# Patient Record
Sex: FEMALE | Race: WHITE | Employment: OTHER | URBAN - NONMETROPOLITAN AREA
[De-identification: names, ages, dates, MRNs, and addresses within clinical notes are randomized per-mention and may not be internally consistent; named-entity substitution may affect disease eponyms.]

---

## 2017-10-06 ENCOUNTER — OFFICE VISIT (OUTPATIENT)
Dept: PRIMARY CARE CLINIC | Age: 47
End: 2017-10-06
Payer: MEDICARE

## 2017-10-06 VITALS
BODY MASS INDEX: 43.4 KG/M2 | HEART RATE: 100 BPM | TEMPERATURE: 97.6 F | HEIGHT: 69 IN | DIASTOLIC BLOOD PRESSURE: 74 MMHG | SYSTOLIC BLOOD PRESSURE: 122 MMHG | OXYGEN SATURATION: 95 % | WEIGHT: 293 LBS

## 2017-10-06 DIAGNOSIS — J06.9 UPPER RESPIRATORY TRACT INFECTION, UNSPECIFIED TYPE: ICD-10-CM

## 2017-10-06 DIAGNOSIS — R05.9 COUGH: ICD-10-CM

## 2017-10-06 DIAGNOSIS — J06.9 UPPER RESPIRATORY TRACT INFECTION, UNSPECIFIED TYPE: Primary | ICD-10-CM

## 2017-10-06 PROCEDURE — 4004F PT TOBACCO SCREEN RCVD TLK: CPT | Performed by: NURSE PRACTITIONER

## 2017-10-06 PROCEDURE — 99213 OFFICE O/P EST LOW 20 MIN: CPT | Performed by: NURSE PRACTITIONER

## 2017-10-06 PROCEDURE — G8427 DOCREV CUR MEDS BY ELIG CLIN: HCPCS | Performed by: NURSE PRACTITIONER

## 2017-10-06 PROCEDURE — G8484 FLU IMMUNIZE NO ADMIN: HCPCS | Performed by: NURSE PRACTITIONER

## 2017-10-06 PROCEDURE — G8419 CALC BMI OUT NRM PARAM NOF/U: HCPCS | Performed by: NURSE PRACTITIONER

## 2017-10-06 RX ORDER — AMOXICILLIN AND CLAVULANATE POTASSIUM 875; 125 MG/1; MG/1
1 TABLET, FILM COATED ORAL 2 TIMES DAILY
Qty: 20 TABLET | Refills: 0 | Status: SHIPPED | OUTPATIENT
Start: 2017-10-06 | End: 2017-10-16

## 2017-10-06 RX ORDER — GUAIFENESIN AND CODEINE PHOSPHATE 100; 10 MG/5ML; MG/5ML
5 SOLUTION ORAL 4 TIMES DAILY PRN
Qty: 180 ML | Refills: 0 | Status: SHIPPED | OUTPATIENT
Start: 2017-10-06 | End: 2017-10-13

## 2017-10-06 RX ORDER — AMOXICILLIN AND CLAVULANATE POTASSIUM 875; 125 MG/1; MG/1
1 TABLET, FILM COATED ORAL 2 TIMES DAILY
Qty: 20 TABLET | Refills: 0 | Status: SHIPPED | OUTPATIENT
Start: 2017-10-06 | End: 2017-10-06 | Stop reason: SDUPTHER

## 2017-10-06 ASSESSMENT — ENCOUNTER SYMPTOMS
COUGH: 1
ABDOMINAL PAIN: 0
DIARRHEA: 0
SORE THROAT: 1
RHINORRHEA: 1
CONSTIPATION: 0
WHEEZING: 1
NAUSEA: 1
EYE DISCHARGE: 0
SINUS PRESSURE: 1
BLOOD IN STOOL: 0
EYE REDNESS: 0
TROUBLE SWALLOWING: 0

## 2017-10-06 NOTE — PROGRESS NOTES
[address]  [place], [address] [place] [address] Rd  Phone [phone]   Fax [phone]      OFFICE VISIT: 10/6/2017    Karin Brunner is a 52 y.o. female who presents today for her medical conditions/complaints as noted below. Karin Brunner is c/o of Cough (green); Congestion; Generalized Body Aches; and Nausea & Vomiting        HPI:     Cough   This is a new problem. The current episode started in the past 7 days. The problem has been gradually worsening. Associated symptoms include nasal congestion, postnasal drip, rhinorrhea, a sore throat and wheezing. Pertinent negatives include no chest pain, eye redness or rash. Nausea & Vomiting   This is a new problem. The current episode started in the past 7 days. Associated symptoms include congestion, coughing, nausea and a sore throat. Pertinent negatives include no abdominal pain, chest pain, rash or weakness. Past Medical History:   Diagnosis Date    Depression     Diabetes ([facility]     Hypertension     Hypothyroidism     RA (rheumatoid arthritis) ([facility]       Past Surgical History:   Procedure Laterality Date    CHOLECYSTECTOMY      TOTAL KNEE ARTHROPLASTY      x2       History reviewed. No pertinent family history. Social History   Substance Use Topics    Smoking status: Current Every Day Smoker     Packs/day: 1.00     Years: 28.00    Smokeless tobacco: Never Used    Alcohol use Yes      Comment: occ      Current Outpatient Prescriptions   Medication Sig Dispense Refill    fluticasone (VERAMYST) 27.5 MCG/SPRAY nasal spray 2 sprays by Nasal route daily      guaiFENesin-codeine (CHERATUSSIN AC) 100-10 MG/5ML syrup Take 5 mLs by mouth 4 times daily as needed for Cough 180 mL 0    Blood Glucose Monitoring Suppl ERASTO Use to check blood sugar.  1 Device 0    amoxicillin-clavulanate (AUGMENTIN) 875-125 MG per tablet Take 1 tablet by mouth 2 times daily for 10 days 20 tablet 0    tiZANidine (ZANAFLEX) 4 MG tablet Take 2 tablets by mouth 3 times daily      visit.       Electronically signed by ANJANA Bertrand on 10/6/2017 at 2:57 PM

## 2017-10-13 DIAGNOSIS — R05.9 COUGH: ICD-10-CM

## 2017-10-13 RX ORDER — GUAIFENESIN AND CODEINE PHOSPHATE 100; 10 MG/5ML; MG/5ML
5 SOLUTION ORAL 4 TIMES DAILY PRN
Qty: 180 ML | Refills: 0 | OUTPATIENT
Start: 2017-10-13 | End: 2017-10-20

## 2017-10-13 RX ORDER — DEXTROMETHORPHAN HYDROBROMIDE AND PROMETHAZINE HYDROCHLORIDE 15; 6.25 MG/5ML; MG/5ML
5 SYRUP ORAL 4 TIMES DAILY PRN
Qty: 180 ML | Refills: 0 | Status: SHIPPED | OUTPATIENT
Start: 2017-10-13 | End: 2017-10-20

## 2017-12-23 ENCOUNTER — ANESTHESIA EVENT (OUTPATIENT)
Dept: OPERATING ROOM | Age: 47
DRG: 270 | End: 2017-12-23
Payer: MEDICARE

## 2017-12-23 ENCOUNTER — APPOINTMENT (OUTPATIENT)
Dept: INTERVENTIONAL RADIOLOGY/VASCULAR | Age: 47
DRG: 270 | End: 2017-12-23
Payer: MEDICARE

## 2017-12-23 ENCOUNTER — HOSPITAL ENCOUNTER (INPATIENT)
Age: 47
LOS: 6 days | Discharge: HOME | DRG: 270 | End: 2017-12-29
Attending: EMERGENCY MEDICINE | Admitting: SURGERY
Payer: MEDICARE

## 2017-12-23 ENCOUNTER — ANESTHESIA (OUTPATIENT)
Dept: OPERATING ROOM | Age: 47
DRG: 270 | End: 2017-12-23
Payer: MEDICARE

## 2017-12-23 ENCOUNTER — HOSPITAL ENCOUNTER (EMERGENCY)
Facility: HOSPITAL | Age: 47
Discharge: ANOTHER HOSPITAL | End: 2017-12-23
Attending: EMERGENCY MEDICINE | Admitting: EMERGENCY MEDICINE

## 2017-12-23 VITALS
HEIGHT: 60 IN | WEIGHT: 136 LBS | OXYGEN SATURATION: 94 % | DIASTOLIC BLOOD PRESSURE: 51 MMHG | TEMPERATURE: 98.5 F | HEART RATE: 76 BPM | RESPIRATION RATE: 20 BRPM | BODY MASS INDEX: 26.7 KG/M2 | SYSTOLIC BLOOD PRESSURE: 106 MMHG

## 2017-12-23 VITALS
OXYGEN SATURATION: 100 % | SYSTOLIC BLOOD PRESSURE: 128 MMHG | DIASTOLIC BLOOD PRESSURE: 97 MMHG | RESPIRATION RATE: 1 BRPM

## 2017-12-23 DIAGNOSIS — I75.021: Primary | ICD-10-CM

## 2017-12-23 DIAGNOSIS — I70.209 ARTERIAL OCCLUSION, LOWER EXTREMITY (HCC): ICD-10-CM

## 2017-12-23 DIAGNOSIS — N17.9 ACUTE RENAL FAILURE, UNSPECIFIED ACUTE RENAL FAILURE TYPE (HCC): ICD-10-CM

## 2017-12-23 DIAGNOSIS — I21.3 ST ELEVATION MYOCARDIAL INFARCTION (STEMI), UNSPECIFIED ARTERY (HCC): Primary | ICD-10-CM

## 2017-12-23 PROBLEM — I99.8 ISCHEMIC LEG: Status: ACTIVE | Noted: 2017-12-23

## 2017-12-23 PROBLEM — I25.10 CAD (CORONARY ARTERY DISEASE): Status: ACTIVE | Noted: 2017-12-23

## 2017-12-23 LAB
ABO/RH: NORMAL
ALBUMIN SERPL-MCNC: 4.4 G/DL (ref 3.5–5.2)
ALP BLD-CCNC: 83 U/L (ref 35–104)
ALT SERPL-CCNC: 40 U/L (ref 5–33)
ANION GAP SERPL CALCULATED.3IONS-SCNC: 14 MMOL/L (ref 4–13)
ANION GAP SERPL CALCULATED.3IONS-SCNC: 18 MMOL/L (ref 7–19)
ANTIBODY SCREEN: NORMAL
APTT PPP: 29.2 SECONDS (ref 24.1–34.8)
APTT: 41.9 SEC (ref 26–36.2)
AST SERPL-CCNC: 240 U/L (ref 5–32)
BASOPHILS ABSOLUTE: 0.1 K/UL (ref 0–0.2)
BASOPHILS RELATIVE PERCENT: 0.3 % (ref 0–1)
BILIRUB SERPL-MCNC: 0.4 MG/DL (ref 0.2–1.2)
BUN BLD-MCNC: 24 MG/DL (ref 5–21)
BUN BLDV-MCNC: 25 MG/DL (ref 6–20)
BUN/CREAT SERPL: 5.3 (ref 7–25)
CALCIUM SERPL-MCNC: 8.9 MG/DL (ref 8.6–10)
CALCIUM SPEC-SCNC: 9.6 MG/DL (ref 8.4–10.4)
CHLORIDE BLD-SCNC: 89 MMOL/L (ref 98–111)
CHLORIDE SERPL-SCNC: 92 MMOL/L (ref 98–110)
CO2 SERPL-SCNC: 28 MMOL/L (ref 24–31)
CO2: 25 MMOL/L (ref 22–29)
CREAT BLD-MCNC: 4.49 MG/DL (ref 0.5–1.4)
CREAT SERPL-MCNC: 4.4 MG/DL (ref 0.5–0.9)
DEPRECATED RDW RBC AUTO: 45.1 FL (ref 40–54)
EOSINOPHILS ABSOLUTE: 0.2 K/UL (ref 0–0.6)
EOSINOPHILS RELATIVE PERCENT: 0.6 % (ref 0–5)
ERYTHROCYTE [DISTWIDTH] IN BLOOD BY AUTOMATED COUNT: 14.5 % (ref 12–15)
GFR NON-AFRICAN AMERICAN: 11
GFR SERPL CREATININE-BSD FRML MDRD: 11 ML/MIN/1.73
GLUCOSE BLD-MCNC: 130 MG/DL (ref 70–100)
GLUCOSE BLD-MCNC: 159 MG/DL (ref 74–109)
HCT VFR BLD AUTO: 43.2 % (ref 37–47)
HCT VFR BLD CALC: 46 % (ref 37–47)
HEMOGLOBIN: 15.3 G/DL (ref 12–16)
HGB BLD-MCNC: 15.1 G/DL (ref 12–16)
INR BLD: 1.01 (ref 0.88–1.18)
INR PPP: 0.92 (ref 0.91–1.09)
LYMPHOCYTES # BLD MANUAL: 0.5 10*3/MM3 (ref 0.72–4.86)
LYMPHOCYTES ABSOLUTE: 4.5 K/UL (ref 1.1–4.5)
LYMPHOCYTES NFR BLD MANUAL: 2 % (ref 15–45)
LYMPHOCYTES NFR BLD MANUAL: 2 % (ref 4–12)
LYMPHOCYTES RELATIVE PERCENT: 17.9 % (ref 20–40)
MCH RBC QN AUTO: 30 PG (ref 27–31)
MCH RBC QN AUTO: 30.3 PG (ref 28–32)
MCHC RBC AUTO-ENTMCNC: 33.3 G/DL (ref 33–37)
MCHC RBC AUTO-ENTMCNC: 35 G/DL (ref 33–36)
MCV RBC AUTO: 86.6 FL (ref 82–98)
MCV RBC AUTO: 90.2 FL (ref 81–99)
METAMYELOCYTES NFR BLD MANUAL: 7 % (ref 0–0)
MONOCYTES # BLD AUTO: 0.5 10*3/MM3 (ref 0.19–1.3)
MONOCYTES ABSOLUTE: 1.7 K/UL (ref 0–0.9)
MONOCYTES RELATIVE PERCENT: 6.8 % (ref 0–10)
NEUTROPHILS # BLD AUTO: 21.34 10*3/MM3 (ref 1.87–8.4)
NEUTROPHILS ABSOLUTE: 18.4 K/UL (ref 1.5–7.5)
NEUTROPHILS NFR BLD MANUAL: 54 % (ref 39–78)
NEUTROPHILS RELATIVE PERCENT: 73.7 % (ref 50–65)
NEUTS BAND NFR BLD MANUAL: 31 % (ref 0–10)
PDW BLD-RTO: 14.3 % (ref 11.5–14.5)
PERFORMED ON: ABNORMAL
PLAT MORPH BLD: NORMAL
PLATELET # BLD AUTO: 353 10*3/MM3 (ref 130–400)
PLATELET # BLD: 329 K/UL (ref 130–400)
PMV BLD AUTO: 10.5 FL (ref 9.4–12.3)
PMV BLD AUTO: 10.8 FL (ref 6–12)
POC TROPONIN I: 32.49 NG/ML (ref 0–0.08)
POTASSIUM BLD-SCNC: 3.5 MMOL/L (ref 3.5–5.3)
POTASSIUM SERPL-SCNC: 3.8 MMOL/L (ref 3.5–5)
PROTHROMBIN TIME: 12.6 SECONDS (ref 11.9–14.6)
PROTHROMBIN TIME: 13.2 SEC (ref 12–14.6)
RBC # BLD AUTO: 4.99 10*6/MM3 (ref 4.2–5.4)
RBC # BLD: 5.1 M/UL (ref 4.2–5.4)
RBC MORPH BLD: NORMAL
SCAN SLIDE: NORMAL
SODIUM BLD-SCNC: 132 MMOL/L (ref 136–145)
SODIUM BLD-SCNC: 134 MMOL/L (ref 135–145)
TOTAL PROTEIN: 8.3 G/DL (ref 6.6–8.7)
VARIANT LYMPHS NFR BLD MANUAL: 4 % (ref 0–5)
WBC # BLD: 24.9 K/UL (ref 4.8–10.8)
WBC MORPH BLD: NORMAL
WBC NRBC COR # BLD: 25.11 10*3/MM3 (ref 4.8–10.8)

## 2017-12-23 PROCEDURE — 93005 ELECTROCARDIOGRAM TRACING: CPT | Performed by: EMERGENCY MEDICINE

## 2017-12-23 PROCEDURE — 6360000002 HC RX W HCPCS: Performed by: SURGERY

## 2017-12-23 PROCEDURE — C1894 INTRO/SHEATH, NON-LASER: HCPCS | Performed by: SURGERY

## 2017-12-23 PROCEDURE — C1769 GUIDE WIRE: HCPCS

## 2017-12-23 PROCEDURE — 85730 THROMBOPLASTIN TIME PARTIAL: CPT | Performed by: EMERGENCY MEDICINE

## 2017-12-23 PROCEDURE — C1769 GUIDE WIRE: HCPCS | Performed by: SURGERY

## 2017-12-23 PROCEDURE — 2709999900 HC NON-CHARGEABLE SUPPLY

## 2017-12-23 PROCEDURE — B2151ZZ FLUOROSCOPY OF LEFT HEART USING LOW OSMOLAR CONTRAST: ICD-10-PCS | Performed by: INTERNAL MEDICINE

## 2017-12-23 PROCEDURE — 99223 1ST HOSP IP/OBS HIGH 75: CPT | Performed by: INTERNAL MEDICINE

## 2017-12-23 PROCEDURE — 93010 ELECTROCARDIOGRAM REPORT: CPT | Performed by: INTERNAL MEDICINE

## 2017-12-23 PROCEDURE — 6360000002 HC RX W HCPCS

## 2017-12-23 PROCEDURE — 02703DZ DILATION OF CORONARY ARTERY, ONE ARTERY WITH INTRALUMINAL DEVICE, PERCUTANEOUS APPROACH: ICD-10-PCS | Performed by: INTERNAL MEDICINE

## 2017-12-23 PROCEDURE — 37211 THROMBOLYTIC ART THERAPY: CPT | Performed by: SURGERY

## 2017-12-23 PROCEDURE — 85007 BL SMEAR W/DIFF WBC COUNT: CPT | Performed by: EMERGENCY MEDICINE

## 2017-12-23 PROCEDURE — 2580000003 HC RX 258: Performed by: SURGERY

## 2017-12-23 PROCEDURE — 6360000002 HC RX W HCPCS: Performed by: EMERGENCY MEDICINE

## 2017-12-23 PROCEDURE — 80053 COMPREHEN METABOLIC PANEL: CPT

## 2017-12-23 PROCEDURE — 4A023N7 MEASUREMENT OF CARDIAC SAMPLING AND PRESSURE, LEFT HEART, PERCUTANEOUS APPROACH: ICD-10-PCS | Performed by: INTERNAL MEDICINE

## 2017-12-23 PROCEDURE — 2720000000 HC MISC SURG SUPPLY STERILE $0-50

## 2017-12-23 PROCEDURE — B41DYZZ FLUOROSCOPY OF AORTA AND BILATERAL LOWER EXTREMITY ARTERIES USING OTHER CONTRAST: ICD-10-PCS | Performed by: SURGERY

## 2017-12-23 PROCEDURE — 85025 COMPLETE CBC W/AUTO DIFF WBC: CPT | Performed by: EMERGENCY MEDICINE

## 2017-12-23 PROCEDURE — B41F1ZZ FLUOROSCOPY OF RIGHT LOWER EXTREMITY ARTERIES USING LOW OSMOLAR CONTRAST: ICD-10-PCS | Performed by: INTERNAL MEDICINE

## 2017-12-23 PROCEDURE — 3E033PZ INTRODUCTION OF PLATELET INHIBITOR INTO PERIPHERAL VEIN, PERCUTANEOUS APPROACH: ICD-10-PCS | Performed by: INTERNAL MEDICINE

## 2017-12-23 PROCEDURE — 3700000000 HC ANESTHESIA ATTENDED CARE: Performed by: SURGERY

## 2017-12-23 PROCEDURE — 85730 THROMBOPLASTIN TIME PARTIAL: CPT

## 2017-12-23 PROCEDURE — C1887 CATHETER, GUIDING: HCPCS

## 2017-12-23 PROCEDURE — 99291 CRITICAL CARE FIRST HOUR: CPT | Performed by: EMERGENCY MEDICINE

## 2017-12-23 PROCEDURE — 6360000002 HC RX W HCPCS: Performed by: ANESTHESIOLOGY

## 2017-12-23 PROCEDURE — 86850 RBC ANTIBODY SCREEN: CPT

## 2017-12-23 PROCEDURE — 99223 1ST HOSP IP/OBS HIGH 75: CPT | Performed by: SURGERY

## 2017-12-23 PROCEDURE — C1876 STENT, NON-COA/NON-COV W/DEL: HCPCS

## 2017-12-23 PROCEDURE — 85025 COMPLETE CBC W/AUTO DIFF WBC: CPT

## 2017-12-23 PROCEDURE — C1757 CATH, THROMBECTOMY/EMBOLECT: HCPCS | Performed by: SURGERY

## 2017-12-23 PROCEDURE — 2000000000 HC ICU R&B

## 2017-12-23 PROCEDURE — 99285 EMERGENCY DEPT VISIT HI MDM: CPT

## 2017-12-23 PROCEDURE — 36415 COLL VENOUS BLD VENIPUNCTURE: CPT

## 2017-12-23 PROCEDURE — 2720000001 HC MISC SURG SUPPLY STERILE $51-500

## 2017-12-23 PROCEDURE — 75710 ARTERY X-RAYS ARM/LEG: CPT

## 2017-12-23 PROCEDURE — 2500000003 HC RX 250 WO HCPCS

## 2017-12-23 PROCEDURE — 85610 PROTHROMBIN TIME: CPT

## 2017-12-23 PROCEDURE — 93458 L HRT ARTERY/VENTRICLE ANGIO: CPT | Performed by: INTERNAL MEDICINE

## 2017-12-23 PROCEDURE — 2720000001 HC MISC SURG SUPPLY STERILE $51-500: Performed by: SURGERY

## 2017-12-23 PROCEDURE — 7100000000 HC PACU RECOVERY - FIRST 15 MIN: Performed by: SURGERY

## 2017-12-23 PROCEDURE — 86901 BLOOD TYPING SEROLOGIC RH(D): CPT

## 2017-12-23 PROCEDURE — C1887 CATHETER, GUIDING: HCPCS | Performed by: SURGERY

## 2017-12-23 PROCEDURE — 2709999900 HC NON-CHARGEABLE SUPPLY: Performed by: SURGERY

## 2017-12-23 PROCEDURE — 80048 BASIC METABOLIC PNL TOTAL CA: CPT | Performed by: EMERGENCY MEDICINE

## 2017-12-23 PROCEDURE — 92928 PRQ TCAT PLMT NTRAC ST 1 LES: CPT | Performed by: INTERNAL MEDICINE

## 2017-12-23 PROCEDURE — 93005 ELECTROCARDIOGRAM TRACING: CPT

## 2017-12-23 PROCEDURE — 3E05317 INTRODUCTION OF OTHER THROMBOLYTIC INTO PERIPHERAL ARTERY, PERCUTANEOUS APPROACH: ICD-10-PCS | Performed by: SURGERY

## 2017-12-23 PROCEDURE — 86900 BLOOD TYPING SEROLOGIC ABO: CPT

## 2017-12-23 PROCEDURE — 3700000001 HC ADD 15 MINUTES (ANESTHESIA): Performed by: SURGERY

## 2017-12-23 PROCEDURE — 3600000004 HC SURGERY LEVEL 4 BASE: Performed by: SURGERY

## 2017-12-23 PROCEDURE — 2500000003 HC RX 250 WO HCPCS: Performed by: ANESTHESIOLOGY

## 2017-12-23 PROCEDURE — 94664 DEMO&/EVAL PT USE INHALER: CPT

## 2017-12-23 PROCEDURE — B2111ZZ FLUOROSCOPY OF MULTIPLE CORONARY ARTERIES USING LOW OSMOLAR CONTRAST: ICD-10-PCS | Performed by: INTERNAL MEDICINE

## 2017-12-23 PROCEDURE — 3600000014 HC SURGERY LEVEL 4 ADDTL 15MIN: Performed by: SURGERY

## 2017-12-23 PROCEDURE — 7100000001 HC PACU RECOVERY - ADDTL 15 MIN: Performed by: SURGERY

## 2017-12-23 PROCEDURE — 84484 ASSAY OF TROPONIN QUANT: CPT

## 2017-12-23 PROCEDURE — 85610 PROTHROMBIN TIME: CPT | Performed by: EMERGENCY MEDICINE

## 2017-12-23 RX ORDER — METOPROLOL TARTRATE 5 MG/5ML
INJECTION INTRAVENOUS
Status: DISPENSED
Start: 2017-12-23 | End: 2017-12-24

## 2017-12-23 RX ORDER — MORPHINE SULFATE 4 MG/ML
4 INJECTION, SOLUTION INTRAMUSCULAR; INTRAVENOUS EVERY 5 MIN PRN
Status: DISCONTINUED | OUTPATIENT
Start: 2017-12-23 | End: 2017-12-23 | Stop reason: HOSPADM

## 2017-12-23 RX ORDER — METOCLOPRAMIDE HYDROCHLORIDE 5 MG/ML
10 INJECTION INTRAMUSCULAR; INTRAVENOUS
Status: DISCONTINUED | OUTPATIENT
Start: 2017-12-23 | End: 2017-12-23 | Stop reason: HOSPADM

## 2017-12-23 RX ORDER — METOPROLOL TARTRATE 5 MG/5ML
INJECTION INTRAVENOUS PRN
Status: DISCONTINUED | OUTPATIENT
Start: 2017-12-23 | End: 2017-12-23 | Stop reason: SDUPTHER

## 2017-12-23 RX ORDER — MORPHINE SULFATE 4 MG/ML
2 INJECTION, SOLUTION INTRAMUSCULAR; INTRAVENOUS
Status: DISCONTINUED | OUTPATIENT
Start: 2017-12-23 | End: 2017-12-24

## 2017-12-23 RX ORDER — ASPIRIN 81 MG/1
324 TABLET, CHEWABLE ORAL ONCE
Status: COMPLETED | OUTPATIENT
Start: 2017-12-23 | End: 2017-12-23

## 2017-12-23 RX ORDER — LABETALOL HYDROCHLORIDE 5 MG/ML
5 INJECTION, SOLUTION INTRAVENOUS EVERY 10 MIN PRN
Status: DISCONTINUED | OUTPATIENT
Start: 2017-12-23 | End: 2017-12-23 | Stop reason: HOSPADM

## 2017-12-23 RX ORDER — HEPARIN SODIUM 10000 [USP'U]/100ML
INJECTION, SOLUTION INTRAVENOUS CONTINUOUS PRN
Status: DISCONTINUED | OUTPATIENT
Start: 2017-12-23 | End: 2017-12-23 | Stop reason: SDUPTHER

## 2017-12-23 RX ORDER — MEPERIDINE HYDROCHLORIDE 50 MG/ML
12.5 INJECTION INTRAMUSCULAR; INTRAVENOUS; SUBCUTANEOUS EVERY 5 MIN PRN
Status: DISCONTINUED | OUTPATIENT
Start: 2017-12-23 | End: 2017-12-23 | Stop reason: HOSPADM

## 2017-12-23 RX ORDER — ONDANSETRON 2 MG/ML
4 INJECTION INTRAMUSCULAR; INTRAVENOUS EVERY 6 HOURS PRN
Status: DISCONTINUED | OUTPATIENT
Start: 2017-12-23 | End: 2017-12-24 | Stop reason: SDUPTHER

## 2017-12-23 RX ORDER — ACETAMINOPHEN 325 MG/1
650 TABLET ORAL EVERY 4 HOURS PRN
Status: DISCONTINUED | OUTPATIENT
Start: 2017-12-23 | End: 2017-12-29 | Stop reason: HOSPADM

## 2017-12-23 RX ORDER — HEPARIN SODIUM 10000 [USP'U]/100ML
18 INJECTION, SOLUTION INTRAVENOUS CONTINUOUS
Status: DISCONTINUED | OUTPATIENT
Start: 2017-12-23 | End: 2017-12-24

## 2017-12-23 RX ORDER — SODIUM CHLORIDE 9 MG/ML
INJECTION, SOLUTION INTRAVENOUS CONTINUOUS
Status: DISCONTINUED | OUTPATIENT
Start: 2017-12-23 | End: 2017-12-29 | Stop reason: HOSPADM

## 2017-12-23 RX ORDER — SODIUM CHLORIDE 0.9 % (FLUSH) 0.9 %
10 SYRINGE (ML) INJECTION PRN
Status: DISCONTINUED | OUTPATIENT
Start: 2017-12-23 | End: 2017-12-29 | Stop reason: HOSPADM

## 2017-12-23 RX ORDER — ONDANSETRON 2 MG/ML
4 INJECTION INTRAMUSCULAR; INTRAVENOUS EVERY 8 HOURS PRN
Status: DISCONTINUED | OUTPATIENT
Start: 2017-12-23 | End: 2017-12-24

## 2017-12-23 RX ORDER — HEPARIN SODIUM 1000 [USP'U]/ML
5000 INJECTION, SOLUTION INTRAVENOUS; SUBCUTANEOUS ONCE
Status: COMPLETED | OUTPATIENT
Start: 2017-12-23 | End: 2017-12-23

## 2017-12-23 RX ORDER — HYDRALAZINE HYDROCHLORIDE 20 MG/ML
5 INJECTION INTRAMUSCULAR; INTRAVENOUS EVERY 10 MIN PRN
Status: DISCONTINUED | OUTPATIENT
Start: 2017-12-23 | End: 2017-12-23 | Stop reason: HOSPADM

## 2017-12-23 RX ORDER — DIPHENHYDRAMINE HYDROCHLORIDE 50 MG/ML
12.5 INJECTION INTRAMUSCULAR; INTRAVENOUS
Status: DISCONTINUED | OUTPATIENT
Start: 2017-12-23 | End: 2017-12-23 | Stop reason: HOSPADM

## 2017-12-23 RX ORDER — PROMETHAZINE HYDROCHLORIDE 25 MG/ML
6.25 INJECTION, SOLUTION INTRAMUSCULAR; INTRAVENOUS
Status: DISCONTINUED | OUTPATIENT
Start: 2017-12-23 | End: 2017-12-23 | Stop reason: HOSPADM

## 2017-12-23 RX ORDER — SODIUM CHLORIDE 0.9 % (FLUSH) 0.9 %
10 SYRINGE (ML) INJECTION AS NEEDED
Status: DISCONTINUED | OUTPATIENT
Start: 2017-12-23 | End: 2017-12-23 | Stop reason: HOSPADM

## 2017-12-23 RX ORDER — SODIUM CHLORIDE 0.9 % (FLUSH) 0.9 %
10 SYRINGE (ML) INJECTION EVERY 12 HOURS SCHEDULED
Status: DISCONTINUED | OUTPATIENT
Start: 2017-12-23 | End: 2017-12-29 | Stop reason: HOSPADM

## 2017-12-23 RX ORDER — CEFAZOLIN SODIUM 1 G/3ML
INJECTION, POWDER, FOR SOLUTION INTRAMUSCULAR; INTRAVENOUS PRN
Status: DISCONTINUED | OUTPATIENT
Start: 2017-12-23 | End: 2017-12-23 | Stop reason: SDUPTHER

## 2017-12-23 RX ORDER — MORPHINE SULFATE 4 MG/ML
2 INJECTION, SOLUTION INTRAMUSCULAR; INTRAVENOUS EVERY 5 MIN PRN
Status: DISCONTINUED | OUTPATIENT
Start: 2017-12-23 | End: 2017-12-23 | Stop reason: HOSPADM

## 2017-12-23 RX ORDER — SODIUM CHLORIDE 9 MG/ML
INJECTION, SOLUTION INTRAVENOUS CONTINUOUS
Status: DISCONTINUED | OUTPATIENT
Start: 2017-12-23 | End: 2017-12-24

## 2017-12-23 RX ADMIN — HEPARIN SODIUM AND DEXTROSE 18 UNITS/KG/HR: 10000; 5 INJECTION INTRAVENOUS at 20:40

## 2017-12-23 RX ADMIN — METOPROLOL TARTRATE 2.5 MG: 5 INJECTION, SOLUTION INTRAVENOUS at 21:03

## 2017-12-23 RX ADMIN — HEPARIN SODIUM AND DEXTROSE 18 UNITS/KG/HR: 10000; 5 INJECTION INTRAVENOUS at 19:16

## 2017-12-23 RX ADMIN — ALTEPLASE 1 MG/HR: 2.2 INJECTION, POWDER, LYOPHILIZED, FOR SOLUTION INTRAVENOUS at 21:45

## 2017-12-23 RX ADMIN — SODIUM CHLORIDE: 9 INJECTION, SOLUTION INTRAVENOUS at 19:13

## 2017-12-23 RX ADMIN — ASPIRIN 81 MG 324 MG: 81 TABLET ORAL at 16:34

## 2017-12-23 RX ADMIN — HEPARIN SODIUM 5000 UNITS: 1000 INJECTION, SOLUTION INTRAVENOUS; SUBCUTANEOUS at 19:13

## 2017-12-23 RX ADMIN — METOPROLOL TARTRATE 2.5 MG: 5 INJECTION, SOLUTION INTRAVENOUS at 21:40

## 2017-12-23 RX ADMIN — MORPHINE SULFATE 4 MG: 4 INJECTION INTRAVENOUS at 22:27

## 2017-12-23 RX ADMIN — MORPHINE SULFATE 4 MG: 4 INJECTION INTRAVENOUS at 22:53

## 2017-12-23 RX ADMIN — CEFAZOLIN 2000 MG: 1 INJECTION, POWDER, FOR SOLUTION INTRAMUSCULAR; INTRAVENOUS; PARENTERAL at 20:50

## 2017-12-23 ASSESSMENT — PAIN SCALES - GENERAL
PAINLEVEL_OUTOF10: 10
PAINLEVEL_OUTOF10: 10
PAINLEVEL_OUTOF10: 0
PAINLEVEL_OUTOF10: 7

## 2017-12-23 ASSESSMENT — LIFESTYLE VARIABLES: SMOKING_STATUS: 0

## 2017-12-23 ASSESSMENT — ENCOUNTER SYMPTOMS: SHORTNESS OF BREATH: 0

## 2017-12-23 NOTE — ED PROVIDER NOTES
no wheezes. She exhibits no tenderness. Abdominal: Soft. She exhibits no distension. There is no tenderness. There is no guarding. Musculoskeletal:        Legs:  Examination the right lower extremity demonstrates pallor and delayed capillary refill to the right lower extremity. This begins proximal to the ankle and extends all the way to the toes. Capillary refill is approximately 4-5 seconds. There is no palpable dorsalis pedis or posterior tibial pulse. These are not able to be located via Doppler either. Patient reports numbness with palpation of the foot. Popliteal pulse on the right lower extremity is present by Doppler. Examination of the left foot demonstrates an extremity that is warm, capillary refill less than 2 seconds. Dorsalis pedis pulses palpable. Neurological: She is alert and oriented to person, place, and time. She has normal strength. No cranial nerve deficit. Skin: No rash noted. Nursing note and vitals reviewed. DIAGNOSTIC RESULTS     EKG: All EKG's are interpreted by the Emergency Department Physician who either signs or Co-signs this chart in the absence of a cardiologist.    Cindy Rias elevation noted in 2,3 and aVF. There are Q waves present in the inferior leads.         LABS:  Labs Reviewed   CBC WITH AUTO DIFFERENTIAL - Abnormal; Notable for the following:        Result Value    WBC 24.9 (*)     Neutrophils % 73.7 (*)     Lymphocytes % 17.9 (*)     Neutrophils # 18.4 (*)     Monocytes # 1.70 (*)     All other components within normal limits   COMPREHENSIVE METABOLIC PANEL - Abnormal; Notable for the following:     Sodium 132 (*)     Chloride 89 (*)     Glucose 159 (*)     BUN 25 (*)     CREATININE 4.4 (*)     GFR Non- 11 (*)     ALT 40 (*)      (*)     All other components within normal limits   APTT - Abnormal; Notable for the following:     aPTT 41.9 (*)     All other components within normal limits   POCT VENOUS - Abnormal; Notable for the following:

## 2017-12-24 ENCOUNTER — APPOINTMENT (OUTPATIENT)
Dept: GENERAL RADIOLOGY | Age: 47
DRG: 270 | End: 2017-12-24
Payer: MEDICARE

## 2017-12-24 ENCOUNTER — APPOINTMENT (OUTPATIENT)
Dept: INTERVENTIONAL RADIOLOGY/VASCULAR | Age: 47
DRG: 270 | End: 2017-12-24
Payer: MEDICARE

## 2017-12-24 PROBLEM — N17.9 ACUTE RENAL FAILURE SUPERIMPOSED ON CHRONIC KIDNEY DISEASE (HCC): Status: ACTIVE | Noted: 2017-12-24

## 2017-12-24 PROBLEM — I70.229 CRITICAL LOWER LIMB ISCHEMIA (HCC): Status: ACTIVE | Noted: 2017-12-23

## 2017-12-24 PROBLEM — D72.829 LEUKOCYTOSIS: Status: ACTIVE | Noted: 2017-12-24

## 2017-12-24 PROBLEM — Z72.0 TOBACCO USE: Status: ACTIVE | Noted: 2017-12-24

## 2017-12-24 PROBLEM — N18.9 ACUTE RENAL FAILURE SUPERIMPOSED ON CHRONIC KIDNEY DISEASE (HCC): Status: ACTIVE | Noted: 2017-12-24

## 2017-12-24 PROBLEM — E87.1 HYPONATREMIA: Status: ACTIVE | Noted: 2017-12-24

## 2017-12-24 LAB
AMORPHOUS: ABNORMAL /HPF
ANION GAP SERPL CALCULATED.3IONS-SCNC: 15 MMOL/L (ref 7–19)
ANION GAP SERPL CALCULATED.3IONS-SCNC: 15 MMOL/L (ref 7–19)
APTT: 30.4 SEC (ref 26–36.2)
APTT: 31.1 SEC (ref 26–36.2)
APTT: 34.7 SEC (ref 26–36.2)
BACTERIA: ABNORMAL /HPF
BILIRUBIN URINE: NEGATIVE
BLOOD, URINE: ABNORMAL
BUN BLDV-MCNC: 30 MG/DL (ref 6–20)
BUN BLDV-MCNC: 32 MG/DL (ref 6–20)
CALCIUM SERPL-MCNC: 8.5 MG/DL (ref 8.6–10)
CALCIUM SERPL-MCNC: 8.6 MG/DL (ref 8.6–10)
CASTS UA: ABNORMAL /LPF
CHLORIDE BLD-SCNC: 90 MMOL/L (ref 98–111)
CHLORIDE BLD-SCNC: 94 MMOL/L (ref 98–111)
CHOLESTEROL, TOTAL: 131 MG/DL (ref 160–199)
CLARITY: ABNORMAL
CO2: 24 MMOL/L (ref 22–29)
CO2: 25 MMOL/L (ref 22–29)
COLOR: YELLOW
CREAT SERPL-MCNC: 3 MG/DL (ref 0.5–0.9)
CREAT SERPL-MCNC: 4.4 MG/DL (ref 0.5–0.9)
CREATININE URINE: 178.5 MG/DL (ref 4.2–622)
EOSINOPHIL,URINE: NORMAL
EPITHELIAL CELLS, UA: ABNORMAL /HPF
FIBRINOGEN: 420 MG/DL (ref 238–505)
FIBRINOGEN: 536 MG/DL (ref 238–505)
GFR NON-AFRICAN AMERICAN: 11
GFR NON-AFRICAN AMERICAN: 17
GLUCOSE BLD-MCNC: 122 MG/DL (ref 70–99)
GLUCOSE BLD-MCNC: 140 MG/DL (ref 70–99)
GLUCOSE BLD-MCNC: 140 MG/DL (ref 74–109)
GLUCOSE BLD-MCNC: 162 MG/DL (ref 74–109)
GLUCOSE BLD-MCNC: 191 MG/DL (ref 70–99)
GLUCOSE URINE: NEGATIVE MG/DL
HBA1C MFR BLD: 6.3 %
HCT VFR BLD CALC: 41.5 % (ref 37–47)
HCT VFR BLD CALC: 43.5 % (ref 37–47)
HCT VFR BLD CALC: 45 % (ref 37–47)
HDLC SERPL-MCNC: 28 MG/DL (ref 65–121)
HEMOGLOBIN: 13.9 G/DL (ref 12–16)
HEMOGLOBIN: 14.2 G/DL (ref 12–16)
HEMOGLOBIN: 14.4 G/DL (ref 12–16)
KETONES, URINE: NEGATIVE MG/DL
LDL CHOLESTEROL CALCULATED: 58 MG/DL
LEUKOCYTE ESTERASE, URINE: NEGATIVE
MAGNESIUM: 1.5 MG/DL (ref 1.6–2.6)
MCH RBC QN AUTO: 30.3 PG (ref 27–31)
MCH RBC QN AUTO: 30.6 PG (ref 27–31)
MCH RBC QN AUTO: 30.8 PG (ref 27–31)
MCHC RBC AUTO-ENTMCNC: 31.6 G/DL (ref 33–37)
MCHC RBC AUTO-ENTMCNC: 33.1 G/DL (ref 33–37)
MCHC RBC AUTO-ENTMCNC: 33.5 G/DL (ref 33–37)
MCV RBC AUTO: 90.6 FL (ref 81–99)
MCV RBC AUTO: 92.6 FL (ref 81–99)
MCV RBC AUTO: 97.6 FL (ref 81–99)
NITRITE, URINE: NEGATIVE
PDW BLD-RTO: 14.3 % (ref 11.5–14.5)
PDW BLD-RTO: 14.4 % (ref 11.5–14.5)
PDW BLD-RTO: 14.6 % (ref 11.5–14.5)
PERFORMED ON: ABNORMAL
PH UA: 5
PHOSPHORUS: 5 MG/DL (ref 2.5–4.5)
PLATELET # BLD: 275 K/UL (ref 130–400)
PLATELET # BLD: 290 K/UL (ref 130–400)
PLATELET # BLD: 302 K/UL (ref 130–400)
PMV BLD AUTO: 10.8 FL (ref 9.4–12.3)
POTASSIUM SERPL-SCNC: 3.6 MMOL/L (ref 3.5–5)
POTASSIUM SERPL-SCNC: 4 MMOL/L (ref 3.5–5)
PROTEIN UA: 100 MG/DL
RBC # BLD: 4.58 M/UL (ref 4.2–5.4)
RBC # BLD: 4.61 M/UL (ref 4.2–5.4)
RBC # BLD: 4.7 M/UL (ref 4.2–5.4)
RBC UA: ABNORMAL /HPF (ref 0–2)
SODIUM BLD-SCNC: 130 MMOL/L (ref 136–145)
SODIUM BLD-SCNC: 133 MMOL/L (ref 136–145)
SODIUM URINE: 60 MMOL/L
SPECIFIC GRAVITY UA: 1.04
TRIGL SERPL-MCNC: 224 MG/DL (ref 0–149)
TSH SERPL DL<=0.05 MIU/L-ACNC: 2.17 UIU/ML (ref 0.27–4.2)
UREA NITROGEN, UR: 278 MG/DL
URIC ACID, SERUM: 9.8 MG/DL (ref 2.4–5.7)
UROBILINOGEN, URINE: 0.2 E.U./DL
WBC # BLD: 21.2 K/UL (ref 4.8–10.8)
WBC # BLD: 23.2 K/UL (ref 4.8–10.8)
WBC # BLD: 23.7 K/UL (ref 4.8–10.8)
WBC UA: ABNORMAL /HPF (ref 0–5)

## 2017-12-24 PROCEDURE — 6360000002 HC RX W HCPCS: Performed by: SURGERY

## 2017-12-24 PROCEDURE — 84100 ASSAY OF PHOSPHORUS: CPT

## 2017-12-24 PROCEDURE — 02HV33Z INSERTION OF INFUSION DEVICE INTO SUPERIOR VENA CAVA, PERCUTANEOUS APPROACH: ICD-10-PCS | Performed by: SURGERY

## 2017-12-24 PROCEDURE — 99223 1ST HOSP IP/OBS HIGH 75: CPT | Performed by: HOSPITALIST

## 2017-12-24 PROCEDURE — 83735 ASSAY OF MAGNESIUM: CPT

## 2017-12-24 PROCEDURE — 82948 REAGENT STRIP/BLOOD GLUCOSE: CPT

## 2017-12-24 PROCEDURE — 6370000000 HC RX 637 (ALT 250 FOR IP): Performed by: SURGERY

## 2017-12-24 PROCEDURE — 37213 THROMBLYTIC ART/VEN THERAPY: CPT

## 2017-12-24 PROCEDURE — 85730 THROMBOPLASTIN TIME PARTIAL: CPT

## 2017-12-24 PROCEDURE — 87070 CULTURE OTHR SPECIMN AEROBIC: CPT

## 2017-12-24 PROCEDURE — 84550 ASSAY OF BLOOD/URIC ACID: CPT

## 2017-12-24 PROCEDURE — 82570 ASSAY OF URINE CREATININE: CPT

## 2017-12-24 PROCEDURE — 71010 XR CHEST PORTABLE: CPT

## 2017-12-24 PROCEDURE — C1769 GUIDE WIRE: HCPCS

## 2017-12-24 PROCEDURE — 2000000000 HC ICU R&B

## 2017-12-24 PROCEDURE — 2580000003 HC RX 258: Performed by: SURGERY

## 2017-12-24 PROCEDURE — 89050 BODY FLUID CELL COUNT: CPT

## 2017-12-24 PROCEDURE — 3E05317 INTRODUCTION OF OTHER THROMBOLYTIC INTO PERIPHERAL ARTERY, PERCUTANEOUS APPROACH: ICD-10-PCS | Performed by: SURGERY

## 2017-12-24 PROCEDURE — 84540 ASSAY OF URINE/UREA-N: CPT

## 2017-12-24 PROCEDURE — 83036 HEMOGLOBIN GLYCOSYLATED A1C: CPT

## 2017-12-24 PROCEDURE — 36415 COLL VENOUS BLD VENIPUNCTURE: CPT

## 2017-12-24 PROCEDURE — 77001 FLUOROGUIDE FOR VEIN DEVICE: CPT

## 2017-12-24 PROCEDURE — 80048 BASIC METABOLIC PNL TOTAL CA: CPT

## 2017-12-24 PROCEDURE — 6370000000 HC RX 637 (ALT 250 FOR IP): Performed by: HOSPITALIST

## 2017-12-24 PROCEDURE — 6360000002 HC RX W HCPCS: Performed by: INTERNAL MEDICINE

## 2017-12-24 PROCEDURE — 81001 URINALYSIS AUTO W/SCOPE: CPT

## 2017-12-24 PROCEDURE — 85027 COMPLETE CBC AUTOMATED: CPT

## 2017-12-24 PROCEDURE — 85384 FIBRINOGEN ACTIVITY: CPT

## 2017-12-24 PROCEDURE — 36556 INSERT NON-TUNNEL CV CATH: CPT

## 2017-12-24 PROCEDURE — 84300 ASSAY OF URINE SODIUM: CPT

## 2017-12-24 PROCEDURE — 80061 LIPID PANEL: CPT

## 2017-12-24 PROCEDURE — 6360000002 HC RX W HCPCS: Performed by: HOSPITALIST

## 2017-12-24 PROCEDURE — 84443 ASSAY THYROID STIM HORMONE: CPT

## 2017-12-24 PROCEDURE — 2700000000 HC OXYGEN THERAPY PER DAY

## 2017-12-24 PROCEDURE — 99232 SBSQ HOSP IP/OBS MODERATE 35: CPT | Performed by: INTERNAL MEDICINE

## 2017-12-24 PROCEDURE — 37213 THROMBLYTIC ART/VEN THERAPY: CPT | Performed by: SURGERY

## 2017-12-24 RX ORDER — ACETAMINOPHEN 325 MG/1
650 TABLET ORAL EVERY 4 HOURS PRN
Status: DISCONTINUED | OUTPATIENT
Start: 2017-12-24 | End: 2017-12-29 | Stop reason: HOSPADM

## 2017-12-24 RX ORDER — MIRTAZAPINE 15 MG/1
15 TABLET, FILM COATED ORAL NIGHTLY
Status: DISCONTINUED | OUTPATIENT
Start: 2017-12-24 | End: 2017-12-29 | Stop reason: HOSPADM

## 2017-12-24 RX ORDER — PANTOPRAZOLE SODIUM 40 MG/1
40 TABLET, DELAYED RELEASE ORAL
Status: DISCONTINUED | OUTPATIENT
Start: 2017-12-25 | End: 2017-12-29 | Stop reason: HOSPADM

## 2017-12-24 RX ORDER — MIDAZOLAM HYDROCHLORIDE 1 MG/ML
1 INJECTION INTRAMUSCULAR; INTRAVENOUS ONCE
Status: COMPLETED | OUTPATIENT
Start: 2017-12-24 | End: 2017-12-24

## 2017-12-24 RX ORDER — DEXTROSE MONOHYDRATE 50 MG/ML
100 INJECTION, SOLUTION INTRAVENOUS PRN
Status: DISCONTINUED | OUTPATIENT
Start: 2017-12-24 | End: 2017-12-29 | Stop reason: HOSPADM

## 2017-12-24 RX ORDER — MIDAZOLAM HYDROCHLORIDE 1 MG/ML
INJECTION INTRAMUSCULAR; INTRAVENOUS
Status: COMPLETED | OUTPATIENT
Start: 2017-12-24 | End: 2017-12-24

## 2017-12-24 RX ORDER — FENTANYL CITRATE 50 UG/ML
100 INJECTION, SOLUTION INTRAMUSCULAR; INTRAVENOUS ONCE
Status: COMPLETED | OUTPATIENT
Start: 2017-12-24 | End: 2017-12-24

## 2017-12-24 RX ORDER — DEXTROSE MONOHYDRATE 25 G/50ML
12.5 INJECTION, SOLUTION INTRAVENOUS PRN
Status: DISCONTINUED | OUTPATIENT
Start: 2017-12-24 | End: 2017-12-29 | Stop reason: HOSPADM

## 2017-12-24 RX ORDER — MORPHINE SULFATE 10 MG/ML
4 INJECTION, SOLUTION INTRAMUSCULAR; INTRAVENOUS
Status: DISCONTINUED | OUTPATIENT
Start: 2017-12-24 | End: 2017-12-29

## 2017-12-24 RX ORDER — LEVOTHYROXINE SODIUM ANHYDROUS 100 UG/5ML
50 INJECTION, POWDER, LYOPHILIZED, FOR SOLUTION INTRAVENOUS
Status: DISCONTINUED | OUTPATIENT
Start: 2017-12-25 | End: 2017-12-24

## 2017-12-24 RX ORDER — FENTANYL CITRATE 50 UG/ML
INJECTION, SOLUTION INTRAMUSCULAR; INTRAVENOUS
Status: COMPLETED | OUTPATIENT
Start: 2017-12-24 | End: 2017-12-24

## 2017-12-24 RX ORDER — TIZANIDINE 4 MG/1
4 TABLET ORAL EVERY 8 HOURS PRN
COMMUNITY

## 2017-12-24 RX ORDER — PREGABALIN 100 MG/1
200 CAPSULE ORAL 3 TIMES DAILY
COMMUNITY

## 2017-12-24 RX ORDER — MAGNESIUM SULFATE IN WATER 40 MG/ML
2 INJECTION, SOLUTION INTRAVENOUS ONCE
Status: COMPLETED | OUTPATIENT
Start: 2017-12-24 | End: 2017-12-24

## 2017-12-24 RX ORDER — ONDANSETRON 2 MG/ML
4 INJECTION INTRAMUSCULAR; INTRAVENOUS EVERY 8 HOURS PRN
Status: DISCONTINUED | OUTPATIENT
Start: 2017-12-24 | End: 2017-12-29 | Stop reason: HOSPADM

## 2017-12-24 RX ORDER — MORPHINE SULFATE 10 MG/ML
2 INJECTION, SOLUTION INTRAMUSCULAR; INTRAVENOUS
Status: DISCONTINUED | OUTPATIENT
Start: 2017-12-24 | End: 2017-12-24

## 2017-12-24 RX ORDER — MIRTAZAPINE 15 MG/1
15 TABLET, FILM COATED ORAL NIGHTLY
COMMUNITY

## 2017-12-24 RX ORDER — HEPARIN SODIUM 10000 [USP'U]/100ML
500 INJECTION, SOLUTION INTRAVENOUS CONTINUOUS
Status: DISCONTINUED | OUTPATIENT
Start: 2017-12-24 | End: 2017-12-25 | Stop reason: ALTCHOICE

## 2017-12-24 RX ORDER — HEPARIN SODIUM 5000 [USP'U]/ML
INJECTION, SOLUTION INTRAVENOUS; SUBCUTANEOUS
Status: COMPLETED | OUTPATIENT
Start: 2017-12-24 | End: 2017-12-24

## 2017-12-24 RX ORDER — ESOMEPRAZOLE MAGNESIUM 10 MG/1
10 GRANULE, FOR SUSPENSION, EXTENDED RELEASE ORAL DAILY
COMMUNITY

## 2017-12-24 RX ORDER — OXYCODONE HYDROCHLORIDE 5 MG/1
10 TABLET ORAL EVERY 4 HOURS PRN
Status: DISCONTINUED | OUTPATIENT
Start: 2017-12-24 | End: 2017-12-29 | Stop reason: HOSPADM

## 2017-12-24 RX ORDER — NICOTINE POLACRILEX 4 MG
15 LOZENGE BUCCAL PRN
Status: DISCONTINUED | OUTPATIENT
Start: 2017-12-24 | End: 2017-12-29 | Stop reason: HOSPADM

## 2017-12-24 RX ORDER — TIZANIDINE 4 MG/1
4 TABLET ORAL EVERY 8 HOURS PRN
Status: DISCONTINUED | OUTPATIENT
Start: 2017-12-24 | End: 2017-12-29 | Stop reason: HOSPADM

## 2017-12-24 RX ORDER — OXYCODONE HYDROCHLORIDE 5 MG/1
5 TABLET ORAL EVERY 4 HOURS PRN
Status: DISCONTINUED | OUTPATIENT
Start: 2017-12-24 | End: 2017-12-24

## 2017-12-24 RX ORDER — MIDAZOLAM HYDROCHLORIDE 1 MG/ML
INJECTION INTRAMUSCULAR; INTRAVENOUS
Status: DISPENSED
Start: 2017-12-24 | End: 2017-12-24

## 2017-12-24 RX ADMIN — MIDAZOLAM HYDROCHLORIDE 1 MG: 1 INJECTION, SOLUTION INTRAMUSCULAR; INTRAVENOUS at 10:26

## 2017-12-24 RX ADMIN — CEFAZOLIN 1 G: 1 INJECTION, POWDER, FOR SOLUTION INTRAMUSCULAR; INTRAVENOUS; PARENTERAL at 10:27

## 2017-12-24 RX ADMIN — FENTANYL CITRATE 50 MCG: 50 INJECTION, SOLUTION INTRAMUSCULAR; INTRAVENOUS at 10:26

## 2017-12-24 RX ADMIN — MORPHINE SULFATE 2 MG: 10 INJECTION INTRAVENOUS at 08:43

## 2017-12-24 RX ADMIN — MORPHINE SULFATE 2 MG: 10 INJECTION INTRAVENOUS at 17:48

## 2017-12-24 RX ADMIN — PREGABALIN 200 MG: 150 CAPSULE ORAL at 20:29

## 2017-12-24 RX ADMIN — Medication 10 ML: at 21:05

## 2017-12-24 RX ADMIN — FENTANYL CITRATE 50 MCG: 50 INJECTION, SOLUTION INTRAMUSCULAR; INTRAVENOUS at 10:37

## 2017-12-24 RX ADMIN — INSULIN LISPRO 1 UNITS: 100 INJECTION, SOLUTION INTRAVENOUS; SUBCUTANEOUS at 17:54

## 2017-12-24 RX ADMIN — MORPHINE SULFATE 4 MG: 10 INJECTION INTRAVENOUS at 21:05

## 2017-12-24 RX ADMIN — ALTEPLASE 1 MG/HR: 2.2 INJECTION, POWDER, LYOPHILIZED, FOR SOLUTION INTRAVENOUS at 20:41

## 2017-12-24 RX ADMIN — HEPARIN SODIUM AND DEXTROSE 500 UNITS/HR: 10000; 5 INJECTION INTRAVENOUS at 01:28

## 2017-12-24 RX ADMIN — SODIUM CHLORIDE: 9 INJECTION, SOLUTION INTRAVENOUS at 01:43

## 2017-12-24 RX ADMIN — ALTEPLASE 1 MG/HR: 2.2 INJECTION, POWDER, LYOPHILIZED, FOR SOLUTION INTRAVENOUS at 12:00

## 2017-12-24 RX ADMIN — MIDAZOLAM HYDROCHLORIDE 1 MG: 1 INJECTION, SOLUTION INTRAMUSCULAR; INTRAVENOUS at 11:00

## 2017-12-24 RX ADMIN — MIRTAZAPINE 15 MG: 15 TABLET, FILM COATED ORAL at 20:30

## 2017-12-24 RX ADMIN — FENTANYL CITRATE 100 MCG: 50 INJECTION, SOLUTION INTRAMUSCULAR; INTRAVENOUS at 19:10

## 2017-12-24 RX ADMIN — HEPARIN SODIUM AND DEXTROSE 500 UNITS/HR: 10000; 5 INJECTION INTRAVENOUS at 13:52

## 2017-12-24 RX ADMIN — OXYCODONE HYDROCHLORIDE 5 MG: 5 TABLET ORAL at 13:51

## 2017-12-24 RX ADMIN — Medication 10 ML: at 20:30

## 2017-12-24 RX ADMIN — MAGNESIUM SULFATE HEPTAHYDRATE 2 G: 40 INJECTION, SOLUTION INTRAVENOUS at 19:23

## 2017-12-24 RX ADMIN — Medication 10 ML: at 05:15

## 2017-12-24 RX ADMIN — PREGABALIN 200 MG: 150 CAPSULE ORAL at 13:51

## 2017-12-24 RX ADMIN — MORPHINE SULFATE 2 MG: 10 INJECTION INTRAVENOUS at 05:14

## 2017-12-24 RX ADMIN — ALTEPLASE 10 MG: 2.2 INJECTION, POWDER, LYOPHILIZED, FOR SOLUTION INTRAVENOUS at 10:44

## 2017-12-24 RX ADMIN — TIZANIDINE 4 MG: 4 TABLET ORAL at 18:56

## 2017-12-24 RX ADMIN — MIDAZOLAM 1 MG: 1 INJECTION INTRAMUSCULAR; INTRAVENOUS at 08:47

## 2017-12-24 RX ADMIN — HEPARIN SODIUM 5000 UNITS: 5000 INJECTION, SOLUTION INTRAVENOUS; SUBCUTANEOUS at 10:40

## 2017-12-24 ASSESSMENT — PAIN SCALES - GENERAL
PAINLEVEL_OUTOF10: 6
PAINLEVEL_OUTOF10: 10
PAINLEVEL_OUTOF10: 0
PAINLEVEL_OUTOF10: 8
PAINLEVEL_OUTOF10: 10
PAINLEVEL_OUTOF10: 4
PAINLEVEL_OUTOF10: 0
PAINLEVEL_OUTOF10: 2
PAINLEVEL_OUTOF10: 8
PAINLEVEL_OUTOF10: 0
PAINLEVEL_OUTOF10: 2

## 2017-12-24 ASSESSMENT — ENCOUNTER SYMPTOMS
VOMITING: 0
SHORTNESS OF BREATH: 1
NAUSEA: 0
ABDOMINAL PAIN: 0
CHEST TIGHTNESS: 1

## 2017-12-24 ASSESSMENT — PAIN DESCRIPTION - PAIN TYPE: TYPE: SURGICAL PAIN

## 2017-12-24 ASSESSMENT — PAIN DESCRIPTION - LOCATION: LOCATION: BACK

## 2017-12-24 ASSESSMENT — PAIN SCALES - WONG BAKER
WONGBAKER_NUMERICALRESPONSE: 0

## 2017-12-24 NOTE — PROCEDURES
fraction of 40%  5.  99% lesion in the mid RCA, subtotally occluded with thrombus  6. Successful percutaneous coronary intervention utilizing a single bare metal stent to the mid RCA reestablishing BRIE-3 flow      RECOMMENDATIONS:    1. Risk Factor Modification  2. On-going Medical Therapy  3. Dual antiplatelet therapy for one month.       Electronically signed by Marybeth Ordonez MD on 12/23/17 at 8:15 PM

## 2017-12-24 NOTE — H&P
[address] [facility] - History & Physical    [id number]  PCP: ANJANA Artis  Date of Admission: 12/23/2017   Date of Service: Pt seen/examined on12/24/2017 and Admitted to Inpatient with expected LOS greater than two midnights due to medical therapy. Chief Complaint:  Severe back and leg pain    History Of Present Illness: The patient is a 52 y.o. female who presented to [facility] ED complaining of a cold and painful right foot and leg. She also was having persistent chest pain with evidence of an acute MI and was transferred for emergent evaluation to our ED for possible STEMI. After careful consideration given her Creatinine of 4.4 she underwent heart catheterization with placement of bare metal stent to the RCA. Afterwards vascular surgery performed angio with lysis catheter placement and tPA + heparin are infusing in left femoral catheter. Now she is going for permacath placement and reimaging to assess lysis. Right PT is + on doppler, cannot detect DP pulse. Foot looks improved apparently compared to lastnight however is still cold with some mottling. Her most pressing complaint this morning is severe chronic back pain and being unable to lie still in the bed. Also complicating her situation is a thrombus in the aorta. She continues to be at high risk for limb loss and further renal failure. Hospitalist services consulted for management of her other chronic medical conditions including DM, hypothyroidism, morbid obesity with BMI 48.1, GERD, RA and chronic back pain. Currently no CP/SOA/N/V.     Past Medical History:        Diagnosis Date    Chronic back pain     [facility] mellitus ([facility]     GERD (gastroesophageal reflux disease)     HTN (hypertension)     Hypothyroidism     Insomnia     Ischemic leg 12/23/2017    Morbid obesity with body mass index (BMI) of 45.0 to 49.9 in adult [facility])     PVD (peripheral vascular disease) ([facility]     Rheumatoid arthritis ([facility]  Tobacco use        Past Surgical History:        Procedure Laterality Date    CARDIAC CATHETERIZATION      CHOLECYSTECTOMY      FOOT SURGERY Right     KNEE SURGERY Bilateral        Home Medications:  Prior to Admission medications    Medication Sig Start Date End Date Taking? Authorizing Provider   pregabalin (LYRICA) 100 MG capsule Take 200 mg by mouth 3 times daily . Yes Historical Provider, MD   Esomeprazole Magnesium 10 MG PACK Take 10 mg by mouth daily   Yes Historical Provider, MD   tiZANidine (ZANAFLEX) 4 MG tablet Take 4 mg by mouth every 8 hours as needed   Yes Historical Provider, MD   metFORMIN (GLUCOPHAGE) 1000 MG tablet Take 1,000 mg by mouth 2 times daily (with meals)   Yes Historical Provider, MD   mirtazapine (REMERON) 15 MG tablet Take 15 mg by mouth nightly   Yes Historical Provider, MD       Allergies:    Review of patient's allergies indicates no known allergies. Social History:    The patient currently lives on her own. She is disabled for psychiatric and physical reasons. Tobacco:   reports that she has been smoking. She has been smoking about 1.00 pack per day. She has never used smokeless tobacco.  Alcohol:   reports that she does not drink alcohol.   Illicit Drugs: denies    Family History:      Problem Relation Age of Onset    Heart Disease Mother     COPD Mother     Cancer Mother     Rheum Arthritis Mother     Cancer Father        Review of Systems:   Constitutional / general:  No fever / chills / sweats  Head:  No headache / neck stiffness / trauma / visual change  Eyes:  No blurry vision / acute visual change or loss / itching / redness  ENT: No sore throat / hoarseness / nasal drainage / ear pain  CV:  No chest pain / palpitations/ orthopnea   Respiratory:  No cough / shortness of breath / sputum / hemoptysis  GI: No nausea / vomiting / abdominal pain / diarrhea / constipation  :  No dysuria / hesitancy / urgency / hematuria   Neuro: No paralysis / syncope / Hyponatremia--follow    Morbid obesity with body mass index (BMI) of 45.0 to 49.9 in adult     Chronic back pain--prn opiates, resume lyrica    GERD--protonix    HTN --currently normotensive    Hypothyroidism--synthroid, confirm dose with her pharmacy    Tobacco use--encouraged cessation    Leukocytosis--stress reactive    Dyslipidemia--check FLP    Thanks for consultation I will be happy to follow with you.     Lucía Champion DO  12/24/2017

## 2017-12-24 NOTE — CONSULTS
Father                    Family history negative for kidney disease. Social History     Social History    Marital status: Legally    Spouse name:                       Years of education:                 Number of children:               Occupational History  Occupation          Employer            Comment               Disabled due to ps*                         Social History Main Topics    Smoking status: Current Every Day Smoker                                                     Packs/day: 1.00      Years: 0.00        Smokeless tobacco: Never Used                        Alcohol use: No              Drug use: No              Sexual activity: No                       Physical Exam    Blood pressure (!) 106/49, pulse 80, temperature 98.4 °F (36.9 °C), resp. rate 17, height 5' 9\" (1.753 m), weight (!) 325 lb (147.4 kg), SpO2 94 %. General:  NAD, A+Ox3, ill-appearing, normal body habitus  HEENT:  PERRL, EOMI  Neck:  Supple, normal range of movement  Chest:  CTAB, good respiratory effort, good air movement  CV:  RRR, no murmurs   Abdomen:   Morbid;y obese, non-tender, +BS, difficult to assess for hepatosplenomegaly  Extremities:  No peripheral edema  Neurological:  Moving all four extremities, decreased sensation in right foot  Lymphatics:  No palpable lymph nodes   Skin:  No rash, no jaundice  Psychiatric:  Normal insight and judgement, good recall    Data                    12/23/17 12/24/17 12/24/17                       1735          0346          1300          WBC          24.9*        21.2*        23.2*         HGB          15.3         14.4         14.2          HCT          46.0         43.5         45.0          MCV          90.2         92.6         97.6          PLT          329          302          290                           12/23/17 12/24/17                       1735          0346          NA           132*         130*          K            3.8          3.6 CL           89*          90*           CO2          25           25            GLUCOSE      159*         140*          PHOS          --          5.0*          MG            --          1.5*          BUN          25*          30*           CREATININE   4.4*         4.4*          LABGLOM      11*          11*             Assessment:  -TIMMY (likely ATN vs renal infarction)  -Type 2 DM  -Benign HTN  -Ischemic right leg   -Inferior STEMI  -Morbid obesity  -hyponatremia    Plan:  Patient was explained risk of contrast induced nephropathy with possible risk of longer TIMMY with possibility of non recovery. At this time, I will ask for a Permcath placement so we can administer dialysis (to control fluid overload, and decrease conc of contrast dye and limit nephrotoxicity). Patient is agreeing to proceed. In the meantime, will order workup and should avoid hypotension and nephrotoxins.      Thank you for asking us to participate in the management of your patient, please do not hesitate to contact me for any concerns regarding my recommendations as outlined above.    -----------------------------  Electronically signed by Rell Tovar MD on 12/24/17 at 3:45 PM

## 2017-12-24 NOTE — PROGRESS NOTES
Lenny Marin is a 52 y.o. female patient. 1. ST elevation myocardial infarction (STEMI), unspecified artery ([facility]    2. Arterial occlusion, lower extremity ([facility]    3. Acute renal failure, unspecified acute renal failure type [facility])      Undergoing thrombolytic therapy for limb threatening ischemia. Also AMI and TIMMY. Plan is return to OR for lysis check, possible further intervention. Discussed with Amy Lainez and Selin. Consult placed to nephrology        Past Medical History:   Diagnosis Date    Chronic back pain     [facility] mellitus [facility]     HTN (hypertension)     Ischemic leg     PVD (peripheral vascular disease) ([facility]     Rheumatoid arthritis ([facility])      No past surgical history pertinent negatives on file. Scheduled Meds:   midazolam        sodium chloride flush  10 mL Intravenous 2 times per day     Continuous Infusions:   heparin (porcine) 500 Units/hr (12/24/17 0128)    sodium chloride 75 mL/hr at 12/24/17 0143    [MAR Hold] sodium chloride      alteplase (ACTIVASE) 10mg in 0.9% sodium chloride infusion (EKOS catheter) 1 mg/hr (12/24/17 0900)     PRN Meds:morphine, [MAR Hold] sodium chloride flush, acetaminophen, magnesium hydroxide, ondansetron, ondansetron, morphine    No Known Allergies  Active Problems:    CAD (coronary artery disease)    Critical lower limb ischemia    Blood pressure 103/61, pulse 75, temperature 98.4 °F (36.9 °C), resp. rate 15, height 5' 9\" (1.753 m), weight (!) 325 lb (147.4 kg), SpO2 95 %. Subjective:   Diet: NPO. Pain control: Partially controlled. Objective:  Vital signs (most recent): Blood pressure 103/61, pulse 75, temperature 98.4 °F (36.9 °C), resp. rate 15, height 5' 9\" (1.753 m), weight (!) 325 lb (147.4 kg), SpO2 95 %. General appearance: Uncomfortable. Lungs:  Normal effort. Abdomen: Abdomen is soft. Ext- RLE- good popliteal dopler signal, good PT signal just above ankle. Foot still cool, with mttling.  LLE ok      Assessment &

## 2017-12-24 NOTE — CONSULTS
 FOOT SURGERY Right     KNEE SURGERY Bilateral          Home Medications:   Prior to Admission medications    Not on File        Facility Administered Medications:    heparin (porcine)  5,000 Units Intravenous Once    sodium chloride flush  10 mL Intravenous 2 times per day       Allergies:  Review of patient's allergies indicates no known allergies. Social History:       Social History     Social History    Marital status: Legally      Spouse name: N/A    Number of children: N/A    Years of education: N/A     Occupational History    Not on file. Social History Main Topics    Smoking status: Current Every Day Smoker     Packs/day: 1.00    Smokeless tobacco: Never Used    Alcohol use No    Drug use: No    Sexual activity: Not on file     Other Topics Concern    Not on file     Social History Narrative    No narrative on file       Family History:   History reviewed. No pertinent family history. REVIEW OF SYSTEMS:     Except as noted in the HPI, all other systems are negative        PHYSICAL EXAMINATION:     BP (!) 141/74   Pulse 84   Temp 98.5 °F (36.9 °C) (Oral)   Resp 12   Ht 5' 9\" (1.753 m)   Wt (!) 320 lb (145.2 kg)   SpO2 90%   BMI 47.26 kg/m²     GENERAL - well developed and well nourished, in moderate amount of generalized distress  HEENT   PERRLA, Hearing appears normal, conjunctiva and lids are normal, ears and nose appear normal  NECK - no thyromegaly, no JVD, trachea is in the midline  CARDIOVASCULAR  PMI is in the left mid line clavicular position, Normal S1 and S2 with a grade 1/6 systolic murmur. No S3 or S4    PULMONARY  No respiratory distress. scattered wheezes and rales.   Breath sounds in both  lung fields are Decreased  ABDOMEN   soft, non tender, no rebound, no hepatomegaly or splenomegaly  MUSCULOSKELETAL   Prone/Supine, digitals and nails are without clubbing or cyanosis  EXTREMITIES - trace edema, with a cold right foot  NEUROLOGIC - cranial nerves, II-XII, are normal  SKIN - turgor is normal, no rash  PSYCHIATRIC - normal mood and affect, alert and orientated x 3, judgement and insight appear appropriate      LABORATORY EVALUATION & TESTING:    I have personally reviewed and interpreted the results of the following diagnostic testing      EKG and or Telemetry:  which was personally reviewed me:  Sinus rhythm,   Pulse Readings from Last 1 Encounters:   12/23/17 84    bpm,  with Acute changes    Troponin:  positive for myocardial necrosis (  32.49); the creatinine is abnormal 4.4    CBC:   Recent Labs      12/23/17   1735   WBC  24.9*   HGB  15.3   HCT  46.0   MCV  90.2   PLT  329     BMP:   Recent Labs      12/23/17   1735   NA  132*   K  3.8   CL  89*   CO2  25   BUN  25*   CREATININE  4.4*     Cardiac Enzymes:   Recent Labs      12/23/17   1737   TROPONINI  32.49*     PT/INR:   Recent Labs      12/23/17   1735   PROTIME  13.2   INR  1.01     APTT:   Recent Labs      12/23/17   1735   APTT  41.9*     Liver Profile:  Lab Results   Component Value Date     12/23/2017    ALT 40 12/23/2017    BILITOT 0.4 12/23/2017    ALKPHOS 83 12/23/2017   No results found for: CHOL, HDL, TRIG  TSH:  No results found for: TSH  UA: No results found for: NITRITE, COLORU, PHUR, LABCAST, WBCUA, RBCUA, MUCUS, TRICHOMONAS, YEAST, BACTERIA, CLARITYU, SPECGRAV, LEUKOCYTESUR, UROBILINOGEN, BILIRUBINUR, BLOODU, GLUCOSEU, AMORPHOUS          ALL THE CARDIOLOGY PROBLEMS ARE LISTED ABOVE; HOWEVER, THE FOLLOWING SPECIFIC CARDIAC PROBLEMS WERE ADDRESSED AND TREATED DURING THE HOSPITAL VISIT TODAY:                                                                                                                                                                                                                                            MEDICAL DECISION MAKING             Cardiac Specific Problem / Diagnosis  Discussion and Data Reviewed Diagnostic Procedures Ordered Management Options of the situation, understood the risks and possible benefits and what was involved. They gave consent to proceed. 5. The ekg shows inferior lateral Q waves and the POC troponin is 32+. However, she has ongoing chest discomfort. Discussed with patient and family and nursing.     I greatly appreciate the opportunity and your confidence in allowing me to participate in the care of Ilya Alaniz    Electronically signed by Dolores Reyna MD on 12/23/17     [facility]

## 2017-12-24 NOTE — ANESTHESIA PRE PROCEDURE
Department of Anesthesiology  Preprocedure Note       Name:  Kuldeep Escobar   Age:  52 y.o.  :  1970                                          MRN:  [id number]         Date:  2017      Surgeon: Leotis Paget):  Joanne Jacobs MD    Procedure: Procedure(s): FEMORAL POPLITEAL BYPASS    Medications prior to admission:   Prior to Admission medications    Not on File       Current medications:    Current Facility-Administered Medications   Medication Dose Route Frequency Provider Last Rate Last Dose    heparin (porcine) injection 5,000 Units  5,000 Units Intravenous Once Fermín Mcneal MD        heparin 25,000 units in dextrose 5% 250 mL infusion  18 Units/kg/hr (Adjusted) Intravenous Continuous Fermín Mcneal MD        sodium chloride flush 0.9 % injection 10 mL  10 mL Intravenous 2 times per day Joanne Jacobs MD        sodium chloride flush 0.9 % injection 10 mL  10 mL Intravenous PRN Joanne Jacobs MD        acetaminophen (TYLENOL) tablet 650 mg  650 mg Oral Q4H PRN Joanne Jacobs MD        magnesium hydroxide (MILK OF MAGNESIA) 400 MG/5ML suspension 30 mL  30 mL Oral Daily PRN Joanne Jacobs MD        ondansetron [facility]) injection 4 mg  4 mg Intravenous Q6H PRN Joanne Jacobs MD        0.9 % sodium chloride infusion   Intravenous Continuous Joanne Jacobs MD         No current outpatient prescriptions on file. Allergies:  No Known Allergies    Problem List:  There is no problem list on file for this patient.       Past Medical History:        Diagnosis Date    [facility] ([facility]     Ischemic leg     Rheumatoid arthritis ([facility]        Past Surgical History:        Procedure Laterality Date    CHOLECYSTECTOMY      FOOT SURGERY Right     KNEE SURGERY Bilateral        Social History:    Social History   Substance Use Topics    Smoking status: Current Every Day Smoker     Packs/day: 1.00    Smokeless tobacco: Never Used    Alcohol use No                                Ready to quit: Not Answered  Counseling given: Not Answered      Vital Signs (Current):   Vitals:    12/23/17 1727 12/23/17 1824   BP: (!) 141/74    Pulse: 84    Resp: 12    Temp: 98.5 °F (36.9 °C)    TempSrc: Oral    SpO2: 92% 90%   Weight: (!) 320 lb (145.2 kg)    Height: 5' 9\" (1.753 m)                                               BP Readings from Last 3 Encounters:   12/23/17 (!) 141/74       NPO Status:                                                                                 BMI:   Wt Readings from Last 3 Encounters:   12/23/17 (!) 320 lb (145.2 kg)     Body mass index is 47.26 kg/m². CBC:   Lab Results   Component Value Date    WBC 24.9 12/23/2017    RBC 5.10 12/23/2017    HGB 15.3 12/23/2017    HCT 46.0 12/23/2017    MCV 90.2 12/23/2017    RDW 14.3 12/23/2017     12/23/2017       CMP:   Lab Results   Component Value Date     12/23/2017    K 3.8 12/23/2017    CL 89 12/23/2017    CO2 25 12/23/2017    BUN 25 12/23/2017    CREATININE 4.4 12/23/2017    LABGLOM 11 12/23/2017    GLUCOSE 159 12/23/2017    PROT 8.3 12/23/2017    CALCIUM 8.9 12/23/2017    BILITOT 0.4 12/23/2017    ALKPHOS 83 12/23/2017     12/23/2017    ALT 40 12/23/2017       POC Tests: No results for input(s): POCGLU, POCNA, POCK, POCCL, POCBUN, POCHEMO, POCHCT in the last 72 hours.     Coags:   Lab Results   Component Value Date    PROTIME 13.2 12/23/2017    INR 1.01 12/23/2017    APTT 41.9 12/23/2017       HCG (If Applicable): No results found for: PREGTESTUR, PREGSERUM, HCG, HCGQUANT     ABGs: No results found for: PHART, PO2ART, SFB5GJT, PPL0ORF, BEART, S6YGAVXW     Type & Screen (If Applicable):  No results found for: [facility]    Anesthesia Evaluation  Patient summary reviewed and Nursing notes reviewed no history of anesthetic complications:   Airway: Mallampati: II  TM distance: >3 FB   Neck ROM: full  Mouth opening: > = 3 FB Dental: normal exam         Pulmonary:Negative Pulmonary ROS and normal exam  breath sounds clear

## 2017-12-24 NOTE — PROGRESS NOTES
Dr Amy Leiva rounded and talked with the patient. Upon assessment he also did not obtain a PT pulse but the DP was +2 dopplered. The patient complains of pain when the medications wear off, and the patient is able to move toes and she stated she can feel the MD touch her foot but she states \"barely\". He informed the patient that the plan is to go back to the OR in the am around 11. I notified family of approximate time for procedure.     CARMEN CALDERON

## 2017-12-25 ENCOUNTER — APPOINTMENT (OUTPATIENT)
Dept: ULTRASOUND IMAGING | Age: 47
DRG: 270 | End: 2017-12-25
Payer: MEDICARE

## 2017-12-25 ENCOUNTER — APPOINTMENT (OUTPATIENT)
Dept: INTERVENTIONAL RADIOLOGY/VASCULAR | Age: 47
DRG: 270 | End: 2017-12-25
Payer: MEDICARE

## 2017-12-25 LAB
ANION GAP SERPL CALCULATED.3IONS-SCNC: 14 MMOL/L (ref 7–19)
ANION GAP SERPL CALCULATED.3IONS-SCNC: 14 MMOL/L (ref 7–19)
APTT: 110.6 SEC (ref 26–36.2)
APTT: 33.3 SEC (ref 26–36.2)
APTT: 35.2 SEC (ref 26–36.2)
APTT: 35.5 SEC (ref 26–36.2)
BUN BLDV-MCNC: 32 MG/DL (ref 6–20)
BUN BLDV-MCNC: 36 MG/DL (ref 6–20)
CALCIUM SERPL-MCNC: 8.1 MG/DL (ref 8.6–10)
CALCIUM SERPL-MCNC: 8.4 MG/DL (ref 8.6–10)
CHLORIDE BLD-SCNC: 92 MMOL/L (ref 98–111)
CHLORIDE BLD-SCNC: 93 MMOL/L (ref 98–111)
CO2: 25 MMOL/L (ref 22–29)
CO2: 25 MMOL/L (ref 22–29)
CREAT SERPL-MCNC: 2.8 MG/DL (ref 0.5–0.9)
CREAT SERPL-MCNC: 3 MG/DL (ref 0.5–0.9)
FIBRINOGEN: 593 MG/DL (ref 238–505)
GFR NON-AFRICAN AMERICAN: 17
GFR NON-AFRICAN AMERICAN: 18
GLUCOSE BLD-MCNC: 114 MG/DL (ref 74–109)
GLUCOSE BLD-MCNC: 123 MG/DL (ref 70–99)
GLUCOSE BLD-MCNC: 126 MG/DL (ref 70–99)
GLUCOSE BLD-MCNC: 128 MG/DL (ref 74–109)
GLUCOSE BLD-MCNC: 129 MG/DL (ref 70–99)
GLUCOSE BLD-MCNC: 130 MG/DL (ref 70–99)
HCT VFR BLD CALC: 36.7 % (ref 37–47)
HCT VFR BLD CALC: 37.3 % (ref 37–47)
HCT VFR BLD CALC: 38.4 % (ref 37–47)
HCT VFR BLD CALC: 38.7 % (ref 37–47)
HEMOGLOBIN: 12 G/DL (ref 12–16)
HEMOGLOBIN: 12.3 G/DL (ref 12–16)
HEMOGLOBIN: 12.5 G/DL (ref 12–16)
HEMOGLOBIN: 12.7 G/DL (ref 12–16)
MCH RBC QN AUTO: 30 PG (ref 27–31)
MCH RBC QN AUTO: 30.1 PG (ref 27–31)
MCH RBC QN AUTO: 30.2 PG (ref 27–31)
MCH RBC QN AUTO: 30.3 PG (ref 27–31)
MCHC RBC AUTO-ENTMCNC: 32.6 G/DL (ref 33–37)
MCHC RBC AUTO-ENTMCNC: 32.7 G/DL (ref 33–37)
MCHC RBC AUTO-ENTMCNC: 32.8 G/DL (ref 33–37)
MCHC RBC AUTO-ENTMCNC: 33 G/DL (ref 33–37)
MCV RBC AUTO: 91.5 FL (ref 81–99)
MCV RBC AUTO: 91.9 FL (ref 81–99)
MCV RBC AUTO: 92.2 FL (ref 81–99)
MCV RBC AUTO: 92.5 FL (ref 81–99)
MRSA CULTURE ONLY: NORMAL
PDW BLD-RTO: 14.3 % (ref 11.5–14.5)
PDW BLD-RTO: 14.5 % (ref 11.5–14.5)
PERFORMED ON: ABNORMAL
PLATELET # BLD: 235 K/UL (ref 130–400)
PLATELET # BLD: 242 K/UL (ref 130–400)
PLATELET # BLD: 258 K/UL (ref 130–400)
PLATELET # BLD: 264 K/UL (ref 130–400)
PMV BLD AUTO: 10.7 FL (ref 9.4–12.3)
PMV BLD AUTO: 11.2 FL (ref 9.4–12.3)
PMV BLD AUTO: 11.4 FL (ref 9.4–12.3)
PMV BLD AUTO: 11.5 FL (ref 9.4–12.3)
POTASSIUM SERPL-SCNC: 3.9 MMOL/L (ref 3.5–5)
POTASSIUM SERPL-SCNC: 4 MMOL/L (ref 3.5–5)
RBC # BLD: 3.98 M/UL (ref 4.2–5.4)
RBC # BLD: 4.06 M/UL (ref 4.2–5.4)
RBC # BLD: 4.15 M/UL (ref 4.2–5.4)
RBC # BLD: 4.23 M/UL (ref 4.2–5.4)
SODIUM BLD-SCNC: 131 MMOL/L (ref 136–145)
SODIUM BLD-SCNC: 132 MMOL/L (ref 136–145)
WBC # BLD: 21.7 K/UL (ref 4.8–10.8)
WBC # BLD: 22.5 K/UL (ref 4.8–10.8)
WBC # BLD: 23.3 K/UL (ref 4.8–10.8)
WBC # BLD: 24 K/UL (ref 4.8–10.8)

## 2017-12-25 PROCEDURE — 85384 FIBRINOGEN ACTIVITY: CPT

## 2017-12-25 PROCEDURE — 85730 THROMBOPLASTIN TIME PARTIAL: CPT

## 2017-12-25 PROCEDURE — 2720000010 IR ANGIO THROUGH CATHETER FOLLOW UP TRANSCATHETER STUDY

## 2017-12-25 PROCEDURE — 36248 INS CATH ABD/L-EXT ART ADDL: CPT

## 2017-12-25 PROCEDURE — 2700000000 HC OXYGEN THERAPY PER DAY

## 2017-12-25 PROCEDURE — 80048 BASIC METABOLIC PNL TOTAL CA: CPT

## 2017-12-25 PROCEDURE — 36247 INS CATH ABD/L-EXT ART 3RD: CPT

## 2017-12-25 PROCEDURE — 37184 PRIM ART M-THRMBC 1ST VSL: CPT

## 2017-12-25 PROCEDURE — 6360000002 HC RX W HCPCS: Performed by: HOSPITALIST

## 2017-12-25 PROCEDURE — 37185 PRIM ART M-THRMBC SBSQ VSL: CPT

## 2017-12-25 PROCEDURE — 2580000003 HC RX 258: Performed by: SURGERY

## 2017-12-25 PROCEDURE — 04CP3ZZ EXTIRPATION OF MATTER FROM RIGHT ANTERIOR TIBIAL ARTERY, PERCUTANEOUS APPROACH: ICD-10-PCS | Performed by: SURGERY

## 2017-12-25 PROCEDURE — 6370000000 HC RX 637 (ALT 250 FOR IP): Performed by: HOSPITALIST

## 2017-12-25 PROCEDURE — 3E05317 INTRODUCTION OF OTHER THROMBOLYTIC INTO PERIPHERAL ARTERY, PERCUTANEOUS APPROACH: ICD-10-PCS | Performed by: SURGERY

## 2017-12-25 PROCEDURE — 6360000002 HC RX W HCPCS: Performed by: SURGERY

## 2017-12-25 PROCEDURE — 99232 SBSQ HOSP IP/OBS MODERATE 35: CPT | Performed by: HOSPITALIST

## 2017-12-25 PROCEDURE — 2000000000 HC ICU R&B

## 2017-12-25 PROCEDURE — 82948 REAGENT STRIP/BLOOD GLUCOSE: CPT

## 2017-12-25 PROCEDURE — 2580000003 HC RX 258: Performed by: INTERNAL MEDICINE

## 2017-12-25 PROCEDURE — 76770 US EXAM ABDO BACK WALL COMP: CPT

## 2017-12-25 PROCEDURE — 04CT3ZZ EXTIRPATION OF MATTER FROM RIGHT PERONEAL ARTERY, PERCUTANEOUS APPROACH: ICD-10-PCS | Performed by: SURGERY

## 2017-12-25 PROCEDURE — 6370000000 HC RX 637 (ALT 250 FOR IP): Performed by: INTERNAL MEDICINE

## 2017-12-25 PROCEDURE — 85027 COMPLETE CBC AUTOMATED: CPT

## 2017-12-25 PROCEDURE — 37184 PRIM ART M-THRMBC 1ST VSL: CPT | Performed by: SURGERY

## 2017-12-25 PROCEDURE — 6360000004 HC RX CONTRAST MEDICATION: Performed by: SURGERY

## 2017-12-25 PROCEDURE — G0269 OCCLUSIVE DEVICE IN VEIN ART: HCPCS

## 2017-12-25 PROCEDURE — 04CR3ZZ EXTIRPATION OF MATTER FROM RIGHT POSTERIOR TIBIAL ARTERY, PERCUTANEOUS APPROACH: ICD-10-PCS | Performed by: SURGERY

## 2017-12-25 PROCEDURE — 37214 CESSJ THERAPY CATH REMOVAL: CPT

## 2017-12-25 PROCEDURE — 99232 SBSQ HOSP IP/OBS MODERATE 35: CPT | Performed by: INTERNAL MEDICINE

## 2017-12-25 RX ORDER — FENTANYL CITRATE 50 UG/ML
INJECTION, SOLUTION INTRAMUSCULAR; INTRAVENOUS
Status: COMPLETED | OUTPATIENT
Start: 2017-12-25 | End: 2017-12-25

## 2017-12-25 RX ORDER — MIDAZOLAM HYDROCHLORIDE 1 MG/ML
INJECTION INTRAMUSCULAR; INTRAVENOUS
Status: COMPLETED | OUTPATIENT
Start: 2017-12-25 | End: 2017-12-25

## 2017-12-25 RX ORDER — IODIXANOL 320 MG/ML
INJECTION, SOLUTION INTRAVASCULAR
Status: COMPLETED | OUTPATIENT
Start: 2017-12-25 | End: 2017-12-25

## 2017-12-25 RX ORDER — HEPARIN SODIUM 10000 [USP'U]/100ML
17.49 INJECTION, SOLUTION INTRAVENOUS CONTINUOUS
Status: DISCONTINUED | OUTPATIENT
Start: 2017-12-25 | End: 2017-12-28

## 2017-12-25 RX ORDER — HEPARIN SODIUM 1000 [USP'U]/ML
10000 INJECTION, SOLUTION INTRAVENOUS; SUBCUTANEOUS PRN
Status: DISCONTINUED | OUTPATIENT
Start: 2017-12-25 | End: 2017-12-28

## 2017-12-25 RX ORDER — HEPARIN SODIUM 1000 [USP'U]/ML
10000 INJECTION, SOLUTION INTRAVENOUS; SUBCUTANEOUS ONCE
Status: DISCONTINUED | OUTPATIENT
Start: 2017-12-25 | End: 2017-12-28

## 2017-12-25 RX ORDER — HEPARIN SODIUM 1000 [USP'U]/ML
5000 INJECTION, SOLUTION INTRAVENOUS; SUBCUTANEOUS PRN
Status: DISCONTINUED | OUTPATIENT
Start: 2017-12-25 | End: 2017-12-28

## 2017-12-25 RX ORDER — HEPARIN SODIUM 5000 [USP'U]/ML
INJECTION, SOLUTION INTRAVENOUS; SUBCUTANEOUS
Status: COMPLETED | OUTPATIENT
Start: 2017-12-25 | End: 2017-12-25

## 2017-12-25 RX ORDER — ALLOPURINOL 100 MG/1
200 TABLET ORAL DAILY
Status: DISCONTINUED | OUTPATIENT
Start: 2017-12-25 | End: 2017-12-29 | Stop reason: HOSPADM

## 2017-12-25 RX ADMIN — HEPARIN SODIUM 3000 UNITS: 5000 INJECTION, SOLUTION INTRAVENOUS; SUBCUTANEOUS at 11:44

## 2017-12-25 RX ADMIN — IODIXANOL 60 ML: 320 INJECTION, SOLUTION INTRAVASCULAR at 12:44

## 2017-12-25 RX ADMIN — OXYCODONE HYDROCHLORIDE 10 MG: 5 TABLET ORAL at 15:55

## 2017-12-25 RX ADMIN — HEPARIN SODIUM AND DEXTROSE 13.45 UNITS/KG/HR: 10000; 5 INJECTION INTRAVENOUS at 20:38

## 2017-12-25 RX ADMIN — OXYCODONE HYDROCHLORIDE 10 MG: 5 TABLET ORAL at 20:15

## 2017-12-25 RX ADMIN — MORPHINE SULFATE 4 MG: 10 INJECTION INTRAVENOUS at 07:47

## 2017-12-25 RX ADMIN — HEPARIN SODIUM 10000 UNITS: 1000 INJECTION, SOLUTION INTRAVENOUS; SUBCUTANEOUS at 15:38

## 2017-12-25 RX ADMIN — HEPARIN SODIUM AND DEXTROSE 13.49 UNITS/KG/HR: 10000; 5 INJECTION INTRAVENOUS at 14:06

## 2017-12-25 RX ADMIN — MIRTAZAPINE 15 MG: 15 TABLET, FILM COATED ORAL at 20:17

## 2017-12-25 RX ADMIN — Medication 10 ML: at 20:25

## 2017-12-25 RX ADMIN — CEFAZOLIN 2 G: 1 INJECTION, POWDER, FOR SOLUTION INTRAMUSCULAR; INTRAVENOUS; PARENTERAL at 11:25

## 2017-12-25 RX ADMIN — PANTOPRAZOLE SODIUM 40 MG: 40 TABLET, DELAYED RELEASE ORAL at 06:19

## 2017-12-25 RX ADMIN — ALTEPLASE 10 MG/HR: 2.2 INJECTION, POWDER, LYOPHILIZED, FOR SOLUTION INTRAVENOUS at 11:46

## 2017-12-25 RX ADMIN — MORPHINE SULFATE 4 MG: 10 INJECTION INTRAVENOUS at 04:46

## 2017-12-25 RX ADMIN — MIDAZOLAM HYDROCHLORIDE 1 MG: 1 INJECTION, SOLUTION INTRAMUSCULAR; INTRAVENOUS at 11:26

## 2017-12-25 RX ADMIN — FENTANYL CITRATE 50 MCG: 50 INJECTION, SOLUTION INTRAMUSCULAR; INTRAVENOUS at 12:32

## 2017-12-25 RX ADMIN — SODIUM CHLORIDE: 9 INJECTION, SOLUTION INTRAVENOUS at 02:19

## 2017-12-25 RX ADMIN — MIDAZOLAM HYDROCHLORIDE 0.5 MG: 1 INJECTION, SOLUTION INTRAMUSCULAR; INTRAVENOUS at 11:42

## 2017-12-25 RX ADMIN — FENTANYL CITRATE 50 MCG: 50 INJECTION, SOLUTION INTRAMUSCULAR; INTRAVENOUS at 11:42

## 2017-12-25 RX ADMIN — ALLOPURINOL 200 MG: 100 TABLET ORAL at 14:01

## 2017-12-25 RX ADMIN — MORPHINE SULFATE 4 MG: 10 INJECTION INTRAVENOUS at 23:11

## 2017-12-25 RX ADMIN — PREGABALIN 200 MG: 150 CAPSULE ORAL at 20:16

## 2017-12-25 RX ADMIN — ALTEPLASE 1 MG/HR: 2.2 INJECTION, POWDER, LYOPHILIZED, FOR SOLUTION INTRAVENOUS at 08:05

## 2017-12-25 RX ADMIN — MORPHINE SULFATE 4 MG: 10 INJECTION INTRAVENOUS at 00:10

## 2017-12-25 RX ADMIN — MORPHINE SULFATE 4 MG: 10 INJECTION INTRAVENOUS at 18:28

## 2017-12-25 RX ADMIN — FENTANYL CITRATE 25 MCG: 50 INJECTION, SOLUTION INTRAMUSCULAR; INTRAVENOUS at 11:26

## 2017-12-25 RX ADMIN — Medication 10 ML: at 00:10

## 2017-12-25 RX ADMIN — FENTANYL CITRATE 25 MCG: 50 INJECTION, SOLUTION INTRAMUSCULAR; INTRAVENOUS at 11:53

## 2017-12-25 RX ADMIN — FENTANYL CITRATE 25 MCG: 50 INJECTION, SOLUTION INTRAMUSCULAR; INTRAVENOUS at 12:09

## 2017-12-25 RX ADMIN — Medication 10 ML: at 08:36

## 2017-12-25 RX ADMIN — SODIUM CHLORIDE: 9 INJECTION, SOLUTION INTRAVENOUS at 20:27

## 2017-12-25 RX ADMIN — PREGABALIN 200 MG: 150 CAPSULE ORAL at 14:30

## 2017-12-25 RX ADMIN — MIDAZOLAM HYDROCHLORIDE 0.5 MG: 1 INJECTION, SOLUTION INTRAMUSCULAR; INTRAVENOUS at 11:53

## 2017-12-25 RX ADMIN — PREGABALIN 200 MG: 150 CAPSULE ORAL at 08:36

## 2017-12-25 RX ADMIN — FENTANYL CITRATE 25 MCG: 50 INJECTION, SOLUTION INTRAMUSCULAR; INTRAVENOUS at 12:20

## 2017-12-25 RX ADMIN — ALTEPLASE 1 MG/HR: 2.2 INJECTION, POWDER, LYOPHILIZED, FOR SOLUTION INTRAVENOUS at 06:19

## 2017-12-25 ASSESSMENT — PAIN SCALES - GENERAL
PAINLEVEL_OUTOF10: 0
PAINLEVEL_OUTOF10: 7
PAINLEVEL_OUTOF10: 7
PAINLEVEL_OUTOF10: 8
PAINLEVEL_OUTOF10: 2
PAINLEVEL_OUTOF10: 0
PAINLEVEL_OUTOF10: 8
PAINLEVEL_OUTOF10: 3
PAINLEVEL_OUTOF10: 6
PAINLEVEL_OUTOF10: 0
PAINLEVEL_OUTOF10: 8
PAINLEVEL_OUTOF10: 0
PAINLEVEL_OUTOF10: 6
PAINLEVEL_OUTOF10: 6
PAINLEVEL_OUTOF10: 2
PAINLEVEL_OUTOF10: 9

## 2017-12-25 ASSESSMENT — PAIN SCALES - WONG BAKER
WONGBAKER_NUMERICALRESPONSE: 0
WONGBAKER_NUMERICALRESPONSE: 0
WONGBAKER_NUMERICALRESPONSE: 2
WONGBAKER_NUMERICALRESPONSE: 0
WONGBAKER_NUMERICALRESPONSE: 2
WONGBAKER_NUMERICALRESPONSE: 0

## 2017-12-25 NOTE — PROGRESS NOTES
iMchelle Gay is a 52 y.o. female patient. Hospital day #3, postoperative day #2/1. Acute ischemia right lower extremity being treated with thrombolysis. Very complicated situation, recent acute MI treated with percutaneous coronary stenting, also recent acute kidney injury presumed to be embolic at this time. We have made progress with thrombolysis, however, circulation is still far from normal. Going back to OR today for recheck possible AngioJet mechanical thrombectomy and stenting of distal aorta for thrombus. Renal function has improved, good urine output, creatinine down.     Current Facility-Administered Medications   Medication Dose Route Frequency Provider Last Rate Last Dose    heparin 25,000 units in dextrose 5% 250 mL infusion  500 Units/hr Intravenous Continuous Juan Carlos Bashir MD 5 mL/hr at 12/24/17 1352 500 Units/hr at 12/24/17 1352    mirtazapine (REMERON) tablet 15 mg  15 mg Oral Nightly Heber Diallo MD   15 mg at 12/24/17 2030    pregabalin (LYRICA) capsule 200 mg  200 mg Oral TID Heber Diallo MD   200 mg at 12/25/17 0836    tiZANidine (ZANAFLEX) tablet 4 mg  4 mg Oral Q8H PRN Heber Diallo MD   4 mg at 12/24/17 1856    pantoprazole (PROTONIX) tablet 40 mg  40 mg Oral QAM AC eHber Diallo MD   40 mg at 12/25/17 0619    glucose (GLUTOSE) 40 % oral gel 15 g  15 g Oral PRN Heber Diallo MD        dextrose 50 % solution 12.5 g  12.5 g Intravenous PRN Heber Diallo MD        glucagon (rDNA) injection 1 mg  1 mg Intramuscular PRN Heber Diallo MD        dextrose 5 % solution  100 mL/hr Intravenous PRN Heber Diallo MD        insulin lispro (HUMALOG) injection vial 0-6 Units  0-6 Units Subcutaneous TID WC Heber Diallo MD   Stopped at 12/25/17 0748    insulin lispro (HUMALOG) injection vial 0-3 Units  0-3 Units Subcutaneous Nightly Heber Diallo MD        acetaminophen (TYLENOL) tablet 650 mg  650 mg Oral Q4H PRN Juan Carlos Bashir MD       [employer] partially controlled. Objective:  General Appearance:  Comfortable. Vital signs: (most recent): Blood pressure (!) 109/52, pulse 83, temperature 97.4 °F (36.3 °C), temperature source Temporal, resp. rate 11, height 5' 9\" (1.753 m), weight (!) 343 lb 2 oz (155.6 kg), SpO2 95 %. Output: Producing urine. Lungs:  Normal effort. Heart: Normal rate. Abdomen: Abdomen is soft. extremity: Right foot is warmer but distal part of foot still cool and cyanotic but much less so than on admission. Doppler signal over posterior tibial artery. Decreased pain in the foot. Capillary refill in the toes is poor. Assessment & Plan    Slight improvement in vascular status with thrombolysis. Return to the OR today for repeat angiography, possible pharmacal mechanical thrombolysis, possible aortoiliac stenting for thrombus. Renal function has improved, good urine output creatinine down to 2.8 this morning. Discussed with Dr. Valeri England, will hold off on permacath for now. .    Cardiac status stable per Dr. Levi Carvalho.     Katelyn Barboza MD  12/25/2017

## 2017-12-25 NOTE — PROGRESS NOTES
Patient cannot comply with regimen of lying flat. She constantly moving about in the bed, sitting up, rolling over, etc. I have emphasized the importance of lying still and flat with a straight leg multiple times. At this time, the sheath in the left groin is intact and in place with no increase in drainage or bleeding. Pulses with doppler on RLE +2 on PT, absent on Pedal. Palpable pulses on LLE.

## 2017-12-25 NOTE — PROGRESS NOTES
mg Oral QAM AC Eloina Go MD   40 mg at 12/25/17 0619    glucose (GLUTOSE) 40 % oral gel 15 g  15 g Oral PRN Eloina Go MD        dextrose 50 % solution 12.5 g  12.5 g Intravenous PRN Eloina Go MD        glucagon (rDNA) injection 1 mg  1 mg Intramuscular PRN Eloina Go MD        dextrose 5 % solution  100 mL/hr Intravenous PRN Eloina Go MD        insulin lispro (HUMALOG) injection vial 0-6 Units  0-6 Units Subcutaneous TID WC Eloina Go MD   Stopped at 12/25/17 0748    insulin lispro (HUMALOG) injection vial 0-3 Units  0-3 Units Subcutaneous Nightly Eloina Go MD        acetaminophen (TYLENOL) tablet 650 mg  650 mg Oral Q4H PRN Vladimir Esquivel MD        [facility]) injection 4 mg  4 mg Intravenous Q8H PRN Vladimir Esquivel MD        morphine (PF) injection 4 mg  4 mg Intravenous Q3H PRN Eloina Go MD   4 mg at 12/25/17 0747    oxyCODONE (ROXICODONE) immediate release tablet 10 mg  10 mg Oral Q4H PRN Eloina oG MD        sodium chloride flush 0.9 % injection 10 mL  10 mL Intravenous 2 times per day Vladimir Esquivel MD   10 mL at 12/24/17 2030    sodium chloride flush 0.9 % injection 10 mL  10 mL Intravenous PRN Vladimir Esquivel MD   10 mL at 12/25/17 0010    acetaminophen (TYLENOL) tablet 650 mg  650 mg Oral Q4H PRN Vladimir Esquivel MD        magnesium hydroxide (MILK OF MAGNESIA) 400 MG/5ML suspension 30 mL  30 mL Oral Daily PRN Vladimir Esquivel MD        0.9 % sodium chloride infusion   Intravenous Continuous Zandra Guillaume  mL/hr at 12/25/17 0219      alteplase (CATHFLO) 10 mg in sodium chloride 0.9 % 250 mL infusion  1 mg/hr Intracatheter Continuous Vladimir Esquivel MD 25 mL/hr at 12/25/17 0805 1 mg/hr at 12/25/17 0805       Past Medical History:  Past Medical History:   Diagnosis Date    Chronic back pain     Diabetes mellitus ([address]     GERD (gastroesophageal reflux disease)     HTN (hypertension)     Hypothyroidism Chest:  clear to auscultation bilaterally without respiratory distress  CVS: regular rate and rhythm  Abdominal: soft, nontender, normal bowel sounds  Extremities: Residual cyanosis in right foot and mild trace leg edema bilaterally  Skin: No rash, decreased warmth in right foot. Labs:  BMP: Recent Labs      12/24/17 0346 12/24/17 1816 12/25/17   0040  12/25/17   0718   NA  130*  133*  132*  131*   K  3.6  4.0  3.9  4.0   CL  90*  94*  93*  92*   CO2  25 24 25 25   PHOS  5.0*   --    --    --    BUN  30*  32*  32*  36*   CREATININE  4.4*  3.0*  2.8*  3.0*   CALCIUM  8.6  8.5*  8.1*  8.4*     CBC: Recent Labs      12/24/17 1816 12/25/17   0040  12/25/17   0718   WBC  23.7*  24.0*  23.3*   HGB  13.9  12.7  12.3   HCT  41.5  38.7  37.3   MCV  90.6  91.5  91.9   PLT  275  264  258     LIVER PROFILE:   Recent Labs      12/23/17   1735   AST  240*   ALT  40*   BILITOT  0.4   ALKPHOS  83     PT/INR:   Recent Labs      12/23/17   1735   PROTIME  13.2   INR  1.01     APTT:   Recent Labs      12/24/17 1816 12/25/17   0040  12/25/17   0718   APTT  31.1  35.2  35.5     BNP:  No results for input(s): BNP in the last 72 hours. Ionized Calcium:No results for input(s): IONCA in the last 72 hours. Magnesium:  Recent Labs      12/24/17   0346   MG  1.5*     Phosphorus:  Recent Labs      12/24/17   0346   PHOS  5.0*     HgbA1C:   Recent Labs      12/24/17   0346   LABA1C  6.3*     Hepatic: Recent Labs      12/23/17   1735   ALKPHOS  83   ALT  40*   AST  240*   PROT  8.3   BILITOT  0.4   LABALBU  4.4     Lactic Acid: No results for input(s): LACTA in the last 72 hours. Troponin: No results for input(s): CKTOTAL, CKMB, TROPONINT in the last 72 hours. ABGs: No results for input(s): PH, PCO2, PO2, HCO3, O2SAT in the last 72 hours. CRP:  No results for input(s): CRP in the last 72 hours. Sed Rate:  No results for input(s): SEDRATE in the last 72 hours.     Cultures:   No results for input(s): CULTURE in the last 72 hours. Radiology reports as per the Radiologist  Radiology: Xr Chest Portable    Result Date: 12/24/2017  History: 71-year-old female with central line placement. Reference: None. Findings: Frontal chest radiograph performed. Suboptimal inspiration. Heart size is accentuated by technique but likely normal. Lungs are grossly clear. No pleural effusion or pneumothorax. There is a right IJ central line with tip in expected SVC. Impression: Right IJ central line, tip in SVC. No pneumothorax. Signed by Dr Fabian Sarmiento on 12/24/2017 12:07 PM       Assessment     -TIMMY ( ATN vs DONOVAN, vs renal infarction)-renal function has improved some  -Type 2 DM  -Benign HTN  -Ischemic right leg   -Inferior STEMI  -Morbid obesity  -hyponatremia  -Hyperuricemia      Plan:  Continue anticoagulation. Will hold on dialysis for now, and continue with IV hydration. Follow up on renal US. Will add allopurinol.

## 2017-12-25 NOTE — PROGRESS NOTES
/ nasal drainage / ear pain  CV:  No chest pain / palpitations/ orthopnea   Respiratory:  No cough / shortness of breath / sputum / hemoptysis  GI: No nausea / vomiting / abdominal pain / diarrhea / constipation  :  No dysuria / hesitancy / urgency / hematuria   Neuro: No paralysis / syncope / seizure / dysphagia / headache / paresthesias  Musculoskeletal:  No muscle weakness /joint stiffness + back pain  Vascular: No edema / claudication / thrombosis / varicosities  Heme / endocrine: No easy bruising / bleeding / excessive sweating / heat or cold intolerance  Psychiatric:  No depression / anxiety / insomnia / mood changes  Skin:  No new rashes / lesions / skin hair or nail changes      OBJECTIVE:     Vent Information  FiO2 : 21 %  I:E Ratio: 1:2.00 (LOC 0)  PEEP/CPAP: 0.1 (LOC 0)  Mean Airway Pressure: 0 cmH20 (LOC 0)  Additional Respiratory  Assessments  Pulse: 89  Resp: 13  SpO2: 96 %  End Tidal CO2: 0 (%)  Oral Care: Teeth brushed, Mouthwash, Lip moisturizer applied    IV Access: central line  Diet:.carb controlled  Prophylaxis:  DVT - heparin drip  GI - protonix    Current Medications:  Current Facility-Administered Medications   Medication Dose Route Frequency Provider Last Rate Last Dose    allopurinol (ZYLOPRIM) tablet 200 mg  200 mg Oral Daily Rudy Gottron, MD        heparin 25,000 units in dextrose 5% 250 mL infusion  500 Units/hr Intravenous Continuous Clary Collazo MD 5 mL/hr at 12/24/17 1352 500 Units/hr at 12/24/17 1352    mirtazapine (REMERON) tablet 15 mg  15 mg Oral Nightly Farheen Kumar MD   15 mg at 12/24/17 2030    pregabalin (LYRICA) capsule 200 mg  200 mg Oral TID Farheen Kumar MD   200 mg at 12/25/17 0836    tiZANidine (ZANAFLEX) tablet 4 mg  4 mg Oral Q8H PRN Farheen Kumar MD   4 mg at 12/24/17 1856    pantoprazole (PROTONIX) tablet 40 mg  40 mg Oral QAM AC Lucía Champion MD   40 mg at 12/25/17 0619    glucose (GLUTOSE) 40 % oral gel 15 g  15 g Oral PRN Lucía LANDERS input(s): BNP in the last 72 hours. INR:   Recent Labs      12/23/17   1735   INR  1.01     ABGs: No results found for: PHART, PO2ART, OPQ6PYK  UA:  Recent Labs      12/24/17   1430   COLORU  YELLOW   PHUR  5.0   WBCUA  2-4   RBCUA  0-1   BACTERIA  trace   CLARITYU  CLOUDY*   SPECGRAV  1.040   LEUKOCYTESUR  Negative   UROBILINOGEN  0.2   BILIRUBINUR  Negative   BLOODU  LARGE*   GLUCOSEU  Negative   AMORPHOUS  1+*       RAD:   US RENAL COMPLETE [[id number]] Resulted: 12/25/17 1142      Order Status: Completed Updated: 12/25/17 1044     Narrative:       RENAL ULTRASOUND COMPLETE 12/25/2017 8:45 AM  REASON FOR EXAM: Acute kidney injury    COMPARISON: None    TECHNIQUE: Multiple longitudinal and transverse realtime sonographic  images of the kidneys and urinary bladder are obtained. FINDINGS:   The right kidney measures 10.9 cm. The cortical thickness within the  right kidney is 15 mm and 17 mm respectively in the upper and lower  pole.  The right kidney is normal in size, shape, contour and  position. The cortical thickness is normal, with preservation of the  corticomedullary differentiation. The central echo complex is compact  with no evidence for hydronephrosis. No nephrolithiasis or abnormal  perinephric fluid collections are seen. No hydroureter. The left kidney measures 10.9 cm. The cortical thickness within the  left kidney is 18 mm  and 18 mm respectively in the upper and lower  pole. The left kidney is normal in size, shape, contour and position. The cortical thickness is normal, with preservation of the  corticomedullary differentiation. The central echo complex is compact  with no evidence for hydronephrosis. No nephrolithiasis or abnormal  perinephric fluid collections are seen. No hydroureter. Scanning through the pelvis reveals the bladder contains a Cotto  catheter. The wall thickness and contour cannot be assessed. There is  no surrounding ascites.      Impression:       1.  Unremarkable renal

## 2017-12-25 NOTE — OP NOTE
[facility] [address] [facility]                                 OPERATIVE REPORT    PATIENT NAME: Cari Sal                      :        1970  MED REC NO:   [id number]                              ROOM:       [facility]  ACCOUNT NO:   [de-identified]                           ADMIT DATE: 2017  PROVIDER:     Jody Chilel MD    DATE OF PROCEDURE:  2017    PREOPERATIVE DIAGNOSIS:  Right lower extremity ischemia. POSTOPERATIVE DIAGNOSIS:  Right lower extremity ischemia. PROCEDURES:  Diagnostic angiography, pharmacomechanical thrombolysis of  popliteal, anterior tibial, tibioperoneal trunk and peroneal artery with  AngioJet rheolytic device, completion angiograms. SURGEON:  Jody Chilel MD    ANESTHESIA:  MAC. BLOOD LOSS:  Minimal.    FLUIDS:  Minimal.    COMPLICATIONS:  None. CONTRAST:  Less than 30 mL. FINDINGS:  Residual thrombus in the anterior tibial and distal  tibioperoneal trunk, posterior tibial mostly resolved with  pharmacomechanical thrombolysis, previously noted aortic thrombus is no  longer visualized. The patient had palpable posterior tibial and anterior  tibial pulses at the completion of the procedure. OPERATIVE PROCEDURE:  The patient was identified in the special suite. She  has been an ongoing thrombolytic case over the last 3 days, who presented  with acute limb ischemia with threatened limb loss. We have been doing  thrombolysis over the past 2 days. She has had recent acute myocardial  infarction and not a candidate for open surgery. She has made some  clinical progress, but her foot remained jeopardy, so my thoughts today  were repeat angio if she still has thrombus to go with pharmacomechanical  thrombolysis and this is what was done. So, we started out by injecting  through the previous sheath.   There was flow into the anterior tibial now,  but it occluded just above the ankle.  There was also some thrombus in the  tibioperoneal trunk and the peroneal and posterior tibial.  So, we  proceeded with wire cannulation of the anterior tibial and over this, the  AngioJet device 4-Vietnamese was inserted, first in the Power Pulse spray, tPA  was injected, a total of 10 mg were injected in the anterior tibial and the  tibioperoneal trunk and posterior tibial.  Thrombectomy _____ were then  performed. Completion films then showed good flow through all tibials. There was still some residual thrombus in the very distal vessels of the  foot, but the patient now had palpable pulses in the foot. At this point,  I started to back up my catheter and did shots of the superficial femoral,  common femoral, external iliac on the right side and then finally the  common iliac and distal aorta and there was no residual thrombus seen in  the proximal arterial circulation, so I was very pleased with the results. We pulled our crossover sheath back, switched to a short 6-Vietnamese sheath  and did a Mynx closure of the superficial femoral artery. The patient  tolerated the procedure well, to recovery in stable condition.         Dayana Norman MD    D: 12/25/2017 14:10:05       T: 12/25/2017 14:54:23     [id number]  Job#: [id number]     Doc#: [id number]    CC:  Sinan Donato MD

## 2017-12-26 LAB
ANION GAP SERPL CALCULATED.3IONS-SCNC: 14 MMOL/L (ref 7–19)
APTT: 36.7 SEC (ref 26–36.2)
APTT: 50.1 SEC (ref 26–36.2)
APTT: 54.5 SEC (ref 26–36.2)
APTT: 90.4 SEC (ref 26–36.2)
BACTERIA: ABNORMAL /HPF
BILIRUBIN URINE: NEGATIVE
BLOOD, URINE: ABNORMAL
BUN BLDV-MCNC: 29 MG/DL (ref 6–20)
CALCIUM SERPL-MCNC: 8.1 MG/DL (ref 8.6–10)
CHLORIDE BLD-SCNC: 98 MMOL/L (ref 98–111)
CLARITY: ABNORMAL
CO2: 24 MMOL/L (ref 22–29)
COLOR: YELLOW
CREAT SERPL-MCNC: 1.5 MG/DL (ref 0.5–0.9)
EPITHELIAL CELLS, UA: ABNORMAL /HPF
GFR NON-AFRICAN AMERICAN: 37
GLUCOSE BLD-MCNC: 132 MG/DL (ref 74–109)
GLUCOSE BLD-MCNC: 138 MG/DL (ref 70–99)
GLUCOSE BLD-MCNC: 150 MG/DL (ref 70–99)
GLUCOSE BLD-MCNC: 158 MG/DL (ref 70–99)
GLUCOSE BLD-MCNC: 163 MG/DL (ref 70–99)
GLUCOSE URINE: NEGATIVE MG/DL
HCT VFR BLD CALC: 33.2 % (ref 37–47)
HCT VFR BLD CALC: 33.7 % (ref 37–47)
HEMOGLOBIN: 10.9 G/DL (ref 12–16)
HEMOGLOBIN: 11 G/DL (ref 12–16)
KETONES, URINE: NEGATIVE MG/DL
LEUKOCYTE ESTERASE, URINE: NEGATIVE
LV EF: 55 %
LVEF MODALITY: NORMAL
MCH RBC QN AUTO: 29.9 PG (ref 27–31)
MCH RBC QN AUTO: 30.2 PG (ref 27–31)
MCHC RBC AUTO-ENTMCNC: 32.6 G/DL (ref 33–37)
MCHC RBC AUTO-ENTMCNC: 32.8 G/DL (ref 33–37)
MCV RBC AUTO: 91.2 FL (ref 81–99)
MCV RBC AUTO: 92.6 FL (ref 81–99)
NITRITE, URINE: NEGATIVE
PARATHYROID HORMONE INTACT: 136.3 PG/ML (ref 15–65)
PDW BLD-RTO: 14.3 % (ref 11.5–14.5)
PDW BLD-RTO: 14.4 % (ref 11.5–14.5)
PERFORMED ON: ABNORMAL
PH UA: 5.5
PLATELET # BLD: 221 K/UL (ref 130–400)
PLATELET # BLD: 228 K/UL (ref 130–400)
PMV BLD AUTO: 11.5 FL (ref 9.4–12.3)
PMV BLD AUTO: 11.7 FL (ref 9.4–12.3)
POTASSIUM SERPL-SCNC: 3.8 MMOL/L (ref 3.5–5)
PROTEIN UA: ABNORMAL MG/DL
RBC # BLD: 3.64 M/UL (ref 4.2–5.4)
RBC # BLD: 3.64 M/UL (ref 4.2–5.4)
RBC UA: ABNORMAL /HPF (ref 0–2)
SODIUM BLD-SCNC: 136 MMOL/L (ref 136–145)
SPECIFIC GRAVITY UA: 1.01
UROBILINOGEN, URINE: 0.2 E.U./DL
WBC # BLD: 17.1 K/UL (ref 4.8–10.8)
WBC # BLD: 20.8 K/UL (ref 4.8–10.8)
WBC UA: ABNORMAL /HPF (ref 0–5)
YEAST: ABNORMAL /HPF

## 2017-12-26 PROCEDURE — 2000000000 HC ICU R&B

## 2017-12-26 PROCEDURE — 6370000000 HC RX 637 (ALT 250 FOR IP): Performed by: INTERNAL MEDICINE

## 2017-12-26 PROCEDURE — 6360000002 HC RX W HCPCS: Performed by: HOSPITALIST

## 2017-12-26 PROCEDURE — 6370000000 HC RX 637 (ALT 250 FOR IP): Performed by: SURGERY

## 2017-12-26 PROCEDURE — 6370000000 HC RX 637 (ALT 250 FOR IP): Performed by: HOSPITALIST

## 2017-12-26 PROCEDURE — 2580000003 HC RX 258: Performed by: INTERNAL MEDICINE

## 2017-12-26 PROCEDURE — 82948 REAGENT STRIP/BLOOD GLUCOSE: CPT

## 2017-12-26 PROCEDURE — 99233 SBSQ HOSP IP/OBS HIGH 50: CPT | Performed by: HOSPITALIST

## 2017-12-26 PROCEDURE — 2580000003 HC RX 258: Performed by: SURGERY

## 2017-12-26 PROCEDURE — 99232 SBSQ HOSP IP/OBS MODERATE 35: CPT | Performed by: INTERNAL MEDICINE

## 2017-12-26 PROCEDURE — 80048 BASIC METABOLIC PNL TOTAL CA: CPT

## 2017-12-26 PROCEDURE — 6360000002 HC RX W HCPCS: Performed by: SURGERY

## 2017-12-26 PROCEDURE — 93306 TTE W/DOPPLER COMPLETE: CPT

## 2017-12-26 PROCEDURE — 83970 ASSAY OF PARATHORMONE: CPT

## 2017-12-26 PROCEDURE — 85027 COMPLETE CBC AUTOMATED: CPT

## 2017-12-26 PROCEDURE — 85730 THROMBOPLASTIN TIME PARTIAL: CPT

## 2017-12-26 RX ORDER — ASPIRIN 81 MG/1
81 TABLET ORAL DAILY
Status: DISCONTINUED | OUTPATIENT
Start: 2017-12-26 | End: 2017-12-29 | Stop reason: ALTCHOICE

## 2017-12-26 RX ORDER — CALCITRIOL 0.25 UG/1
0.25 CAPSULE, LIQUID FILLED ORAL DAILY
Status: DISCONTINUED | OUTPATIENT
Start: 2017-12-26 | End: 2017-12-29 | Stop reason: HOSPADM

## 2017-12-26 RX ORDER — LISINOPRIL 2.5 MG/1
2.5 TABLET ORAL DAILY
Status: DISCONTINUED | OUTPATIENT
Start: 2017-12-26 | End: 2017-12-29 | Stop reason: HOSPADM

## 2017-12-26 RX ORDER — NALOXONE HYDROCHLORIDE 0.4 MG/ML
0.2 INJECTION, SOLUTION INTRAMUSCULAR; INTRAVENOUS; SUBCUTANEOUS ONCE
Status: COMPLETED | OUTPATIENT
Start: 2017-12-26 | End: 2017-12-26

## 2017-12-26 RX ORDER — NALOXONE HYDROCHLORIDE 0.4 MG/ML
INJECTION, SOLUTION INTRAMUSCULAR; INTRAVENOUS; SUBCUTANEOUS
Status: DISPENSED
Start: 2017-12-26 | End: 2017-12-27

## 2017-12-26 RX ADMIN — SODIUM CHLORIDE: 9 INJECTION, SOLUTION INTRAVENOUS at 16:43

## 2017-12-26 RX ADMIN — Medication 10 ML: at 10:27

## 2017-12-26 RX ADMIN — MIRTAZAPINE 15 MG: 15 TABLET, FILM COATED ORAL at 21:39

## 2017-12-26 RX ADMIN — HEPARIN SODIUM AND DEXTROSE 14.49 UNITS/KG/HR: 10000; 5 INJECTION INTRAVENOUS at 21:51

## 2017-12-26 RX ADMIN — METOPROLOL TARTRATE 12.5 MG: 25 TABLET ORAL at 15:12

## 2017-12-26 RX ADMIN — PANTOPRAZOLE SODIUM 40 MG: 40 TABLET, DELAYED RELEASE ORAL at 07:24

## 2017-12-26 RX ADMIN — NALOXONE HYDROCHLORIDE 0.2 MG: 0.4 INJECTION, SOLUTION INTRAMUSCULAR; INTRAVENOUS; SUBCUTANEOUS at 18:57

## 2017-12-26 RX ADMIN — MORPHINE SULFATE 4 MG: 10 INJECTION INTRAVENOUS at 15:19

## 2017-12-26 RX ADMIN — INSULIN LISPRO 1 UNITS: 100 INJECTION, SOLUTION INTRAVENOUS; SUBCUTANEOUS at 12:10

## 2017-12-26 RX ADMIN — PREGABALIN 200 MG: 150 CAPSULE ORAL at 09:06

## 2017-12-26 RX ADMIN — SODIUM CHLORIDE: 9 INJECTION, SOLUTION INTRAVENOUS at 04:00

## 2017-12-26 RX ADMIN — INSULIN LISPRO 3 UNITS: 100 INJECTION, SOLUTION INTRAVENOUS; SUBCUTANEOUS at 16:31

## 2017-12-26 RX ADMIN — OXYCODONE HYDROCHLORIDE 10 MG: 5 TABLET ORAL at 02:04

## 2017-12-26 RX ADMIN — HEPARIN SODIUM 2000 UNITS: 1000 INJECTION, SOLUTION INTRAVENOUS; SUBCUTANEOUS at 03:54

## 2017-12-26 RX ADMIN — HEPARIN SODIUM 10000 UNITS: 1000 INJECTION, SOLUTION INTRAVENOUS; SUBCUTANEOUS at 03:51

## 2017-12-26 RX ADMIN — PREGABALIN 200 MG: 150 CAPSULE ORAL at 21:39

## 2017-12-26 RX ADMIN — Medication 10 ML: at 21:39

## 2017-12-26 RX ADMIN — OXYCODONE HYDROCHLORIDE 10 MG: 5 TABLET ORAL at 08:14

## 2017-12-26 RX ADMIN — OXYCODONE HYDROCHLORIDE 10 MG: 5 TABLET ORAL at 12:22

## 2017-12-26 RX ADMIN — CALCITRIOL 0.25 MCG: 0.25 CAPSULE, LIQUID FILLED ORAL at 11:05

## 2017-12-26 RX ADMIN — HEPARIN SODIUM AND DEXTROSE 15.49 UNITS/KG/HR: 10000; 5 INJECTION INTRAVENOUS at 09:06

## 2017-12-26 RX ADMIN — ASPIRIN 81 MG: 81 TABLET, COATED ORAL at 21:39

## 2017-12-26 RX ADMIN — PREGABALIN 200 MG: 150 CAPSULE ORAL at 12:22

## 2017-12-26 RX ADMIN — OXYCODONE HYDROCHLORIDE 10 MG: 5 TABLET ORAL at 16:30

## 2017-12-26 RX ADMIN — ALLOPURINOL 200 MG: 100 TABLET ORAL at 09:06

## 2017-12-26 ASSESSMENT — PAIN SCALES - GENERAL
PAINLEVEL_OUTOF10: 10
PAINLEVEL_OUTOF10: 10
PAINLEVEL_OUTOF10: 6
PAINLEVEL_OUTOF10: 6
PAINLEVEL_OUTOF10: 8
PAINLEVEL_OUTOF10: 6
PAINLEVEL_OUTOF10: 5

## 2017-12-26 NOTE — PROGRESS NOTES
pTT 110.6. Per Heparin Algorithm, I will stop the heparin gtt for 1 hour and then decrease the infusion by 2un/kg/hr. This was discussed with Indira Coleman RN who is taking over care of this patient for the night shift.

## 2017-12-26 NOTE — PROGRESS NOTES
chest pressure/discomfort. The patient was seen today for these cardiology problems:      Specialty Problems        Cardiology Problems    CAD (coronary artery disease)        ST elevation myocardial infarction (STEMI) ([facility] [address]        * (Principal)Critical lower limb ischemia        Arterial occlusion, lower extremity (HCC)        HTN (hypertension)                 PFSH:  Any new information:  no       Change:  no      Review of Systems:    General:      Complaint / Symptom Yes / No / Description if Yes         Fatigue Yes:  chronic   Weight gain NA   Insomnia NA         Respiratory:         Complaint / Symptom Yes / No / Description if Yes         Cough No   Horseness NA         Cardiovascular:     Complaint / Symptom Yes / No / Description if Yes         Chest Pain No   Shortness of Air / Orthopnea Yes: chronic and stable   Presyncope / Syncope No   Palpitations No             Physical Examination:    /63   Pulse 97   Temp 97.5 °F (36.4 °C) (Temporal)   Resp 24   Ht 5' 9\" (1.753 m)   Wt (!) 339 lb (153.8 kg)   SpO2 (!) 89%   BMI 50.06 kg/m²     GENERAL - well developed and well nourished    HEENT   PERRLA, Hearing appears normal, conjunctiva and lids are normal, ears and nose appear normal  NECK - no thyromegaly, no JVD, trachea is in the midline  CARDIOVASCULAR  PMI is in the left mid line clavicular position, Normal S1 and S2 with a grade 1/6 systolic murmur. No S3 or S4    PULMONARY  No respiratory distress. scattered wheezes and rales.   Breath sounds in both  lung fields are Decreased  ABDOMEN   soft, non tender, no rebound, no hepatomegaly or splenomegaly  MUSCULOSKELETAL   Prone/Supine, digitals and nails are without clubbing or cyanosis  EXTREMITIES - trace edema, the right foot is cold  NEUROLOGIC - cranial nerves, II-XII, are normal  SKIN - turgor is normal, no rash  PSYCHIATRIC - normal mood and affect, alert and orientated x 3, judgement and insight appear appropriate         Current

## 2017-12-26 NOTE — PROGRESS NOTES
function is impaired in the inferior basilar segment extending up the diaphragmatic surface    with an ejection fraction of 40%  5.  99% lesion in the mid RCA, subtotally occluded with thrombus  6. Successful percutaneous coronary intervention utilizing a single bare metal stent to the mid RCA reestablishing BRIE-3 flow  RECOMMENDATIONS:  1. Risk Factor Modification  2. On-going Medical Therapy  3. Dual antiplatelet therapy for one month.    Electronically signed by Nasima Boykin MD on 12/23/17 at 8:15 PM       Objective:   Vitals: BP (!) 103/49   Pulse 89   Temp 98.1 °F (36.7 °C) (Temporal)   Resp 14   Ht 5' 9\" (1.753 m)   Wt (!) 339 lb (153.8 kg)   SpO2 93%   BMI 50.06 kg/m²   24HR INTAKE/OUTPUT:      Intake/Output Summary (Last 24 hours) at 12/26/17 1313  Last data filed at 12/26/17 1200   Gross per 24 hour   Intake          3387.39 ml   Output             6295 ml   Net         -2907.61 ml     General appearance: alert and cooperative with exam, comfortably reclining in the bed  HEENT: atraumatic, eyes with clear conjunctiva and normal lids, pupils and irises normal, external ears and nose are normal, lips normal. Neck without masses, lympadenopathy, bruit, thyroid normal  Lungs: no increased work of breathing, \"clear to auscultation bilaterally\" without rales, rhonchi or wheezes  Heart: regular rate and rhythm, S1, S2 normal, no murmur, click, rub or gallop  Abdomen: soft, non-tender; bowel sounds normal; no masses,  no organomegaly  Extremities: extremities normal, atraumatic, no cyanosis or edema, BLE warm with good color  Neurologic: No focal neurologic deficits, normal sensation, alert and oriented, affect and mood appropriate. Skin: no rashes, nodules.     Assessment and Plan:   Principal Problem:    Critical lower limb ischemia - now SP thrombolysis with success and expected discomfort  Active Problems:    CAD - denies CP, monitor    ST elevation myocardial infarction (STEMI)  - POA now s/p PCI

## 2017-12-26 NOTE — PROGRESS NOTES
Rodger Jay MD        heparin 25,000 units in dextrose 5% 250 mL infusion  13.49 Units/kg/hr Intravenous Continuous Jody Chilel MD 22.5 mL/hr at 12/26/17 0918 14.49 Units/kg/hr at 12/26/17 0918    mirtazapine (REMERON) tablet 15 mg  15 mg Oral Nightly Arnie Rodriguez MD   15 mg at 12/25/17 2017    pregabalin (LYRICA) capsule 200 mg  200 mg Oral TID Arnie Rodriguez MD   200 mg at 12/26/17 0906    tiZANidine (ZANAFLEX) tablet 4 mg  4 mg Oral Q8H PRN Arnie Rodriguez MD   4 mg at 12/24/17 1856    pantoprazole (PROTONIX) tablet 40 mg  40 mg Oral QAM AC Arnie Rodriguez MD   40 mg at 12/26/17 0724    glucose (GLUTOSE) 40 % oral gel 15 g  15 g Oral PRN Arnie Rodriguez MD        dextrose 50 % solution 12.5 g  12.5 g Intravenous PRN Arnie Rodriguez MD        glucagon (rDNA) injection 1 mg  1 mg Intramuscular PRN Arnie Rodriguez MD        dextrose 5 % solution  100 mL/hr Intravenous PRN Arnie Rodriguez MD        insulin lispro (HUMALOG) injection vial 0-6 Units  0-6 Units Subcutaneous TID WC Arnie Rodriguez MD   Stopped at 12/25/17 0748    insulin lispro (HUMALOG) injection vial 0-3 Units  0-3 Units Subcutaneous Nightly Arnie Rodriguez MD        acetaminophen (TYLENOL) tablet 650 mg  650 mg Oral Q4H PRN Jody Chilel MD        ondansetron [facility]) injection 4 mg  4 mg Intravenous Q8H PRN Jody Chilel MD        morphine (PF) injection 4 mg  4 mg Intravenous Q3H PRN Arnie Rodriguez MD   4 mg at 12/25/17 2311    oxyCODONE (ROXICODONE) immediate release tablet 10 mg  10 mg Oral Q4H PRN Arnie Rodriguez MD   10 mg at 12/26/17 2743    sodium chloride flush 0.9 % injection 10 mL  10 mL Intravenous 2 times per day Jody Chilel MD   10 mL at 12/25/17 2025    sodium chloride flush 0.9 % injection 10 mL  10 mL Intravenous PRN Jody Chilel MD   10 mL at 12/25/17 0010    acetaminophen (TYLENOL) tablet 650 mg  650 mg Oral Q4H PRN Jody Chilel MD        magnesium hydroxide (MILK OF melena  Genito-Urinary ROS: No dysuria or hematuria  Musculoskeletal ROS: No joint pain or swelling         Objective:  Blood pressure (!) 94/51, pulse 87, temperature 97.8 °F (36.6 °C), temperature source Temporal, resp. rate 15, height 5' 9\" (1.753 m), weight (!) 339 lb (153.8 kg), SpO2 90 %. Intake/Output Summary (Last 24 hours) at 12/26/17 1025  Last data filed at 12/26/17 1000   Gross per 24 hour   Intake          3142.39 ml   Output             6115 ml   Net         -2972.61 ml     General: awake/alert   Chest:  clear to auscultation bilaterally without respiratory distress  CVS: regular rate and rhythm  Abdominal: soft, nontender, normal bowel sounds  Extremities: no edema, some Right foot cyanosis  Skin: warm and dry without rash    Labs:  BMP: Recent Labs      12/24/17   0346   12/25/17   0040  12/25/17   0718  12/26/17   0245   NA  130*   < >  132*  131*  136   K  3.6   < >  3.9  4.0  3.8   CL  90*   < >  93*  92*  98   CO2  25   < >  25  25  24   PHOS  5.0*   --    --    --    --    BUN  30*   < >  32*  36*  29*   CREATININE  4.4*   < >  2.8*  3.0*  1.5*   CALCIUM  8.6   < >  8.1*  8.4*  8.1*    < > = values in this interval not displayed. CBC: Recent Labs      12/25/17   1405  12/25/17   1840  12/26/17   0245   WBC  21.7*  22.5*  20.8*   HGB  12.5  12.0  11.0*   HCT  38.4  36.7*  33.7*   MCV  92.5  92.2  92.6   PLT  235  242  221     LIVER PROFILE:   Recent Labs      12/23/17   1735   AST  240*   ALT  40*   BILITOT  0.4   ALKPHOS  83     PT/INR:   Recent Labs      12/23/17   1735   PROTIME  13.2   INR  1.01     APTT:   Recent Labs      12/25/17   1840  12/26/17   0245  12/26/17   0819   APTT  110.6*  36.7*  90.4*     BNP:  No results for input(s): BNP in the last 72 hours. Ionized Calcium:No results for input(s): IONCA in the last 72 hours.   Magnesium:  Recent Labs      12/24/17   0346   MG  1.5*     Phosphorus:  Recent Labs      12/24/17   0346   PHOS  5.0*     HgbA1C:   Recent Labs 12/24/17   0346   LABA1C  6.3*     Hepatic: Recent Labs      12/23/17   1735   ALKPHOS  83   ALT  40*   AST  240*   PROT  8.3   BILITOT  0.4   LABALBU  4.4     Lactic Acid: No results for input(s): LACTA in the last 72 hours. Troponin: No results for input(s): CKTOTAL, CKMB, TROPONINT in the last 72 hours. ABGs: No results found for: PHART, PO2ART, HNY7NGR  CRP:  No results for input(s): CRP in the last 72 hours. Sed Rate:  No results for input(s): SEDRATE in the last 72 hours. Culture Results:   Blood Culture Recent: No results for input(s): BC in the last 720 hours. Urine Culture Recent : No results for input(s): LABURIN in the last 720 hours. Radiology reports as per the Radiologist  Radiology: Us Renal Complete    Result Date: 12/25/2017  RENAL ULTRASOUND COMPLETE 12/25/2017 8:45 AM REASON FOR EXAM: Acute kidney injury  COMPARISON: None  TECHNIQUE: Multiple longitudinal and transverse realtime sonographic images of the kidneys and urinary bladder are obtained. FINDINGS: The right kidney measures 10.9 cm. The cortical thickness within the right kidney is 15 mm and 17 mm respectively in the upper and lower pole. The right kidney is normal in size, shape, contour and position. The cortical thickness is normal, with preservation of the corticomedullary differentiation. The central echo complex is compact with no evidence for hydronephrosis. No nephrolithiasis or abnormal perinephric fluid collections are seen. No hydroureter. The left kidney measures 10.9 cm. The cortical thickness within the left kidney is 18 mm  and 18 mm respectively in the upper and lower pole. The left kidney is normal in size, shape, contour and position. The cortical thickness is normal, with preservation of the corticomedullary differentiation. The central echo complex is compact with no evidence for hydronephrosis. No nephrolithiasis or abnormal perinephric fluid collections are seen. No hydroureter.  Scanning through the pelvis reveals the bladder contains a Cotto catheter. The wall thickness and contour cannot be assessed. There is no surrounding ascites. 1. Unremarkable renal ultrasound. Signed by Dr Ranijth White on 12/25/2017 10:42 AM    Xr Chest Portable    Result Date: 12/25/2017  EXAMINATION: Portable one view chest 12/24/2017 HISTORY: Assess central line placement FINDINGS: Today's exam is compared to previous study of earlier the same day. The patient's right IJ deep line remains well-positioned. There is persistent widening of the vascular pedicle with engorgement of the central pulmonary vascularity suggesting an element of volume overload. There is no evidence of lobar pneumonia or effusion. 1.. Right IJ deep line unchanged in position from the previous study. 2. Suspected volume overload with engorgement of the central pulmonary vascularity and widening of the vascular pedicle. Signed by Dr Ranjith White on 12/25/2017 8:56 AM    Xr Chest Portable    Result Date: 12/24/2017  History: 59-year-old female with central line placement. Reference: None. Findings: Frontal chest radiograph performed. Suboptimal inspiration. Heart size is accentuated by technique but likely normal. Lungs are grossly clear. No pleural effusion or pneumothorax. There is a right IJ central line with tip in expected SVC. Impression: Right IJ central line, tip in SVC. No pneumothorax.  Signed by Dr Darya Willson on 12/24/2017 12:07 PM       Assessment   TIMMY- ATN/DONOVAN  DM2   HTN  STEMI  RLE ischemia  Morbid obesity  Hyponatremia  Secondary Hyperparathyroidism  hyperuricemia      Plan:  Creat much better  Add calicitriol for now   Continue to monitor    Jinny Clark MD  12/26/17  10:25 AM

## 2017-12-27 LAB
ANION GAP SERPL CALCULATED.3IONS-SCNC: 10 MMOL/L (ref 7–19)
APTT: 38.1 SEC (ref 26–36.2)
APTT: 45.6 SEC (ref 26–36.2)
APTT: 54 SEC (ref 26–36.2)
APTT: 55.2 SEC (ref 26–36.2)
BUN BLDV-MCNC: 28 MG/DL (ref 6–20)
CALCIUM SERPL-MCNC: 8.1 MG/DL (ref 8.6–10)
CHLORIDE BLD-SCNC: 102 MMOL/L (ref 98–111)
CO2: 25 MMOL/L (ref 22–29)
CREAT SERPL-MCNC: 2 MG/DL (ref 0.5–0.9)
GFR NON-AFRICAN AMERICAN: 27
GLUCOSE BLD-MCNC: 116 MG/DL (ref 74–109)
GLUCOSE BLD-MCNC: 145 MG/DL (ref 70–99)
GLUCOSE BLD-MCNC: 154 MG/DL (ref 70–99)
GLUCOSE BLD-MCNC: 192 MG/DL (ref 70–99)
GLUCOSE BLD-MCNC: 313 MG/DL (ref 70–99)
HCT VFR BLD CALC: 28.1 % (ref 37–47)
HCT VFR BLD CALC: 31.7 % (ref 37–47)
HCT VFR BLD CALC: 32.2 % (ref 37–47)
HEMOGLOBIN: 10.4 G/DL (ref 12–16)
HEMOGLOBIN: 10.5 G/DL (ref 12–16)
HEMOGLOBIN: 9.2 G/DL (ref 12–16)
MCH RBC QN AUTO: 30.1 PG (ref 27–31)
MCH RBC QN AUTO: 30.2 PG (ref 27–31)
MCH RBC QN AUTO: 30.3 PG (ref 27–31)
MCHC RBC AUTO-ENTMCNC: 32.6 G/DL (ref 33–37)
MCHC RBC AUTO-ENTMCNC: 32.7 G/DL (ref 33–37)
MCHC RBC AUTO-ENTMCNC: 32.8 G/DL (ref 33–37)
MCV RBC AUTO: 92.1 FL (ref 81–99)
MCV RBC AUTO: 92.3 FL (ref 81–99)
MCV RBC AUTO: 92.4 FL (ref 81–99)
PDW BLD-RTO: 14.6 % (ref 11.5–14.5)
PDW BLD-RTO: 14.7 % (ref 11.5–14.5)
PDW BLD-RTO: 14.8 % (ref 11.5–14.5)
PERFORMED ON: ABNORMAL
PLATELET # BLD: 218 K/UL (ref 130–400)
PLATELET # BLD: 251 K/UL (ref 130–400)
PLATELET # BLD: 253 K/UL (ref 130–400)
PMV BLD AUTO: 11.4 FL (ref 9.4–12.3)
PMV BLD AUTO: 11.6 FL (ref 9.4–12.3)
PMV BLD AUTO: 11.9 FL (ref 9.4–12.3)
POTASSIUM SERPL-SCNC: 4.1 MMOL/L (ref 3.5–5)
RBC # BLD: 3.05 M/UL (ref 4.2–5.4)
RBC # BLD: 3.43 M/UL (ref 4.2–5.4)
RBC # BLD: 3.49 M/UL (ref 4.2–5.4)
SODIUM BLD-SCNC: 137 MMOL/L (ref 136–145)
WBC # BLD: 17.5 K/UL (ref 4.8–10.8)
WBC # BLD: 17.6 K/UL (ref 4.8–10.8)
WBC # BLD: 17.8 K/UL (ref 4.8–10.8)

## 2017-12-27 PROCEDURE — 80048 BASIC METABOLIC PNL TOTAL CA: CPT

## 2017-12-27 PROCEDURE — 6360000002 HC RX W HCPCS: Performed by: HOSPITALIST

## 2017-12-27 PROCEDURE — 82948 REAGENT STRIP/BLOOD GLUCOSE: CPT

## 2017-12-27 PROCEDURE — 6360000002 HC RX W HCPCS: Performed by: SURGERY

## 2017-12-27 PROCEDURE — 6370000000 HC RX 637 (ALT 250 FOR IP): Performed by: HOSPITALIST

## 2017-12-27 PROCEDURE — 6370000000 HC RX 637 (ALT 250 FOR IP): Performed by: INTERNAL MEDICINE

## 2017-12-27 PROCEDURE — 6360000002 HC RX W HCPCS: Performed by: CLINICAL NURSE SPECIALIST

## 2017-12-27 PROCEDURE — 99231 SBSQ HOSP IP/OBS SF/LOW 25: CPT | Performed by: SURGERY

## 2017-12-27 PROCEDURE — 99233 SBSQ HOSP IP/OBS HIGH 50: CPT | Performed by: INTERNAL MEDICINE

## 2017-12-27 PROCEDURE — 83735 ASSAY OF MAGNESIUM: CPT

## 2017-12-27 PROCEDURE — 36415 COLL VENOUS BLD VENIPUNCTURE: CPT

## 2017-12-27 PROCEDURE — 81001 URINALYSIS AUTO W/SCOPE: CPT

## 2017-12-27 PROCEDURE — 2580000003 HC RX 258: Performed by: SURGERY

## 2017-12-27 PROCEDURE — 85027 COMPLETE CBC AUTOMATED: CPT

## 2017-12-27 PROCEDURE — 85730 THROMBOPLASTIN TIME PARTIAL: CPT

## 2017-12-27 PROCEDURE — 6370000000 HC RX 637 (ALT 250 FOR IP): Performed by: SURGERY

## 2017-12-27 PROCEDURE — 99233 SBSQ HOSP IP/OBS HIGH 50: CPT | Performed by: HOSPITALIST

## 2017-12-27 PROCEDURE — 84100 ASSAY OF PHOSPHORUS: CPT

## 2017-12-27 PROCEDURE — 2580000003 HC RX 258: Performed by: INTERNAL MEDICINE

## 2017-12-27 PROCEDURE — 87086 URINE CULTURE/COLONY COUNT: CPT

## 2017-12-27 PROCEDURE — 1210000000 HC MED SURG R&B

## 2017-12-27 RX ORDER — DOPAMINE HYDROCHLORIDE 160 MG/100ML
2.5 INJECTION, SOLUTION INTRAVENOUS CONTINUOUS
Status: DISCONTINUED | OUTPATIENT
Start: 2017-12-27 | End: 2017-12-27

## 2017-12-27 RX ORDER — CLOPIDOGREL 300 MG/1
600 TABLET, FILM COATED ORAL ONCE
Status: COMPLETED | OUTPATIENT
Start: 2017-12-27 | End: 2017-12-27

## 2017-12-27 RX ORDER — CLOPIDOGREL BISULFATE 75 MG/1
75 TABLET ORAL DAILY
Status: DISCONTINUED | OUTPATIENT
Start: 2017-12-28 | End: 2017-12-29 | Stop reason: HOSPADM

## 2017-12-27 RX ADMIN — SODIUM CHLORIDE: 9 INJECTION, SOLUTION INTRAVENOUS at 21:11

## 2017-12-27 RX ADMIN — OXYCODONE HYDROCHLORIDE 10 MG: 5 TABLET ORAL at 18:27

## 2017-12-27 RX ADMIN — ALLOPURINOL 200 MG: 100 TABLET ORAL at 08:26

## 2017-12-27 RX ADMIN — MIRTAZAPINE 15 MG: 15 TABLET, FILM COATED ORAL at 21:12

## 2017-12-27 RX ADMIN — HEPARIN SODIUM 12000 UNITS: 1000 INJECTION, SOLUTION INTRAVENOUS; SUBCUTANEOUS at 22:27

## 2017-12-27 RX ADMIN — PREGABALIN 200 MG: 150 CAPSULE ORAL at 14:18

## 2017-12-27 RX ADMIN — HEPARIN SODIUM AND DEXTROSE 17.49 UNITS/KG/HR: 10000; 5 INJECTION INTRAVENOUS at 22:30

## 2017-12-27 RX ADMIN — HEPARIN SODIUM AND DEXTROSE 15.49 UNITS/KG/HR: 10000; 5 INJECTION INTRAVENOUS at 10:29

## 2017-12-27 RX ADMIN — TIZANIDINE 4 MG: 4 TABLET ORAL at 18:27

## 2017-12-27 RX ADMIN — PREGABALIN 200 MG: 150 CAPSULE ORAL at 21:12

## 2017-12-27 RX ADMIN — OXYCODONE HYDROCHLORIDE 10 MG: 5 TABLET ORAL at 14:18

## 2017-12-27 RX ADMIN — HEPARIN SODIUM 6176 UNITS: 1000 INJECTION, SOLUTION INTRAVENOUS; SUBCUTANEOUS at 09:21

## 2017-12-27 RX ADMIN — DOPAMINE HYDROCHLORIDE 5 MCG/KG/MIN: 160 INJECTION, SOLUTION INTRAVENOUS at 03:45

## 2017-12-27 RX ADMIN — MORPHINE SULFATE 4 MG: 10 INJECTION INTRAVENOUS at 11:39

## 2017-12-27 RX ADMIN — OXYCODONE HYDROCHLORIDE 10 MG: 5 TABLET ORAL at 22:33

## 2017-12-27 RX ADMIN — PANTOPRAZOLE SODIUM 40 MG: 40 TABLET, DELAYED RELEASE ORAL at 08:26

## 2017-12-27 RX ADMIN — ASPIRIN 81 MG: 81 TABLET, COATED ORAL at 08:27

## 2017-12-27 RX ADMIN — METOPROLOL TARTRATE 12.5 MG: 25 TABLET ORAL at 21:12

## 2017-12-27 RX ADMIN — CLOPIDOGREL BISULFATE 600 MG: 300 TABLET, FILM COATED ORAL at 18:23

## 2017-12-27 RX ADMIN — HEPARIN SODIUM AND DEXTROSE 15.49 UNITS/KG/HR: 10000; 5 INJECTION INTRAVENOUS at 19:51

## 2017-12-27 RX ADMIN — INSULIN LISPRO 11 UNITS: 100 INJECTION, SOLUTION INTRAVENOUS; SUBCUTANEOUS at 21:14

## 2017-12-27 RX ADMIN — PREGABALIN 200 MG: 150 CAPSULE ORAL at 08:26

## 2017-12-27 RX ADMIN — CALCITRIOL 0.25 MCG: 0.25 CAPSULE, LIQUID FILLED ORAL at 08:27

## 2017-12-27 RX ADMIN — MORPHINE SULFATE 4 MG: 10 INJECTION INTRAVENOUS at 21:41

## 2017-12-27 RX ADMIN — Medication 10 ML: at 21:12

## 2017-12-27 RX ADMIN — OXYCODONE HYDROCHLORIDE 10 MG: 5 TABLET ORAL at 09:43

## 2017-12-27 ASSESSMENT — PAIN SCALES - GENERAL
PAINLEVEL_OUTOF10: 9
PAINLEVEL_OUTOF10: 8
PAINLEVEL_OUTOF10: 9
PAINLEVEL_OUTOF10: 7
PAINLEVEL_OUTOF10: 9

## 2017-12-27 ASSESSMENT — PAIN DESCRIPTION - PAIN TYPE: TYPE: ACUTE PAIN

## 2017-12-27 ASSESSMENT — PAIN DESCRIPTION - LOCATION: LOCATION: FOOT

## 2017-12-27 ASSESSMENT — PAIN DESCRIPTION - ORIENTATION: ORIENTATION: RIGHT

## 2017-12-27 NOTE — PROGRESS NOTES
Nephrology ([facility]) Progress Note    Patient:  Tres Rodriguez  YOB: 1970  Date of Service: 12/27/2017  MRN: [id number]   Acct: [de-identified]   Primary Care Physician: ANJANA Masters  Advance Directive: Full Code  Admit Date: 12/23/2017       Hospital Day: 4  Referring Provider: Olivia Varela MD    Patient independently seen and examined, Chart, Consults, Notes, Operative notes, Labs, Cardiology, and Radiology studies reviewed as able. Subjective:  Tres Rodriguez is a 52 y.o. female  whom we were consulted for TIMMY. Patient has benign hypertension, morbid obesity, nicotine abuse. She was experiencing chest pain and then distal right leg pain. She ruled in for an acute inferior MI and was transferred to [facility]. She was found with incidental serum creatinine of 4.4.  She had a heart catheterization with a bare metal stent placed at the RCA.  Vascular surgery has performed angiography with lysis catheter placement and tPA + heparin infusion in left femoral catheter. She is also believed to have a thrombus in the aorta. On 12/24, she went for diagnostic right lower extremity arteriography with replacement of a lysis catheter and insertion of TPA.     Patient is having good urine output. No new complaints today aside from continued RLE pain. No events per RN. No n/v/d.       Allergies:  Review of patient's allergies indicates no known allergies.     Medicines:  Current Facility-Administered Medications   Medication Dose Route Frequency Provider Last Rate Last Dose    DOPamine (INTROPIN) 400 mg in dextrose 5 % 250 mL infusion  2.5 mcg/kg/min Intravenous Continuous ANJANA Moya   Stopped at 12/27/17 0930    calcitRIOL (ROCALTROL) capsule 0.25 mcg  0.25 mcg Oral Daily Rosa Eli MD   0.25 mcg at 12/27/17 0827    insulin lispro (HUMALOG) injection vial 0-35 Units  0-35 Units Subcutaneous 4x Daily AC & HS Debi Ruth MD   3 Units at 12/26/17 1631    metoprolol tartrate (LOPRESSOR) tablet 12.5 mg  12.5 mg Oral BID Paulina Waite MD   12.5 mg at 12/26/17 1512    lisinopril (PRINIVIL;ZESTRIL) tablet 2.5 mg  2.5 mg Oral Daily Paulina Waite MD        aspirin EC tablet 81 mg  81 mg Oral Daily Ap Chavez MD   81 mg at 12/27/17 0827    allopurinol (ZYLOPRIM) tablet 200 mg  200 mg Oral Daily Bony Cano MD   200 mg at 12/27/17 0706    heparin (porcine) injection 10,000 Units  10,000 Units Intravenous Once April Cool MD        heparin (porcine) injection 10,000 Units  10,000 Units Intravenous PRN April Cool MD   2,770 Units at 12/27/17 6052    heparin (porcine) injection 5,000 Units  5,000 Units Intravenous PRN April Cool MD        heparin 25,000 units in dextrose 5% 250 mL infusion  13.49 Units/kg/hr Intravenous Continuous April Cool MD 24.1 mL/hr at 12/27/17 1029 15.49 Units/kg/hr at 12/27/17 1029    mirtazapine (REMERON) tablet 15 mg  15 mg Oral Nightly Edgard Galvin MD   15 mg at 12/26/17 2139    pregabalin (LYRICA) capsule 200 mg  200 mg Oral TID Edgard Galvin MD   200 mg at 12/27/17 0826    tiZANidine (ZANAFLEX) tablet 4 mg  4 mg Oral Q8H PRN Edgard Galvin MD   4 mg at 12/24/17 1856    pantoprazole (PROTONIX) tablet 40 mg  40 mg Oral QAM AC Edgard Galvin MD   40 mg at 12/27/17 0826    glucose (GLUTOSE) 40 % oral gel 15 g  15 g Oral PRN Edgard Galvin MD        dextrose 50 % solution 12.5 g  12.5 g Intravenous PRN Edgard Galvin MD        glucagon (rDNA) injection 1 mg  1 mg Intramuscular PRN Edgard Galvin MD        dextrose 5 % solution  100 mL/hr Intravenous PRN Edgard Galvin MD        acetaminophen (TYLENOL) tablet 650 mg  650 mg Oral Q4H PRN April Cool MD        ondansetron [facility]) injection 4 mg  4 mg Intravenous Q8H PRN April Cool MD        morphine (PF) injection 4 mg  4 mg Intravenous Q3H PRN Edgard Galvin MD   4 mg at 12/27/17 6318    oxyCODONE (ROXICODONE) immediate release tablet 10 mg  10 mg Oral Q4H PRN Pati Bernardo MD   10 mg at 12/27/17 6131    sodium chloride flush 0.9 % injection 10 mL  10 mL Intravenous 2 times per day Amilcar Gandhi MD   10 mL at 12/26/17 2139    sodium chloride flush 0.9 % injection 10 mL  10 mL Intravenous PRN Amilcar Gandhi MD   10 mL at 12/25/17 0010    acetaminophen (TYLENOL) tablet 650 mg  650 mg Oral Q4H PRN Amilcar Gandhi MD        magnesium hydroxide (MILK OF MAGNESIA) 400 MG/5ML suspension 30 mL  30 mL Oral Daily PRN Amilcar Gandhi MD        0.9 % sodium chloride infusion   Intravenous Continuous Naila Portillo  mL/hr at 12/26/17 1643         Past Medical History:  Past Medical History:   Diagnosis Date    Chronic back pain     Diabetes mellitus ([facility]     GERD (gastroesophageal reflux disease)     HTN (hypertension)     Hypothyroidism     Insomnia     Ischemic leg 12/23/2017    Morbid obesity with body mass index (BMI) of 45.0 to 49.9 in adult ([facility]     PVD (peripheral vascular disease) ([facility] [facility]     Rheumatoid arthritis ([facility] [facility]     Tobacco use        Past Surgical History:  Past Surgical History:   Procedure Laterality Date    CARDIAC CATHETERIZATION      CHOLECYSTECTOMY      FOOT SURGERY Right     KNEE SURGERY Bilateral     IL VEIN BYPASS GRAFT,FEM-POP Right 12/23/2017    RIGHT LOWER EXTREMITY RUNOFF WITH LYSIS OF THROMBUS performed by Amilcar Gandhi MD at [facility] History  Family History   Problem Relation Age of Onset    Heart Disease Mother     COPD Mother     Cancer Mother     Rheum Arthritis Mother     Cancer Father        Social History  Social History     Social History    Marital status: Legally      Spouse name: N/A    Number of children: N/A    Years of education: N/A     Occupational History    Disabled due to psychiatric reasons, accident and pain      Social History Main Topics    Smoking status: Current Every Day Smoker     Packs/day: 1.00 12/27/17   0846   APTT  54.5*  55.2*  45.6*     BNP:  No results for input(s): BNP in the last 72 hours. Ionized Calcium:No results for input(s): IONCA in the last 72 hours. Magnesium:  No results for input(s): MG in the last 72 hours. Phosphorus:  No results for input(s): PHOS in the last 72 hours. HgbA1C:   No results for input(s): LABA1C in the last 72 hours. Hepatic: No results for input(s): ALKPHOS, ALT, AST, PROT, BILITOT, BILIDIR, LABALBU in the last 72 hours. Lactic Acid: No results for input(s): LACTA in the last 72 hours. Troponin: No results for input(s): CKTOTAL, CKMB, TROPONINT in the last 72 hours. ABGs: No results found for: PHART, PO2ART, [id number]  CRP:  No results for input(s): CRP in the last 72 hours. Sed Rate:  No results for input(s): SEDRATE in the last 72 hours. Culture Results:   Blood Culture Recent: No results for input(s): BC in the last 720 hours. Urine Culture Recent : No results for input(s): LABURIN in the last 720 hours. Radiology reports as per the Radiologist  Radiology: Us Renal Complete    Result Date: 12/25/2017  RENAL ULTRASOUND COMPLETE 12/25/2017 8:45 AM REASON FOR EXAM: Acute kidney injury  COMPARISON: None  TECHNIQUE: Multiple longitudinal and transverse realtime sonographic images of the kidneys and urinary bladder are obtained. FINDINGS: The right kidney measures 10.9 cm. The cortical thickness within the right kidney is 15 mm and 17 mm respectively in the upper and lower pole. The right kidney is normal in size, shape, contour and position. The cortical thickness is normal, with preservation of the corticomedullary differentiation. The central echo complex is compact with no evidence for hydronephrosis. No nephrolithiasis or abnormal perinephric fluid collections are seen. No hydroureter. The left kidney measures 10.9 cm. The cortical thickness within the left kidney is 18 mm  and 18 mm respectively in the upper and lower pole.  The left kidney is normal

## 2017-12-27 NOTE — PROGRESS NOTES
with foot pain. NO chest discomfort noted. Additionally, positive for fatigue, negative for chest pressure/discomfort.     The patient was seen today for these cardiology problems:      Specialty Problems        Cardiology Problems    CAD (coronary artery disease)        ST elevation myocardial infarction (STEMI) ([facility] [address]        * (Principal)Critical lower limb ischemia        Arterial occlusion, lower extremity (HCC)        HTN (hypertension)                 PFSH:  Any new information:  no       Change:  no      Review of Systems:    General:     Complaint / Symptom Yes / No / Description if Yes         Fatigue Yes:  chronic   Weight gain NA   Insomnia NA         Respiratory:         Complaint / Symptom Yes / No / Description if Yes         Cough No   Horseness NA         Cardiovascular:     Complaint / Symptom Yes / No / Description if Yes         Chest Pain No   Shortness of Air / Orthopnea Yes: chronic and stable   Presyncope / Syncope No   Palpitations No             Physical Examination:    /68   Pulse 91   Temp 97.1 °F (36.2 °C) (Temporal)   Resp 16   Ht 5' 9\" (1.753 m)   Wt (!) 340 lb 6.4 oz (154.4 kg)   SpO2 96%   BMI 50.27 kg/m²     GENERAL - well developed and well nourished    HEENT   PERRLA, Hearing appears normal, conjunctiva and lids are normal, ears and nose appear normal  NECK - no thyromegaly, no JVD, trachea is in the midline  CARDIOVASCULAR  PMI is in the left mid line clavicular position, Normal S1 and S2 with a grade 1/6 systolic murmur.  No S3 or S4    PULMONARY  No respiratory distress.  scattered wheezes and rales.  Breath sounds in both  lung fields are Decreased  ABDOMEN   soft, non tender, no rebound, no hepatomegaly or splenomegaly  MUSCULOSKELETAL   Prone/Supine, digitals and nails are without clubbing or cyanosis  EXTREMITIES - trace edema, the right foot is cold  NEUROLOGIC - cranial nerves, II-XII, are normal  SKIN - turgor is normal, no rash  PSYCHIATRIC - normal mood and affect, alert and orientated x 3, judgement and insight appear appropriate         Current Inpatient Medications:   clopidogrel  600 mg Oral Once    [START ON 12/28/2017] clopidogrel  75 mg Oral Daily    calcitRIOL  0.25 mcg Oral Daily    insulin lispro  0-35 Units Subcutaneous 4x Daily AC & HS    metoprolol tartrate  12.5 mg Oral BID    lisinopril  2.5 mg Oral Daily    aspirin  81 mg Oral Daily    allopurinol  200 mg Oral Daily    heparin (porcine)  10,000 Units Intravenous Once    mirtazapine  15 mg Oral Nightly    pregabalin  200 mg Oral TID    pantoprazole  40 mg Oral QAM AC    sodium chloride flush  10 mL Intravenous 2 times per day       IV Infusions (if any):   heparin (porcine) 15.49 Units/kg/hr (12/27/17 1535)    dextrose      sodium chloride 100 mL/hr at 12/26/17 1643         LABORATORY EVALUATION & TESTING:    I have personally reviewed and interpreted the results of the following diagnostic testing and noted in the nurse's note above      EKG and or Telemetry:  which was personally reviewed me:  Sinus rhythm,   Pulse Readings from Last 1 Encounters:   12/27/17 91           ALL THE CARDIOLOGY PROBLEMS ARE LISTED ABOVE; HOWEVER, THE FOLLOWING SPECIFIC CARDIAC PROBLEMS WERE ADDRESSED AND TREATED DURING [place] [address] VISIT TODAY:                    Cardiac Specific Problem / Diagnosis   Discussion and Data Reviewed Diagnostic Procedures Ordered Management Options Selected                 1.  Presenting problem / symptom     Chest discomfort of ? etiology  are improving The chest discomfort is was exertional and does  appear to represent myocardial ischemia.       Nonetheless, She has the following risk factors for the presence of coronary artery disease:     Risk Factor Yes / No / Unknown         Gender Female   Cigarette Use Yes:    Family History of Cardiovascular Disease N/A   Diabetes Mellitus yes   Hypercholesteremia no   Hypertension no         She also has the following cardiac history:                    Specialty Problems      None                Yes: emergency cardiac catheterization     1.  Successful femoral artery ultrasound  2.  Successful femoral artery arterogram  3.  Single vessel coronary artery disease involving the mid RCA  4.  Left ventricular function is impaired in the inferior basilar segment extending up the diaphragmatic surface with an ejection fraction of 40%  5.  99% lesion in the mid RCA, subtotally occluded with thrombus  6.  Successful percutaneous coronary intervention utilizing a single bare metal stent to the mid RCA reestablishing BRIE-3 flow Continue current medications:      Yes:                  2. Cold right foot Prior evaluation I strongly suspect that the patient had an inferior lateral MI, which is ongoing with continued chest discomfort.  I suspect the discomfort represents a stuttering MI and more likely than not a cardiac embolus went to the right leg with resulting ischemic right foot / leg.       Back to the OR today No     As per vascular Continue current medications:     Yes:                  3. Renal insufficiency Prior evaluation The creatinine is 4.4 and the patient not know of pre-existing renal insufficiency.  Obvious, any use of any contrast will greatly endanger the kidneys and may well precipate acute and chronic renal failure and end up with the patient on either temporary or permanent dialysis.     Creatinine has fallen to 1.5 !   No     As per nephrology Continue current medications:        yes               PLAN:    1. Continue present medications except for changes as noted above  2. Continue to monitor rhythm  3. Further orders per clinical course. 4. Load with Plavix today             Discussed with patient and nursing.       Electronically signed by Cedric Goldmann, MD on 12/27/17 at 5:40 PM    [facility] [place]

## 2017-12-27 NOTE — PROGRESS NOTES
cardiology as to what anticoagulation is needed.   4. Creatine 2.0, recheck in am    Antibiotics continued after surgery due to:N/a    DVT prophylaxis: IV hEPARIN         No mechanical prophylaxis due to:   []  bilateral amputee  []  bilateral lower extremity trauma        []  patient refusal        [x]  continuing IV heparin          Beta Blocker:  continued    Cotto Catheter:  continue  Leave catheter in place due to:  []  low pelvic dissection    [x]  strict I&Os   []  total bedrest  []  functioning as drain

## 2017-12-27 NOTE — PROGRESS NOTES
Hospitalist Progress Note  12/27/2017 7:57 AM  Subjective:   Admit Date: 12/23/2017  PCP: ANJANA Grant    Chief Complaint: R leg painful today, worse with dependency    Subjective: R leg hurts but is warm and she is otherwise doing OK. No HA, fevers, chills sweats, NVD, abdominal pain. Interval History:    51 yo female presented to [facility]OS[facility] [place] ER with cold, painful R foot and leg, CP, question of AMI and transferred to [facility] for ? STEMI, Cr 4.4; cath with BMS to RCA and vascular thromboylsis with TPA + Heparin in L femoral catheter. Permacath placed for HD, LE improved with thrombolysis, also had aortic thrombosis and remains at high risk. Consulted for medical management of DM, Morbid Obesity, RA, GERD, chronic low back pain. ROS: 14 point review of systems is negative except as specifically addressed above.     DIET CARB CONTROL; Carb Control: 4 carbs/meal (approximate 1800 kcals/day)    Intake/Output Summary (Last 24 hours) at 12/27/17 0757  Last data filed at 12/27/17 0600   Gross per 24 hour   Intake          2210.08 ml   Output             3900 ml   Net         -1689.92 ml     Medications:   DOPamine 4 mcg/kg/min (12/27/17 0722)    heparin (porcine) 14.49 Units/kg/hr (12/26/17 2151)    dextrose      sodium chloride 100 mL/hr at 12/26/17 1643     Current Facility-Administered Medications   Medication Dose Route Frequency Provider Last Rate Last Dose    DOPamine (INTROPIN) 400 mg in dextrose 5 % 250 mL infusion  2.5 mcg/kg/min Intravenous Continuous ANJANA Waite 23.1 mL/hr at 12/27/17 0722 4 mcg/kg/min at 12/27/17 4544    calcitRIOL (ROCALTROL) capsule 0.25 mcg  0.25 mcg Oral Daily Henri Kaminski MD   0.25 mcg at 12/26/17 1105    insulin lispro (HUMALOG) injection vial 0-35 Units  0-35 Units Subcutaneous 4x Daily AC & HS Keri Chaney MD   3 Units at 12/26/17 1631    metoprolol tartrate (LOPRESSOR) tablet 12.5 mg  12.5 mg Oral BID Keri Chaney MD   12.5 mg at 12/26/17 4496  lisinopril (PRINIVIL;ZESTRIL) tablet 2.5 mg  2.5 mg Oral Daily Larisa Urbina MD        aspirin EC tablet 81 mg  81 mg Oral Daily Javid Jane MD   81 mg at 12/26/17 2139    allopurinol (ZYLOPRIM) tablet 200 mg  200 mg Oral Daily Aruna Ghotra MD   200 mg at 12/26/17 4700    heparin (porcine) injection 10,000 Units  10,000 Units Intravenous Once Demetri Pereira MD        heparin (porcine) injection 10,000 Units  10,000 Units Intravenous PRN Demetri Pereira MD   2,000 Units at 12/26/17 0354    heparin (porcine) injection 5,000 Units  5,000 Units Intravenous PRN Demetri Pereira MD        heparin 25,000 units in dextrose 5% 250 mL infusion  13.49 Units/kg/hr Intravenous Continuous Demetri Pereira MD 22.5 mL/hr at 12/26/17 2151 14.49 Units/kg/hr at 12/26/17 2151    mirtazapine (REMERON) tablet 15 mg  15 mg Oral Nightly Ladarius Corea MD   15 mg at 12/26/17 2139    pregabalin (LYRICA) capsule 200 mg  200 mg Oral TID Ladarius Corea MD   200 mg at 12/26/17 2139    tiZANidine (ZANAFLEX) tablet 4 mg  4 mg Oral Q8H PRN Ladarius Corea MD   4 mg at 12/24/17 1856    pantoprazole (PROTONIX) tablet 40 mg  40 mg Oral QAM AC Ladarius Corea MD   40 mg at 12/26/17 0724    glucose (GLUTOSE) 40 % oral gel 15 g  15 g Oral PRN Ladarius Corea MD        dextrose 50 % solution 12.5 g  12.5 g Intravenous PRN Ladarius Corea MD        glucagon (rDNA) injection 1 mg  1 mg Intramuscular PRN Ladarius Corea MD        dextrose 5 % solution  100 mL/hr Intravenous PRN Ladarius Corea MD        acetaminophen (TYLENOL) tablet 650 mg  650 mg Oral Q4H PRN Demetri Pereira MD        ondansetron [facility]) injection 4 mg  4 mg Intravenous Q8H PRN Demetri Pereira MD        morphine (PF) injection 4 mg  4 mg Intravenous Q3H PRN Ladarius Corea MD   4 mg at 12/26/17 1519    oxyCODONE (ROXICODONE) immediate release tablet 10 mg  10 mg Oral Q4H PRN Ladarius Corea MD   10 mg at 12/26/17 1630    sodium chloride flush 0.9 % injection 10 mL  10 mL Intravenous 2 times per day Foster Barry MD   10 mL at 12/26/17 2139    sodium chloride flush 0.9 % injection 10 mL  10 mL Intravenous PRN Foster Barry MD   10 mL at 12/25/17 0010    acetaminophen (TYLENOL) tablet 650 mg  650 mg Oral Q4H PRN Foster Barry MD        magnesium hydroxide (MILK OF MAGNESIA) 400 MG/5ML suspension 30 mL  30 mL Oral Daily PRN Foster Barry MD        0.9 % sodium chloride infusion   Intravenous Continuous Cat Ching  mL/hr at 12/26/17 1643          Labs:     Recent Labs      12/26/17   0245  12/26/17   1340  12/27/17   0153   WBC  20.8*  17.1*  17.5*   RBC  3.64*  3.64*  3.05*   HGB  11.0*  10.9*  9.2*   HCT  33.7*  33.2*  28.1*   MCV  92.6  91.2  92.1   MCH  30.2  29.9  30.2   MCHC  32.6*  32.8*  32.7*   PLT  221  228  218     Recent Labs      12/25/17   0718  12/26/17   0245  12/27/17   0154   NA  131*  136  137   K  4.0  3.8  4.1   ANIONGAP  14  14  10   CL  92*  98  102   CO2  25  24  25   BUN  36*  29*  28*   CREATININE  3.0*  1.5*  2.0*   GLUCOSE  114*  132*  116*   CALCIUM  8.4*  8.1*  8.1*     HgBA1c: 6.3    FLP:    Lab Results   Component Value Date    TRIG 224 12/24/2017    HDL 28 12/24/2017    LDLCALC 58 12/24/2017     TSH:    Lab Results   Component Value Date    TSH 2.170 12/24/2017       CXR 12/24/17  Impression  1.. Right IJ deep line unchanged in position from the previous study. 2. Suspected volume overload with engorgement of the central pulmonary vascularity and widening of the vascular pedicle. Signed by Dr Whitney Ornelas on 12/25/2017 8:56 AM     Renal US 12/25/17  Impression  1. Unremarkable renal ultrasound. Signed by Dr Whitney Ornelas on 12/25/2017 10:42 AM      Cardiac Cath 12/23/17  Summary:    1. Successful femoral artery ultrasound  2. Successful femoral artery arterogram  3. Single vessel coronary artery disease involving the mid RCA  4.   Left ventricular function is impaired in the rashes, nodules, she has vascular ischemic changes dorsum R foot, improving. Assessment and Plan:   Principal Problem:    Critical lower limb ischemia - now SP thrombolysis with success and expected discomfort    Active Problems:    CAD - denies CP, monitor    ST elevation myocardial infarction (STEMI)  - POA now s/p PCI with BMS to RCA 12/23/17, EF 40%, add BB ACE-I, Xa inh    Acute Systolic CHF - with MI, add BB and ACE-I. Morbid obesity - medical management    Diabetes mellitus - POC, lispro adusted    Chronic back pain - noted, not bothering her today    GERD - PPI    HTN - medical management    Hypothyroidism - historical, checked TSH and normal (not on replacement)    Tobacco use - cessation    Acute renal failure superimposed on chronic kidney disease - renal function improving and now almost normal    Hyponatremia - POA, resolved    Leukocytosis - likely reactive, trending favorably    Arterial occlusion, lower extremity - s/p intervention, feet and legs warm with normal color    Advance Directive: Full Code    DVT prophylaxis: continuous infusion of heparin per protocol    Discharge planning: TBD    MDM: She had PCI and BMS for STEMI POA 12/23/17 and TPA / heparin to RLE. She is now on aspirin.  Still on heparin infusion  Echo for EF noted, needs Xa as well or plavix/asa for BMS - defer to Cardiology / vascular surgery - discussed with Dr Renato Colorado this am.    Manav Michel MD  [facility] Hospitalist

## 2017-12-28 LAB
ANION GAP SERPL CALCULATED.3IONS-SCNC: 13 MMOL/L (ref 7–19)
APTT: 75.9 SEC (ref 26–36.2)
BUN BLDV-MCNC: 28 MG/DL (ref 6–20)
CALCIUM SERPL-MCNC: 8.3 MG/DL (ref 8.6–10)
CHLORIDE BLD-SCNC: 101 MMOL/L (ref 98–111)
CO2: 25 MMOL/L (ref 22–29)
CREAT SERPL-MCNC: 1.7 MG/DL (ref 0.5–0.9)
GFR NON-AFRICAN AMERICAN: 32
GLUCOSE BLD-MCNC: 111 MG/DL (ref 74–109)
GLUCOSE BLD-MCNC: 115 MG/DL (ref 70–99)
GLUCOSE BLD-MCNC: 123 MG/DL (ref 70–99)
GLUCOSE BLD-MCNC: 135 MG/DL (ref 70–99)
GLUCOSE BLD-MCNC: 139 MG/DL (ref 70–99)
HCT VFR BLD CALC: 28.5 % (ref 37–47)
HCT VFR BLD CALC: 30 % (ref 37–47)
HEMOGLOBIN: 9.1 G/DL (ref 12–16)
HEMOGLOBIN: 9.7 G/DL (ref 12–16)
MCH RBC QN AUTO: 29.6 PG (ref 27–31)
MCH RBC QN AUTO: 29.8 PG (ref 27–31)
MCHC RBC AUTO-ENTMCNC: 31.9 G/DL (ref 33–37)
MCHC RBC AUTO-ENTMCNC: 32.3 G/DL (ref 33–37)
MCV RBC AUTO: 92.3 FL (ref 81–99)
MCV RBC AUTO: 92.8 FL (ref 81–99)
PDW BLD-RTO: 14.5 % (ref 11.5–14.5)
PDW BLD-RTO: 14.7 % (ref 11.5–14.5)
PERFORMED ON: ABNORMAL
PLATELET # BLD: 260 K/UL (ref 130–400)
PLATELET # BLD: 282 K/UL (ref 130–400)
PMV BLD AUTO: 11.1 FL (ref 9.4–12.3)
PMV BLD AUTO: 11.2 FL (ref 9.4–12.3)
POTASSIUM SERPL-SCNC: 4 MMOL/L (ref 3.5–5)
RBC # BLD: 3.07 M/UL (ref 4.2–5.4)
RBC # BLD: 3.25 M/UL (ref 4.2–5.4)
SODIUM BLD-SCNC: 139 MMOL/L (ref 136–145)
WBC # BLD: 13.2 K/UL (ref 4.8–10.8)
WBC # BLD: 15.9 K/UL (ref 4.8–10.8)

## 2017-12-28 PROCEDURE — 86850 RBC ANTIBODY SCREEN: CPT

## 2017-12-28 PROCEDURE — 80048 BASIC METABOLIC PNL TOTAL CA: CPT

## 2017-12-28 PROCEDURE — 2580000003 HC RX 258: Performed by: SURGERY

## 2017-12-28 PROCEDURE — 86901 BLOOD TYPING SEROLOGIC RH(D): CPT

## 2017-12-28 PROCEDURE — 6370000000 HC RX 637 (ALT 250 FOR IP): Performed by: HOSPITALIST

## 2017-12-28 PROCEDURE — 6370000000 HC RX 637 (ALT 250 FOR IP): Performed by: INTERNAL MEDICINE

## 2017-12-28 PROCEDURE — 85730 THROMBOPLASTIN TIME PARTIAL: CPT

## 2017-12-28 PROCEDURE — P9016 RBC LEUKOCYTES REDUCED: HCPCS

## 2017-12-28 PROCEDURE — 6370000000 HC RX 637 (ALT 250 FOR IP): Performed by: SURGERY

## 2017-12-28 PROCEDURE — 36592 COLLECT BLOOD FROM PICC: CPT

## 2017-12-28 PROCEDURE — 82948 REAGENT STRIP/BLOOD GLUCOSE: CPT

## 2017-12-28 PROCEDURE — 85025 COMPLETE CBC W/AUTO DIFF WBC: CPT

## 2017-12-28 PROCEDURE — 99024 POSTOP FOLLOW-UP VISIT: CPT | Performed by: NURSE PRACTITIONER

## 2017-12-28 PROCEDURE — 6360000002 HC RX W HCPCS: Performed by: SURGERY

## 2017-12-28 PROCEDURE — 1210000000 HC MED SURG R&B

## 2017-12-28 PROCEDURE — 99232 SBSQ HOSP IP/OBS MODERATE 35: CPT | Performed by: INTERNAL MEDICINE

## 2017-12-28 PROCEDURE — 6360000002 HC RX W HCPCS: Performed by: HOSPITALIST

## 2017-12-28 PROCEDURE — 6370000000 HC RX 637 (ALT 250 FOR IP): Performed by: NURSE PRACTITIONER

## 2017-12-28 PROCEDURE — 99233 SBSQ HOSP IP/OBS HIGH 50: CPT | Performed by: HOSPITALIST

## 2017-12-28 PROCEDURE — 36415 COLL VENOUS BLD VENIPUNCTURE: CPT

## 2017-12-28 PROCEDURE — 83735 ASSAY OF MAGNESIUM: CPT

## 2017-12-28 PROCEDURE — 85027 COMPLETE CBC AUTOMATED: CPT

## 2017-12-28 PROCEDURE — 86900 BLOOD TYPING SEROLOGIC ABO: CPT

## 2017-12-28 PROCEDURE — 84100 ASSAY OF PHOSPHORUS: CPT

## 2017-12-28 PROCEDURE — 2580000003 HC RX 258: Performed by: INTERNAL MEDICINE

## 2017-12-28 RX ORDER — MORPHINE SULFATE 10 MG/ML
8 INJECTION, SOLUTION INTRAMUSCULAR; INTRAVENOUS
Status: DISCONTINUED | OUTPATIENT
Start: 2017-12-28 | End: 2017-12-29 | Stop reason: SDUPTHER

## 2017-12-28 RX ORDER — MORPHINE SULFATE 10 MG/ML
4 INJECTION, SOLUTION INTRAMUSCULAR; INTRAVENOUS
Status: DISCONTINUED | OUTPATIENT
Start: 2017-12-28 | End: 2017-12-29 | Stop reason: SDUPTHER

## 2017-12-28 RX ADMIN — ASPIRIN 81 MG: 81 TABLET, COATED ORAL at 09:11

## 2017-12-28 RX ADMIN — TIZANIDINE 4 MG: 4 TABLET ORAL at 16:17

## 2017-12-28 RX ADMIN — CLOPIDOGREL 75 MG: 75 TABLET, FILM COATED ORAL at 09:11

## 2017-12-28 RX ADMIN — SODIUM CHLORIDE: 9 INJECTION, SOLUTION INTRAVENOUS at 08:36

## 2017-12-28 RX ADMIN — MIRTAZAPINE 15 MG: 15 TABLET, FILM COATED ORAL at 20:56

## 2017-12-28 RX ADMIN — MORPHINE SULFATE 4 MG: 10 INJECTION INTRAVENOUS at 01:41

## 2017-12-28 RX ADMIN — OXYCODONE HYDROCHLORIDE 10 MG: 5 TABLET ORAL at 17:23

## 2017-12-28 RX ADMIN — PREGABALIN 200 MG: 150 CAPSULE ORAL at 20:56

## 2017-12-28 RX ADMIN — MORPHINE SULFATE 8 MG: 10 INJECTION, SOLUTION INTRAMUSCULAR; INTRAVENOUS at 20:56

## 2017-12-28 RX ADMIN — OXYCODONE HYDROCHLORIDE 10 MG: 5 TABLET ORAL at 08:36

## 2017-12-28 RX ADMIN — RIVAROXABAN 15 MG: 15 TABLET, FILM COATED ORAL at 09:21

## 2017-12-28 RX ADMIN — OXYCODONE HYDROCHLORIDE 10 MG: 5 TABLET ORAL at 12:50

## 2017-12-28 RX ADMIN — PANTOPRAZOLE SODIUM 40 MG: 40 TABLET, DELAYED RELEASE ORAL at 09:21

## 2017-12-28 RX ADMIN — ALLOPURINOL 200 MG: 100 TABLET ORAL at 09:11

## 2017-12-28 RX ADMIN — Medication 10 ML: at 20:56

## 2017-12-28 RX ADMIN — CALCITRIOL 0.25 MCG: 0.25 CAPSULE, LIQUID FILLED ORAL at 09:11

## 2017-12-28 RX ADMIN — MORPHINE SULFATE 4 MG: 10 INJECTION, SOLUTION INTRAMUSCULAR; INTRAVENOUS at 18:18

## 2017-12-28 RX ADMIN — Medication 10 ML: at 09:11

## 2017-12-28 RX ADMIN — PREGABALIN 200 MG: 150 CAPSULE ORAL at 09:11

## 2017-12-28 RX ADMIN — PREGABALIN 200 MG: 150 CAPSULE ORAL at 16:14

## 2017-12-28 ASSESSMENT — PAIN SCALES - GENERAL
PAINLEVEL_OUTOF10: 8
PAINLEVEL_OUTOF10: 7
PAINLEVEL_OUTOF10: 9
PAINLEVEL_OUTOF10: 10
PAINLEVEL_OUTOF10: 8
PAINLEVEL_OUTOF10: 9
PAINLEVEL_OUTOF10: 7
PAINLEVEL_OUTOF10: 10
PAINLEVEL_OUTOF10: 8
PAINLEVEL_OUTOF10: 10

## 2017-12-28 NOTE — PROGRESS NOTES
Pt has c/o of R foot pain. She states that her pain medication is not helping. She asks for it before it due. She is requesting something stronger.

## 2017-12-28 NOTE — PROGRESS NOTES
last 72 hours. Lactic Acid: No results for input(s): LACTA in the last 72 hours. Troponin: No results for input(s): CKTOTAL, CKMB, TROPONINT in the last 72 hours. ABGs: No results found for: PHART, PO2ART, [id number]  CRP:  No results for input(s): CRP in the last 72 hours. Sed Rate:  No results for input(s): SEDRATE in the last 72 hours. Culture Results:   Blood Culture Recent: No results for input(s): BC in the last 720 hours. Urine Culture Recent :   Recent Labs      12/26/17   Pargi 1  No growth       Radiology reports as per the Radiologist  Radiology: Us Renal Complete    Result Date: 12/25/2017  RENAL ULTRASOUND COMPLETE 12/25/2017 8:45 AM REASON FOR EXAM: Acute kidney injury  COMPARISON: None  TECHNIQUE: Multiple longitudinal and transverse realtime sonographic images of the kidneys and urinary bladder are obtained. FINDINGS: The right kidney measures 10.9 cm. The cortical thickness within the right kidney is 15 mm and 17 mm respectively in the upper and lower pole. The right kidney is normal in size, shape, contour and position. The cortical thickness is normal, with preservation of the corticomedullary differentiation. The central echo complex is compact with no evidence for hydronephrosis. No nephrolithiasis or abnormal perinephric fluid collections are seen. No hydroureter. The left kidney measures 10.9 cm. The cortical thickness within the left kidney is 18 mm  and 18 mm respectively in the upper and lower pole. The left kidney is normal in size, shape, contour and position. The cortical thickness is normal, with preservation of the corticomedullary differentiation. The central echo complex is compact with no evidence for hydronephrosis. No nephrolithiasis or abnormal perinephric fluid collections are seen. No hydroureter. Scanning through the pelvis reveals the bladder contains a Cotto catheter. The wall thickness and contour cannot be assessed. There is no surrounding ascites.      1. Unremarkable renal ultrasound. Signed by Dr Benedicto Shelton on 12/25/2017 10:42 AM    Xr Chest Portable    Result Date: 12/25/2017  EXAMINATION: Portable one view chest 12/24/2017 HISTORY: Assess central line placement FINDINGS: Today's exam is compared to previous study of earlier the same day. The patient's right IJ deep line remains well-positioned. There is persistent widening of the vascular pedicle with engorgement of the central pulmonary vascularity suggesting an element of volume overload. There is no evidence of lobar pneumonia or effusion. 1.. Right IJ deep line unchanged in position from the previous study. 2. Suspected volume overload with engorgement of the central pulmonary vascularity and widening of the vascular pedicle. Signed by Dr Benedicto Shelton on 12/25/2017 8:56 AM    Xr Chest Portable    Result Date: 12/24/2017  History: 51-year-old female with central line placement. Reference: None. Findings: Frontal chest radiograph performed. Suboptimal inspiration. Heart size is accentuated by technique but likely normal. Lungs are grossly clear. No pleural effusion or pneumothorax. There is a right IJ central line with tip in expected SVC. Impression: Right IJ central line, tip in SVC. No pneumothorax.  Signed by Dr Felicia Alvares on 12/24/2017 12:07 PM       Assessment   TIMMY- ATN/DONOVAN  DM2   HTN  STEMI  RLE ischemia  Morbid obesity  Hyponatremia  Secondary Hyperparathyroidism  Hyperuricemia  Anemia      Plan:  Creat back down a bit  Add calicitriol for now   Continue to monitor  wean IVF     Chilango Perry MD  12/28/17  12:51 PM

## 2017-12-28 NOTE — PROGRESS NOTES
Vascular Surgery Rounding Note    12/28/2017  8:30 AM  Post op day - 3 Angiojet lysis of right leg thrombus    Subjective- still with foot pain, more sore to touch; reports mom has history of MTHFR, protein S def, and lupus anticoagulation    Objective-   Invalid input(s): 24H    Intake/Output Summary (Last 24 hours) at 12/28/17 0830  Last data filed at 12/27/17 1600   Gross per 24 hour   Intake          1497.59 ml   Output             1100 ml   Net           397.59 ml     No intake/output data recorded. CBC:   Recent Labs      12/27/17   0847  12/27/17   1500  12/28/17   0545   WBC  17.8*  17.6*  15.9*   RBC  3.43*  3.49*  3.07*   HGB  10.4*  10.5*  9.1*   HCT  31.7*  32.2*  28.5*   MCV  92.4  92.3  92.8   MCH  30.3  30.1  29.6   MCHC  32.8*  32.6*  31.9*   RDW  14.7*  14.6*  14.7*   PLT  253  251  260   MPV  11.9  11.4  11.2      Last 3 CMP:   Recent Labs      12/26/17   0245  12/27/17   0154  12/28/17   0545   NA  136  137  139   K  3.8  4.1  4.0   CL  98  102  101   CO2  24  25  25   BUN  29*  28*  28*   CREATININE  1.5*  2.0*  1.7*   GLUCOSE  132*  116*  111*   CALCIUM  8.1*  8.1*  8.3*        Physical Exam:  Awake, alert, appropriate Yes  /71   Pulse 84   Temp 96.4 °F (35.8 °C) (Temporal)   Resp 18   Ht 5' 9\" (1.753 m)   Wt (!) 340 lb 6.4 oz (154.4 kg)   SpO2 96%   BMI 50.27 kg/m²   Heart-Normal S1 and S2.  Regular rhythm. No murmurs, gallops, or rubs. Lung-negative  Neck-suppleno adenopathy, no carotid bruit, no JVD, supple, symmetrical, trachea midline and thyroid not enlarged, symmetric, no tenderness/mass/nodules  Abdomen-Abdomen soft, non-tender. BS normal. No masses,  No organomegaly  Extremities-Right foot with some edema, slightly dusky middle toes and dorsum of foot. Excellent dopler signal in Posterior tibial artery; monophasic dp signal  Neurologic- alert, oriented, normal speech, no focal findings or movement disorder noted    Assessment/Plan  1. Foot viable  2.  Will stop heparin and start xarelto 15 mg po daily (discussed with Daljit Dominguez and Dr. Blake Melgar)  3. plavix per cardiology  4. Creatine slightly improved today  5.   Will start ph th      DVT prophylaxis: switch to xarelto         Beta Blocker:  continued    Cotto Catheter:  continue  Leave catheter in place due to:  []  low pelvic dissection    [x]  strict I&Os   []  total bedrest  []  functioning as drain

## 2017-12-28 NOTE — PROGRESS NOTES
[facility]  Progress Note                            Date:  12/28/2017  Patient: Ilya Alaniz  Admission:  12/23/2017  5:27 PM  Admit DX: Ischemic leg [I99.8]  Age:  52 y.o., 1970     LOS: 5 days     Reason for evaluation:   coronary artery stent      SUBJECTIVE:    The patient was seen and examined. Notes and labs reviewed. There were not complications over night. No new complaints. Still with foot pain . OBJECTIVE:    Telemetry: Sinus  /71   Pulse 84   Temp 96.4 °F (35.8 °C) (Temporal)   Resp 18   Ht 5' 9\" (1.753 m)   Wt (!) 340 lb 6.4 oz (154.4 kg)   SpO2 96%   BMI 50.27 kg/m²     Intake/Output Summary (Last 24 hours) at 12/28/17 1118  Last data filed at 12/28/17 0901   Gross per 24 hour   Intake           1344.6 ml   Output              700 ml   Net            644.6 ml           Labs:   CBC:   Recent Labs      12/27/17   1500  12/28/17   0545   WBC  17.6*  15.9*   HGB  10.5*  9.1*   HCT  32.2*  28.5*   PLT  251  260     BMP: Recent Labs      12/27/17   0154  12/28/17   0545   NA  137  139   K  4.1  4.0   CO2  25  25   BUN  28*  28*   CREATININE  2.0*  1.7*   LABGLOM  27*  32*   GLUCOSE  116*  111*     BNP: No results for input(s): BNP in the last 72 hours. PT/INR: No results for input(s): PROTIME, INR in the last 72 hours. APTT:  Recent Labs      12/27/17   2125  12/28/17   0310   APTT  38.1*  75.9*     CARDIAC ENZYMES:No results for input(s): CKTOTAL, CKMB, CKMBINDEX, TROPONINI in the last 72 hours. FASTING LIPID PANEL:  Lab Results   Component Value Date    HDL 28 12/24/2017    LDLCALC 58 12/24/2017    TRIG 224 12/24/2017     LIVER PROFILE:No results for input(s): AST, ALT, LABALBU in the last 72 hours. NURSE:  Toya Bang RN       Reason for initial evaluation    AMI      History of the Present Illness and Today's Current Status: NO chest discomfort, still with foot pain but foot is warm with pulses noted.        Additionally, positive for appropriate              Current Inpatient Medications:   rivaroxaban  15 mg Oral Daily    clopidogrel  75 mg Oral Daily    calcitRIOL  0.25 mcg Oral Daily    insulin lispro  0-35 Units Subcutaneous 4x Daily AC & HS    metoprolol tartrate  12.5 mg Oral BID    lisinopril  2.5 mg Oral Daily    aspirin  81 mg Oral Daily    allopurinol  200 mg Oral Daily    mirtazapine  15 mg Oral Nightly    pregabalin  200 mg Oral TID    pantoprazole  40 mg Oral QAM AC    sodium chloride flush  10 mL Intravenous 2 times per day       IV Infusions (if any):   dextrose      sodium chloride 50 mL/hr at 12/28/17 1254         LABORATORY EVALUATION & TESTING:    I have personally reviewed and interpreted the results of the following diagnostic testing and noted in the nurse's note above      EKG and or Telemetry:  which was personally reviewed me:  Sinus rhythm,   Pulse Readings from Last 1 Encounters:   12/28/17 88           ALL THE CARDIOLOGY PROBLEMS ARE LISTED ABOVE; HOWEVER, THE FOLLOWING SPECIFIC CARDIAC PROBLEMS WERE ADDRESSED AND TREATED DURING [facility] Jhony Pennington 34 VISIT TODAY:                    Cardiac Specific Problem / Diagnosis   Discussion and Data Reviewed Diagnostic Procedures Ordered Management Options Selected                 1. Presenting problem / symptom     Chest discomfort of ? Etiology/CAD  are improving The chest discomfort is was exertional and does  appear to represent myocardial ischemia.       Nonetheless, She has the following risk factors for the presence of coronary artery disease:     Risk Factor Yes / No / Unknown         Gender Female   Cigarette Use Yes:    Family History of Cardiovascular Disease N/A   Diabetes Mellitus yes   Hypercholesteremia no   Hypertension no        According to the [facility] [address] of Medicine (2016; [id number]), the combination of Xarelto 15 mg orally once a day and a P2Y12 inhibitor for 12 months reduced the rates of clinically significant bleeding.   This is without [facility]

## 2017-12-28 NOTE — PROGRESS NOTES
infusion   Intravenous Continuous Marce Mack MD 50 mL/hr at 12/28/17 1254          Labs:     Recent Labs      12/27/17   0847  12/27/17   1500  12/28/17   0545   WBC  17.8*  17.6*  15.9*   RBC  3.43*  3.49*  3.07*   HGB  10.4*  10.5*  9.1*   HCT  31.7*  32.2*  28.5*   MCV  92.4  92.3  92.8   MCH  30.3  30.1  29.6   MCHC  32.8*  32.6*  31.9*   PLT  253  251  260     Recent Labs      12/26/17   0245  12/27/17   0154  12/28/17   0545   NA  136  137  139   K  3.8  4.1  4.0   ANIONGAP  14  10  13   CL  98  102  101   CO2  24  25  25   BUN  29*  28*  28*   CREATININE  1.5*  2.0*  1.7*   GLUCOSE  132*  116*  111*   CALCIUM  8.1*  8.1*  8.3*     HgBA1c: 6.3    FLP:    Lab Results   Component Value Date    TRIG 224 12/24/2017    HDL 28 12/24/2017    LDLCALC 58 12/24/2017     TSH:    Lab Results   Component Value Date    TSH 2.170 12/24/2017       CXR 12/24/17  Impression  1.. Right IJ deep line unchanged in position from the previous study. 2. Suspected volume overload with engorgement of the central pulmonary vascularity and widening of the vascular pedicle. Signed by Dr West Osorio on 12/25/2017 8:56 AM     Renal US 12/25/17  Impression  1. Unremarkable renal ultrasound. Signed by Dr West Osorio on 12/25/2017 10:42 AM      Cardiac Cath 12/23/17  Summary:    1. Successful femoral artery ultrasound  2. Successful femoral artery arterogram  3. Single vessel coronary artery disease involving the mid RCA  4. Left ventricular function is impaired in the inferior basilar segment extending up the diaphragmatic surface    with an ejection fraction of 40%  5.  99% lesion in the mid RCA, subtotally occluded with thrombus  6. Successful percutaneous coronary intervention utilizing a single bare metal stent to the mid RCA reestablishing BRIE-3 flow  RECOMMENDATIONS:  1. Risk Factor Modification  2. On-going Medical Therapy  3.   Dual antiplatelet therapy for one month.    Electronically signed by Sumaya Rodriguez Aliya Barber MD on 12/23/17 at 8:15 PM     Echo 12/26/17  Summary   Left Atrium was not clearly visualized.  Juan Jose Geraldo dilated left atrium.   The left ventricle was not well visualized.   Normal left ventricular size and function. Electronically signed by Margarito Garcia MD (Interpreting physician) on 12/26/2017 05:35 PM    Objective:   Vitals: /76   Pulse 88   Temp 96.9 °F (36.1 °C) (Temporal)   Resp 18   Ht 5' 9\" (1.753 m)   Wt (!) 340 lb 6.4 oz (154.4 kg)   SpO2 98%   BMI 50.27 kg/m²   24HR INTAKE/OUTPUT:      Intake/Output Summary (Last 24 hours) at 12/28/17 1619  Last data filed at 12/28/17 1402   Gross per 24 hour   Intake              720 ml   Output             1000 ml   Net             -280 ml     General appearance: alert and cooperative with exam, comfortably reclining in the bed  HEENT: atraumatic, eyes with clear conjunctiva and normal lids, pupils and irises normal, external ears and nose are normal, lips normal. Neck without masses, lympadenopathy, bruit, thyroid normal  Lungs: no increased work of breathing, \"clear to auscultation bilaterally\" without rales, rhonchi or wheezes  Heart: regular rate and rhythm, S1, S2 normal, no murmur, click, rub or gallop  Abdomen: soft, non-tender; bowel sounds normal; no masses,  no organomegaly  Extremities: extremities normal, atraumatic, no cyanosis or edema, BLE warm with good color, R 2nd toe slightly dusky and tender  Neurologic: No focal neurologic deficits, normal sensation, alert and oriented, affect and mood appropriate. Skin: no rashes, nodules, she has vascular ischemic changes dorsum R foot, improving, R 2nd toe slightly dusky.     Assessment and Plan:   Principal Problem:    Critical lower limb ischemia - now SP thrombolysis with success and expected discomfort of reperfusion    Active Problems:    CAD - denies CP, monitor    ST elevation myocardial infarction (STEMI)  - POA now s/p PCI with BMS to RCA 12/23/17, EF 40%, add BB ACE-I, Xa inh

## 2017-12-29 ENCOUNTER — ANESTHESIA EVENT (OUTPATIENT)
Dept: OPERATING ROOM | Age: 47
DRG: 907 | End: 2017-12-29
Payer: MEDICARE

## 2017-12-29 ENCOUNTER — HOSPITAL ENCOUNTER (INPATIENT)
Age: 47
LOS: 7 days | Discharge: NURSING, REHAB OR LONG-TERM CARE | DRG: 907 | End: 2018-01-05
Attending: SURGERY | Admitting: SURGERY
Payer: MEDICARE

## 2017-12-29 ENCOUNTER — ANESTHESIA (OUTPATIENT)
Dept: OPERATING ROOM | Age: 47
DRG: 907 | End: 2017-12-29
Payer: MEDICARE

## 2017-12-29 ENCOUNTER — APPOINTMENT (OUTPATIENT)
Dept: GENERAL RADIOLOGY | Age: 47
DRG: 907 | End: 2017-12-29
Payer: MEDICARE

## 2017-12-29 VITALS
DIASTOLIC BLOOD PRESSURE: 63 MMHG | SYSTOLIC BLOOD PRESSURE: 137 MMHG | TEMPERATURE: 97.4 F | RESPIRATION RATE: 12 BRPM | OXYGEN SATURATION: 96 %

## 2017-12-29 VITALS
RESPIRATION RATE: 18 BRPM | WEIGHT: 293 LBS | OXYGEN SATURATION: 96 % | HEART RATE: 92 BPM | DIASTOLIC BLOOD PRESSURE: 73 MMHG | HEIGHT: 69 IN | TEMPERATURE: 97 F | SYSTOLIC BLOOD PRESSURE: 126 MMHG | BODY MASS INDEX: 43.4 KG/M2

## 2017-12-29 DIAGNOSIS — I97.610 POSTOPERATIVE HEMORRHAGE INVOLVING CIRCULATORY SYSTEM FOLLOWING CARDIAC CATHETERIZATION: Primary | ICD-10-CM

## 2017-12-29 PROBLEM — I70.229 CRITICAL LOWER LIMB ISCHEMIA (HCC): Status: RESOLVED | Noted: 2017-12-23 | Resolved: 2017-12-29

## 2017-12-29 PROBLEM — N18.9 ACUTE RENAL FAILURE SUPERIMPOSED ON CHRONIC KIDNEY DISEASE (HCC): Status: RESOLVED | Noted: 2017-12-24 | Resolved: 2017-12-29

## 2017-12-29 PROBLEM — S80.12XA HEMATOMA OF LEFT LOWER EXTREMITY: Status: ACTIVE | Noted: 2017-12-29

## 2017-12-29 PROBLEM — N17.9 ACUTE RENAL FAILURE SUPERIMPOSED ON CHRONIC KIDNEY DISEASE (HCC): Status: RESOLVED | Noted: 2017-12-24 | Resolved: 2017-12-29

## 2017-12-29 PROBLEM — E87.1 HYPONATREMIA: Status: RESOLVED | Noted: 2017-12-24 | Resolved: 2017-12-29

## 2017-12-29 LAB
ABO/RH: NORMAL
ALBUMIN SERPL-MCNC: 2.9 G/DL (ref 3.5–5.2)
ALP BLD-CCNC: 74 U/L (ref 35–104)
ALT SERPL-CCNC: 25 U/L (ref 5–33)
ANION GAP SERPL CALCULATED.3IONS-SCNC: 13 MMOL/L (ref 7–19)
ANION GAP SERPL CALCULATED.3IONS-SCNC: 14 MMOL/L (ref 7–19)
ANTIBODY SCREEN: NORMAL
APTT: 33.2 SEC (ref 26–36.2)
AST SERPL-CCNC: 39 U/L (ref 5–32)
BASOPHILS ABSOLUTE: 0.1 K/UL (ref 0–0.2)
BASOPHILS RELATIVE PERCENT: 0.3 % (ref 0–1)
BILIRUB SERPL-MCNC: 0.5 MG/DL (ref 0.2–1.2)
BLOOD BANK DISPENSE STATUS: NORMAL
BLOOD BANK PRODUCT CODE: NORMAL
BPU ID: NORMAL
BUN BLDV-MCNC: 18 MG/DL (ref 6–20)
BUN BLDV-MCNC: 20 MG/DL (ref 6–20)
CALCIUM SERPL-MCNC: 8.8 MG/DL (ref 8.6–10)
CALCIUM SERPL-MCNC: 8.8 MG/DL (ref 8.6–10)
CHLORIDE BLD-SCNC: 102 MMOL/L (ref 98–111)
CHLORIDE BLD-SCNC: 104 MMOL/L (ref 98–111)
CO2: 25 MMOL/L (ref 22–29)
CO2: 25 MMOL/L (ref 22–29)
CREAT SERPL-MCNC: 1.1 MG/DL (ref 0.5–0.9)
CREAT SERPL-MCNC: 1.2 MG/DL (ref 0.5–0.9)
DESCRIPTION BLOOD BANK: NORMAL
EOSINOPHILS ABSOLUTE: 0.3 K/UL (ref 0–0.6)
EOSINOPHILS RELATIVE PERCENT: 1.4 % (ref 0–5)
FERRITIN: 192.1 NG/ML (ref 13–150)
FOLATE: 3.9 NG/ML (ref 4.8–37.3)
GFR NON-AFRICAN AMERICAN: 48
GFR NON-AFRICAN AMERICAN: 53
GLUCOSE BLD-MCNC: 120 MG/DL (ref 70–99)
GLUCOSE BLD-MCNC: 137 MG/DL (ref 70–99)
GLUCOSE BLD-MCNC: 139 MG/DL (ref 74–109)
GLUCOSE BLD-MCNC: 159 MG/DL (ref 70–99)
GLUCOSE BLD-MCNC: 197 MG/DL (ref 74–109)
HCT VFR BLD CALC: 24.8 % (ref 37–47)
HCT VFR BLD CALC: 29.3 % (ref 37–47)
HCT VFR BLD CALC: 30.6 % (ref 37–47)
HEMOGLOBIN: 8 G/DL (ref 12–16)
HEMOGLOBIN: 9.5 G/DL (ref 12–16)
HEMOGLOBIN: 9.8 G/DL (ref 12–16)
INR BLD: 1.43 (ref 0.88–1.18)
IRON SATURATION: 8 % (ref 14–50)
IRON: 18 UG/DL (ref 37–145)
LYMPHOCYTES ABSOLUTE: 2.6 K/UL (ref 1.1–4.5)
LYMPHOCYTES RELATIVE PERCENT: 12.6 % (ref 20–40)
MCH RBC QN AUTO: 29.7 PG (ref 27–31)
MCH RBC QN AUTO: 30.1 PG (ref 27–31)
MCH RBC QN AUTO: 30.3 PG (ref 27–31)
MCHC RBC AUTO-ENTMCNC: 32 G/DL (ref 33–37)
MCHC RBC AUTO-ENTMCNC: 32.3 G/DL (ref 33–37)
MCHC RBC AUTO-ENTMCNC: 32.4 G/DL (ref 33–37)
MCV RBC AUTO: 92.7 FL (ref 81–99)
MCV RBC AUTO: 93.2 FL (ref 81–99)
MCV RBC AUTO: 93.3 FL (ref 81–99)
MONOCYTES ABSOLUTE: 1.6 K/UL (ref 0–0.9)
MONOCYTES RELATIVE PERCENT: 7.9 % (ref 0–10)
NEUTROPHILS ABSOLUTE: 15.5 K/UL (ref 1.5–7.5)
NEUTROPHILS RELATIVE PERCENT: 76.4 % (ref 50–65)
PDW BLD-RTO: 14.5 % (ref 11.5–14.5)
PDW BLD-RTO: 14.5 % (ref 11.5–14.5)
PDW BLD-RTO: 14.6 % (ref 11.5–14.5)
PERFORMED ON: ABNORMAL
PLATELET # BLD: 277 K/UL (ref 130–400)
PLATELET # BLD: 278 K/UL (ref 130–400)
PLATELET # BLD: 287 K/UL (ref 130–400)
PLATELET # BLD: 372 K/UL (ref 130–400)
PMV BLD AUTO: 11.2 FL (ref 9.4–12.3)
PMV BLD AUTO: 11.3 FL (ref 9.4–12.3)
PMV BLD AUTO: 11.6 FL (ref 9.4–12.3)
POTASSIUM SERPL-SCNC: 3.8 MMOL/L (ref 3.5–5)
POTASSIUM SERPL-SCNC: 4.2 MMOL/L (ref 3.5–5)
PROTHROMBIN TIME: 17.4 SEC (ref 12–14.6)
RBC # BLD: 2.66 M/UL (ref 4.2–5.4)
RBC # BLD: 3.14 M/UL (ref 4.2–5.4)
RBC # BLD: 3.3 M/UL (ref 4.2–5.4)
SODIUM BLD-SCNC: 140 MMOL/L (ref 136–145)
SODIUM BLD-SCNC: 143 MMOL/L (ref 136–145)
TOTAL IRON BINDING CAPACITY: 224 UG/DL (ref 250–400)
TOTAL PROTEIN: 6.1 G/DL (ref 6.6–8.7)
TROPONIN: 1.75 NG/ML (ref 0–0.03)
URINE CULTURE, ROUTINE: NORMAL
VITAMIN B-12: 164 PG/ML (ref 211–946)
WBC # BLD: 11.8 K/UL (ref 4.8–10.8)
WBC # BLD: 12.2 K/UL (ref 4.8–10.8)
WBC # BLD: 20.3 K/UL (ref 4.8–10.8)

## 2017-12-29 PROCEDURE — 6360000002 HC RX W HCPCS: Performed by: NURSE ANESTHETIST, CERTIFIED REGISTERED

## 2017-12-29 PROCEDURE — P9059 PLASMA, FRZ BETWEEN 8-24HOUR: HCPCS

## 2017-12-29 PROCEDURE — 6370000000 HC RX 637 (ALT 250 FOR IP): Performed by: HOSPITALIST

## 2017-12-29 PROCEDURE — 85018 HEMOGLOBIN: CPT

## 2017-12-29 PROCEDURE — 83735 ASSAY OF MAGNESIUM: CPT

## 2017-12-29 PROCEDURE — 6370000000 HC RX 637 (ALT 250 FOR IP): Performed by: NURSE PRACTITIONER

## 2017-12-29 PROCEDURE — 97162 PT EVAL MOD COMPLEX 30 MIN: CPT

## 2017-12-29 PROCEDURE — 3700000001 HC ADD 15 MINUTES (ANESTHESIA): Performed by: SURGERY

## 2017-12-29 PROCEDURE — 85730 THROMBOPLASTIN TIME PARTIAL: CPT

## 2017-12-29 PROCEDURE — 86901 BLOOD TYPING SEROLOGIC RH(D): CPT

## 2017-12-29 PROCEDURE — 6360000002 HC RX W HCPCS: Performed by: HOSPITALIST

## 2017-12-29 PROCEDURE — 85025 COMPLETE CBC W/AUTO DIFF WBC: CPT

## 2017-12-29 PROCEDURE — 6370000000 HC RX 637 (ALT 250 FOR IP): Performed by: INTERNAL MEDICINE

## 2017-12-29 PROCEDURE — G8979 MOBILITY GOAL STATUS: HCPCS

## 2017-12-29 PROCEDURE — 2500000003 HC RX 250 WO HCPCS: Performed by: SURGERY

## 2017-12-29 PROCEDURE — 6360000002 HC RX W HCPCS: Performed by: SURGERY

## 2017-12-29 PROCEDURE — 3700000000 HC ANESTHESIA ATTENDED CARE: Performed by: SURGERY

## 2017-12-29 PROCEDURE — G8978 MOBILITY CURRENT STATUS: HCPCS

## 2017-12-29 PROCEDURE — 86850 RBC ANTIBODY SCREEN: CPT

## 2017-12-29 PROCEDURE — 2000000000 HC ICU R&B

## 2017-12-29 PROCEDURE — 85014 HEMATOCRIT: CPT

## 2017-12-29 PROCEDURE — 99024 POSTOP FOLLOW-UP VISIT: CPT | Performed by: NURSE PRACTITIONER

## 2017-12-29 PROCEDURE — 2580000003 HC RX 258: Performed by: SURGERY

## 2017-12-29 PROCEDURE — 36592 COLLECT BLOOD FROM PICC: CPT

## 2017-12-29 PROCEDURE — 85610 PROTHROMBIN TIME: CPT

## 2017-12-29 PROCEDURE — 82607 VITAMIN B-12: CPT

## 2017-12-29 PROCEDURE — 2500000003 HC RX 250 WO HCPCS: Performed by: NURSE ANESTHETIST, CERTIFIED REGISTERED

## 2017-12-29 PROCEDURE — 83550 IRON BINDING TEST: CPT

## 2017-12-29 PROCEDURE — 36430 TRANSFUSION BLD/BLD COMPNT: CPT

## 2017-12-29 PROCEDURE — P9016 RBC LEUKOCYTES REDUCED: HCPCS

## 2017-12-29 PROCEDURE — G8988 SELF CARE GOAL STATUS: HCPCS

## 2017-12-29 PROCEDURE — 99232 SBSQ HOSP IP/OBS MODERATE 35: CPT | Performed by: INTERNAL MEDICINE

## 2017-12-29 PROCEDURE — 93005 ELECTROCARDIOGRAM TRACING: CPT

## 2017-12-29 PROCEDURE — 86900 BLOOD TYPING SEROLOGIC ABO: CPT

## 2017-12-29 PROCEDURE — 2580000003 HC RX 258: Performed by: INTERNAL MEDICINE

## 2017-12-29 PROCEDURE — 99233 SBSQ HOSP IP/OBS HIGH 50: CPT | Performed by: HOSPITALIST

## 2017-12-29 PROCEDURE — 99285 EMERGENCY DEPT VISIT HI MDM: CPT

## 2017-12-29 PROCEDURE — 80053 COMPREHEN METABOLIC PANEL: CPT

## 2017-12-29 PROCEDURE — 82728 ASSAY OF FERRITIN: CPT

## 2017-12-29 PROCEDURE — 6360000002 HC RX W HCPCS

## 2017-12-29 PROCEDURE — 0Y380ZZ CONTROL BLEEDING IN LEFT FEMORAL REGION, OPEN APPROACH: ICD-10-PCS | Performed by: SURGERY

## 2017-12-29 PROCEDURE — 80048 BASIC METABOLIC PNL TOTAL CA: CPT

## 2017-12-29 PROCEDURE — G8989 SELF CARE D/C STATUS: HCPCS

## 2017-12-29 PROCEDURE — P9035 PLATELET PHERES LEUKOREDUCED: HCPCS

## 2017-12-29 PROCEDURE — A6445 CONFORM BAND S W <3"/YD: HCPCS | Performed by: SURGERY

## 2017-12-29 PROCEDURE — 2580000003 HC RX 258: Performed by: NURSE ANESTHETIST, CERTIFIED REGISTERED

## 2017-12-29 PROCEDURE — 84100 ASSAY OF PHOSPHORUS: CPT

## 2017-12-29 PROCEDURE — 97530 THERAPEUTIC ACTIVITIES: CPT

## 2017-12-29 PROCEDURE — 84484 ASSAY OF TROPONIN QUANT: CPT

## 2017-12-29 PROCEDURE — 2720000001 HC MISC SURG SUPPLY STERILE $51-500: Performed by: SURGERY

## 2017-12-29 PROCEDURE — G8987 SELF CARE CURRENT STATUS: HCPCS

## 2017-12-29 PROCEDURE — 3600000005 HC SURGERY LEVEL 5 BASE: Performed by: SURGERY

## 2017-12-29 PROCEDURE — 35226 REPAIR BLOOD VESSEL DIR LXTR: CPT | Performed by: SURGERY

## 2017-12-29 PROCEDURE — 85049 AUTOMATED PLATELET COUNT: CPT

## 2017-12-29 PROCEDURE — 83540 ASSAY OF IRON: CPT

## 2017-12-29 PROCEDURE — 99285 EMERGENCY DEPT VISIT HI MDM: CPT | Performed by: EMERGENCY MEDICINE

## 2017-12-29 PROCEDURE — 82746 ASSAY OF FOLIC ACID SERUM: CPT

## 2017-12-29 PROCEDURE — 3600000015 HC SURGERY LEVEL 5 ADDTL 15MIN: Performed by: SURGERY

## 2017-12-29 PROCEDURE — 36415 COLL VENOUS BLD VENIPUNCTURE: CPT

## 2017-12-29 PROCEDURE — 97165 OT EVAL LOW COMPLEX 30 MIN: CPT

## 2017-12-29 PROCEDURE — 71010 XR CHEST PORTABLE: CPT

## 2017-12-29 PROCEDURE — 6370000000 HC RX 637 (ALT 250 FOR IP): Performed by: SURGERY

## 2017-12-29 PROCEDURE — 85027 COMPLETE CBC AUTOMATED: CPT

## 2017-12-29 PROCEDURE — 82948 REAGENT STRIP/BLOOD GLUCOSE: CPT

## 2017-12-29 RX ORDER — 0.9 % SODIUM CHLORIDE 0.9 %
250 INTRAVENOUS SOLUTION INTRAVENOUS ONCE
Status: DISCONTINUED | OUTPATIENT
Start: 2017-12-29 | End: 2017-12-29

## 2017-12-29 RX ORDER — ROCURONIUM BROMIDE 10 MG/ML
INJECTION, SOLUTION INTRAVENOUS PRN
Status: DISCONTINUED | OUTPATIENT
Start: 2017-12-29 | End: 2017-12-29 | Stop reason: SDUPTHER

## 2017-12-29 RX ORDER — TIZANIDINE 4 MG/1
8 TABLET ORAL 3 TIMES DAILY
Status: DISCONTINUED | OUTPATIENT
Start: 2017-12-30 | End: 2018-01-06 | Stop reason: HOSPADM

## 2017-12-29 RX ORDER — CLOPIDOGREL BISULFATE 75 MG/1
75 TABLET ORAL DAILY
Qty: 30 TABLET | Refills: 0 | Status: SHIPPED | OUTPATIENT
Start: 2017-12-30 | End: 2018-09-08

## 2017-12-29 RX ORDER — ALLOPURINOL 100 MG/1
200 TABLET ORAL DAILY
Qty: 30 TABLET | Refills: 3 | Status: SHIPPED | OUTPATIENT
Start: 2017-12-30 | End: 2018-09-08

## 2017-12-29 RX ORDER — VANCOMYCIN HYDROCHLORIDE 1 G/200ML
1000 INJECTION, SOLUTION INTRAVENOUS ONCE
Status: COMPLETED | OUTPATIENT
Start: 2017-12-29 | End: 2017-12-29

## 2017-12-29 RX ORDER — CALCITRIOL 0.25 UG/1
0.25 CAPSULE, LIQUID FILLED ORAL DAILY
Status: DISCONTINUED | OUTPATIENT
Start: 2017-12-30 | End: 2018-01-06 | Stop reason: HOSPADM

## 2017-12-29 RX ORDER — ONDANSETRON 2 MG/ML
INJECTION INTRAMUSCULAR; INTRAVENOUS PRN
Status: DISCONTINUED | OUTPATIENT
Start: 2017-12-29 | End: 2017-12-29 | Stop reason: SDUPTHER

## 2017-12-29 RX ORDER — MORPHINE SULFATE 10 MG/ML
8 INJECTION, SOLUTION INTRAMUSCULAR; INTRAVENOUS
Status: DISCONTINUED | OUTPATIENT
Start: 2017-12-29 | End: 2017-12-29 | Stop reason: HOSPADM

## 2017-12-29 RX ORDER — SODIUM CHLORIDE 9 MG/ML
INJECTION, SOLUTION INTRAVENOUS CONTINUOUS
Status: DISCONTINUED | OUTPATIENT
Start: 2017-12-30 | End: 2018-01-02

## 2017-12-29 RX ORDER — ONDANSETRON 2 MG/ML
4 INJECTION INTRAMUSCULAR; INTRAVENOUS EVERY 6 HOURS PRN
Status: DISCONTINUED | OUTPATIENT
Start: 2017-12-29 | End: 2018-01-06 | Stop reason: HOSPADM

## 2017-12-29 RX ORDER — CALCITRIOL 0.25 UG/1
0.25 CAPSULE, LIQUID FILLED ORAL DAILY
COMMUNITY

## 2017-12-29 RX ORDER — MORPHINE SULFATE 4 MG/ML
2 INJECTION, SOLUTION INTRAMUSCULAR; INTRAVENOUS EVERY 5 MIN PRN
Status: DISCONTINUED | OUTPATIENT
Start: 2017-12-29 | End: 2017-12-29 | Stop reason: HOSPADM

## 2017-12-29 RX ORDER — CLOPIDOGREL BISULFATE 75 MG/1
75 TABLET ORAL DAILY
Qty: 30 TABLET | Refills: 0 | Status: CANCELLED | OUTPATIENT
Start: 2017-12-30

## 2017-12-29 RX ORDER — LISINOPRIL 2.5 MG/1
2.5 TABLET ORAL DAILY
Status: DISCONTINUED | OUTPATIENT
Start: 2017-12-30 | End: 2017-12-31

## 2017-12-29 RX ORDER — CALCITRIOL 0.25 UG/1
0.25 CAPSULE, LIQUID FILLED ORAL DAILY
Qty: 30 CAPSULE | Refills: 3 | Status: SHIPPED | OUTPATIENT
Start: 2017-12-30 | End: 2018-09-08

## 2017-12-29 RX ORDER — OXYCODONE AND ACETAMINOPHEN 10; 325 MG/1; MG/1
1 TABLET ORAL EVERY 4 HOURS PRN
COMMUNITY
End: 2018-09-08

## 2017-12-29 RX ORDER — DULOXETIN HYDROCHLORIDE 60 MG/1
60 CAPSULE, DELAYED RELEASE ORAL 2 TIMES DAILY
Status: DISCONTINUED | OUTPATIENT
Start: 2017-12-30 | End: 2018-01-06 | Stop reason: HOSPADM

## 2017-12-29 RX ORDER — ONDANSETRON 2 MG/ML
INJECTION INTRAMUSCULAR; INTRAVENOUS
Status: COMPLETED
Start: 2017-12-29 | End: 2017-12-29

## 2017-12-29 RX ORDER — NITROGLYCERIN 20 MG/100ML
INJECTION INTRAVENOUS CONTINUOUS PRN
Status: DISCONTINUED | OUTPATIENT
Start: 2017-12-29 | End: 2017-12-29 | Stop reason: SDUPTHER

## 2017-12-29 RX ORDER — SUCCINYLCHOLINE CHLORIDE 20 MG/ML
INJECTION INTRAMUSCULAR; INTRAVENOUS PRN
Status: DISCONTINUED | OUTPATIENT
Start: 2017-12-29 | End: 2017-12-29 | Stop reason: SDUPTHER

## 2017-12-29 RX ORDER — SODIUM CHLORIDE 9 MG/ML
INJECTION, SOLUTION INTRAVENOUS CONTINUOUS PRN
Status: DISCONTINUED | OUTPATIENT
Start: 2017-12-29 | End: 2017-12-29 | Stop reason: SDUPTHER

## 2017-12-29 RX ORDER — ALLOPURINOL 100 MG/1
100 TABLET ORAL DAILY
Status: DISCONTINUED | OUTPATIENT
Start: 2017-12-30 | End: 2018-01-06 | Stop reason: HOSPADM

## 2017-12-29 RX ORDER — PROPOFOL 10 MG/ML
INJECTION, EMULSION INTRAVENOUS PRN
Status: DISCONTINUED | OUTPATIENT
Start: 2017-12-29 | End: 2017-12-29 | Stop reason: SDUPTHER

## 2017-12-29 RX ORDER — ONDANSETRON 2 MG/ML
4 INJECTION INTRAMUSCULAR; INTRAVENOUS ONCE
Status: COMPLETED | OUTPATIENT
Start: 2017-12-29 | End: 2017-12-29

## 2017-12-29 RX ORDER — MEPERIDINE HYDROCHLORIDE 50 MG/ML
12.5 INJECTION INTRAMUSCULAR; INTRAVENOUS; SUBCUTANEOUS EVERY 5 MIN PRN
Status: DISCONTINUED | OUTPATIENT
Start: 2017-12-29 | End: 2017-12-29 | Stop reason: HOSPADM

## 2017-12-29 RX ORDER — LIDOCAINE HYDROCHLORIDE 10 MG/ML
INJECTION, SOLUTION INFILTRATION; PERINEURAL PRN
Status: DISCONTINUED | OUTPATIENT
Start: 2017-12-29 | End: 2017-12-29 | Stop reason: SDUPTHER

## 2017-12-29 RX ORDER — FLUTICASONE PROPIONATE 50 MCG
2 SPRAY, SUSPENSION (ML) NASAL DAILY
Status: DISCONTINUED | OUTPATIENT
Start: 2017-12-30 | End: 2018-01-06 | Stop reason: HOSPADM

## 2017-12-29 RX ORDER — MORPHINE SULFATE 4 MG/ML
4 INJECTION, SOLUTION INTRAMUSCULAR; INTRAVENOUS
Status: DISCONTINUED | OUTPATIENT
Start: 2017-12-29 | End: 2018-01-02

## 2017-12-29 RX ORDER — HYDROCODONE BITARTRATE AND ACETAMINOPHEN 5; 325 MG/1; MG/1
2 TABLET ORAL EVERY 4 HOURS PRN
Status: DISCONTINUED | OUTPATIENT
Start: 2017-12-29 | End: 2018-01-06 | Stop reason: HOSPADM

## 2017-12-29 RX ORDER — LISINOPRIL 2.5 MG/1
2.5 TABLET ORAL DAILY
Qty: 30 TABLET | Refills: 3 | Status: SHIPPED | OUTPATIENT
Start: 2017-12-30

## 2017-12-29 RX ORDER — OXYCODONE AND ACETAMINOPHEN 10; 325 MG/1; MG/1
1 TABLET ORAL EVERY 4 HOURS PRN
Status: DISCONTINUED | OUTPATIENT
Start: 2017-12-29 | End: 2018-01-06 | Stop reason: HOSPADM

## 2017-12-29 RX ORDER — LISINOPRIL 2.5 MG/1
2.5 TABLET ORAL DAILY
COMMUNITY

## 2017-12-29 RX ORDER — ACETAMINOPHEN 325 MG/1
650 TABLET ORAL EVERY 4 HOURS PRN
Status: DISCONTINUED | OUTPATIENT
Start: 2017-12-29 | End: 2018-01-06 | Stop reason: HOSPADM

## 2017-12-29 RX ORDER — MORPHINE SULFATE 4 MG/ML
4 INJECTION, SOLUTION INTRAMUSCULAR; INTRAVENOUS EVERY 5 MIN PRN
Status: DISCONTINUED | OUTPATIENT
Start: 2017-12-29 | End: 2017-12-29 | Stop reason: HOSPADM

## 2017-12-29 RX ORDER — FENTANYL CITRATE 50 UG/ML
INJECTION, SOLUTION INTRAMUSCULAR; INTRAVENOUS PRN
Status: DISCONTINUED | OUTPATIENT
Start: 2017-12-29 | End: 2017-12-29 | Stop reason: SDUPTHER

## 2017-12-29 RX ORDER — PROMETHAZINE HYDROCHLORIDE 25 MG/ML
6.25 INJECTION, SOLUTION INTRAMUSCULAR; INTRAVENOUS
Status: DISCONTINUED | OUTPATIENT
Start: 2017-12-29 | End: 2017-12-29 | Stop reason: HOSPADM

## 2017-12-29 RX ORDER — IRON POLYSACCHARIDE COMPLEX 150 MG
150 CAPSULE ORAL DAILY
Status: DISCONTINUED | OUTPATIENT
Start: 2017-12-29 | End: 2017-12-29 | Stop reason: HOSPADM

## 2017-12-29 RX ORDER — ALLOPURINOL 100 MG/1
100 TABLET ORAL DAILY
COMMUNITY
End: 2018-09-08

## 2017-12-29 RX ORDER — METOCLOPRAMIDE HYDROCHLORIDE 5 MG/ML
10 INJECTION INTRAMUSCULAR; INTRAVENOUS
Status: DISCONTINUED | OUTPATIENT
Start: 2017-12-29 | End: 2017-12-29 | Stop reason: HOSPADM

## 2017-12-29 RX ORDER — ESMOLOL HYDROCHLORIDE 10 MG/ML
INJECTION INTRAVENOUS PRN
Status: DISCONTINUED | OUTPATIENT
Start: 2017-12-29 | End: 2017-12-29 | Stop reason: SDUPTHER

## 2017-12-29 RX ORDER — DIPHENHYDRAMINE HYDROCHLORIDE 50 MG/ML
12.5 INJECTION INTRAMUSCULAR; INTRAVENOUS
Status: DISCONTINUED | OUTPATIENT
Start: 2017-12-29 | End: 2017-12-29 | Stop reason: HOSPADM

## 2017-12-29 RX ORDER — MORPHINE SULFATE 10 MG/ML
INJECTION, SOLUTION INTRAMUSCULAR; INTRAVENOUS PRN
Status: DISCONTINUED | OUTPATIENT
Start: 2017-12-29 | End: 2017-12-29 | Stop reason: SDUPTHER

## 2017-12-29 RX ORDER — MORPHINE SULFATE 4 MG/ML
2 INJECTION, SOLUTION INTRAMUSCULAR; INTRAVENOUS
Status: DISCONTINUED | OUTPATIENT
Start: 2017-12-29 | End: 2018-01-02

## 2017-12-29 RX ORDER — ENALAPRILAT 2.5 MG/2ML
1.25 INJECTION INTRAVENOUS
Status: DISCONTINUED | OUTPATIENT
Start: 2017-12-29 | End: 2017-12-29 | Stop reason: HOSPADM

## 2017-12-29 RX ORDER — LEVOTHYROXINE SODIUM 0.1 MG/1
100 TABLET ORAL DAILY
Status: DISCONTINUED | OUTPATIENT
Start: 2017-12-30 | End: 2018-01-06 | Stop reason: HOSPADM

## 2017-12-29 RX ORDER — MIRTAZAPINE 15 MG/1
30 TABLET, FILM COATED ORAL DAILY
Status: DISCONTINUED | OUTPATIENT
Start: 2017-12-30 | End: 2017-12-30

## 2017-12-29 RX ORDER — SODIUM CHLORIDE, SODIUM LACTATE, POTASSIUM CHLORIDE, CALCIUM CHLORIDE 600; 310; 30; 20 MG/100ML; MG/100ML; MG/100ML; MG/100ML
INJECTION, SOLUTION INTRAVENOUS CONTINUOUS PRN
Status: DISCONTINUED | OUTPATIENT
Start: 2017-12-29 | End: 2017-12-29 | Stop reason: SDUPTHER

## 2017-12-29 RX ORDER — MORPHINE SULFATE 10 MG/ML
4 INJECTION, SOLUTION INTRAMUSCULAR; INTRAVENOUS
Status: DISCONTINUED | OUTPATIENT
Start: 2017-12-29 | End: 2017-12-29 | Stop reason: HOSPADM

## 2017-12-29 RX ORDER — CLOPIDOGREL BISULFATE 75 MG/1
75 TABLET ORAL DAILY
COMMUNITY

## 2017-12-29 RX ORDER — HYDROCODONE BITARTRATE AND ACETAMINOPHEN 5; 325 MG/1; MG/1
1 TABLET ORAL EVERY 4 HOURS PRN
Status: DISCONTINUED | OUTPATIENT
Start: 2017-12-29 | End: 2018-01-06 | Stop reason: HOSPADM

## 2017-12-29 RX ORDER — CLOPIDOGREL BISULFATE 75 MG/1
75 TABLET ORAL DAILY
Status: DISCONTINUED | OUTPATIENT
Start: 2017-12-30 | End: 2018-01-06 | Stop reason: HOSPADM

## 2017-12-29 RX ORDER — HYDRALAZINE HYDROCHLORIDE 20 MG/ML
5 INJECTION INTRAMUSCULAR; INTRAVENOUS EVERY 10 MIN PRN
Status: DISCONTINUED | OUTPATIENT
Start: 2017-12-29 | End: 2017-12-29 | Stop reason: HOSPADM

## 2017-12-29 RX ORDER — LABETALOL HYDROCHLORIDE 5 MG/ML
5 INJECTION, SOLUTION INTRAVENOUS EVERY 10 MIN PRN
Status: DISCONTINUED | OUTPATIENT
Start: 2017-12-29 | End: 2017-12-29 | Stop reason: HOSPADM

## 2017-12-29 RX ORDER — LISINOPRIL 2.5 MG/1
2.5 TABLET ORAL DAILY
Qty: 30 TABLET | Refills: 0 | Status: CANCELLED | OUTPATIENT
Start: 2017-12-30

## 2017-12-29 RX ADMIN — PROPOFOL 200 MG: 10 INJECTION, EMULSION INTRAVENOUS at 21:43

## 2017-12-29 RX ADMIN — CLOPIDOGREL 75 MG: 75 TABLET, FILM COATED ORAL at 07:17

## 2017-12-29 RX ADMIN — MORPHINE SULFATE 4 MG: 10 INJECTION INTRAVENOUS at 07:29

## 2017-12-29 RX ADMIN — OXYCODONE HYDROCHLORIDE 10 MG: 5 TABLET ORAL at 09:20

## 2017-12-29 RX ADMIN — Medication 10 ML: at 07:17

## 2017-12-29 RX ADMIN — OXYCODONE HYDROCHLORIDE 10 MG: 5 TABLET ORAL at 15:12

## 2017-12-29 RX ADMIN — ONDANSETRON HYDROCHLORIDE 4 MG: 2 SOLUTION INTRAMUSCULAR; INTRAVENOUS at 23:01

## 2017-12-29 RX ADMIN — METOPROLOL TARTRATE 12.5 MG: 25 TABLET ORAL at 07:16

## 2017-12-29 RX ADMIN — MORPHINE SULFATE 2 MG: 10 INJECTION INTRAMUSCULAR; INTRAVENOUS; SUBCUTANEOUS at 23:11

## 2017-12-29 RX ADMIN — PREGABALIN 200 MG: 150 CAPSULE ORAL at 07:16

## 2017-12-29 RX ADMIN — ROCURONIUM BROMIDE 50 MG: 10 INJECTION INTRAVENOUS at 21:54

## 2017-12-29 RX ADMIN — SODIUM CHLORIDE, SODIUM LACTATE, POTASSIUM CHLORIDE, AND CALCIUM CHLORIDE: 600; 310; 30; 20 INJECTION, SOLUTION INTRAVENOUS at 22:35

## 2017-12-29 RX ADMIN — NITROGLYCERIN 5 MCG/MIN: 20 INJECTION INTRAVENOUS at 22:56

## 2017-12-29 RX ADMIN — MORPHINE SULFATE 2 MG: 10 INJECTION INTRAMUSCULAR; INTRAVENOUS; SUBCUTANEOUS at 23:21

## 2017-12-29 RX ADMIN — PHENYLEPHRINE HYDROCHLORIDE 10 MCG/MIN: 10 INJECTION INTRAVENOUS at 22:16

## 2017-12-29 RX ADMIN — ALLOPURINOL 200 MG: 100 TABLET ORAL at 07:16

## 2017-12-29 RX ADMIN — ONDANSETRON 4 MG: 2 INJECTION INTRAMUSCULAR; INTRAVENOUS at 21:19

## 2017-12-29 RX ADMIN — CALCITRIOL 0.25 MCG: 0.25 CAPSULE, LIQUID FILLED ORAL at 07:16

## 2017-12-29 RX ADMIN — LIDOCAINE HYDROCHLORIDE 50 MG: 10 INJECTION, SOLUTION INFILTRATION; PERINEURAL at 21:43

## 2017-12-29 RX ADMIN — MORPHINE SULFATE 4 MG: 10 INJECTION INTRAVENOUS at 12:02

## 2017-12-29 RX ADMIN — VANCOMYCIN HYDROCHLORIDE 1000 MG: 1 INJECTION, SOLUTION INTRAVENOUS at 21:19

## 2017-12-29 RX ADMIN — OXYCODONE HYDROCHLORIDE 10 MG: 5 TABLET ORAL at 05:08

## 2017-12-29 RX ADMIN — ROCURONIUM BROMIDE 20 MG: 10 INJECTION INTRAVENOUS at 22:38

## 2017-12-29 RX ADMIN — SUCCINYLCHOLINE CHLORIDE 140 MG: 20 INJECTION, SOLUTION INTRAMUSCULAR; INTRAVENOUS; PARENTERAL at 21:43

## 2017-12-29 RX ADMIN — ESMOLOL HYDROCHLORIDE 5 MG: 10 INJECTION, SOLUTION INTRAVENOUS at 22:48

## 2017-12-29 RX ADMIN — LISINOPRIL 2.5 MG: 2.5 TABLET ORAL at 07:16

## 2017-12-29 RX ADMIN — FENTANYL CITRATE 50 MCG: 50 INJECTION, SOLUTION INTRAMUSCULAR; INTRAVENOUS at 21:43

## 2017-12-29 RX ADMIN — SODIUM CHLORIDE: 9 INJECTION, SOLUTION INTRAVENOUS at 07:15

## 2017-12-29 RX ADMIN — FENTANYL CITRATE 50 MCG: 50 INJECTION, SOLUTION INTRAMUSCULAR; INTRAVENOUS at 22:37

## 2017-12-29 RX ADMIN — SUGAMMADEX 300 MG: 100 INJECTION, SOLUTION INTRAVENOUS at 23:15

## 2017-12-29 RX ADMIN — MORPHINE SULFATE 8 MG: 10 INJECTION, SOLUTION INTRAMUSCULAR; INTRAVENOUS at 02:35

## 2017-12-29 RX ADMIN — ASPIRIN 81 MG: 81 TABLET, COATED ORAL at 07:16

## 2017-12-29 RX ADMIN — MORPHINE SULFATE 2 MG: 10 INJECTION INTRAMUSCULAR; INTRAVENOUS; SUBCUTANEOUS at 23:24

## 2017-12-29 RX ADMIN — PREGABALIN 200 MG: 150 CAPSULE ORAL at 14:21

## 2017-12-29 RX ADMIN — PANTOPRAZOLE SODIUM 40 MG: 40 TABLET, DELAYED RELEASE ORAL at 07:16

## 2017-12-29 RX ADMIN — MORPHINE SULFATE 8 MG: 10 INJECTION, SOLUTION INTRAMUSCULAR; INTRAVENOUS at 00:14

## 2017-12-29 RX ADMIN — MORPHINE SULFATE 4 MG: 10 INJECTION INTRAMUSCULAR; INTRAVENOUS; SUBCUTANEOUS at 23:06

## 2017-12-29 RX ADMIN — SODIUM CHLORIDE: 9 INJECTION, SOLUTION INTRAVENOUS at 21:38

## 2017-12-29 RX ADMIN — SODIUM CHLORIDE, SODIUM LACTATE, POTASSIUM CHLORIDE, AND CALCIUM CHLORIDE: 600; 310; 30; 20 INJECTION, SOLUTION INTRAVENOUS at 21:38

## 2017-12-29 RX ADMIN — RIVAROXABAN 15 MG: 15 TABLET, FILM COATED ORAL at 07:16

## 2017-12-29 ASSESSMENT — PAIN SCALES - GENERAL
PAINLEVEL_OUTOF10: 0
PAINLEVEL_OUTOF10: 9
PAINLEVEL_OUTOF10: 9
PAINLEVEL_OUTOF10: 8
PAINLEVEL_OUTOF10: 8
PAINLEVEL_OUTOF10: 9
PAINLEVEL_OUTOF10: 9
PAINLEVEL_OUTOF10: 10
PAINLEVEL_OUTOF10: 1
PAINLEVEL_OUTOF10: 9
PAINLEVEL_OUTOF10: 10

## 2017-12-29 ASSESSMENT — PAIN DESCRIPTION - DESCRIPTORS
DESCRIPTORS: STABBING;CONSTANT
DESCRIPTORS: ACHING

## 2017-12-29 ASSESSMENT — ENCOUNTER SYMPTOMS
ABDOMINAL PAIN: 0
SORE THROAT: 0
DIARRHEA: 0
RHINORRHEA: 0
VOMITING: 0
BACK PAIN: 0
NAUSEA: 0
SHORTNESS OF BREATH: 0

## 2017-12-29 ASSESSMENT — PAIN DESCRIPTION - ORIENTATION
ORIENTATION: RIGHT
ORIENTATION: RIGHT

## 2017-12-29 ASSESSMENT — PAIN DESCRIPTION - PAIN TYPE: TYPE: SURGICAL PAIN

## 2017-12-29 ASSESSMENT — PAIN DESCRIPTION - LOCATION
LOCATION: GROIN
LOCATION: FOOT

## 2017-12-29 ASSESSMENT — PAIN DESCRIPTION - FREQUENCY: FREQUENCY: CONTINUOUS

## 2017-12-29 NOTE — PROGRESS NOTES
Priority: Low    GERD (gastroesophageal reflux disease)      Priority: Low    HTN (hypertension)      Priority: Low    Hypothyroidism      Priority: Low    Arterial occlusion, lower extremity (HCC)      Priority: Low    Critical lower limb ischemia 12/23/2017     Priority: Low     Current Facility-Administered Medications   Medication Dose Route Frequency Provider Last Rate Last Dose    morphine (PF) injection 4 mg  4 mg Intravenous Q1H PRN Pranay Lester MD   4 mg at 12/29/17 1202    morphine (PF) injection 8 mg  8 mg Intravenous Q1H PRN Pranay Lester MD        rivaroxaban (XARELTO) tablet 15 mg  15 mg Oral Daily Benavidez Spruce, APRN   15 mg at 12/29/17 0716    clopidogrel (PLAVIX) tablet 75 mg  75 mg Oral Daily Yvette Lewis MD   75 mg at 12/29/17 2808    calcitRIOL (ROCALTROL) capsule 0.25 mcg  0.25 mcg Oral Daily Candelario Sainz MD   0.25 mcg at 12/29/17 0716    insulin lispro (HUMALOG) injection vial 0-35 Units  0-35 Units Subcutaneous 4x Daily AC & HS Fariba Villavicencio MD   Stopped at 12/28/17 2057    metoprolol tartrate (LOPRESSOR) tablet 12.5 mg  12.5 mg Oral BID Fariba Villavicencio MD   12.5 mg at 12/29/17 0716    lisinopril (PRINIVIL;ZESTRIL) tablet 2.5 mg  2.5 mg Oral Daily Fariba Villavicencio MD   2.5 mg at 12/29/17 0716    allopurinol (ZYLOPRIM) tablet 200 mg  200 mg Oral Daily Aundrea Buenrostro MD   200 mg at 12/29/17 0716    mirtazapine (REMERON) tablet 15 mg  15 mg Oral Nightly Raquel Romero MD   15 mg at 12/28/17 2056    pregabalin (LYRICA) capsule 200 mg  200 mg Oral TID Raquel Romero MD   200 mg at 12/29/17 0716    tiZANidine (ZANAFLEX) tablet 4 mg  4 mg Oral Q8H PRN Raquel Romero MD   4 mg at 12/28/17 1617    pantoprazole (PROTONIX) tablet 40 mg  40 mg Oral QAM AC Lucía Champion MD   40 mg at 12/29/17 0716    glucose (GLUTOSE) 40 % oral gel 15 g  15 g Oral PRN Raquel Romero MD        dextrose 50 % solution 12.5 g  12.5 g Intravenous PRN Lucía LANDERS MAKING                    Cardiac Specific Problem / Diagnosis   Discussion and Data Reviewed Diagnostic Procedures Ordered Management Options Selected                 1. Presenting problem / symptom     Chest discomfort of ? etiology  are improving The chest discomfort is was exertional and does  appear to represent myocardial ischemia.       Nonetheless, She has the following risk factors for the presence of coronary artery disease:     Risk Factor Yes / No / Unknown         Gender Female   Cigarette Use Yes:    Family History of Cardiovascular Disease N/A   Diabetes Mellitus yes   Hypercholesteremia no   Hypertension no         She also has the following cardiac history:                    Specialty Problems      None                Yes: emergency cardiac catheterization     1.  Successful femoral artery ultrasound  2.  Successful femoral artery arterogram  3.  Single vessel coronary artery disease involving the mid RCA  4.  Left ventricular function is impaired in the inferior basilar segment extending up the diaphragmatic surface with an ejection fraction of 40%  5.  99% lesion in the mid RCA, subtotally occluded with thrombus  6.  Successful percutaneous coronary intervention utilizing a single bare metal stent to the mid RCA reestablishing BRIE-3 flow Continue current medications:      Yes:                  2. Cold right foot Prior evaluation I strongly suspect that the patient had an inferior lateral MI, which is ongoing with continued chest discomfort.  I suspect the discomfort represents a stuttering MI and more likely than not a cardiac embolus went to the right leg with resulting ischemic right foot / leg.       Back to the OR today No     As per vascular Continue current medications:     Yes:                  3.  Renal insufficiency Prior evaluation The creatinine is 4.4 and the patient not know of pre-existing renal insufficiency.  Obvious, any use of any contrast will greatly endanger the kidneys and may well precipate acute and chronic renal failure and end up with the patient on either temporary or permanent dialysis.     Creatinine has fallen to 3 !   No     As per nephrology Continue current medications:        yes                      PLAN:    1. Continue present medications except for changes as noted above  2. Continue to monitor rhythm  3. Further orders per clinical course. 4. Ok to DC home  5. Plavix x 1 month           Discussed with patient and nursing.     Electronically signed by Garima Aguilar MD on 12/29/17        [facility]

## 2017-12-29 NOTE — PROGRESS NOTES
Physical Therapy  Name: Amy El  MRN:  [id number]  Date of service:  12/29/2017    Patient declined ambulation this pm.  Instructed on ankle pumps and calf stretches. Patient verbalized and demonstrated understanding. Physical Therapy will continue to follow and progress as able.       Electronically signed by Carmen Lyon PTA on 12/29/2017 at 3:14 PM

## 2017-12-29 NOTE — PROGRESS NOTES
(ROCALTROL) capsule 0.25 mcg  0.25 mcg Oral Daily Carmine Stacy MD   0.25 mcg at 12/29/17 0716    insulin lispro (HUMALOG) injection vial 0-35 Units  0-35 Units Subcutaneous 4x Daily AC & HS Megan Camacho MD   Stopped at 12/28/17 2057    metoprolol tartrate (LOPRESSOR) tablet 12.5 mg  12.5 mg Oral BID Megan Camacho MD   12.5 mg at 12/29/17 0716    lisinopril (PRINIVIL;ZESTRIL) tablet 2.5 mg  2.5 mg Oral Daily Megan Camacho MD   2.5 mg at 12/29/17 0716    allopurinol (ZYLOPRIM) tablet 200 mg  200 mg Oral Daily Veramaya Todd MD   200 mg at 12/29/17 0716    mirtazapine (REMERON) tablet 15 mg  15 mg Oral Nightly Kylah Herrera MD   15 mg at 12/28/17 2056    pregabalin (LYRICA) capsule 200 mg  200 mg Oral TID Kylah Herrera MD   200 mg at 12/29/17 1421    tiZANidine (ZANAFLEX) tablet 4 mg  4 mg Oral Q8H PRN Kylah Herrera MD   4 mg at 12/28/17 1617    pantoprazole (PROTONIX) tablet 40 mg  40 mg Oral QAM AC Kylah Herrera MD   40 mg at 12/29/17 0716    glucose (GLUTOSE) 40 % oral gel 15 g  15 g Oral PRN Kylah Herrera MD        dextrose 50 % solution 12.5 g  12.5 g Intravenous PRN Kylah Herrera MD        glucagon (rDNA) injection 1 mg  1 mg Intramuscular PRN Kylah Herrera MD        dextrose 5 % solution  100 mL/hr Intravenous PRN Kylah Herrera MD        acetaminophen (TYLENOL) tablet 650 mg  650 mg Oral Q4H PRN Hosea Louis MD        ondansetron [facility]) injection 4 mg  4 mg Intravenous Q8H PRN Hosea Louis MD        oxyCODONE (ROXICODONE) immediate release tablet 10 mg  10 mg Oral Q4H PRN Kylah Herrera MD   10 mg at 12/29/17 1512    sodium chloride flush 0.9 % injection 10 mL  10 mL Intravenous 2 times per day Hosea Louis MD   10 mL at 12/29/17 0717    sodium chloride flush 0.9 % injection 10 mL  10 mL Intravenous PRN Hosea Louis MD   10 mL at 12/25/17 0010    acetaminophen (TYLENOL) tablet 650 mg  650 mg Oral Q4H PRN Leopoldo Dapper Carlos Cardenas MD        magnesium hydroxide (MILK OF MAGNESIA) 400 MG/5ML suspension 30 mL  30 mL Oral Daily PRN Douglas Russell MD        0.9 % sodium chloride infusion   Intravenous Continuous Oanh Freitas MD   Stopped at 12/29/17 1621       Past Medical History:  Past Medical History:   Diagnosis Date    Chronic back pain     Diabetes mellitus ([facility]     GERD (gastroesophageal reflux disease)     HTN (hypertension)     Hypothyroidism     Insomnia     Ischemic leg 12/23/2017    Morbid obesity with body mass index (BMI) of 45.0 to 49.9 in adult ([facility]     PVD (peripheral vascular disease) ([facility]     Rheumatoid arthritis ([facility]     Tobacco use        Past Surgical History:  Past Surgical History:   Procedure Laterality Date    CARDIAC CATHETERIZATION      CHOLECYSTECTOMY      FOOT SURGERY Right     KNEE SURGERY Bilateral     SC VEIN BYPASS GRAFT,FEM-POP Right 12/23/2017    RIGHT LOWER EXTREMITY RUNOFF WITH LYSIS OF THROMBUS performed by Douglas Russell MD at [address] History  Family History   Problem Relation Age of Onset    Heart Disease Mother     COPD Mother     Cancer Mother     Rheum Arthritis Mother     Cancer Father        Social History  Social History     Social History    Marital status: Legally      Spouse name: N/A    Number of children: N/A    Years of education: N/A     Occupational History    Disabled due to psychiatric reasons, accident and pain      Social History Main Topics    Smoking status: Current Every Day Smoker     Packs/day: 1.00    Smokeless tobacco: Never Used    Alcohol use No    Drug use: No    Sexual activity: No     Other Topics Concern    Not on file     Social History Narrative    No narrative on file         Review of Systems:  History obtained from chart review and the patient  General ROS: No fever or chills  Respiratory ROS: No cough, shortness of breath, wheezing  Cardiovascular ROS: no chest pain or dyspnea on catheter. The wall thickness and contour cannot be assessed. There is no surrounding ascites. 1. Unremarkable renal ultrasound. Signed by Dr Kiah Carmona on 12/25/2017 10:42 AM    Xr Chest Portable    Result Date: 12/25/2017  EXAMINATION: Portable one view chest 12/24/2017 HISTORY: Assess central line placement FINDINGS: Today's exam is compared to previous study of earlier the same day. The patient's right IJ deep line remains well-positioned. There is persistent widening of the vascular pedicle with engorgement of the central pulmonary vascularity suggesting an element of volume overload. There is no evidence of lobar pneumonia or effusion. 1.. Right IJ deep line unchanged in position from the previous study. 2. Suspected volume overload with engorgement of the central pulmonary vascularity and widening of the vascular pedicle. Signed by Dr Kiah Carmona on 12/25/2017 8:56 AM    Xr Chest Portable    Result Date: 12/24/2017  History: 49-year-old female with central line placement. Reference: None. Findings: Frontal chest radiograph performed. Suboptimal inspiration. Heart size is accentuated by technique but likely normal. Lungs are grossly clear. No pleural effusion or pneumothorax. There is a right IJ central line with tip in expected SVC. Impression: Right IJ central line, tip in SVC. No pneumothorax.  Signed by Dr Polo Lay on 12/24/2017 12:07 PM       Assessment   TIMMY- ATN/DONOVAN  DM2   HTN  STEMI  RLE ischemia  Morbid obesity  Hyponatremia  Secondary Hyperparathyroidism  Hyperuricemia  Anemia - iron deficiency      Plan:  Creat back down a bit  calicitriol for now   Iron   F/u office 1-2w    Patti Burgess MD  12/29/17  4:42 PM

## 2017-12-29 NOTE — CARE COORDINATION
Spoke with patient regarding MD orders for [place] services. Patient agreeable and has chosen [facility]. Referral Faxed. [facility] [phone]. [phone]. Please notify [facility] when patient discharges and fax DC med list and any new [place] orders.   Electronically signed by Alicia Hughes RN on 12/29/17 at 10:43 AM

## 2017-12-29 NOTE — PROGRESS NOTES
Occupational Therapy   Occupational Therapy Initial Assessment  Date: 2017   Patient Name: Trinh Fallon  MRN: [id number]     : 1970    Patient Diagnosis(es): The primary encounter diagnosis was ST elevation myocardial infarction (STEMI), unspecified artery ([facility] Diagnoses of Arterial occlusion, lower extremity ([facility] and Acute renal failure, unspecified acute renal failure type [facility]) were also pertinent to this visit. has a past medical history of Chronic back pain; Diabetes mellitus ([facility] GERD (gastroesophageal reflux disease); HTN (hypertension); Hypothyroidism; Insomnia; Ischemic leg; Morbid obesity with body mass index (BMI) of 45.0 to 49.9 in adult [facility]); PVD (peripheral vascular disease) ([facility] Rheumatoid arthritis ([facility] and Tobacco use.   has a past surgical history that includes knee surgery (Bilateral); Cholecystectomy; Foot surgery (Right); Cardiac catheterization; and vein bypass graft,fem-pop (Right, 2017). Treatment Diagnosis: RLE thrombosis, s/p angiojet lysis      Restrictions  Restrictions/Precautions  Restrictions/Precautions: Weight Bearing  Lower Extremity Weight Bearing Restrictions  Right Lower Extremity Weight Bearing: Weight Bearing As Tolerated    Subjective   General  Chart Reviewed: Yes  Patient assessed for rehabilitation services?: Yes  Additional Pertinent Hx: Pt. lives with her adult children, but plans to go temporarily to her cousin's house which is more accesible. Family / Caregiver Present: Yes  Diagnosis: RLE thrombosis, s/p angiojet lysis  General Comment  Comments: Pt. sitting EOB when OT entered.   Pain Assessment  Patient Currently in Pain: Yes  Pain Assessment: 0-10  Torres-Baker Pain Rating: Hurts a little bit  Pain Level: 8  Pain Type: Surgical pain  Pain Location: Foot  Pain Orientation: Right  Pain Descriptors: Aching  Pain Frequency: Continuous (Increases with attempted weight bearing.)  Pain Intervention(s): Medication (see eMar)  Oxygen on RLE due to pain. Recommended w/c rental but does not need further OT at this point. PT to see for gait. Treatment Diagnosis: RLE thrombosis, s/p angiojet lysis  Prognosis: Good  Decision Making: Low Complexity  No Skilled OT: Independent with ADL's;No OT goals identified  REQUIRES OT FOLLOW UP: No  Activity Tolerance  Activity Tolerance: Patient limited by pain        Discharge Recommendations:        Plan   Plan  Times per week: OT eval only    G-Code  OT G-codes  Functional Assessment Tool Used: ADLs, transfers  Score: CI  Functional Limitation: Self care  Self Care Current Status (): At least 1 percent but less than 20 percent impaired, limited or restricted  Self Care Goal Status (): At least 1 percent but less than 20 percent impaired, limited or restricted  Self Care Discharge Status ():  At least 1 percent but less than 20 percent impaired, limited or restricted  OutComes Score                                           AM-PAC Score             Goals  Short term goals  Time Frame for Short term goals: OT eval only  Long term goals  Time Frame for Long term goals : OT eval only       Therapy Time   Individual Concurrent Group Co-treatment   Time In           Time Out           Minutes                   Yoko Krueger, OTR/L

## 2017-12-29 NOTE — PROGRESS NOTES
Physical Therapy    Facility/Department: [facility] CLEO/VAS/MED  Initial Assessment    NAME: Charlestine Brunner  : 1970  MRN: [id number]    Date of Service: 2017    Patient Diagnosis(es): The primary encounter diagnosis was ST elevation myocardial infarction (STEMI), unspecified artery ([facility] Diagnoses of Arterial occlusion, lower extremity ([facility] and Acute renal failure, unspecified acute renal failure type [facility]) were also pertinent to this visit. has a past medical history of Chronic back pain; Diabetes mellitus ([facility] GERD (gastroesophageal reflux disease); HTN (hypertension); Hypothyroidism; Insomnia; Ischemic leg; Morbid obesity with body mass index (BMI) of 45.0 to 49.9 in adult [facility]); PVD (peripheral vascular disease) ([facility] Rheumatoid arthritis ([facility] and Tobacco use.   has a past surgical history that includes knee surgery (Bilateral); Cholecystectomy; Foot surgery (Right); Cardiac catheterization; and vein bypass graft,fem-pop (Right, 2017). Restrictions     Vision/Hearing  Vision: Within Functional Limits  Hearing: Within functional limits     Subjective  General  Additional Pertinent Hx: RA, PVD, MORBID OBESITY, CHRONIC PAIN  Family / Caregiver Present: Yes (son present)  Diagnosis: MI AND THROMBUS IN R FOOT  Follows Commands: Within Functional Limits  General Comment  Comments: Pt LYING IN BED WITH R LE ELEVATED. HOLD R LE IN ER/FLEX IN ATTEMPTS TO DECREASE PAIN  Subjective  Subjective: pt STATES SHE WOULD LIKE TO D/C TO [facility]. PLANS TO USE A ROLLATOR WALKER AND HAVE BSC IN LIVING ROOM WITH HER DURING DAY SO SHE WOULD NOT HAVE TO AMB TO THE BR WHILE HER R FOOT IS STILL SO PAINFUL.   Pain Screening  Patient Currently in Pain:  (STATES HER R FOOT REMAINS VERY PAINFUL)  Vital Signs  Patient Currently in Pain:  (STATES HER R FOOT REMAINS VERY PAINFUL)       Orientation  Orientation  Overall Orientation Status: Within Normal Limits    Social/Functional History  Social/Functional

## 2017-12-30 PROBLEM — D62 ACUTE BLOOD LOSS ANEMIA: Status: ACTIVE | Noted: 2017-12-30

## 2017-12-30 PROBLEM — D72.819 LEUKOCYTOPENIA: Status: ACTIVE | Noted: 2017-12-30

## 2017-12-30 PROBLEM — I25.10 CORONARY ARTERY DISEASE: Status: ACTIVE | Noted: 2017-12-30

## 2017-12-30 PROBLEM — E11.9 DIABETES (HCC): Status: ACTIVE | Noted: 2017-12-30

## 2017-12-30 PROBLEM — E03.9 HYPOTHYROIDISM: Status: ACTIVE | Noted: 2017-12-30

## 2017-12-30 PROBLEM — K21.9 GERD (GASTROESOPHAGEAL REFLUX DISEASE): Status: ACTIVE | Noted: 2017-12-30

## 2017-12-30 PROBLEM — I10 HYPERTENSION: Status: ACTIVE | Noted: 2017-12-30

## 2017-12-30 LAB
ANION GAP SERPL CALCULATED.3IONS-SCNC: 11 MMOL/L (ref 7–19)
BASOPHILS ABSOLUTE: 0 K/UL (ref 0–0.2)
BASOPHILS RELATIVE PERCENT: 0.2 % (ref 0–1)
BLOOD BANK DISPENSE STATUS: NORMAL
BLOOD BANK PRODUCT CODE: NORMAL
BPU ID: NORMAL
BUN BLDV-MCNC: 20 MG/DL (ref 6–20)
CALCIUM SERPL-MCNC: 8.2 MG/DL (ref 8.6–10)
CHLORIDE BLD-SCNC: 102 MMOL/L (ref 98–111)
CO2: 26 MMOL/L (ref 22–29)
CREAT SERPL-MCNC: 1 MG/DL (ref 0.5–0.9)
DESCRIPTION BLOOD BANK: NORMAL
EOSINOPHILS ABSOLUTE: 0.1 K/UL (ref 0–0.6)
EOSINOPHILS RELATIVE PERCENT: 0.2 % (ref 0–5)
GFR NON-AFRICAN AMERICAN: 59
GLUCOSE BLD-MCNC: 156 MG/DL (ref 70–99)
GLUCOSE BLD-MCNC: 161 MG/DL (ref 74–109)
GLUCOSE BLD-MCNC: 177 MG/DL (ref 70–99)
GLUCOSE BLD-MCNC: 196 MG/DL (ref 70–99)
HCT VFR BLD CALC: 21.4 % (ref 37–47)
HCT VFR BLD CALC: 23.8 % (ref 37–47)
HCT VFR BLD CALC: 25.3 % (ref 37–47)
HCT VFR BLD CALC: 27 % (ref 37–47)
HEMOGLOBIN: 7 G/DL (ref 12–16)
HEMOGLOBIN: 7.9 G/DL (ref 12–16)
HEMOGLOBIN: 8.4 G/DL (ref 12–16)
HEMOGLOBIN: 8.9 G/DL (ref 12–16)
LYMPHOCYTES ABSOLUTE: 2.8 K/UL (ref 1.1–4.5)
LYMPHOCYTES RELATIVE PERCENT: 12.6 % (ref 20–40)
MCH RBC QN AUTO: 29.3 PG (ref 27–31)
MCH RBC QN AUTO: 29.4 PG (ref 27–31)
MCH RBC QN AUTO: 29.5 PG (ref 27–31)
MCH RBC QN AUTO: 30 PG (ref 27–31)
MCHC RBC AUTO-ENTMCNC: 32.7 G/DL (ref 33–37)
MCHC RBC AUTO-ENTMCNC: 33 G/DL (ref 33–37)
MCHC RBC AUTO-ENTMCNC: 33.2 G/DL (ref 33–37)
MCHC RBC AUTO-ENTMCNC: 33.2 G/DL (ref 33–37)
MCV RBC AUTO: 88.5 FL (ref 81–99)
MCV RBC AUTO: 88.8 FL (ref 81–99)
MCV RBC AUTO: 89.5 FL (ref 81–99)
MCV RBC AUTO: 90.9 FL (ref 81–99)
MONOCYTES ABSOLUTE: 1.9 K/UL (ref 0–0.9)
MONOCYTES RELATIVE PERCENT: 8.8 % (ref 0–10)
NEUTROPHILS ABSOLUTE: 16.8 K/UL (ref 1.5–7.5)
NEUTROPHILS RELATIVE PERCENT: 76.6 % (ref 50–65)
PDW BLD-RTO: 14.4 % (ref 11.5–14.5)
PDW BLD-RTO: 14.6 % (ref 11.5–14.5)
PDW BLD-RTO: 14.9 % (ref 11.5–14.5)
PDW BLD-RTO: 15 % (ref 11.5–14.5)
PERFORMED ON: ABNORMAL
PLATELET # BLD: 271 K/UL (ref 130–400)
PLATELET # BLD: 292 K/UL (ref 130–400)
PLATELET # BLD: 293 K/UL (ref 130–400)
PLATELET # BLD: 385 K/UL (ref 130–400)
PMV BLD AUTO: 10.6 FL (ref 9.4–12.3)
PMV BLD AUTO: 10.9 FL (ref 9.4–12.3)
PMV BLD AUTO: 11.1 FL (ref 9.4–12.3)
PMV BLD AUTO: 11.1 FL (ref 9.4–12.3)
POTASSIUM SERPL-SCNC: 4.7 MMOL/L (ref 3.5–5)
RBC # BLD: 2.39 M/UL (ref 4.2–5.4)
RBC # BLD: 2.68 M/UL (ref 4.2–5.4)
RBC # BLD: 2.86 M/UL (ref 4.2–5.4)
RBC # BLD: 2.97 M/UL (ref 4.2–5.4)
SODIUM BLD-SCNC: 139 MMOL/L (ref 136–145)
TROPONIN: 1.09 NG/ML (ref 0–0.03)
TROPONIN: 1.13 NG/ML (ref 0–0.03)
WBC # BLD: 18.7 K/UL (ref 4.8–10.8)
WBC # BLD: 21.9 K/UL (ref 4.8–10.8)
WBC # BLD: 23.4 K/UL (ref 4.8–10.8)
WBC # BLD: 29.4 K/UL (ref 4.8–10.8)

## 2017-12-30 PROCEDURE — 2000000000 HC ICU R&B

## 2017-12-30 PROCEDURE — 2580000003 HC RX 258: Performed by: SURGERY

## 2017-12-30 PROCEDURE — 99291 CRITICAL CARE FIRST HOUR: CPT | Performed by: HOSPITALIST

## 2017-12-30 PROCEDURE — 6370000000 HC RX 637 (ALT 250 FOR IP): Performed by: SURGERY

## 2017-12-30 PROCEDURE — 36430 TRANSFUSION BLD/BLD COMPNT: CPT

## 2017-12-30 PROCEDURE — 80048 BASIC METABOLIC PNL TOTAL CA: CPT

## 2017-12-30 PROCEDURE — 6360000002 HC RX W HCPCS: Performed by: SURGERY

## 2017-12-30 PROCEDURE — 36415 COLL VENOUS BLD VENIPUNCTURE: CPT

## 2017-12-30 PROCEDURE — 99222 1ST HOSP IP/OBS MODERATE 55: CPT | Performed by: INTERNAL MEDICINE

## 2017-12-30 PROCEDURE — 6370000000 HC RX 637 (ALT 250 FOR IP): Performed by: HOSPITALIST

## 2017-12-30 PROCEDURE — 85025 COMPLETE CBC W/AUTO DIFF WBC: CPT

## 2017-12-30 PROCEDURE — 84484 ASSAY OF TROPONIN QUANT: CPT

## 2017-12-30 PROCEDURE — 85027 COMPLETE CBC AUTOMATED: CPT

## 2017-12-30 PROCEDURE — 2500000003 HC RX 250 WO HCPCS: Performed by: INTERNAL MEDICINE

## 2017-12-30 PROCEDURE — 2700000000 HC OXYGEN THERAPY PER DAY

## 2017-12-30 PROCEDURE — P9016 RBC LEUKOCYTES REDUCED: HCPCS

## 2017-12-30 PROCEDURE — 82948 REAGENT STRIP/BLOOD GLUCOSE: CPT

## 2017-12-30 RX ORDER — 0.9 % SODIUM CHLORIDE 0.9 %
250 INTRAVENOUS SOLUTION INTRAVENOUS ONCE
Status: COMPLETED | OUTPATIENT
Start: 2017-12-30 | End: 2017-12-31

## 2017-12-30 RX ORDER — NICOTINE POLACRILEX 4 MG
15 LOZENGE BUCCAL PRN
Status: DISCONTINUED | OUTPATIENT
Start: 2017-12-30 | End: 2018-01-06 | Stop reason: HOSPADM

## 2017-12-30 RX ORDER — DEXTROSE MONOHYDRATE 50 MG/ML
100 INJECTION, SOLUTION INTRAVENOUS PRN
Status: DISCONTINUED | OUTPATIENT
Start: 2017-12-30 | End: 2018-01-06 | Stop reason: HOSPADM

## 2017-12-30 RX ORDER — DEXTROSE MONOHYDRATE 25 G/50ML
12.5 INJECTION, SOLUTION INTRAVENOUS PRN
Status: DISCONTINUED | OUTPATIENT
Start: 2017-12-30 | End: 2018-01-06 | Stop reason: HOSPADM

## 2017-12-30 RX ORDER — NITROGLYCERIN 20 MG/100ML
5 INJECTION INTRAVENOUS CONTINUOUS
Status: DISCONTINUED | OUTPATIENT
Start: 2017-12-30 | End: 2018-01-01

## 2017-12-30 RX ORDER — FAMOTIDINE 20 MG/1
20 TABLET, FILM COATED ORAL 2 TIMES DAILY
Status: DISCONTINUED | OUTPATIENT
Start: 2017-12-30 | End: 2018-01-06 | Stop reason: HOSPADM

## 2017-12-30 RX ORDER — NITROGLYCERIN 20 MG/100ML
INJECTION INTRAVENOUS
Status: DISPENSED
Start: 2017-12-30 | End: 2017-12-31

## 2017-12-30 RX ORDER — MIRTAZAPINE 15 MG/1
30 TABLET, FILM COATED ORAL NIGHTLY
Status: DISCONTINUED | OUTPATIENT
Start: 2017-12-30 | End: 2018-01-06 | Stop reason: HOSPADM

## 2017-12-30 RX ADMIN — LEVOTHYROXINE SODIUM 100 MCG: 100 TABLET ORAL at 06:37

## 2017-12-30 RX ADMIN — ALLOPURINOL 100 MG: 100 TABLET ORAL at 07:54

## 2017-12-30 RX ADMIN — SODIUM CHLORIDE: 9 INJECTION, SOLUTION INTRAVENOUS at 14:55

## 2017-12-30 RX ADMIN — TIZANIDINE 8 MG: 4 TABLET ORAL at 14:08

## 2017-12-30 RX ADMIN — SODIUM CHLORIDE: 9 INJECTION, SOLUTION INTRAVENOUS at 00:05

## 2017-12-30 RX ADMIN — CLOPIDOGREL 75 MG: 75 TABLET, FILM COATED ORAL at 07:55

## 2017-12-30 RX ADMIN — PREGABALIN 200 MG: 150 CAPSULE ORAL at 07:54

## 2017-12-30 RX ADMIN — OXYCODONE HYDROCHLORIDE AND ACETAMINOPHEN 1 TABLET: 10; 325 TABLET ORAL at 19:50

## 2017-12-30 RX ADMIN — PREGABALIN 200 MG: 150 CAPSULE ORAL at 19:42

## 2017-12-30 RX ADMIN — METOPROLOL TARTRATE 12.5 MG: 25 TABLET, FILM COATED ORAL at 07:53

## 2017-12-30 RX ADMIN — FAMOTIDINE 20 MG: 20 TABLET ORAL at 19:42

## 2017-12-30 RX ADMIN — METOPROLOL TARTRATE 12.5 MG: 25 TABLET, FILM COATED ORAL at 19:41

## 2017-12-30 RX ADMIN — NITROGLYCERIN 5 MCG/MIN: 20 INJECTION INTRAVENOUS at 14:55

## 2017-12-30 RX ADMIN — FAMOTIDINE 20 MG: 20 TABLET ORAL at 15:50

## 2017-12-30 RX ADMIN — DULOXETINE HYDROCHLORIDE 60 MG: 60 CAPSULE, DELAYED RELEASE ORAL at 19:42

## 2017-12-30 RX ADMIN — LISINOPRIL 2.5 MG: 2.5 TABLET ORAL at 07:55

## 2017-12-30 RX ADMIN — DULOXETINE HYDROCHLORIDE 60 MG: 60 CAPSULE, DELAYED RELEASE ORAL at 07:54

## 2017-12-30 RX ADMIN — CALCITRIOL 0.25 MCG: 0.25 CAPSULE, LIQUID FILLED ORAL at 07:53

## 2017-12-30 RX ADMIN — FLUTICASONE PROPIONATE 2 SPRAY: 50 SPRAY, METERED NASAL at 07:55

## 2017-12-30 RX ADMIN — Medication 2000 MG: at 07:53

## 2017-12-30 RX ADMIN — INSULIN LISPRO 3 UNITS: 100 INJECTION, SOLUTION INTRAVENOUS; SUBCUTANEOUS at 19:53

## 2017-12-30 RX ADMIN — MIRTAZAPINE 30 MG: 15 TABLET, FILM COATED ORAL at 19:40

## 2017-12-30 RX ADMIN — SODIUM CHLORIDE 250 ML: 9 INJECTION, SOLUTION INTRAVENOUS at 18:37

## 2017-12-30 RX ADMIN — HYDROCODONE BITARTRATE AND ACETAMINOPHEN 1 TABLET: 5; 325 TABLET ORAL at 07:53

## 2017-12-30 RX ADMIN — TIZANIDINE 8 MG: 4 TABLET ORAL at 19:41

## 2017-12-30 RX ADMIN — Medication 4 MG: at 14:09

## 2017-12-30 RX ADMIN — TIZANIDINE 8 MG: 4 TABLET ORAL at 07:54

## 2017-12-30 RX ADMIN — PREGABALIN 200 MG: 150 CAPSULE ORAL at 14:08

## 2017-12-30 ASSESSMENT — PAIN SCALES - GENERAL
PAINLEVEL_OUTOF10: 5
PAINLEVEL_OUTOF10: 0
PAINLEVEL_OUTOF10: 5
PAINLEVEL_OUTOF10: 0
PAINLEVEL_OUTOF10: 8
PAINLEVEL_OUTOF10: 10
PAINLEVEL_OUTOF10: 0

## 2017-12-30 NOTE — PLAN OF CARE
Problem: Falls - Risk of  Goal: Absence of falls  Outcome: Ongoing      Problem: Risk for Impaired Skin Integrity  Goal: Tissue integrity - skin and mucous membranes  Structural intactness and normal physiological function of skin and  mucous membranes.    Outcome: Ongoing      Problem: Bleeding:  Goal: Will show no signs and symptoms of excessive bleeding  Will show no signs and symptoms of excessive bleeding   Outcome: Ongoing

## 2017-12-30 NOTE — ED PROVIDER NOTES
performed and is negative except as noted above in the HPI. PAST MEDICAL HISTORY     Past Medical History:   Diagnosis Date    Depression     Diabetes ([facility]     Heart attack     Hypertension     Hypothyroidism     RA (rheumatoid arthritis) ([facility]          SURGICAL HISTORY       Past Surgical History:   Procedure Laterality Date    CHOLECYSTECTOMY      CORONARY ARTERY BYPASS GRAFT      with one stent    TOTAL KNEE ARTHROPLASTY      x2         CURRENT MEDICATIONS       Current Discharge Medication List      CONTINUE these medications which have NOT CHANGED    Details   metoprolol tartrate (LOPRESSOR) 12.5 mg TABS Take 12.5 mg by mouth 2 times daily      rivaroxaban (XARELTO) 15 MG TABS tablet Take 15 mg by mouth      clopidogrel (PLAVIX) 75 MG tablet Take 75 mg by mouth daily      lisinopril (PRINIVIL;ZESTRIL) 2.5 MG tablet Take 2.5 mg by mouth daily      allopurinol (ZYLOPRIM) 100 MG tablet Take 100 mg by mouth daily      calcitRIOL (ROCALTROL) 0.25 MCG capsule Take 0.25 mcg by mouth daily      oxyCODONE-acetaminophen (PERCOCET)  MG per tablet Take 1 tablet by mouth every 4 hours as needed for Pain. fluticasone (VERAMYST) 27.5 MCG/SPRAY nasal spray 2 sprays by Nasal route daily      Blood Glucose Monitoring Suppl ERASTO Use to check blood sugar. Qty: 1 Device, Refills: 0      tiZANidine (ZANAFLEX) 4 MG tablet Take 2 tablets by mouth 3 times daily      LYRICA 200 MG capsule Take 1 capsule by mouth 3 times daily      DULoxetine (CYMBALTA) 60 MG capsule Take 60 mg by mouth 2 times daily      mirtazapine (REMERON) 30 MG tablet Take 30 mg by mouth daily      metFORMIN (GLUCOPHAGE) 500 MG tablet Take 1 tablet by mouth 2 times daily      levothyroxine (SYNTHROID) 100 MCG tablet Take 100 mcg by mouth Daily             ALLERGIES     Review of patient's allergies indicates no known allergies. FAMILY HISTORY     History reviewed. No pertinent family history.        SOCIAL HISTORY       Social History She is not diaphoretic. Psychiatric: She has a normal mood and affect. Her behavior is normal.   Nursing note and vitals reviewed. DIAGNOSTIC RESULTS     EKG: All EKG's are interpreted by the Emergency Department Physician who either signs or Co-signs this chart in the absence of a cardiologist.  Sinus tach rate 110 ST elevation in leads 2,3 aVF with ST depression V1 and V2 V3.  This EKG was compared to an EKG on December 23, 2017 and was slightly improved    RADIOLOGY:   Non-plain film images such as CT, Ultrasound and MRI are read by the radiologist. Plain radiographic images are visualized and preliminarily interpreted by the emergency physician with the below findings:      Interpretation per the Radiologist below, if available at the time of this note:    No orders to display         ED BEDSIDE ULTRASOUND:   Performed by ED Physician - none    LABS:  Labs Reviewed   CBC WITH AUTO DIFFERENTIAL - Abnormal; Notable for the following:        Result Value    WBC 20.3 (*)     RBC 2.66 (*)     Hemoglobin 8.0 (*)     Hematocrit 24.8 (*)     MCHC 32.3 (*)     Neutrophils % 76.4 (*)     Lymphocytes % 12.6 (*)     Neutrophils # 15.5 (*)     Monocytes # 1.60 (*)     All other components within normal limits   COMPREHENSIVE METABOLIC PANEL - Abnormal; Notable for the following:     Glucose 197 (*)     CREATININE 1.1 (*)     GFR Non- 53 (*)     Total Protein 6.1 (*)     Alb 2.9 (*)     AST 39 (*)     All other components within normal limits   PROTIME-INR - Abnormal; Notable for the following:     Protime 17.4 (*)     INR 1.43 (*)     All other components within normal limits   TROPONIN - Abnormal; Notable for the following:     Troponin 1.75 (*)     All other components within normal limits    Narrative:     CALL  [facility]  KLED tel. ,  Chemistry results called to and read back by Reg Robledo RN in [address],  12/29/2017 21:54, by Earlene Clark   APTT   TYPE AND SCREEN   PREPARE RBC (CROSSMATCH)   PREPARE PLATELETS

## 2017-12-30 NOTE — ED NOTES
Bed: 17  Expected date: 12/29/17  Expected time:   Means of arrival: [facility]:  [facility] ?  Groin swelling s/p tiffanie Ly RN  12/29/17 [id number]

## 2017-12-30 NOTE — ANESTHESIA PRE PROCEDURE
[facility])        Past Surgical History:        Procedure Laterality Date    CHOLECYSTECTOMY      CORONARY ARTERY BYPASS GRAFT      with one stent    TOTAL KNEE ARTHROPLASTY      x2       Social History:    Social History   Substance Use Topics    Smoking status: Current Some Day Smoker     Packs/day: 1.00     Years: 28.00    Smokeless tobacco: Never Used    Alcohol use No      Comment: occ                                Ready to quit: Not Answered  Counseling given: Not Answered      Vital Signs (Current):   Vitals:    12/29/17 1958 12/29/17 2001 12/29/17 2114   BP:  (!) 93/59 (!) 99/54   Pulse:  117 98   Resp:  17    Temp:  98.6 °F (37 °C) 98.6 °F (37 °C)   TempSrc:  Oral Oral   SpO2:  97% 95%   Weight: (!) 330 lb (149.7 kg)     Height: 5' 9\" (1.753 m)                                                BP Readings from Last 3 Encounters:   12/29/17 (!) 99/54   10/06/17 122/74   03/10/16 (!) 140/98       NPO Status:                                                                                 BMI:   Wt Readings from Last 3 Encounters:   12/29/17 (!) 330 lb (149.7 kg)   10/06/17 (!) 338 lb (153.3 kg)   03/10/16 (!) 353 lb 12 oz (160.5 kg)     Body mass index is 48.73 kg/m². CBC:   Lab Results   Component Value Date    WBC 20.3 12/29/2017    RBC 2.66 12/29/2017    HGB 8.0 12/29/2017    HCT 24.8 12/29/2017    MCV 93.2 12/29/2017    RDW 14.5 12/29/2017     12/29/2017       CMP:   Lab Results   Component Value Date     12/29/2017    K 4.2 12/29/2017     12/29/2017    CO2 25 12/29/2017    BUN 18 12/29/2017    CREATININE 1.1 12/29/2017    LABGLOM 53 12/29/2017    GLUCOSE 197 12/29/2017    PROT 6.1 12/29/2017    CALCIUM 8.8 12/29/2017    BILITOT 0.5 12/29/2017    ALKPHOS 74 12/29/2017    AST 39 12/29/2017    ALT 25 12/29/2017       POC Tests: No results for input(s): POCGLU, POCNA, POCK, POCCL, POCBUN, POCHEMO, POCHCT in the last 72 hours.     Coags:   Lab Results   Component Value Date    PROTIME 17.4 12/29/2017    INR 1.43 12/29/2017    APTT 33.2 12/29/2017       HCG (If Applicable): No results found for: PREGTESTUR, PREGSERUM, HCG, HCGQUANT     ABGs: No results found for: PHART, PO2ART, ENP8MQW, ULJ4MZE, BEART, U9BPWPHG     Type & Screen (If Applicable):  No results found for: LABABO, [address]    Anesthesia Evaluation  Patient summary reviewed and Nursing notes reviewed  Airway: Mallampati: II  TM distance: >3 FB   Neck ROM: full  Mouth opening: > = 3 FB Dental:    (+) edentulous      Pulmonary:normal exam                               Cardiovascular:    (+) hypertension:, CAD:, CABG/stent:,       ECG reviewed  Rhythm: irregular  Rate: abnormal           Beta Blocker:  Dose within 24 Hrs         Neuro/Psych:   (+) depression/anxiety             GI/Hepatic/Renal:             Endo/Other:    (+) Type II DM, , hypothyroidism, blood dyscrasia: anticoagulation therapy, arthritis: rheumatoid. , .                 Abdominal:   (+) obese,         Vascular:                                      Anesthesia Plan      general     ASA 4 - emergent       Induction: intravenous. central line  MIPS: Postoperative opioids intended and Prophylactic antiemetics administered. Anesthetic plan and risks discussed with patient.                     Wicho Escobar MD   12/29/2017

## 2017-12-30 NOTE — H&P
HPI: 52year old female over last week had MI, thrombo-embolism to right leg treated with lysis. D/c'd home on Xaralto and Plavix. A few hours ago experienced rapid left groin swelling and hematome, came into ER. Hgb 9.7 this am now 8. BP 79\"A systolic,  Large left groin hematoma      HEENT negative  Chest- Heart regular, no S3S4 or m  Lung -clear  Right leg, good dopler signals in PT and DP, Tender swollen right foot. Left leg, palpable DP+2, thigh large eccymotic very tender. PMHx-MI,  (Impresssion-groin bleeding from Stick site-  Plan IV fluids   2. TO OR for urgent exploration and repair  [facility] on [facility]  3. Will need blood  Risks discussed. Risks of surgery have been reviewed with the patient including but not limited to MI, death, CVA, bleeding, nerve injury, infection, need for further surgery, pain, and extended recovery time.

## 2017-12-30 NOTE — ANESTHESIA POSTPROCEDURE EVALUATION
Department of Anesthesiology  Postprocedure Note    Patient: Ever Silver  MRN: [id number]  [place]: 1970  Date of evaluation: 12/29/2017  Time:  11:43 PM     Procedure Summary     Date:  12/29/17 Room / Location:  [facility] OR    Anesthesia Start:  2138 Anesthesia Stop:  2342    Procedure:  LEFT FEMORAL ARTERY REPAIR (N/A ) Diagnosis:  (bleeding at heart catherization site)    Surgeon:  Faiza Elena MD Responsible Provider:  Tiffany Yanes CRNA    Anesthesia Type:  general ASA Status:  4 - Emergent          Anesthesia Type: general    Jennifer Phase I:      Jennifer Phase II:      Last vitals: Reviewed and per EMR flowsheets. Anesthesia Post Evaluation    Patient location during evaluation: PACU  Patient participation: complete - patient participated  Level of consciousness: awake and alert  Pain score: 0  Airway patency: patent  Nausea & Vomiting: no nausea and no vomiting  Complications: no  Cardiovascular status: hemodynamically stable and blood pressure returned to baseline  Respiratory status: acceptable and nasal cannula  Hydration status: stable  Comments: Vital Signs Stable. Exchanging well. PACU RN received care.

## 2017-12-30 NOTE — PROGRESS NOTES
Vascular Surgery Rounding Note    12/30/2017  12:23 PM  Post op day - 1repair of left CFA    Subjective- No complaints    Objective-   Invalid input(s): 24H    Intake/Output Summary (Last 24 hours) at 12/30/17 1223  Last data filed at 12/30/17 1000   Gross per 24 hour   Intake          5333.78 ml   Output             2595 ml   Net          2738.78 ml     In: 1004.6 [I.V.:504.6]  Out: 300 [Urine:300]    CBC: Recent Labs      12/29/17 2007 12/30/17   0007  12/30/17   0427   WBC  20.3*  29.4*  23.4*   RBC  2.66*  2.97*  2.86*   HGB  8.0*  8.9*  8.4*   HCT  24.8*  27.0*  25.3*   MCV  93.2  90.9  88.5   MCH  30.1  30.0  29.4   MCHC  32.3*  33.0  33.2   RDW  14.5  14.4  14.6*   PLT  372  385  293   MPV  11.3  10.9  10.6      Last 3 CMP:   Recent Labs      12/29/17 2007 12/30/17   0427   NA  140  139   K  4.2  4.7   CL  102  102   CO2  25  26   BUN  18  20   CREATININE  1.1*  1.0*   GLUCOSE  197*  161*   CALCIUM  8.8  8.2*   PROT  6.1*   --    LABALBU  2.9*   --    BILITOT  0.5   --    ALKPHOS  74   --    AST  39*   --    ALT  25   --       Troponin:   Recent Labs      12/29/17   2125   TROPONINI  1.75*     Calcium:   Lab Results   Component Value Date    CALCIUM 8.2 12/30/2017    CALCIUM 8.8 12/29/2017      Ionized Calcium: No results found for: IONCA   Lipids: No results for input(s): CHOL, HDL in the last 72 hours. Invalid input(s): LDLCALCU  INR:   Recent Labs      12/29/17 2007   INR  1.43*     Lactic Acid: No results found for: LACTA           Physical Exam:  Awake, alert, appropriate Yes  BP (!) 97/46   Pulse 56   Temp 97.3 °F (36.3 °C)   Resp (!) 6   Ht 5' 9\" (1.753 m)   Wt (!) 357 lb (161.9 kg)   SpO2 97%   BMI 52.72 kg/m²   Heart-Normal S1 and S2.  Regular rhythm. No murmurs, gallops, or rubs. Lung-negative  Neck-suppleno adenopathy, no carotid bruit, no JVD, supple, symmetrical, trachea midline and thyroid not enlarged, symmetric, no tenderness/mass/nodules  Abdomen-Abdomen soft, non-tender.

## 2017-12-31 LAB
ANION GAP SERPL CALCULATED.3IONS-SCNC: 8 MMOL/L (ref 7–19)
BLOOD BANK DISPENSE STATUS: NORMAL
BLOOD BANK PRODUCT CODE: NORMAL
BPU ID: NORMAL
BUN BLDV-MCNC: 13 MG/DL (ref 6–20)
CALCIUM SERPL-MCNC: 8.3 MG/DL (ref 8.6–10)
CHLORIDE BLD-SCNC: 104 MMOL/L (ref 98–111)
CO2: 29 MMOL/L (ref 22–29)
CREAT SERPL-MCNC: 0.7 MG/DL (ref 0.5–0.9)
DESCRIPTION BLOOD BANK: NORMAL
GFR NON-AFRICAN AMERICAN: >60
GLUCOSE BLD-MCNC: 122 MG/DL (ref 70–99)
GLUCOSE BLD-MCNC: 132 MG/DL (ref 70–99)
GLUCOSE BLD-MCNC: 133 MG/DL (ref 74–109)
GLUCOSE BLD-MCNC: 141 MG/DL (ref 70–99)
GLUCOSE BLD-MCNC: 141 MG/DL (ref 70–99)
GLUCOSE BLD-MCNC: 148 MG/DL (ref 70–99)
HCT VFR BLD CALC: 23.6 % (ref 37–47)
HCT VFR BLD CALC: 26.4 % (ref 37–47)
HCT VFR BLD CALC: 26.8 % (ref 37–47)
HEMOGLOBIN: 7.8 G/DL (ref 12–16)
HEMOGLOBIN: 8.6 G/DL (ref 12–16)
HEMOGLOBIN: 8.8 G/DL (ref 12–16)
MCH RBC QN AUTO: 28.6 PG (ref 27–31)
MCH RBC QN AUTO: 28.9 PG (ref 27–31)
MCHC RBC AUTO-ENTMCNC: 32.6 G/DL (ref 33–37)
MCHC RBC AUTO-ENTMCNC: 32.8 G/DL (ref 33–37)
MCV RBC AUTO: 87.7 FL (ref 81–99)
MCV RBC AUTO: 88.2 FL (ref 81–99)
PDW BLD-RTO: 15.1 % (ref 11.5–14.5)
PDW BLD-RTO: 16.2 % (ref 11.5–14.5)
PERFORMED ON: ABNORMAL
PLATELET # BLD: 267 K/UL (ref 130–400)
PLATELET # BLD: 281 K/UL (ref 130–400)
PMV BLD AUTO: 10.9 FL (ref 9.4–12.3)
PMV BLD AUTO: 11 FL (ref 9.4–12.3)
POTASSIUM SERPL-SCNC: 4.1 MMOL/L (ref 3.5–5)
RBC # BLD: 3.01 M/UL (ref 4.2–5.4)
RBC # BLD: 3.04 M/UL (ref 4.2–5.4)
SODIUM BLD-SCNC: 141 MMOL/L (ref 136–145)
TROPONIN: 1.15 NG/ML (ref 0–0.03)
WBC # BLD: 18.9 K/UL (ref 4.8–10.8)
WBC # BLD: 18.9 K/UL (ref 4.8–10.8)

## 2017-12-31 PROCEDURE — 2580000003 HC RX 258: Performed by: SURGERY

## 2017-12-31 PROCEDURE — 6370000000 HC RX 637 (ALT 250 FOR IP): Performed by: HOSPITALIST

## 2017-12-31 PROCEDURE — P9016 RBC LEUKOCYTES REDUCED: HCPCS

## 2017-12-31 PROCEDURE — 99232 SBSQ HOSP IP/OBS MODERATE 35: CPT | Performed by: INTERNAL MEDICINE

## 2017-12-31 PROCEDURE — 99024 POSTOP FOLLOW-UP VISIT: CPT | Performed by: SURGERY

## 2017-12-31 PROCEDURE — 6370000000 HC RX 637 (ALT 250 FOR IP): Performed by: SURGERY

## 2017-12-31 PROCEDURE — 2580000003 HC RX 258: Performed by: HOSPITALIST

## 2017-12-31 PROCEDURE — 85018 HEMOGLOBIN: CPT

## 2017-12-31 PROCEDURE — 85014 HEMATOCRIT: CPT

## 2017-12-31 PROCEDURE — 36415 COLL VENOUS BLD VENIPUNCTURE: CPT

## 2017-12-31 PROCEDURE — 99233 SBSQ HOSP IP/OBS HIGH 50: CPT | Performed by: HOSPITALIST

## 2017-12-31 PROCEDURE — 2000000000 HC ICU R&B

## 2017-12-31 PROCEDURE — 80048 BASIC METABOLIC PNL TOTAL CA: CPT

## 2017-12-31 PROCEDURE — 36430 TRANSFUSION BLD/BLD COMPNT: CPT

## 2017-12-31 PROCEDURE — 84484 ASSAY OF TROPONIN QUANT: CPT

## 2017-12-31 PROCEDURE — 85027 COMPLETE CBC AUTOMATED: CPT

## 2017-12-31 PROCEDURE — 6360000002 HC RX W HCPCS: Performed by: SURGERY

## 2017-12-31 PROCEDURE — 82948 REAGENT STRIP/BLOOD GLUCOSE: CPT

## 2017-12-31 PROCEDURE — 2580000003 HC RX 258: Performed by: INTERNAL MEDICINE

## 2017-12-31 RX ORDER — 0.9 % SODIUM CHLORIDE 0.9 %
250 INTRAVENOUS SOLUTION INTRAVENOUS ONCE
Status: COMPLETED | OUTPATIENT
Start: 2017-12-31 | End: 2018-01-01

## 2017-12-31 RX ORDER — SODIUM HYPOCHLORITE 1.25 MG/ML
SOLUTION TOPICAL 2 TIMES DAILY
Status: DISCONTINUED | OUTPATIENT
Start: 2017-12-31 | End: 2018-01-02

## 2017-12-31 RX ORDER — 0.9 % SODIUM CHLORIDE 0.9 %
500 INTRAVENOUS SOLUTION INTRAVENOUS ONCE
Status: COMPLETED | OUTPATIENT
Start: 2017-12-31 | End: 2017-12-31

## 2017-12-31 RX ORDER — ZOLPIDEM TARTRATE 5 MG/1
5 TABLET ORAL NIGHTLY PRN
Status: DISCONTINUED | OUTPATIENT
Start: 2017-12-31 | End: 2018-01-06 | Stop reason: HOSPADM

## 2017-12-31 RX ORDER — LISINOPRIL 2.5 MG/1
2.5 TABLET ORAL 2 TIMES DAILY
Status: DISCONTINUED | OUTPATIENT
Start: 2017-12-31 | End: 2017-12-31

## 2017-12-31 RX ADMIN — DULOXETINE HYDROCHLORIDE 60 MG: 60 CAPSULE, DELAYED RELEASE ORAL at 21:14

## 2017-12-31 RX ADMIN — METOPROLOL TARTRATE 12.5 MG: 25 TABLET, FILM COATED ORAL at 09:56

## 2017-12-31 RX ADMIN — DULOXETINE HYDROCHLORIDE 60 MG: 60 CAPSULE, DELAYED RELEASE ORAL at 10:52

## 2017-12-31 RX ADMIN — LEVOTHYROXINE SODIUM 100 MCG: 100 TABLET ORAL at 04:57

## 2017-12-31 RX ADMIN — LISINOPRIL 2.5 MG: 2.5 TABLET ORAL at 09:56

## 2017-12-31 RX ADMIN — CLOPIDOGREL 75 MG: 75 TABLET, FILM COATED ORAL at 09:56

## 2017-12-31 RX ADMIN — DAKIN'S SOLUTION 0.125% (QUARTER STRENGTH): 0.12 SOLUTION at 21:20

## 2017-12-31 RX ADMIN — FAMOTIDINE 20 MG: 20 TABLET ORAL at 21:14

## 2017-12-31 RX ADMIN — MIRTAZAPINE 30 MG: 15 TABLET, FILM COATED ORAL at 21:13

## 2017-12-31 RX ADMIN — PREGABALIN 200 MG: 150 CAPSULE ORAL at 09:56

## 2017-12-31 RX ADMIN — SODIUM CHLORIDE 250 ML: 9 INJECTION, SOLUTION INTRAVENOUS at 01:17

## 2017-12-31 RX ADMIN — ONDANSETRON 4 MG: 2 INJECTION INTRAMUSCULAR; INTRAVENOUS at 15:16

## 2017-12-31 RX ADMIN — SODIUM CHLORIDE 500 ML: 9 INJECTION, SOLUTION INTRAVENOUS at 12:21

## 2017-12-31 RX ADMIN — ZOLPIDEM TARTRATE 5 MG: 5 TABLET ORAL at 21:14

## 2017-12-31 RX ADMIN — HYDROCODONE BITARTRATE AND ACETAMINOPHEN 2 TABLET: 5; 325 TABLET ORAL at 21:14

## 2017-12-31 RX ADMIN — METOPROLOL TARTRATE 12.5 MG: 25 TABLET, FILM COATED ORAL at 21:14

## 2017-12-31 RX ADMIN — SODIUM CHLORIDE: 9 INJECTION, SOLUTION INTRAVENOUS at 22:17

## 2017-12-31 RX ADMIN — PREGABALIN 200 MG: 150 CAPSULE ORAL at 15:03

## 2017-12-31 RX ADMIN — HYDROCODONE BITARTRATE AND ACETAMINOPHEN 2 TABLET: 5; 325 TABLET ORAL at 15:52

## 2017-12-31 RX ADMIN — OXYCODONE HYDROCHLORIDE AND ACETAMINOPHEN 1 TABLET: 10; 325 TABLET ORAL at 04:58

## 2017-12-31 RX ADMIN — ALLOPURINOL 100 MG: 100 TABLET ORAL at 09:56

## 2017-12-31 RX ADMIN — SODIUM CHLORIDE 250 ML: 9 INJECTION, SOLUTION INTRAVENOUS at 15:05

## 2017-12-31 RX ADMIN — PREGABALIN 200 MG: 150 CAPSULE ORAL at 21:14

## 2017-12-31 RX ADMIN — FAMOTIDINE 20 MG: 20 TABLET ORAL at 09:56

## 2017-12-31 RX ADMIN — CALCITRIOL 0.25 MCG: 0.25 CAPSULE, LIQUID FILLED ORAL at 09:56

## 2017-12-31 RX ADMIN — TIZANIDINE 8 MG: 4 TABLET ORAL at 09:56

## 2017-12-31 ASSESSMENT — PAIN SCALES - GENERAL
PAINLEVEL_OUTOF10: 0
PAINLEVEL_OUTOF10: 6
PAINLEVEL_OUTOF10: 3
PAINLEVEL_OUTOF10: 8
PAINLEVEL_OUTOF10: 5
PAINLEVEL_OUTOF10: 7
PAINLEVEL_OUTOF10: 10

## 2017-12-31 ASSESSMENT — PAIN DESCRIPTION - PAIN TYPE
TYPE: CHRONIC PAIN
TYPE: CHRONIC PAIN

## 2017-12-31 ASSESSMENT — PAIN DESCRIPTION - LOCATION
LOCATION: BACK
LOCATION: BACK

## 2017-12-31 NOTE — PROGRESS NOTES
Hospitalist Progress Note  12/31/2017 10:49 AM  Subjective:   Admit Date: 12/29/2017  PCP: ANJANA Syed    Chief Complaint: L groin, R foot uncomfortable    Subjective: Denies CP to me, L groin still sore (dressing changed) and R foot sore but less so. No CP, back uncomfortable. Course reviewed, all questions answered. Cumulative Hospital Course   52 y.o. female READMITTED following discharge home same day 12/29/17 with onset of rapid L groin swelling drop of Hg apx 2 gms and hypotension with SPB in 80s, taken emergently to OR with repair of L femoral artery site where she had bled at catheerization site. She had 2 areas of puncture bleeding at L CFA per Operative note, has stabilized and doing well since. Still with hematoma, some oozing but dressings in place. Troponin was elevated but no chest pain, SOA or other complaint (will trend). She was admitted here 12/23/17 in transfer from [facility] ER with cold, painful R foot and leg, CP,  AMI in progress, Cr 4.4; cathed with BMS to RCA and vascular thromboylsis with TPA + Heparin in L femoral catheter.  Permacath placed for HD, LE improved with thrombolysis, also had aortic thrombosis and remains at high risk. Consulted for medical management of DM, Morbid Obesity, RA, GERD, chronic low back pain. She did well and was discharged home 12/29/17 - was concerned and had \"butterflies\" about going home but wanted to, was reassured that we would take care of her if she had a problem.     ROS: 14 point review of systems is negative except as specifically addressed above.          Intake/Output Summary (Last 24 hours) at 12/31/17 1049  Last data filed at 12/31/17 1000   Gross per 24 hour   Intake          4522.63 ml   Output             4115 ml   Net           407.63 ml     Medications:   dextrose      nitroGLYCERIN 3.333 mcg/min (12/31/17 0607)    sodium chloride 125 mL/hr at 12/30/17 1455     Current Facility-Administered Medications Medication Dose Route Frequency Provider Last Rate Last Dose    lisinopril (PRINIVIL;ZESTRIL) tablet 2.5 mg  2.5 mg Oral BID Manav Michel MD        mirtazapine (REMERON) tablet 30 mg  30 mg Oral Nightly Doris Castrejon MD   30 mg at 12/30/17 1940    insulin lispro (HUMALOG) injection vial 0-35 Units  0-35 Units Subcutaneous 4x Daily AC & HS Manav Michel MD   3 Units at 12/30/17 1953    famotidine (PEPCID) tablet 20 mg  20 mg Oral BID Manav Michel MD   20 mg at 12/31/17 0956    glucose (GLUTOSE) 40 % oral gel 15 g  15 g Oral PRN Manav Michel MD        dextrose 50 % solution 12.5 g  12.5 g Intravenous PRN Manav Michel MD        glucagon (rDNA) injection 1 mg  1 mg Intramuscular PRN Manav Michel MD        dextrose 5 % solution  100 mL/hr Intravenous PRN Manav Michel MD        nitroGLYCERIN 50 mg in dextrose 5% 250 mL infusion  5 mcg/min Intravenous Continuous Derrel MD Liam 1 mL/hr at 12/31/17 0607 3.333 mcg/min at 12/31/17 6667    0.9 % sodium chloride bolus  250 mL Intravenous Once Christine Villanueva MD 20 mL/hr at 12/31/17 0117 250 mL at 12/31/17 0117    allopurinol (ZYLOPRIM) tablet 100 mg  100 mg Oral Daily Doris Castrejon MD   100 mg at 12/31/17 4679    calcitRIOL (ROCALTROL) capsule 0.25 mcg  0.25 mcg Oral Daily Doris Castrejon MD   0.25 mcg at 12/31/17 0956    DULoxetine (CYMBALTA) extended release capsule 60 mg  60 mg Oral BID Doris Castrejon MD   60 mg at 12/30/17 1942    fluticasone (FLONASE) 50 MCG/ACT nasal spray 2 spray  2 spray Each Nare Daily Doris Castrejon MD   2 spray at 12/30/17 0755    levothyroxine (SYNTHROID) tablet 100 mcg  100 mcg Oral Daily Doris Castrejon MD   100 mcg at 12/31/17 0457    clopidogrel (PLAVIX) tablet 75 mg  75 mg Oral Daily Doris Castrejon MD   75 mg at 12/31/17 0956    pregabalin (LYRICA) capsule 200 mg  200 mg Oral TID Doris Castrejon MD   200 mg at 12/31/17 0956    metoprolol tartrate (LOPRESSOR) tablet 12.5 mg  12.5 mg Oral BID Nicky Key MD   12.5 mg at 12/31/17 0956    oxyCODONE-acetaminophen (PERCOCET)  MG per tablet 1 tablet  1 tablet Oral Q4H PRN Nicky Key MD   1 tablet at 12/31/17 0458    tiZANidine (ZANAFLEX) tablet 8 mg  8 mg Oral TID Nicky Key MD   8 mg at 12/31/17 0515    acetaminophen (TYLENOL) tablet 650 mg  650 mg Oral Q4H PRN Nicky Key MD        HYDROcodone-acetaminophen (NORCO) 5-325 MG per tablet 1 tablet  1 tablet Oral Q4H PRN Nicky Key MD   1 tablet at 12/30/17 0753    Or    HYDROcodone-acetaminophen (NORCO) 5-325 MG per tablet 2 tablet  2 tablet Oral Q4H PRN Nicky Key MD        ondansetron (ZOFRAN) injection 4 mg  4 mg Intravenous Q6H PRN Nicky Key MD        0.9 % sodium chloride infusion   Intravenous Continuous Nicky Key  mL/hr at 12/30/17 1455      morphine injection 2 mg  2 mg Intravenous Q1H PRN Nicky Key MD        Or    morphine injection 4 mg  4 mg Intravenous Q2H PRN Nicky Key MD   4 mg at 12/30/17 1409        Labs:     Recent Labs      12/30/17   1310  12/30/17   2131  12/31/17   0530   WBC  21.9*  18.7*  18.9*   RBC  2.39*  2.68*  3.04*   HGB  7.0*  7.9*  8.8*   HCT  21.4*  23.8*  26.8*   MCV  89.5  88.8  88.2   MCH  29.3  29.5  28.9   MCHC  32.7*  33.2  32.8*   PLT  292  271  281     Recent Labs      12/29/17 2007 12/30/17   0427   NA  140  139   K  4.2  4.7   ANIONGAP  13  11   CL  102  102   CO2  25  26   BUN  18  20   CREATININE  1.1*  1.0*   GLUCOSE  197*  161*   CALCIUM  8.8  8.2*     No results for input(s): MG, PHOS in the last 72 hours. Recent Labs      12/29/17 2007   AST  39*   ALT  25   BILITOT  0.5   ALKPHOS  74     ABGs:No results for input(s): PHART, JNL5CMO, PO2ART, IXT1RYA, BEART, HGBAE, A7DFWZHQ, CARBOXHGBART, 02THERAPY in the last 72 hours. HgBA1c: Invalid input(s):  HGBA1C    Troponin T:   Recent Labs      12/30/17   1310  12/30/17   5510

## 2017-12-31 NOTE — PROGRESS NOTES
[address]    Daily HOSPITAL Progress Note                            Date:  12/31/17  Patient: Grace Wyman  Admission:  12/29/2017  7:57 PM  Admit DX: Hematoma of left lower extremity, initial encounter [S80.12XA]  Age:  52 y.o., 1970     LOS: 2 days       Reason for initial evaluation or the patient's initial complaint    Left groin hematoma      SUBJECTIVE:      12/29/2017    Chief Complaint / Reason for the Visit   Follow up of:  Left groin hematoma / cold right foot and CAD and Hypertenson    Family present:  no      Specialty Problems        Cardiology Problems    Postoperative hemorrhage involving circulatory system following cardiac catheterization        Coronary artery disease        Hypertension              Current Status Today According to the patient:  \"back is hurting\"    Subjective:  Ms. Grace Wyman is generally feeling unchanged. Stable, the patient has no complaints or symptoms. There are no new problems noted overnight. Ms. Grace Wyman has the following cardiac complaints / symptoms today:    1. Left groin hematoma / cold right foot, Is stable on current medications     2. CAD, had some chest discomfort last evening with equivocal to positive inferior ST segment.   She is chest pain free today, the troponin is on the way down    3. hypertension, Is stable on current medications         Grace Wyman is a 52 y.o. female with the following history as recorded in [facility]:    Patient Active Problem List    Diagnosis Date Noted    Hypothyroidism 12/30/2017    Hypertension 12/30/2017    GERD (gastroesophageal reflux disease) 12/30/2017    Diabetes ([address] 12/30/2017    Coronary artery disease 12/30/2017    Leukocytopenia 12/30/2017    Acute blood loss anemia 12/30/2017    Leukocytosis     Hematoma of left lower extremity 12/29/2017    Postoperative hemorrhage involving circulatory system following cardiac catheterization      Current

## 2018-01-01 LAB
ANION GAP SERPL CALCULATED.3IONS-SCNC: 9 MMOL/L (ref 7–19)
BASOPHILS ABSOLUTE: 0 K/UL (ref 0–0.2)
BASOPHILS RELATIVE PERCENT: 0.3 % (ref 0–1)
BUN BLDV-MCNC: 9 MG/DL (ref 6–20)
CALCIUM SERPL-MCNC: 8.3 MG/DL (ref 8.6–10)
CHLORIDE BLD-SCNC: 104 MMOL/L (ref 98–111)
CO2: 28 MMOL/L (ref 22–29)
CREAT SERPL-MCNC: 0.6 MG/DL (ref 0.5–0.9)
EOSINOPHILS ABSOLUTE: 0.3 K/UL (ref 0–0.6)
EOSINOPHILS RELATIVE PERCENT: 1.8 % (ref 0–5)
GFR NON-AFRICAN AMERICAN: >60
GLUCOSE BLD-MCNC: 114 MG/DL (ref 70–99)
GLUCOSE BLD-MCNC: 114 MG/DL (ref 74–109)
GLUCOSE BLD-MCNC: 124 MG/DL (ref 70–99)
GLUCOSE BLD-MCNC: 126 MG/DL (ref 70–99)
GLUCOSE BLD-MCNC: 140 MG/DL (ref 70–99)
HCT VFR BLD CALC: 26.6 % (ref 37–47)
HEMOGLOBIN: 8.4 G/DL (ref 12–16)
LYMPHOCYTES ABSOLUTE: 2.5 K/UL (ref 1.1–4.5)
LYMPHOCYTES RELATIVE PERCENT: 15.4 % (ref 20–40)
MCH RBC QN AUTO: 28 PG (ref 27–31)
MCHC RBC AUTO-ENTMCNC: 31.6 G/DL (ref 33–37)
MCV RBC AUTO: 88.7 FL (ref 81–99)
MONOCYTES ABSOLUTE: 1.4 K/UL (ref 0–0.9)
MONOCYTES RELATIVE PERCENT: 8.7 % (ref 0–10)
NEUTROPHILS ABSOLUTE: 11.6 K/UL (ref 1.5–7.5)
NEUTROPHILS RELATIVE PERCENT: 72.6 % (ref 50–65)
PDW BLD-RTO: 16.4 % (ref 11.5–14.5)
PERFORMED ON: ABNORMAL
PLATELET # BLD: 287 K/UL (ref 130–400)
PMV BLD AUTO: 10.5 FL (ref 9.4–12.3)
POTASSIUM SERPL-SCNC: 3.7 MMOL/L (ref 3.5–5)
RBC # BLD: 3 M/UL (ref 4.2–5.4)
SODIUM BLD-SCNC: 141 MMOL/L (ref 136–145)
WBC # BLD: 16 K/UL (ref 4.8–10.8)

## 2018-01-01 PROCEDURE — 2000000000 HC ICU R&B

## 2018-01-01 PROCEDURE — 6370000000 HC RX 637 (ALT 250 FOR IP): Performed by: HOSPITALIST

## 2018-01-01 PROCEDURE — 99024 POSTOP FOLLOW-UP VISIT: CPT | Performed by: SURGERY

## 2018-01-01 PROCEDURE — 99233 SBSQ HOSP IP/OBS HIGH 50: CPT | Performed by: INTERNAL MEDICINE

## 2018-01-01 PROCEDURE — 85025 COMPLETE CBC W/AUTO DIFF WBC: CPT

## 2018-01-01 PROCEDURE — 36592 COLLECT BLOOD FROM PICC: CPT

## 2018-01-01 PROCEDURE — 99233 SBSQ HOSP IP/OBS HIGH 50: CPT | Performed by: HOSPITALIST

## 2018-01-01 PROCEDURE — 6370000000 HC RX 637 (ALT 250 FOR IP): Performed by: SURGERY

## 2018-01-01 PROCEDURE — 80048 BASIC METABOLIC PNL TOTAL CA: CPT

## 2018-01-01 PROCEDURE — 82948 REAGENT STRIP/BLOOD GLUCOSE: CPT

## 2018-01-01 PROCEDURE — 2580000003 HC RX 258: Performed by: SURGERY

## 2018-01-01 RX ORDER — ISOSORBIDE MONONITRATE 60 MG/1
60 TABLET, EXTENDED RELEASE ORAL DAILY
Status: DISCONTINUED | OUTPATIENT
Start: 2018-01-01 | End: 2018-01-03

## 2018-01-01 RX ADMIN — MIRTAZAPINE 30 MG: 15 TABLET, FILM COATED ORAL at 20:16

## 2018-01-01 RX ADMIN — DULOXETINE HYDROCHLORIDE 60 MG: 60 CAPSULE, DELAYED RELEASE ORAL at 20:16

## 2018-01-01 RX ADMIN — DAKIN'S SOLUTION 0.125% (QUARTER STRENGTH) 473 ML: 0.12 SOLUTION at 09:32

## 2018-01-01 RX ADMIN — METOPROLOL TARTRATE 12.5 MG: 25 TABLET, FILM COATED ORAL at 21:21

## 2018-01-01 RX ADMIN — FAMOTIDINE 20 MG: 20 TABLET ORAL at 09:26

## 2018-01-01 RX ADMIN — SODIUM CHLORIDE: 9 INJECTION, SOLUTION INTRAVENOUS at 20:16

## 2018-01-01 RX ADMIN — CALCITRIOL 0.25 MCG: 0.25 CAPSULE, LIQUID FILLED ORAL at 09:25

## 2018-01-01 RX ADMIN — METOPROLOL TARTRATE 12.5 MG: 25 TABLET, FILM COATED ORAL at 09:26

## 2018-01-01 RX ADMIN — HYDROCODONE BITARTRATE AND ACETAMINOPHEN 2 TABLET: 5; 325 TABLET ORAL at 05:25

## 2018-01-01 RX ADMIN — HYDROCODONE BITARTRATE AND ACETAMINOPHEN 1 TABLET: 5; 325 TABLET ORAL at 18:01

## 2018-01-01 RX ADMIN — HYDROCODONE BITARTRATE AND ACETAMINOPHEN 1 TABLET: 5; 325 TABLET ORAL at 13:32

## 2018-01-01 RX ADMIN — HYDROCODONE BITARTRATE AND ACETAMINOPHEN 2 TABLET: 5; 325 TABLET ORAL at 09:32

## 2018-01-01 RX ADMIN — FLUTICASONE PROPIONATE 2 SPRAY: 50 SPRAY, METERED NASAL at 09:32

## 2018-01-01 RX ADMIN — FAMOTIDINE 20 MG: 20 TABLET ORAL at 20:16

## 2018-01-01 RX ADMIN — PREGABALIN 200 MG: 150 CAPSULE ORAL at 13:30

## 2018-01-01 RX ADMIN — PREGABALIN 200 MG: 150 CAPSULE ORAL at 09:25

## 2018-01-01 RX ADMIN — TIZANIDINE 8 MG: 4 TABLET ORAL at 20:16

## 2018-01-01 RX ADMIN — TIZANIDINE 8 MG: 4 TABLET ORAL at 09:25

## 2018-01-01 RX ADMIN — SODIUM CHLORIDE: 9 INJECTION, SOLUTION INTRAVENOUS at 06:14

## 2018-01-01 RX ADMIN — DULOXETINE HYDROCHLORIDE 60 MG: 60 CAPSULE, DELAYED RELEASE ORAL at 09:26

## 2018-01-01 RX ADMIN — ALLOPURINOL 100 MG: 100 TABLET ORAL at 09:25

## 2018-01-01 RX ADMIN — HYDROCODONE BITARTRATE AND ACETAMINOPHEN 1 TABLET: 5; 325 TABLET ORAL at 20:16

## 2018-01-01 RX ADMIN — CLOPIDOGREL 75 MG: 75 TABLET, FILM COATED ORAL at 09:25

## 2018-01-01 RX ADMIN — PREGABALIN 200 MG: 150 CAPSULE ORAL at 20:16

## 2018-01-01 RX ADMIN — LEVOTHYROXINE SODIUM 100 MCG: 100 TABLET ORAL at 05:26

## 2018-01-01 RX ADMIN — DAKIN'S SOLUTION 0.125% (QUARTER STRENGTH) 473 ML: 0.12 SOLUTION at 20:10

## 2018-01-01 ASSESSMENT — PAIN SCALES - GENERAL
PAINLEVEL_OUTOF10: 5
PAINLEVEL_OUTOF10: 0
PAINLEVEL_OUTOF10: 6
PAINLEVEL_OUTOF10: 5
PAINLEVEL_OUTOF10: 8
PAINLEVEL_OUTOF10: 7
PAINLEVEL_OUTOF10: 1
PAINLEVEL_OUTOF10: 0
PAINLEVEL_OUTOF10: 6
PAINLEVEL_OUTOF10: 8

## 2018-01-01 NOTE — PROGRESS NOTES
12/31/17 2114    metoprolol tartrate (LOPRESSOR) tablet 12.5 mg  12.5 mg Oral BID Sara Whitfield MD   12.5 mg at 12/31/17 2114    oxyCODONE-acetaminophen (PERCOCET)  MG per tablet 1 tablet  1 tablet Oral Q4H PRN Sara Whitfield MD   1 tablet at 12/31/17 0458    tiZANidine (ZANAFLEX) tablet 8 mg  8 mg Oral TID Sara Whitfield MD   Stopped at 12/31/17 1432    acetaminophen (TYLENOL) tablet 650 mg  650 mg Oral Q4H PRN Sara Whitfield MD        HYDROcodone-acetaminophen (NORCO) 5-325 MG per tablet 1 tablet  1 tablet Oral Q4H PRN Sara Whitfield MD   1 tablet at 12/30/17 0753    Or    HYDROcodone-acetaminophen (NORCO) 5-325 MG per tablet 2 tablet  2 tablet Oral Q4H PRN Sara Whitfield MD   2 tablet at 01/01/18 0525    ondansetron (ZOFRAN) injection 4 mg  4 mg Intravenous Q6H PRN Sara Whitfield MD   4 mg at 12/31/17 1516    0.9 % sodium chloride infusion   Intravenous Continuous Sara Whitfield  mL/hr at 01/01/18 0614      morphine injection 2 mg  2 mg Intravenous Q1H PRN Sara Whitfield MD        Or    morphine injection 4 mg  4 mg Intravenous Q2H PRN Sara Whitfield MD   4 mg at 12/30/17 1409        Labs:     Recent Labs      12/31/17   0530  12/31/17   1317  12/31/17   1812  01/01/18   0350   WBC  18.9*   --   18.9*  16.0*   RBC  3.04*   --   3.01*  3.00*   HGB  8.8*  7.8*  8.6*  8.4*   HCT  26.8*  23.6*  26.4*  26.6*   MCV  88.2   --   87.7  88.7   MCH  28.9   --   28.6  28.0   MCHC  32.8*   --   32.6*  31.6*   PLT  281   --   267  287     Recent Labs      12/30/17   0427  12/31/17   0530  01/01/18   0350   NA  139  141  141   K  4.7  4.1  3.7   ANIONGAP  11  8  9   CL  102  104  104   CO2  26  29  28   BUN  20  13  9   CREATININE  1.0*  0.7  0.6   GLUCOSE  161*  133*  114*   CALCIUM  8.2*  8.3*  8.3*     No results for input(s): MG, PHOS in the last 72 hours.   Recent Labs      12/29/17 2007   AST  39*   ALT  25   BILITOT  0.5   ALKPHOS  74     ABGs:No results for input(s): PHART, QEV0MVV, PO2ART, YMQ0NNP, BEART, HGBAE, C2WTLEWN, CARBOXHGBART, 02THERAPY in the last 72 hours. HgBA1c: Invalid input(s): HGBA1C    Troponin T:   Recent Labs      12/30/17   1310  12/30/17   1834  12/31/17   0156   TROPONINI  1.09*  1.13*  1.15*     INR:   Recent Labs      12/29/17 2007   INR  1.43*     A1c: 6.3     PCXR: 12/30/17  Impression:  1. Right internal jugular central line tip projects over the SVC with no appreciable pneumothorax. 2. Diminished level of inspiration. Vascular crowding. Right medial basilar densities may be patchy atelectasis. Pneumonia considered. Signed by Dr Luma Sandra on 12/30/2017 9:53 AM     CXR 12/24/17  Impression  1.. Right IJ deep line unchanged in position from the previous study. 2. Suspected volume overload with engorgement of the central pulmonary vascularity and widening of the vascular pedicle.              Signed by Dr Pradeep Andrew on 12/25/2017 8:56 AM     Renal US 12/25/17  Impression  1.  Unremarkable renal ultrasound.              Signed by Dr Pradeep Andrew on 12/25/2017 10:42 AM     Cardiac Cath 12/23/17  Summary:    1.  Successful femoral artery ultrasound  2.  Successful femoral artery arterogram  3.  Single vessel coronary artery disease involving the mid RCA  4.  Left ventricular function is impaired in the inferior basilar segment extending up the diaphragmatic surface               with an ejection fraction of 40%  5.  99% lesion in the mid RCA, subtotally occluded with thrombus  6.  Successful percutaneous coronary intervention utilizing a single bare metal stent to the mid RCA reestablishing BRIE-3 flow  RECOMMENDATIONS:  1.  Risk Factor Modification  2.  On-going Medical Therapy  3.  Dual antiplatelet therapy for one month.              Electronically signed by Nasima Boykin MD on 12/23/17 at 8:15 PM     Echo 12/26/17  Summary   Left Atrium was not clearly visualized.  Saurabh Huang dilated left atrium.   The left

## 2018-01-01 NOTE — PROGRESS NOTES
Social History   Substance Use Topics    Smoking status: Current Some Day Smoker     Packs/day: 1.00     Years: 28.00    Smokeless tobacco: Never Used    Alcohol use No      Comment: occ          Review of Systems:    General:      Complaint / Symptom Yes / No / Description if Yes       Fatigue Yes:  chronic   Weight gain NA   Insomnia NA       Respiratory:        Complaint / Symptom Yes / No / Description if Yes       Cough No   Horseness NA       Cardiovascular:    Complaint / Symptom Yes / No / Description if Yes       Chest Pain No   Shortness of Air / Orthopnea Yes: chronic and stable   Presyncope / Syncope No   Palpitations No         Objective:    /77   Pulse 75   Temp 97 °F (36.1 °C) (Temporal)   Resp 16   Ht 5' 9\" (1.753 m)   Wt (!) 358 lb 1.6 oz (162.4 kg)   SpO2 95%   BMI 52.88 kg/m²     GENERAL - well developed and well nourished    HEENT   PERRLA, Hearing appears normal, conjunctiva and lids are normal, ears and nose appear normal  NECK - no thyromegaly, no JVD, trachea is in the midline  CARDIOVASCULAR  PMI is in the left mid line clavicular position, Normal S1 and S2 with a grade 1/6 systolic murmur. No S3 or S4    PULMONARY  No respiratory distress. scattered wheezes and rales.   Breath sounds in both  lung fields are Decreased  ABDOMEN   soft, non tender, no rebound, no hepatomegaly or splenomegaly  MUSCULOSKELETAL   Prone/Supine, digitals and nails are without clubbing or cyanosis  EXTREMITIES - trace edema  NEUROLOGIC - cranial nerves, II-XII, are normal  SKIN - turgor is normal, no rash  PSYCHIATRIC - normal mood and affect, alert and orientated x 3, judgement and insight appear appropriate      ASSESSMENT:    ALL THE CARDIOLOGY PROBLEMS ARE LISTED ABOVE; HOWEVER, THE FOLLOWING SPECIFIC CARDIAC PROBLEMS / CONDITIONS WERE ADDRESSED AND TREATED DURING THE OFFICE VISIT TODAY:                                                                                            MEDICAL

## 2018-01-01 NOTE — PROGRESS NOTES
Vascular Surgery Rounding Note    1/1/2018  10:14 AM  Post op day - 3repair of left CFA    Subjective- No complaints, looks better, feeling \"fine\"    Objective-   Invalid input(s): 24H    Intake/Output Summary (Last 24 hours) at 01/01/18 1014  Last data filed at 01/01/18 0800   Gross per 24 hour   Intake             3650 ml   Output             3565 ml   Net               85 ml     In: 250 [I.V.:250]  Out: 160 [Urine:160]    CBC:   Recent Labs      12/31/17   0530 12/31/17   1317  12/31/17   1812  01/01/18   0350   WBC  18.9*   --   18.9*  16.0*   RBC  3.04*   --   3.01*  3.00*   HGB  8.8*  7.8*  8.6*  8.4*   HCT  26.8*  23.6*  26.4*  26.6*   MCV  88.2   --   87.7  88.7   MCH  28.9   --   28.6  28.0   MCHC  32.8*   --   32.6*  31.6*   RDW  15.1*   --   16.2*  16.4*   PLT  281   --   267  287   MPV  11.0   --   10.9  10.5      Last 3 CMP:   Recent Labs      12/29/17 2007 12/30/17   0427  12/31/17   0530  01/01/18   0350   NA  140  139  141  141   K  4.2  4.7  4.1  3.7   CL  102  102  104  104   CO2  25  26  29  28   BUN  18  20  13  9   CREATININE  1.1*  1.0*  0.7  0.6   GLUCOSE  197*  161*  133*  114*   CALCIUM  8.8  8.2*  8.3*  8.3*   PROT  6.1*   --    --    --    LABALBU  2.9*   --    --    --    BILITOT  0.5   --    --    --    ALKPHOS  74   --    --    --    AST  39*   --    --    --    ALT  25   --    --    --       Troponin:   Recent Labs      12/30/17   1310  12/30/17   1834  12/31/17   0156   TROPONINI  1.09*  1.13*  1.15*     Calcium:   Lab Results   Component Value Date    CALCIUM 8.3 01/01/2018    CALCIUM 8.3 12/31/2017    CALCIUM 8.2 12/30/2017      Ionized Calcium: No results found for: IONCA   Lipids: No results for input(s): CHOL, HDL in the last 72 hours.     Invalid input(s): LDLCALCU  INR:   Recent Labs      12/29/17 2007   INR  1.43*     Lactic Acid: No results found for: LACTA           Physical Exam:  Awake, alert, appropriate Yes  /77   Pulse 75   Temp 97 °F (36.1 °C) (Temporal)

## 2018-01-02 LAB
BASOPHILS ABSOLUTE: 0.1 K/UL (ref 0–0.2)
BASOPHILS RELATIVE PERCENT: 0.3 % (ref 0–1)
EKG P AXIS: 49 DEGREES
EKG P-R INTERVAL: 160 MS
EKG Q-T INTERVAL: 406 MS
EKG QRS DURATION: 122 MS
EKG QTC CALCULATION (BAZETT): 449 MS
EKG T AXIS: 32 DEGREES
EOSINOPHILS ABSOLUTE: 0.3 K/UL (ref 0–0.6)
EOSINOPHILS RELATIVE PERCENT: 1.5 % (ref 0–5)
GLUCOSE BLD-MCNC: 114 MG/DL (ref 70–99)
GLUCOSE BLD-MCNC: 136 MG/DL (ref 70–99)
GLUCOSE BLD-MCNC: 143 MG/DL (ref 70–99)
HCT VFR BLD CALC: 25.7 % (ref 37–47)
HEMOGLOBIN: 8.1 G/DL (ref 12–16)
LYMPHOCYTES ABSOLUTE: 2.6 K/UL (ref 1.1–4.5)
LYMPHOCYTES RELATIVE PERCENT: 13.8 % (ref 20–40)
MCH RBC QN AUTO: 28.4 PG (ref 27–31)
MCHC RBC AUTO-ENTMCNC: 31.5 G/DL (ref 33–37)
MCV RBC AUTO: 90.2 FL (ref 81–99)
MONOCYTES ABSOLUTE: 1.6 K/UL (ref 0–0.9)
MONOCYTES RELATIVE PERCENT: 8.6 % (ref 0–10)
NEUTROPHILS ABSOLUTE: 13.7 K/UL (ref 1.5–7.5)
NEUTROPHILS RELATIVE PERCENT: 74.4 % (ref 50–65)
PDW BLD-RTO: 15.9 % (ref 11.5–14.5)
PERFORMED ON: ABNORMAL
PLATELET # BLD: 322 K/UL (ref 130–400)
PMV BLD AUTO: 11 FL (ref 9.4–12.3)
RBC # BLD: 2.85 M/UL (ref 4.2–5.4)
WBC # BLD: 18.4 K/UL (ref 4.8–10.8)

## 2018-01-02 PROCEDURE — 82948 REAGENT STRIP/BLOOD GLUCOSE: CPT

## 2018-01-02 PROCEDURE — 6370000000 HC RX 637 (ALT 250 FOR IP): Performed by: SURGERY

## 2018-01-02 PROCEDURE — 6370000000 HC RX 637 (ALT 250 FOR IP): Performed by: HOSPITALIST

## 2018-01-02 PROCEDURE — 99233 SBSQ HOSP IP/OBS HIGH 50: CPT | Performed by: HOSPITALIST

## 2018-01-02 PROCEDURE — 94640 AIRWAY INHALATION TREATMENT: CPT

## 2018-01-02 PROCEDURE — 85025 COMPLETE CBC W/AUTO DIFF WBC: CPT

## 2018-01-02 PROCEDURE — 1210000000 HC MED SURG R&B

## 2018-01-02 PROCEDURE — 99232 SBSQ HOSP IP/OBS MODERATE 35: CPT | Performed by: INTERNAL MEDICINE

## 2018-01-02 PROCEDURE — 6370000000 HC RX 637 (ALT 250 FOR IP): Performed by: INTERNAL MEDICINE

## 2018-01-02 PROCEDURE — 94664 DEMO&/EVAL PT USE INHALER: CPT

## 2018-01-02 PROCEDURE — 6360000002 HC RX W HCPCS: Performed by: SURGERY

## 2018-01-02 RX ORDER — MORPHINE SULFATE 4 MG/ML
4 INJECTION, SOLUTION INTRAMUSCULAR; INTRAVENOUS
Status: DISCONTINUED | OUTPATIENT
Start: 2018-01-02 | End: 2018-01-02 | Stop reason: SDUPTHER

## 2018-01-02 RX ORDER — SODIUM CHLORIDE 9 MG/ML
INJECTION, SOLUTION INTRAVENOUS CONTINUOUS
Status: DISCONTINUED | OUTPATIENT
Start: 2018-01-02 | End: 2018-01-02

## 2018-01-02 RX ORDER — MORPHINE SULFATE 4 MG/ML
2 INJECTION, SOLUTION INTRAMUSCULAR; INTRAVENOUS
Status: DISCONTINUED | OUTPATIENT
Start: 2018-01-02 | End: 2018-01-05 | Stop reason: SDUPTHER

## 2018-01-02 RX ORDER — MORPHINE SULFATE 4 MG/ML
4 INJECTION, SOLUTION INTRAMUSCULAR; INTRAVENOUS
Status: DISCONTINUED | OUTPATIENT
Start: 2018-01-02 | End: 2018-01-05 | Stop reason: SDUPTHER

## 2018-01-02 RX ORDER — MORPHINE SULFATE 4 MG/ML
2 INJECTION, SOLUTION INTRAMUSCULAR; INTRAVENOUS
Status: DISCONTINUED | OUTPATIENT
Start: 2018-01-02 | End: 2018-01-02 | Stop reason: SDUPTHER

## 2018-01-02 RX ADMIN — TIZANIDINE 8 MG: 4 TABLET ORAL at 14:19

## 2018-01-02 RX ADMIN — TIZANIDINE 8 MG: 4 TABLET ORAL at 09:08

## 2018-01-02 RX ADMIN — FAMOTIDINE 20 MG: 20 TABLET ORAL at 09:08

## 2018-01-02 RX ADMIN — CALCITRIOL 0.25 MCG: 0.25 CAPSULE, LIQUID FILLED ORAL at 09:08

## 2018-01-02 RX ADMIN — ZOLPIDEM TARTRATE 5 MG: 5 TABLET ORAL at 21:17

## 2018-01-02 RX ADMIN — Medication 4 MG: at 21:30

## 2018-01-02 RX ADMIN — PREGABALIN 200 MG: 150 CAPSULE ORAL at 09:08

## 2018-01-02 RX ADMIN — DULOXETINE HYDROCHLORIDE 60 MG: 60 CAPSULE, DELAYED RELEASE ORAL at 09:08

## 2018-01-02 RX ADMIN — PREGABALIN 200 MG: 150 CAPSULE ORAL at 14:19

## 2018-01-02 RX ADMIN — DULOXETINE HYDROCHLORIDE 60 MG: 60 CAPSULE, DELAYED RELEASE ORAL at 21:17

## 2018-01-02 RX ADMIN — METOPROLOL TARTRATE 12.5 MG: 25 TABLET, FILM COATED ORAL at 21:17

## 2018-01-02 RX ADMIN — HYDROCODONE BITARTRATE AND ACETAMINOPHEN 1 TABLET: 5; 325 TABLET ORAL at 05:22

## 2018-01-02 RX ADMIN — ISOSORBIDE MONONITRATE 60 MG: 60 TABLET, EXTENDED RELEASE ORAL at 10:09

## 2018-01-02 RX ADMIN — TIZANIDINE 8 MG: 4 TABLET ORAL at 21:17

## 2018-01-02 RX ADMIN — DAKIN'S SOLUTION 0.125% (QUARTER STRENGTH): 0.12 SOLUTION at 09:10

## 2018-01-02 RX ADMIN — HYDROCODONE BITARTRATE AND ACETAMINOPHEN 1 TABLET: 5; 325 TABLET ORAL at 10:58

## 2018-01-02 RX ADMIN — LEVOTHYROXINE SODIUM 100 MCG: 100 TABLET ORAL at 05:22

## 2018-01-02 RX ADMIN — FLUTICASONE PROPIONATE 2 SPRAY: 50 SPRAY, METERED NASAL at 09:10

## 2018-01-02 RX ADMIN — MORPHINE SULFATE 2 MG: 4 INJECTION INTRAVENOUS at 16:37

## 2018-01-02 RX ADMIN — MIRTAZAPINE 30 MG: 15 TABLET, FILM COATED ORAL at 21:17

## 2018-01-02 RX ADMIN — HYDROCODONE BITARTRATE AND ACETAMINOPHEN 1 TABLET: 5; 325 TABLET ORAL at 01:56

## 2018-01-02 RX ADMIN — ALLOPURINOL 100 MG: 100 TABLET ORAL at 09:08

## 2018-01-02 RX ADMIN — PREGABALIN 200 MG: 150 CAPSULE ORAL at 21:17

## 2018-01-02 RX ADMIN — OXYCODONE HYDROCHLORIDE AND ACETAMINOPHEN 1 TABLET: 10; 325 TABLET ORAL at 14:28

## 2018-01-02 RX ADMIN — CLOPIDOGREL 75 MG: 75 TABLET, FILM COATED ORAL at 09:08

## 2018-01-02 RX ADMIN — FAMOTIDINE 20 MG: 20 TABLET ORAL at 22:29

## 2018-01-02 ASSESSMENT — PAIN SCALES - GENERAL
PAINLEVEL_OUTOF10: 3
PAINLEVEL_OUTOF10: 8
PAINLEVEL_OUTOF10: 0
PAINLEVEL_OUTOF10: 0
PAINLEVEL_OUTOF10: 3
PAINLEVEL_OUTOF10: 0
PAINLEVEL_OUTOF10: 5
PAINLEVEL_OUTOF10: 9
PAINLEVEL_OUTOF10: 3
PAINLEVEL_OUTOF10: 6
PAINLEVEL_OUTOF10: 6

## 2018-01-02 NOTE — PROGRESS NOTES
indigestion this AM, now resolved      Additionally, positive for fatigue, negative for chest pressure/discomfort and dyspnea. The patient was seen today for these cardiology problems:      Specialty Problems        Cardiology Problems    Postoperative hemorrhage involving circulatory system following cardiac catheterization        Coronary artery disease        Hypertension        Coronary artery disease involving native coronary artery of native heart without angina pectoris        Essential hypertension        Hypotension                 PFSH:  Any new information:  no       Change:  no      Review of Systems:    General:      Complaint / Symptom Yes / No / Description if Yes         Fatigue Yes:  chronic   Weight gain NA   Insomnia NA         Respiratory:         Complaint / Symptom Yes / No / Description if Yes         Cough No   Horseness NA         Cardiovascular:     Complaint / Symptom Yes / No / Description if Yes         Chest Pain No   Shortness of Air / Orthopnea Yes: chronic and stable   Presyncope / Syncope No   Palpitations No             Physical Examination:    BP (!) 104/50   Pulse 84   Temp 97.2 °F (36.2 °C) (Temporal)   Resp 16   Ht 5' 9\" (1.753 m)   Wt (!) 363 lb 1.6 oz (164.7 kg)   SpO2 97%   BMI 53.62 kg/m²     GENERAL - well developed and well nourished    HEENT   PERRLA, Hearing appears normal, conjunctiva and lids are normal, ears and nose appear normal  NECK - no thyromegaly, no JVD, trachea is in the midline  CARDIOVASCULAR  PMI is in the left mid line clavicular position, Normal S1 and S2 with a grade 1/6 systolic murmur. No S3 or S4    PULMONARY  No respiratory distress. scattered wheezes and rales.   Breath sounds in both  lung fields are Decreased  ABDOMEN   soft, non tender, no rebound, no hepatomegaly or splenomegaly  MUSCULOSKELETAL   Prone/Supine, digitals and nails are without clubbing or cyanosis  EXTREMITIES - trace edema  NEUROLOGIC - cranial nerves, II-XII, are normal  SKIN - turgor is normal, no rash  PSYCHIATRIC - normal mood and affect, alert and orientated x 3, judgement and insight appear appropriate         Current Inpatient Medications:   isosorbide mononitrate  60 mg Oral Daily    mirtazapine  30 mg Oral Nightly    insulin lispro  0-35 Units Subcutaneous 4x Daily AC & HS    famotidine  20 mg Oral BID    allopurinol  100 mg Oral Daily    calcitRIOL  0.25 mcg Oral Daily    DULoxetine  60 mg Oral BID    fluticasone  2 spray Each Nare Daily    levothyroxine  100 mcg Oral Daily    clopidogrel  75 mg Oral Daily    pregabalin  200 mg Oral TID    metoprolol tartrate  12.5 mg Oral BID    tiZANidine  8 mg Oral TID       IV Infusions (if any):   dextrose           LABORATORY EVALUATION & TESTING:    I have personally reviewed and interpreted the results of the following diagnostic testing and noted in the nurse's note above      EKG and or Telemetry:  which was personally reviewed me:  Sinus rhythm,   Pulse Readings from Last 1 Encounters:   01/02/18 84           ALL THE CARDIOLOGY PROBLEMS ARE LISTED ABOVE; HOWEVER, THE FOLLOWING SPECIFIC CARDIAC PROBLEMS WERE ADDRESSED AND TREATED DURING THE HOSPITAL VISIT TODAY:                    Cardiac Specific Problem / Diagnosis   Discussion and Data Reviewed Diagnostic Procedures Ordered Management Options Selected                 1. Presenting problem / symptom     Left groin hematoma  are improving S/p surgery 12/29/2017     HB stable at 8.8 No Continue current medications:      Yes:                  2. CAD Initial presentation during this evaluation Review and summation of old records:     S/p inferior lateral MI  S/p stent to mid RCA  Inferior basilar akinesis     troponins are stable, no further chest discomfort, I personally reviewed the previous EKG with equivocal to positive ST segment elevation in the inferior leads Yes:  Continue current medications:     Yes:                  3.  Hypertension Initial presentation during this evaluation The blood pressure for the lastr 36 hours has been:  Systolic (07OGY), UVT:657 , Min:71 , JNS:547    Diastolic (03LIW), GFJ:13, Min:33, Max:87    No Continue current medications:        yes                PLAN:    1. Continue present medications except for changes as noted above  2. Continue to monitor rhythm  3. Further orders per clinical course. 4. Monitor             Discussed with patient and nursing.       Electronically signed by Eduard Amaya MD on 1/2/18 at 3:08 PM    [facility] [place] [facility]

## 2018-01-02 NOTE — PROGRESS NOTES
hoarseness / nasal drainage / ear pain  CV:  No chest pain / palpitations/ orthopnea   Respiratory:  No cough / shortness of breath / sputum / hemoptysis  GI: No nausea / vomiting / abdominal pain / diarrhea / constipation  :  No dysuria / hesitancy / urgency / hematuria   Neuro: No paralysis / syncope / seizure / dysphagia / headache / paresthesias  Musculoskeletal:  No muscle weakness /joint stiffness Chronic back pain  Vascular: No edema / claudication / thrombosis / varicosities  Heme / endocrine: No easy bruising / bleeding / excessive sweating / heat or cold intolerance  Psychiatric:  No depression / anxiety / insomnia / mood changes  Skin:  No new rashes / lesions / skin hair or nail changes    OBJECTIVE:        Additional Respiratory  Assessments  Pulse: 85  Resp: 16  SpO2: 94 %  Oral Care: Mouth swabbed    IV Access: peripheral and right IJ  Diet:. Cardiac low sodium  Prophylaxis:  DVT - Plan is for xaralto per vascular at their discretion  GI - pepcid    Current Medications:  Current Facility-Administered Medications   Medication Dose Route Frequency Provider Last Rate Last Dose    isosorbide mononitrate (IMDUR) extended release tablet 60 mg  60 mg Oral Daily Nasima Boykin MD   60 mg at 01/02/18 1009    sodium hypochlorite (DAKINS) 0.125 % external solution   Irrigation BID Patricio Parisi MD        zolpidem (AMBIEN) tablet 5 mg  5 mg Oral Nightly PRN Edison Sykes MD   5 mg at 12/31/17 2114    mirtazapine (REMERON) tablet 30 mg  30 mg Oral Nightly Patricio Parisi MD   30 mg at 01/01/18 2016    insulin lispro (HUMALOG) injection vial 0-35 Units  0-35 Units Subcutaneous 4x Daily AC & HS Edison Sykes MD   Stopped at 12/31/17 1052    famotidine (PEPCID) tablet 20 mg  20 mg Oral BID Edison Sykes MD   20 mg at 01/02/18 0908    glucose (GLUTOSE) 40 % oral gel 15 g  15 g Oral PRN Edison Sykes MD        dextrose 50 % solution 12.5 g  12.5 g Intravenous PRN Edison Sykes Intravenous Q1H PRN Eloy Elizabeth MD        Or    morphine injection 4 mg  4 mg Intravenous Q2H PRN Eloy Elizabeth MD   4 mg at 12/30/17 1409       Physical Exam:  BP (!) 91/43   Pulse 85   Temp 97.4 °F (36.3 °C) (Temporal)   Resp 16   Ht 5' 9\" (1.753 m)   Wt (!) 363 lb 1.6 oz (164.7 kg)   SpO2 94%   BMI 53.62 kg/m²   24hr I & O:    Intake/Output Summary (Last 24 hours) at 01/02/18 1133  Last data filed at 01/02/18 0800   Gross per 24 hour   Intake             2500 ml   Output             1815 ml   Net              685 ml     General appearance: alert, appears stated age, cooperative and no distress  Head: Normocephalic, without obvious abnormality, atraumatic  Eyes: conjunctivae/corneas clear. PERRL, EOM's intact. Ears: normal external ears  Neck: no adenopathy, no carotid bruit, no JVD, supple, symmetrical, trachea midline and thyroid not enlarged, symmetric, no tenderness/mass/nodules  Lungs: clear to auscultation bilaterally,no rales or wheezes   Heart: regular rate and rhythm, S1, S2 normal, no murmur,  No thrill palpable   Abdomen:soft, non-tender; non-distended normal bowel sounds no masses, no organomegaly  Extremities:Pedal edema Trace  Homans sign is negative, no sign of DVT  Left groin with VAC in place  Skin: Skin color, texture, turgor normal. No rashes or lesions  Lymphatic: No palpable lymph node enlargment  Neurologic: Alert and oriented X 3, normal strength and tone. Normal symmetric reflexes.  Mental status: Alert, oriented, thought content appropriate  Cranial nerves:II-XII Grossly intact Sensory: normal Motor:grossly normal  Psychiatric:  normal insight and behavior, judgement and thought content normal, affect appropriate      Labs:   Recent Labs      12/31/17   1812  01/01/18   0350  01/02/18   0221   WBC  18.9*  16.0*  18.4*   RBC  3.01*  3.00*  2.85*   HGB  8.6*  8.4*  8.1*   HCT  26.4*  26.6*  25.7*   PLT  267  287  322     Recent Labs      12/31/17   0530  01/01/18   0350 NA  141  141   K  4.1  3.7   ANIONGAP  8  9   CL  104  104   CO2  29  28   BUN  13  9   CREATININE  0.7  0.6   GLUCOSE  133*  114*   CALCIUM  8.3*  8.3*   No results for input(s): MG, PHOS in the last 72 hours. No results for input(s): AST, ALT, ALB, BILITOT, ALKPHOS in the last 72 hours. HgBA1c:  No components found for: HGBA1C  Troponin T:   Recent Labs      12/30/17   1310  12/30/17   1834  12/31/17   0156   TROPONINI  1.09*  1.13*  1.15*     Pro-BNP: No results for input(s): BNP in the last 72 hours. INR: No results for input(s): INR in the last 72 hours. ABGs: No results found for: PHART, PO2ART, BSA9WFZ  UA:No results for input(s): NITRITE, COLORU, PHUR, LABCAST, WBCUA, RBCUA, MUCUS, TRICHOMONAS, YEAST, BACTERIA, CLARITYU, SPECGRAV, LEUKOCYTESUR, UROBILINOGEN, BILIRUBINUR, BLOODU, GLUCOSEU, AMORPHOUS in the last 72 hours. Invalid input(s): Melven Proper: 12/30/17  Impression:  1. Right internal jugular central line tip projects over the SVC with no appreciable pneumothorax. 2. Diminished level of inspiration. Vascular crowding. Right medial basilar densities may be patchy atelectasis.     Pneumonia considered.              BDLMER by Dr Patience Alvarado on 12/30/2017 9:53 AM     CXR 12/24/17  Impression  1.. Right IJ deep line unchanged in position from the previous study. 2. Suspected volume overload with engorgement of the central pulmonary vascularity and widening of the vascular pedicle.              Signed by Dr Harman Mcfarland on 12/25/2017 8:56 AM     Renal US 12/25/17  Impression  1.  Unremarkable renal ultrasound.              Signed by Dr Harman Mcfarland on 12/25/2017 10:42 AM     Cardiac Cath 12/23/17  Summary:    1.  Successful femoral artery ultrasound  2.  Successful femoral artery arterogram  3.  Single vessel coronary artery disease involving the mid RCA  4.  Left ventricular function is impaired in the inferior basilar segment extending up the diaphragmatic surface               with

## 2018-01-02 NOTE — PROGRESS NOTES
Pt transported up to room 328 via wheelchair on telemetry. Both nurses at the bedside to assess patient, found to be stable. Care handed off to Jayla Singleton, [address] [facility].

## 2018-01-03 LAB
ABO/RH: NORMAL
ANION GAP SERPL CALCULATED.3IONS-SCNC: 12 MMOL/L (ref 7–19)
ANTIBODY SCREEN: NORMAL
BASOPHILS ABSOLUTE: 0 K/UL (ref 0–0.2)
BASOPHILS RELATIVE PERCENT: 0.3 % (ref 0–1)
BLOOD BANK DISPENSE STATUS: NORMAL
BLOOD BANK PRODUCT CODE: NORMAL
BPU ID: NORMAL
BUN BLDV-MCNC: 10 MG/DL (ref 6–20)
CALCIUM SERPL-MCNC: 8.4 MG/DL (ref 8.6–10)
CHLORIDE BLD-SCNC: 101 MMOL/L (ref 98–111)
CO2: 26 MMOL/L (ref 22–29)
CREAT SERPL-MCNC: 0.9 MG/DL (ref 0.5–0.9)
DESCRIPTION BLOOD BANK: NORMAL
EOSINOPHILS ABSOLUTE: 0.2 K/UL (ref 0–0.6)
EOSINOPHILS RELATIVE PERCENT: 1.4 % (ref 0–5)
GFR NON-AFRICAN AMERICAN: >60
GLUCOSE BLD-MCNC: 110 MG/DL (ref 70–99)
GLUCOSE BLD-MCNC: 120 MG/DL (ref 70–99)
GLUCOSE BLD-MCNC: 141 MG/DL (ref 70–99)
GLUCOSE BLD-MCNC: 144 MG/DL (ref 74–109)
GLUCOSE BLD-MCNC: 221 MG/DL (ref 70–99)
HCT VFR BLD CALC: 23.1 % (ref 37–47)
HCT VFR BLD CALC: 24.4 % (ref 37–47)
HEMOGLOBIN: 7.2 G/DL (ref 12–16)
HEMOGLOBIN: 7.7 G/DL (ref 12–16)
LYMPHOCYTES ABSOLUTE: 2.4 K/UL (ref 1.1–4.5)
LYMPHOCYTES RELATIVE PERCENT: 15.5 % (ref 20–40)
MAGNESIUM: 1.5 MG/DL (ref 1.6–2.6)
MCH RBC QN AUTO: 28.8 PG (ref 27–31)
MCH RBC QN AUTO: 28.8 PG (ref 27–31)
MCHC RBC AUTO-ENTMCNC: 31.2 G/DL (ref 33–37)
MCHC RBC AUTO-ENTMCNC: 31.6 G/DL (ref 33–37)
MCV RBC AUTO: 91.4 FL (ref 81–99)
MCV RBC AUTO: 92.4 FL (ref 81–99)
MONOCYTES ABSOLUTE: 1.1 K/UL (ref 0–0.9)
MONOCYTES RELATIVE PERCENT: 7.4 % (ref 0–10)
NEUTROPHILS ABSOLUTE: 11.3 K/UL (ref 1.5–7.5)
NEUTROPHILS RELATIVE PERCENT: 74 % (ref 50–65)
PDW BLD-RTO: 16.2 % (ref 11.5–14.5)
PDW BLD-RTO: 16.3 % (ref 11.5–14.5)
PERFORMED ON: ABNORMAL
PHOSPHORUS: 3.5 MG/DL (ref 2.5–4.5)
PLATELET # BLD: 222 K/UL (ref 130–400)
PLATELET # BLD: 348 K/UL (ref 130–400)
PMV BLD AUTO: 10.5 FL (ref 9.4–12.3)
PMV BLD AUTO: 11.6 FL (ref 9.4–12.3)
POTASSIUM SERPL-SCNC: 3 MMOL/L (ref 3.5–5)
RBC # BLD: 2.5 M/UL (ref 4.2–5.4)
RBC # BLD: 2.67 M/UL (ref 4.2–5.4)
SODIUM BLD-SCNC: 139 MMOL/L (ref 136–145)
WBC # BLD: 14.7 K/UL (ref 4.8–10.8)
WBC # BLD: 15.3 K/UL (ref 4.8–10.8)

## 2018-01-03 PROCEDURE — 83735 ASSAY OF MAGNESIUM: CPT

## 2018-01-03 PROCEDURE — 99233 SBSQ HOSP IP/OBS HIGH 50: CPT | Performed by: INTERNAL MEDICINE

## 2018-01-03 PROCEDURE — 86901 BLOOD TYPING SEROLOGIC RH(D): CPT

## 2018-01-03 PROCEDURE — 85025 COMPLETE CBC W/AUTO DIFF WBC: CPT

## 2018-01-03 PROCEDURE — 99024 POSTOP FOLLOW-UP VISIT: CPT | Performed by: SURGERY

## 2018-01-03 PROCEDURE — 6370000000 HC RX 637 (ALT 250 FOR IP): Performed by: INTERNAL MEDICINE

## 2018-01-03 PROCEDURE — 6360000002 HC RX W HCPCS: Performed by: SURGERY

## 2018-01-03 PROCEDURE — 6370000000 HC RX 637 (ALT 250 FOR IP): Performed by: HOSPITALIST

## 2018-01-03 PROCEDURE — 6370000000 HC RX 637 (ALT 250 FOR IP): Performed by: SURGERY

## 2018-01-03 PROCEDURE — 1210000000 HC MED SURG R&B

## 2018-01-03 PROCEDURE — 36415 COLL VENOUS BLD VENIPUNCTURE: CPT

## 2018-01-03 PROCEDURE — 82948 REAGENT STRIP/BLOOD GLUCOSE: CPT

## 2018-01-03 PROCEDURE — 86900 BLOOD TYPING SEROLOGIC ABO: CPT

## 2018-01-03 PROCEDURE — 2580000003 HC RX 258: Performed by: INTERNAL MEDICINE

## 2018-01-03 PROCEDURE — 80048 BASIC METABOLIC PNL TOTAL CA: CPT

## 2018-01-03 PROCEDURE — 85027 COMPLETE CBC AUTOMATED: CPT

## 2018-01-03 PROCEDURE — 86850 RBC ANTIBODY SCREEN: CPT

## 2018-01-03 PROCEDURE — 99024 POSTOP FOLLOW-UP VISIT: CPT | Performed by: NURSE PRACTITIONER

## 2018-01-03 PROCEDURE — 84100 ASSAY OF PHOSPHORUS: CPT

## 2018-01-03 PROCEDURE — 99232 SBSQ HOSP IP/OBS MODERATE 35: CPT | Performed by: HOSPITALIST

## 2018-01-03 RX ORDER — SODIUM CHLORIDE 9 MG/ML
INJECTION, SOLUTION INTRAVENOUS ONCE
Status: COMPLETED | OUTPATIENT
Start: 2018-01-03 | End: 2018-01-03

## 2018-01-03 RX ORDER — POTASSIUM CHLORIDE 20MEQ/15ML
40 LIQUID (ML) ORAL PRN
Status: DISCONTINUED | OUTPATIENT
Start: 2018-01-03 | End: 2018-01-06 | Stop reason: HOSPADM

## 2018-01-03 RX ORDER — POTASSIUM CHLORIDE 20 MEQ/1
40 TABLET, EXTENDED RELEASE ORAL PRN
Status: DISCONTINUED | OUTPATIENT
Start: 2018-01-03 | End: 2018-01-06 | Stop reason: HOSPADM

## 2018-01-03 RX ORDER — SODIUM HYPOCHLORITE 1.25 MG/ML
SOLUTION TOPICAL ONCE
Status: COMPLETED | OUTPATIENT
Start: 2018-01-03 | End: 2018-01-03

## 2018-01-03 RX ORDER — POTASSIUM CHLORIDE 20 MEQ/1
40 TABLET, EXTENDED RELEASE ORAL ONCE
Status: COMPLETED | OUTPATIENT
Start: 2018-01-03 | End: 2018-01-03

## 2018-01-03 RX ORDER — ISOSORBIDE MONONITRATE 60 MG/1
30 TABLET, EXTENDED RELEASE ORAL DAILY
Status: DISCONTINUED | OUTPATIENT
Start: 2018-01-04 | End: 2018-01-06 | Stop reason: HOSPADM

## 2018-01-03 RX ORDER — POTASSIUM CHLORIDE 7.45 MG/ML
10 INJECTION INTRAVENOUS PRN
Status: DISCONTINUED | OUTPATIENT
Start: 2018-01-03 | End: 2018-01-06 | Stop reason: HOSPADM

## 2018-01-03 RX ADMIN — MIRTAZAPINE 30 MG: 15 TABLET, FILM COATED ORAL at 20:36

## 2018-01-03 RX ADMIN — CLOPIDOGREL 75 MG: 75 TABLET, FILM COATED ORAL at 09:23

## 2018-01-03 RX ADMIN — PREGABALIN 200 MG: 150 CAPSULE ORAL at 13:28

## 2018-01-03 RX ADMIN — Medication 4 MG: at 08:05

## 2018-01-03 RX ADMIN — ISOSORBIDE MONONITRATE 60 MG: 60 TABLET, EXTENDED RELEASE ORAL at 09:23

## 2018-01-03 RX ADMIN — PREGABALIN 200 MG: 150 CAPSULE ORAL at 09:22

## 2018-01-03 RX ADMIN — LEVOTHYROXINE SODIUM 100 MCG: 100 TABLET ORAL at 06:41

## 2018-01-03 RX ADMIN — DULOXETINE HYDROCHLORIDE 60 MG: 60 CAPSULE, DELAYED RELEASE ORAL at 09:23

## 2018-01-03 RX ADMIN — OXYCODONE HYDROCHLORIDE AND ACETAMINOPHEN 1 TABLET: 10; 325 TABLET ORAL at 13:30

## 2018-01-03 RX ADMIN — HYDROCODONE BITARTRATE AND ACETAMINOPHEN 2 TABLET: 5; 325 TABLET ORAL at 06:59

## 2018-01-03 RX ADMIN — TIZANIDINE 8 MG: 4 TABLET ORAL at 13:29

## 2018-01-03 RX ADMIN — POTASSIUM CHLORIDE 40 MEQ: 20 TABLET, EXTENDED RELEASE ORAL at 11:38

## 2018-01-03 RX ADMIN — METOPROLOL TARTRATE 12.5 MG: 25 TABLET, FILM COATED ORAL at 09:23

## 2018-01-03 RX ADMIN — HYDROCODONE BITARTRATE AND ACETAMINOPHEN 2 TABLET: 5; 325 TABLET ORAL at 01:58

## 2018-01-03 RX ADMIN — OXYCODONE HYDROCHLORIDE AND ACETAMINOPHEN 1 TABLET: 10; 325 TABLET ORAL at 17:30

## 2018-01-03 RX ADMIN — Medication 4 MG: at 20:10

## 2018-01-03 RX ADMIN — DULOXETINE HYDROCHLORIDE 60 MG: 60 CAPSULE, DELAYED RELEASE ORAL at 20:36

## 2018-01-03 RX ADMIN — PREGABALIN 200 MG: 150 CAPSULE ORAL at 20:36

## 2018-01-03 RX ADMIN — TIZANIDINE 8 MG: 4 TABLET ORAL at 09:22

## 2018-01-03 RX ADMIN — ALLOPURINOL 100 MG: 100 TABLET ORAL at 09:23

## 2018-01-03 RX ADMIN — FAMOTIDINE 20 MG: 20 TABLET ORAL at 09:22

## 2018-01-03 RX ADMIN — CALCITRIOL 0.25 MCG: 0.25 CAPSULE, LIQUID FILLED ORAL at 09:23

## 2018-01-03 RX ADMIN — SODIUM CHLORIDE: 9 INJECTION, SOLUTION INTRAVENOUS at 11:25

## 2018-01-03 RX ADMIN — TIZANIDINE 8 MG: 4 TABLET ORAL at 20:36

## 2018-01-03 RX ADMIN — FLUTICASONE PROPIONATE 2 SPRAY: 50 SPRAY, METERED NASAL at 08:48

## 2018-01-03 RX ADMIN — FAMOTIDINE 20 MG: 20 TABLET ORAL at 20:36

## 2018-01-03 RX ADMIN — HYDROCODONE BITARTRATE AND ACETAMINOPHEN 1 TABLET: 5; 325 TABLET ORAL at 22:42

## 2018-01-03 RX ADMIN — DAKIN'S SOLUTION 0.125% (QUARTER STRENGTH): 0.12 SOLUTION at 21:59

## 2018-01-03 RX ADMIN — INSULIN LISPRO 6 UNITS: 100 INJECTION, SOLUTION INTRAVENOUS; SUBCUTANEOUS at 17:27

## 2018-01-03 ASSESSMENT — PAIN SCALES - GENERAL
PAINLEVEL_OUTOF10: 3
PAINLEVEL_OUTOF10: 6
PAINLEVEL_OUTOF10: 7
PAINLEVEL_OUTOF10: 6
PAINLEVEL_OUTOF10: 6
PAINLEVEL_OUTOF10: 8
PAINLEVEL_OUTOF10: 6
PAINLEVEL_OUTOF10: 7
PAINLEVEL_OUTOF10: 2

## 2018-01-03 NOTE — PROGRESS NOTES
Left groin VAC dressing leaking and Foam spongy- patient medicated with pain meds and dressing removed. Left groin beverly wound gently washed with soap and water, patted dry. Washcloths in folds to absorb moisture. Arglaes Powder lightly sprinkled around wound edges and smoothed, Sureprep Rapid Dry Spray (Medline) applied around wound as prep. Kat #[id number] Skin Barrier 4X4 cut into strips and used around all wound edges. Black VAC Foam into open wound, Trac Pad placed distal to wound to keep out of skin folds. Drape applied and VAC at -125mmHg cont, good seal noted. Patient tolerated dressing change well with pain meds.

## 2018-01-03 NOTE — PROGRESS NOTES
cardiology following  3. Hgb 7.7  4. Restart xarelto after sure no bleeding.   5. Order Home VAC - change vac today  Antibiotics continued after surgery due to:NA                              DVT prophylaxis: bleeding risk, on xeralto plavix         Beta Blocker:  continued      Leave catheter in place due to:  []  low pelvic dissection    [x]  strict I&Os   [x]  total bedrest  []  functioning as drain

## 2018-01-03 NOTE — PROGRESS NOTES
chest pressure/discomfort, dyspnea and irregular heart beat.     The patient was seen today for these cardiology problems:      Specialty Problems        Cardiology Problems    Postoperative hemorrhage involving circulatory system following cardiac catheterization        Coronary artery disease        Hypertension        Coronary artery disease involving native coronary artery of native heart without angina pectoris        Essential hypertension        Hypotension                 PFSH:  Any new information:  no       Change:  no      Review of Systems:    General:      Complaint / Symptom Yes / No / Description if Yes         Fatigue Yes:  chronic   Weight gain NA   Insomnia NA         Respiratory:         Complaint / Symptom Yes / No / Description if Yes         Cough No   Horseness NA         Cardiovascular:     Complaint / Symptom Yes / No / Description if Yes         Chest Pain No   Shortness of Air / Orthopnea Yes: chronic and stable   Presyncope / Syncope No   Palpitations No             Physical Examination:    BP (!) 76/47   Pulse 68   Temp 97.6 °F (36.4 °C)   Resp 14   Ht 5' 9\" (1.753 m)   Wt (!) 363 lb 1.6 oz (164.7 kg)   SpO2 94%   BMI 53.62 kg/m²     GENERAL - well developed and well nourished    HEENT   PERRLA, Hearing appears normal, conjunctiva and lids are normal, ears and nose appear normal  NECK - no thyromegaly, no JVD, trachea is in the midline  CARDIOVASCULAR  PMI is in the left mid line clavicular position, Normal S1 and S2 with a grade 1/6 systolic murmur.  No S3 or S4    PULMONARY  No respiratory distress.  scattered wheezes and rales.  Breath sounds in both  lung fields are Decreased  ABDOMEN   soft, non tender, no rebound, no hepatomegaly or splenomegaly  MUSCULOSKELETAL   Prone/Supine, digitals and nails are without clubbing or cyanosis  EXTREMITIES - trace edema  NEUROLOGIC - cranial nerves, II-XII, are normal  SKIN - turgor is normal, no rash  PSYCHIATRIC - normal mood and affect, alert and orientated x 3, judgement and insight appear appropriate         Current Inpatient Medications:   [START ON 1/4/2018] isosorbide mononitrate  30 mg Oral Daily    mirtazapine  30 mg Oral Nightly    insulin lispro  0-35 Units Subcutaneous 4x Daily AC & HS    famotidine  20 mg Oral BID    allopurinol  100 mg Oral Daily    calcitRIOL  0.25 mcg Oral Daily    DULoxetine  60 mg Oral BID    fluticasone  2 spray Each Nare Daily    levothyroxine  100 mcg Oral Daily    clopidogrel  75 mg Oral Daily    pregabalin  200 mg Oral TID    metoprolol tartrate  12.5 mg Oral BID    tiZANidine  8 mg Oral TID       IV Infusions (if any):   dextrose           LABORATORY EVALUATION & TESTING:    I have personally reviewed and interpreted the results of the following diagnostic testing and noted in the nurse's note above      EKG and or Telemetry:  which was personally reviewed me:  Sinus rhythm,   Pulse Readings from Last 1 Encounters:   01/03/18 68           ALL THE CARDIOLOGY PROBLEMS ARE LISTED ABOVE; HOWEVER, THE FOLLOWING SPECIFIC CARDIAC PROBLEMS WERE ADDRESSED AND TREATED DURING THE HOSPITAL VISIT TODAY:                    Cardiac Specific Problem / Diagnosis   Discussion and Data Reviewed Diagnostic Procedures Ordered Management Options Selected                 1. Presenting problem / symptom     Left groin hematoma  are improving S/p surgery 12/29/2017     HB stable at 8.8 No Continue current medications:      Yes:                  2. CAD Initial presentation during this evaluation Review and summation of old records:     S/p inferior lateral MI  S/p stent to mid RCA  Inferior basilar akinesis     troponins are stable, no further chest discomfort, I personally reviewed the previous EKG with equivocal to positive ST segment elevation in the inferior leads Yes:  Continue current medications:     Yes:                  3.  Hypertension Initial presentation during this evaluation The blood pressure for the

## 2018-01-03 NOTE — PROGRESS NOTES
Attempted to draw ordered lab work from pt central line. After changing cap on line blood return was noted but clotted immediately in Vacutainer even after several attempts.  Changed pt to a lab draw and notified the phlebotomist. Electronically signed by Delonte Jay RN on 1/3/18 at 3:00 PM

## 2018-01-03 NOTE — PLAN OF CARE
[facility] VAC order submitted to [facility], documentation faxed to [facility]. Patient current with [facility].

## 2018-01-03 NOTE — PROGRESS NOTES
ICU Hospitalist Progress Note                    Patient Name: Bryan Mclaughlin  [id number]  Admit Date: 12/29/2017  LOS 5     PCP: ANJANA Dixon     Brief Overview: 52 y.o. female     Chief Complaint: Still with groin pain    Subjective / History of present illness:  Her groin pain is better. Eating well. No N/V/CP/SOA. VAC placed and home unit being ordered. Cumulative hospital history:    52 y.o. female READMITTED following discharge home same day 12/29/17 with onset of rapid L groin swelling drop of Hg apx 2 gms and hypotension with SPB in 80s, taken emergently to OR with repair of L femoral artery site where she had bled at catheerization site.  She had 2 areas of puncture bleeding at L CFA per Operative note, has stabilized and doing well since.  Still with hematoma, some oozing but dressings in place.  Troponin was elevated but no chest pain, SOA or other complaint (will trend).  She was admitted here 12/23/17 in transfer from [facility] ER with cold, painful R foot and leg, CP,  AMI in progress, Cr 4.4; cathed with BMS to RCA and vascular thromboylsis with TPA + Heparin in L femoral catheter.  Permacath placed for HD, LE improved with thrombolysis, also had aortic thrombosis and remains at high risk. Consulted for medical management of DM, Morbid Obesity, RA, GERD, chronic low back pain. She did well and was discharged home 12/29/17 - was concerned and had \"butterflies\" about going home but wanted to, was reassured that we would take care of her if she had a problem.     REVIEW OF SYSTEMS:  Constitutional / general:  No fever / chills / sweats \"my groin is sore but okay\"  Head:  No headache / neck stiffness / trauma / visual change  Eyes:  No blurry vision / acute visual change or loss / itching / redness  ENT: No sore throat / hoarseness / nasal drainage / ear pain  CV:  No chest pain / palpitations/ orthopnea   Respiratory:  No cough / shortness of breath / sputum / hemoptysis  GI: No nausea / vomiting / abdominal pain / diarrhea / constipation  :  No dysuria / hesitancy / urgency / hematuria   Neuro: No paralysis / syncope / seizure / dysphagia / headache / paresthesias  Musculoskeletal:  No muscle weakness /joint stiffness Chronic back pain  Vascular: No edema / claudication / thrombosis / varicosities  Heme / endocrine: No easy bruising / bleeding / excessive sweating / heat or cold intolerance  Psychiatric:  No depression / anxiety / insomnia / mood changes  Skin:  No new rashes / lesions / skin hair or nail changes    OBJECTIVE:        Additional Respiratory  Assessments  Pulse: 70  Resp: 18  SpO2: 97 %  Oral Care: Mouth swabbed    IV Access: peripheral and right IJ  Diet:. Cardiac low sodium  Prophylaxis:  DVT - Plan is for xaralto per vascular at their discretion  GI - pepcid    Current Medications:  Current Facility-Administered Medications   Medication Dose Route Frequency Provider Last Rate Last Dose    [START ON 1/4/2018] isosorbide mononitrate (IMDUR) extended release tablet 30 mg  30 mg Oral Daily Bill Wheeler MD        potassium chloride (KLOR-CON M) extended release tablet 40 mEq  40 mEq Oral PRN Doris Castrejon MD        Or    potassium chloride 20 MEQ/15ML (10%) oral solution 40 mEq  40 mEq Oral PRN Doris Castrejon MD        Or    potassium chloride 10 mEq/100 mL IVPB (Peripheral Line)  10 mEq Intravenous PRN Doris Castrejon MD        morphine injection 2 mg  2 mg Intravenous Q1H PRN Doris Castrejon MD        Or    morphine injection 4 mg  4 mg Intravenous Q1H PRN Doris Castrejon MD   4 mg at 01/03/18 0805    zolpidem (AMBIEN) tablet 5 mg  5 mg Oral Nightly PRN Manav Michel MD   5 mg at 01/02/18 2117    mirtazapine (REMERON) tablet 30 mg  30 mg Oral Nightly Doris Castrejon MD   30 mg at 01/02/18 2117    insulin lispro (HUMALOG) injection vial 0-35 Units  0-35 Units Subcutaneous 4x Daily AC & HS Manav Michel MD   Stopped at 12/31/17 1052    famotidine

## 2018-01-04 LAB
ANION GAP SERPL CALCULATED.3IONS-SCNC: 13 MMOL/L (ref 7–19)
BASOPHILS ABSOLUTE: 0 K/UL (ref 0–0.2)
BASOPHILS RELATIVE PERCENT: 0.2 % (ref 0–1)
BUN BLDV-MCNC: 14 MG/DL (ref 6–20)
CALCIUM SERPL-MCNC: 8.5 MG/DL (ref 8.6–10)
CHLORIDE BLD-SCNC: 100 MMOL/L (ref 98–111)
CO2: 26 MMOL/L (ref 22–29)
CREAT SERPL-MCNC: 1.1 MG/DL (ref 0.5–0.9)
EOSINOPHILS ABSOLUTE: 0.4 K/UL (ref 0–0.6)
EOSINOPHILS RELATIVE PERCENT: 2.5 % (ref 0–5)
GFR NON-AFRICAN AMERICAN: 53
GLUCOSE BLD-MCNC: 130 MG/DL (ref 70–99)
GLUCOSE BLD-MCNC: 131 MG/DL (ref 74–109)
GLUCOSE BLD-MCNC: 168 MG/DL (ref 70–99)
GLUCOSE BLD-MCNC: 175 MG/DL (ref 70–99)
GLUCOSE BLD-MCNC: 208 MG/DL (ref 70–99)
HCT VFR BLD CALC: 25.2 % (ref 37–47)
HEMOGLOBIN: 8.1 G/DL (ref 12–16)
LYMPHOCYTES ABSOLUTE: 2.2 K/UL (ref 1.1–4.5)
LYMPHOCYTES RELATIVE PERCENT: 15.7 % (ref 20–40)
MAGNESIUM: 1.6 MG/DL (ref 1.6–2.6)
MCH RBC QN AUTO: 29.3 PG (ref 27–31)
MCHC RBC AUTO-ENTMCNC: 32.1 G/DL (ref 33–37)
MCV RBC AUTO: 91.3 FL (ref 81–99)
MONOCYTES ABSOLUTE: 1 K/UL (ref 0–0.9)
MONOCYTES RELATIVE PERCENT: 7.2 % (ref 0–10)
NEUTROPHILS ABSOLUTE: 10.4 K/UL (ref 1.5–7.5)
NEUTROPHILS RELATIVE PERCENT: 72.9 % (ref 50–65)
PDW BLD-RTO: 16 % (ref 11.5–14.5)
PERFORMED ON: ABNORMAL
PHOSPHORUS: 4 MG/DL (ref 2.5–4.5)
PLATELET # BLD: 347 K/UL (ref 130–400)
PMV BLD AUTO: 10.9 FL (ref 9.4–12.3)
POTASSIUM SERPL-SCNC: 3.7 MMOL/L (ref 3.5–5)
RBC # BLD: 2.76 M/UL (ref 4.2–5.4)
SODIUM BLD-SCNC: 139 MMOL/L (ref 136–145)
WBC # BLD: 14.3 K/UL (ref 4.8–10.8)

## 2018-01-04 PROCEDURE — 6370000000 HC RX 637 (ALT 250 FOR IP): Performed by: SURGERY

## 2018-01-04 PROCEDURE — 99232 SBSQ HOSP IP/OBS MODERATE 35: CPT | Performed by: INTERNAL MEDICINE

## 2018-01-04 PROCEDURE — 82948 REAGENT STRIP/BLOOD GLUCOSE: CPT

## 2018-01-04 PROCEDURE — 80048 BASIC METABOLIC PNL TOTAL CA: CPT

## 2018-01-04 PROCEDURE — 6370000000 HC RX 637 (ALT 250 FOR IP): Performed by: HOSPITALIST

## 2018-01-04 PROCEDURE — 6370000000 HC RX 637 (ALT 250 FOR IP): Performed by: INTERNAL MEDICINE

## 2018-01-04 PROCEDURE — 83735 ASSAY OF MAGNESIUM: CPT

## 2018-01-04 PROCEDURE — 1210000000 HC MED SURG R&B

## 2018-01-04 PROCEDURE — 84100 ASSAY OF PHOSPHORUS: CPT

## 2018-01-04 PROCEDURE — 36430 TRANSFUSION BLD/BLD COMPNT: CPT

## 2018-01-04 PROCEDURE — 36415 COLL VENOUS BLD VENIPUNCTURE: CPT

## 2018-01-04 PROCEDURE — 85025 COMPLETE CBC W/AUTO DIFF WBC: CPT

## 2018-01-04 PROCEDURE — P9016 RBC LEUKOCYTES REDUCED: HCPCS

## 2018-01-04 RX ORDER — VARENICLINE TARTRATE 0.5 MG/1
0.5 TABLET, FILM COATED ORAL 2 TIMES DAILY
Status: DISCONTINUED | OUTPATIENT
Start: 2018-01-07 | End: 2018-01-06 | Stop reason: HOSPADM

## 2018-01-04 RX ORDER — VARENICLINE TARTRATE 0.5 MG/1
0.5 TABLET, FILM COATED ORAL DAILY
Status: DISCONTINUED | OUTPATIENT
Start: 2018-01-04 | End: 2018-01-06 | Stop reason: HOSPADM

## 2018-01-04 RX ORDER — VARENICLINE TARTRATE 0.5 MG/1
1 TABLET, FILM COATED ORAL 2 TIMES DAILY
Status: DISCONTINUED | OUTPATIENT
Start: 2018-01-10 | End: 2018-01-06 | Stop reason: HOSPADM

## 2018-01-04 RX ADMIN — INSULIN LISPRO 5 UNITS: 100 INJECTION, SOLUTION INTRAVENOUS; SUBCUTANEOUS at 17:52

## 2018-01-04 RX ADMIN — OXYCODONE HYDROCHLORIDE AND ACETAMINOPHEN 1 TABLET: 10; 325 TABLET ORAL at 12:28

## 2018-01-04 RX ADMIN — LEVOTHYROXINE SODIUM 100 MCG: 100 TABLET ORAL at 08:00

## 2018-01-04 RX ADMIN — OXYCODONE HYDROCHLORIDE AND ACETAMINOPHEN 1 TABLET: 10; 325 TABLET ORAL at 17:29

## 2018-01-04 RX ADMIN — ALLOPURINOL 100 MG: 100 TABLET ORAL at 08:00

## 2018-01-04 RX ADMIN — VARENICLINE TARTRATE 0.5 MG: 0.5 TABLET, FILM COATED ORAL at 19:56

## 2018-01-04 RX ADMIN — TIZANIDINE 8 MG: 4 TABLET ORAL at 14:04

## 2018-01-04 RX ADMIN — HYDROCODONE BITARTRATE AND ACETAMINOPHEN 1 TABLET: 5; 325 TABLET ORAL at 21:40

## 2018-01-04 RX ADMIN — MIRTAZAPINE 30 MG: 15 TABLET, FILM COATED ORAL at 19:56

## 2018-01-04 RX ADMIN — METOPROLOL TARTRATE 12.5 MG: 25 TABLET, FILM COATED ORAL at 08:00

## 2018-01-04 RX ADMIN — FAMOTIDINE 20 MG: 20 TABLET ORAL at 08:00

## 2018-01-04 RX ADMIN — CALCITRIOL 0.25 MCG: 0.25 CAPSULE, LIQUID FILLED ORAL at 08:00

## 2018-01-04 RX ADMIN — INSULIN LISPRO 4 UNITS: 100 INJECTION, SOLUTION INTRAVENOUS; SUBCUTANEOUS at 08:01

## 2018-01-04 RX ADMIN — DULOXETINE HYDROCHLORIDE 60 MG: 60 CAPSULE, DELAYED RELEASE ORAL at 19:56

## 2018-01-04 RX ADMIN — ISOSORBIDE MONONITRATE 30 MG: 60 TABLET, EXTENDED RELEASE ORAL at 08:01

## 2018-01-04 RX ADMIN — FAMOTIDINE 20 MG: 20 TABLET ORAL at 19:57

## 2018-01-04 RX ADMIN — TIZANIDINE 8 MG: 4 TABLET ORAL at 08:00

## 2018-01-04 RX ADMIN — FLUTICASONE PROPIONATE 2 SPRAY: 50 SPRAY, METERED NASAL at 08:01

## 2018-01-04 RX ADMIN — OXYCODONE HYDROCHLORIDE AND ACETAMINOPHEN 1 TABLET: 10; 325 TABLET ORAL at 08:00

## 2018-01-04 RX ADMIN — PREGABALIN 200 MG: 150 CAPSULE ORAL at 14:04

## 2018-01-04 RX ADMIN — PREGABALIN 200 MG: 150 CAPSULE ORAL at 08:00

## 2018-01-04 RX ADMIN — DULOXETINE HYDROCHLORIDE 60 MG: 60 CAPSULE, DELAYED RELEASE ORAL at 08:00

## 2018-01-04 RX ADMIN — INSULIN LISPRO 3 UNITS: 100 INJECTION, SOLUTION INTRAVENOUS; SUBCUTANEOUS at 12:21

## 2018-01-04 RX ADMIN — PREGABALIN 200 MG: 150 CAPSULE ORAL at 19:56

## 2018-01-04 RX ADMIN — TIZANIDINE 8 MG: 4 TABLET ORAL at 19:56

## 2018-01-04 RX ADMIN — CLOPIDOGREL 75 MG: 75 TABLET, FILM COATED ORAL at 08:00

## 2018-01-04 RX ADMIN — METOPROLOL TARTRATE 12.5 MG: 25 TABLET, FILM COATED ORAL at 19:56

## 2018-01-04 ASSESSMENT — PAIN SCALES - GENERAL
PAINLEVEL_OUTOF10: 8
PAINLEVEL_OUTOF10: 8
PAINLEVEL_OUTOF10: 10
PAINLEVEL_OUTOF10: 7

## 2018-01-04 NOTE — PROGRESS NOTES
ICU Hospitalist Progress Note                    Patient Name: Kindra Yanez  [id number]  Admit Date: 12/29/2017  LOS 6     PCP: ANJANA Castellanos     Brief Overview: 52 y.o. female     Chief Complaint: Still with groin pain    Subjective / History of present illness:  Her groin pain is better. Eating well. No N/V/CP/SOA. VAC placed and home unit being ordered. Cumulative hospital history:    52 y.o. female READMITTED following discharge home same day 12/29/17 with onset of rapid L groin swelling drop of Hg apx 2 gms and hypotension with SPB in 80s, taken emergently to OR with repair of L femoral artery site where she had bled at catheerization site.  She had 2 areas of puncture bleeding at L CFA per Operative note, has stabilized and doing well since.  Still with hematoma, some oozing but dressings in place.  Troponin was elevated but no chest pain, SOA or other complaint (will trend).  She was admitted here 12/23/17 in transfer from [facility] ER with cold, painful R foot and leg, CP,  AMI in progress, Cr 4.4; cathed with BMS to RCA and vascular thromboylsis with TPA + Heparin in L femoral catheter.  Permacath placed for HD, LE improved with thrombolysis, also had aortic thrombosis and remains at high risk. Consulted for medical management of DM, Morbid Obesity, RA, GERD, chronic low back pain. She did well and was discharged home 12/29/17 - was concerned and had \"butterflies\" about going home but wanted to, was reassured that we would take care of her if she had a problem.     REVIEW OF SYSTEMS:  Constitutional / general:  No fever / chills / sweats \"my groin is sore but okay\"  Head:  No headache / neck stiffness / trauma / visual change  Eyes:  No blurry vision / acute visual change or loss / itching / redness  ENT: No sore throat / hoarseness / nasal drainage / ear pain  CV:  No chest pain / palpitations/ orthopnea   Respiratory:  No cough / shortness of breath / sputum / hemoptysis  GI: No nausea / [facility]) injection 4 mg  4 mg Intravenous Q6H PRN Chantal Abdi MD   4 mg at 12/31/17 1516       Physical Exam:  BP (!) 91/51 Comment: RN Irish notified  Pulse 73   Temp 96.5 °F (35.8 °C) (Temporal)   Resp 18   Ht 5' 9\" (1.753 m)   Wt (!) 363 lb 1.6 oz (164.7 kg)   SpO2 98%   BMI 53.62 kg/m²   24hr I & O:      Intake/Output Summary (Last 24 hours) at 01/04/18 1303  Last data filed at 01/04/18 1135   Gross per 24 hour   Intake              820 ml   Output             1000 ml   Net             -180 ml     General appearance: alert, appears stated age, cooperative and no distress  Head: Normocephalic, without obvious abnormality, atraumatic  Eyes: conjunctivae/corneas clear. PERRL, EOM's intact. Ears: normal external ears  Neck: no adenopathy, no carotid bruit, no JVD, supple, symmetrical, trachea midline and thyroid not enlarged, symmetric, no tenderness/mass/nodules  Lungs: clear to auscultation bilaterally,no rales or wheezes   Heart: regular rate and rhythm, S1, S2 normal, no murmur,  No thrill palpable   Abdomen:soft, non-tender; non-distended normal bowel sounds no masses, no organomegaly  Extremities:Pedal edema Trace  Homans sign is negative, no sign of DVT  Left groin with VAC in place no leaking  Skin: Skin color, texture, turgor normal. No rashes or lesions  Lymphatic: No palpable lymph node enlargment  Neurologic: Alert and oriented X 3, normal strength and tone. Normal symmetric reflexes.  Mental status: Alert, oriented, thought content appropriate  Cranial nerves:II-XII Grossly intact Sensory: normal Motor:grossly normal  Psychiatric:  normal insight and behavior, judgement and thought content normal, affect appropriate      Labs:   Recent Labs      01/03/18   0527  01/03/18   1249  01/04/18   0336   WBC  15.3*  14.7*  14.3*   RBC  2.67*  2.50*  2.76*   HGB  7.7*  7.2*  8.1*   HCT  24.4*  23.1*  25.2*   PLT  222  348  347     Recent Labs      01/03/18   0527  01/04/18   0336   NA  139  543 40%  5.  99% lesion in the mid RCA, subtotally occluded with thrombus  6.  Successful percutaneous coronary intervention utilizing a single bare metal stent to the mid RCA reestablishing BRIE-3 flow  RECOMMENDATIONS:  1.  Risk Factor Modification  2.  On-going Medical Therapy  3.  Dual antiplatelet therapy for one month.              Electronically signed by Sampson Lawson MD on 12/23/17 at 8:15 PM     Echo 12/26/17  Summary   Left Atrium was not clearly visualized.  Blima Ingram dilated left atrium.   The left ventricle was not well visualized.   Normal left ventricular size and function.              Electronically signed by Crystal Morocho MD (Interpreting physician) on 12/26/2017 05:35 PM    IMPRESSION / PLAN:  Active Problems:    Postoperative hemorrhage involving circulatory system following cardiac catheterization    Hematoma of left lower extremity    Hypothyroidism    Hypertension    GERD (gastroesophageal reflux disease)    Diabetes ([address]    Coronary artery disease    Leukocytopenia    Acute blood loss anemia    Leukocytosis    Coronary artery disease involving native coronary artery of native heart without angina pectoris    Type 2 diabetes mellitus with complication (HCC)    Gastroesophageal reflux disease    Essential hypertension    Hypotension    Hypokalemia    Hypomagnesemia  Hypokalemia  Hypomagnesemia    Replacing mag and phos. Xarelto when appropriate. Leukocytosis likely reactive. SS for discharge planning. Glucose controlled very well. She is trying to go off the floor and smoke. Pt discussed with ICU team during rounds. CC time excluding procedures: 0 minutes  Code Status: Full Code       ESTRELLITA Worley, D.O.   Internal Medicine Hospitalist

## 2018-01-04 NOTE — PROGRESS NOTES
Blood just finished running on patient. Called lab to confirm that they had patient down as a lab draw and asked if they could draw 6am labs at 330 to get the CBC completed. They stated that they would do this.

## 2018-01-04 NOTE — PROGRESS NOTES
[address] [facility]  Progress Note                            Date:  1/4/2018  Patient: Cherelle Conklin  Admission:  12/29/2017  7:57 PM  Admit DX: Hematoma of left lower extremity, initial encounter [S80.12XA]  Age:  52 y.o., 1970     LOS: 6 days     Reason for evaluation:   coronary artery disease      SUBJECTIVE:    The patient was seen and examined. Notes and labs reviewed. There were not complications over night. No new complaints. Received blood during the night. OBJECTIVE:    Telemetry: Sinus  BP (!) 91/51 Comment: VKNG Coburn notified  Pulse 73   Temp 96.5 °F (35.8 °C) (Temporal)   Resp 18   Ht 5' 9\" (1.753 m)   Wt (!) 363 lb 1.6 oz (164.7 kg)   SpO2 98%   BMI 53.62 kg/m²     Intake/Output Summary (Last 24 hours) at 01/04/18 1659  Last data filed at 01/04/18 1135   Gross per 24 hour   Intake              820 ml   Output             1000 ml   Net             -180 ml           Labs:   CBC:   Recent Labs      01/03/18   1249  01/04/18   0336   WBC  14.7*  14.3*   HGB  7.2*  8.1*   HCT  23.1*  25.2*   PLT  348  347     BMP: Recent Labs      01/03/18   0527  01/04/18   0336   NA  139  139   K  3.0*  3.7   CO2  26  26   BUN  10  14   CREATININE  0.9  1.1*   LABGLOM  >60  53*   GLUCOSE  144*  131*     BNP: No results for input(s): BNP in the last 72 hours. PT/INR: No results for input(s): PROTIME, INR in the last 72 hours. APTT:No results for input(s): APTT in the last 72 hours. CARDIAC ENZYMES:No results for input(s): CKTOTAL, CKMB, CKMBINDEX, TROPONINI in the last 72 hours. FASTING LIPID PANEL:No results found for: HDL, LDLDIRECT, LDLCALC, TRIG  LIVER PROFILE:No results for input(s): AST, ALT, LABALBU in the last 72 hours. NURSE:  Judge Jose RN      Reason for initial evaluation    CAD      History of the Present Illness and Today's Current Status: No new complaints. Occasionally arrhythmia's noted from telemetry.        Additionally, positive for fatigue, negative for chest pressure/discomfort.     The patient was seen today for these cardiology problems:      Specialty Problems        Cardiology Problems    Postoperative hemorrhage involving circulatory system following cardiac catheterization        Coronary artery disease        Hypertension        Coronary artery disease involving native coronary artery of native heart without angina pectoris        Essential hypertension        Hypotension                 PFSH:  Any new information:  no       Change:  no      Review of Systems:    General:  Complaint / Symptom Yes / No / Description if Yes         Fatigue Yes:  chronic   Weight gain NA   Insomnia NA         Respiratory:         Complaint / Symptom Yes / No / Description if Yes         Cough No   Horseness NA         Cardiovascular:     Complaint / Symptom Yes / No / Description if Yes         Chest Pain No   Shortness of Air / Orthopnea Yes: chronic and stable   Presyncope / Syncope No   Palpitations No             Physical Examination:    BP (!) 91/51 Comment: RN Krish Mark notified  Pulse 73   Temp 96.5 °F (35.8 °C) (Temporal)   Resp 18   Ht 5' 9\" (1.753 m)   Wt (!) 363 lb 1.6 oz (164.7 kg)   SpO2 98%   BMI 53.62 kg/m²     GENERAL - well developed and well nourished    HEENT   PERRLA, Hearing appears normal, conjunctiva and lids are normal, ears and nose appear normal  NECK - no thyromegaly, no JVD, trachea is in the midline  CARDIOVASCULAR  PMI is in the left mid line clavicular position, Normal S1 and S2 with a grade 1/6 systolic murmur.  No S3 or S4    PULMONARY  No respiratory distress.  scattered wheezes and rales.  Breath sounds in both  lung fields are Decreased  ABDOMEN   soft, non tender, no rebound, no hepatomegaly or splenomegaly  MUSCULOSKELETAL   Prone/Supine, digitals and nails are without clubbing or cyanosis  EXTREMITIES - trace edema  NEUROLOGIC - cranial nerves, II-XII, are normal  SKIN - turgor is normal, no rash  PSYCHIATRIC -

## 2018-01-04 NOTE — PROGRESS NOTES
of serosang drainage. Dakins moist to dry dressing applied. Wound bed pink color, clean-no granulation noted     Assessment/Plan  1. POD-#6 Repair of left CFA  2.3. Hgb 8.1-recheck in am  4. Restart xarelto after sure no bleeding. 5. Order Home VAC - change vac today  6. Discussed LTACH with patient, patient agreeable and Aravind Tovar has made offer-will plan for am  7.   Dakins moist to dry dressings-will reapply VAC soon                              DVT prophylaxis: bleeding risk, on xaralto plavix         Beta Blocker:  continued

## 2018-01-05 ENCOUNTER — HOSPITAL ENCOUNTER (OUTPATIENT)
Facility: HOSPITAL | Age: 48
Discharge: HOME | End: 2018-01-22
Attending: INTERNAL MEDICINE | Admitting: INTERNAL MEDICINE

## 2018-01-05 VITALS
RESPIRATION RATE: 20 BRPM | TEMPERATURE: 97.6 F | HEART RATE: 84 BPM | WEIGHT: 293 LBS | HEIGHT: 69 IN | BODY MASS INDEX: 43.4 KG/M2 | DIASTOLIC BLOOD PRESSURE: 52 MMHG | SYSTOLIC BLOOD PRESSURE: 99 MMHG | OXYGEN SATURATION: 99 %

## 2018-01-05 DIAGNOSIS — S31.109D WOUND OF LEFT GROIN, SUBSEQUENT ENCOUNTER: Primary | ICD-10-CM

## 2018-01-05 LAB
ANION GAP SERPL CALCULATED.3IONS-SCNC: 11 MMOL/L (ref 7–19)
BASOPHILS ABSOLUTE: 0 K/UL (ref 0–0.2)
BASOPHILS RELATIVE PERCENT: 0.2 % (ref 0–1)
BUN BLDV-MCNC: 11 MG/DL (ref 6–20)
CALCIUM SERPL-MCNC: 8.7 MG/DL (ref 8.6–10)
CHLORIDE BLD-SCNC: 103 MMOL/L (ref 98–111)
CO2: 29 MMOL/L (ref 22–29)
CREAT SERPL-MCNC: 0.9 MG/DL (ref 0.5–0.9)
EOSINOPHILS ABSOLUTE: 0.4 K/UL (ref 0–0.6)
EOSINOPHILS RELATIVE PERCENT: 2.8 % (ref 0–5)
GFR NON-AFRICAN AMERICAN: >60
GLUCOSE BLD-MCNC: 116 MG/DL (ref 74–109)
GLUCOSE BLD-MCNC: 138 MG/DL (ref 70–99)
GLUCOSE BLD-MCNC: 169 MG/DL (ref 70–99)
GLUCOSE BLD-MCNC: 176 MG/DL (ref 70–99)
GLUCOSE BLD-MCNC: 184 MG/DL (ref 70–99)
HCT VFR BLD CALC: 28 % (ref 37–47)
HEMOGLOBIN: 8.8 G/DL (ref 12–16)
LYMPHOCYTES ABSOLUTE: 1.8 K/UL (ref 1.1–4.5)
LYMPHOCYTES RELATIVE PERCENT: 14.8 % (ref 20–40)
MAGNESIUM: 1.7 MG/DL (ref 1.6–2.6)
MCH RBC QN AUTO: 28.8 PG (ref 27–31)
MCHC RBC AUTO-ENTMCNC: 31.4 G/DL (ref 33–37)
MCV RBC AUTO: 91.5 FL (ref 81–99)
MONOCYTES ABSOLUTE: 0.7 K/UL (ref 0–0.9)
MONOCYTES RELATIVE PERCENT: 5.8 % (ref 0–10)
NEUTROPHILS ABSOLUTE: 9.2 K/UL (ref 1.5–7.5)
NEUTROPHILS RELATIVE PERCENT: 74.8 % (ref 50–65)
PDW BLD-RTO: 16 % (ref 11.5–14.5)
PERFORMED ON: ABNORMAL
PHOSPHORUS: 3.9 MG/DL (ref 2.5–4.5)
PLATELET # BLD: 384 K/UL (ref 130–400)
PMV BLD AUTO: 10.8 FL (ref 9.4–12.3)
POTASSIUM SERPL-SCNC: 3.6 MMOL/L (ref 3.5–5)
RBC # BLD: 3.06 M/UL (ref 4.2–5.4)
SODIUM BLD-SCNC: 143 MMOL/L (ref 136–145)
WBC # BLD: 12.3 K/UL (ref 4.8–10.8)

## 2018-01-05 PROCEDURE — 6370000000 HC RX 637 (ALT 250 FOR IP): Performed by: INTERNAL MEDICINE

## 2018-01-05 PROCEDURE — 6360000002 HC RX W HCPCS: Performed by: SURGERY

## 2018-01-05 PROCEDURE — 6370000000 HC RX 637 (ALT 250 FOR IP): Performed by: SURGERY

## 2018-01-05 PROCEDURE — 1210000000 HC MED SURG R&B

## 2018-01-05 PROCEDURE — 6370000000 HC RX 637 (ALT 250 FOR IP): Performed by: HOSPITALIST

## 2018-01-05 PROCEDURE — 83735 ASSAY OF MAGNESIUM: CPT

## 2018-01-05 PROCEDURE — 85025 COMPLETE CBC W/AUTO DIFF WBC: CPT

## 2018-01-05 PROCEDURE — 84100 ASSAY OF PHOSPHORUS: CPT

## 2018-01-05 PROCEDURE — 99232 SBSQ HOSP IP/OBS MODERATE 35: CPT | Performed by: INTERNAL MEDICINE

## 2018-01-05 PROCEDURE — 99024 POSTOP FOLLOW-UP VISIT: CPT | Performed by: SURGERY

## 2018-01-05 PROCEDURE — 80048 BASIC METABOLIC PNL TOTAL CA: CPT

## 2018-01-05 PROCEDURE — 82948 REAGENT STRIP/BLOOD GLUCOSE: CPT

## 2018-01-05 PROCEDURE — 36415 COLL VENOUS BLD VENIPUNCTURE: CPT

## 2018-01-05 RX ORDER — NICOTINE 21 MG/24HR
1 PATCH, TRANSDERMAL 24 HOURS TRANSDERMAL DAILY
Qty: 30 PATCH | Refills: 3 | Status: SHIPPED | OUTPATIENT
Start: 2018-01-06 | End: 2018-09-08

## 2018-01-05 RX ORDER — VARENICLINE TARTRATE 0.5 MG/1
0.5 TABLET, FILM COATED ORAL DAILY
Qty: 60 TABLET | Refills: 3 | Status: SHIPPED | OUTPATIENT
Start: 2018-01-06 | End: 2018-09-08

## 2018-01-05 RX ORDER — ISOSORBIDE MONONITRATE 30 MG/1
30 TABLET, EXTENDED RELEASE ORAL DAILY
Qty: 30 TABLET | Refills: 3 | Status: SHIPPED | OUTPATIENT
Start: 2018-01-06 | End: 2018-09-08

## 2018-01-05 RX ORDER — MORPHINE SULFATE 4 MG/ML
2 INJECTION, SOLUTION INTRAMUSCULAR; INTRAVENOUS
Status: DISCONTINUED | OUTPATIENT
Start: 2018-01-05 | End: 2018-01-06 | Stop reason: HOSPADM

## 2018-01-05 RX ORDER — NICOTINE 21 MG/24HR
1 PATCH, TRANSDERMAL 24 HOURS TRANSDERMAL DAILY
Status: DISCONTINUED | OUTPATIENT
Start: 2018-01-05 | End: 2018-01-06 | Stop reason: HOSPADM

## 2018-01-05 RX ORDER — MORPHINE SULFATE 4 MG/ML
4 INJECTION, SOLUTION INTRAMUSCULAR; INTRAVENOUS
Status: DISCONTINUED | OUTPATIENT
Start: 2018-01-05 | End: 2018-01-06 | Stop reason: HOSPADM

## 2018-01-05 RX ADMIN — FAMOTIDINE 20 MG: 20 TABLET ORAL at 09:07

## 2018-01-05 RX ADMIN — ALLOPURINOL 100 MG: 100 TABLET ORAL at 09:06

## 2018-01-05 RX ADMIN — PREGABALIN 200 MG: 150 CAPSULE ORAL at 14:51

## 2018-01-05 RX ADMIN — VARENICLINE TARTRATE 0.5 MG: 0.5 TABLET, FILM COATED ORAL at 09:06

## 2018-01-05 RX ADMIN — DULOXETINE HYDROCHLORIDE 60 MG: 60 CAPSULE, DELAYED RELEASE ORAL at 21:12

## 2018-01-05 RX ADMIN — TIZANIDINE 8 MG: 4 TABLET ORAL at 09:06

## 2018-01-05 RX ADMIN — DULOXETINE HYDROCHLORIDE 60 MG: 60 CAPSULE, DELAYED RELEASE ORAL at 09:07

## 2018-01-05 RX ADMIN — INSULIN LISPRO 4 UNITS: 100 INJECTION, SOLUTION INTRAVENOUS; SUBCUTANEOUS at 17:52

## 2018-01-05 RX ADMIN — CALCITRIOL 0.25 MCG: 0.25 CAPSULE, LIQUID FILLED ORAL at 09:06

## 2018-01-05 RX ADMIN — FAMOTIDINE 20 MG: 20 TABLET ORAL at 21:12

## 2018-01-05 RX ADMIN — OXYCODONE HYDROCHLORIDE AND ACETAMINOPHEN 1 TABLET: 10; 325 TABLET ORAL at 10:56

## 2018-01-05 RX ADMIN — PREGABALIN 200 MG: 150 CAPSULE ORAL at 21:12

## 2018-01-05 RX ADMIN — OXYCODONE HYDROCHLORIDE AND ACETAMINOPHEN 1 TABLET: 10; 325 TABLET ORAL at 19:59

## 2018-01-05 RX ADMIN — MIRTAZAPINE 30 MG: 15 TABLET, FILM COATED ORAL at 21:12

## 2018-01-05 RX ADMIN — METOPROLOL TARTRATE 12.5 MG: 25 TABLET, FILM COATED ORAL at 21:12

## 2018-01-05 RX ADMIN — Medication 4 MG: at 09:55

## 2018-01-05 RX ADMIN — TIZANIDINE 8 MG: 4 TABLET ORAL at 21:12

## 2018-01-05 RX ADMIN — HYDROCODONE BITARTRATE AND ACETAMINOPHEN 1 TABLET: 5; 325 TABLET ORAL at 06:50

## 2018-01-05 RX ADMIN — METOPROLOL TARTRATE 12.5 MG: 25 TABLET, FILM COATED ORAL at 09:06

## 2018-01-05 RX ADMIN — TIZANIDINE 8 MG: 4 TABLET ORAL at 14:51

## 2018-01-05 RX ADMIN — PREGABALIN 200 MG: 150 CAPSULE ORAL at 09:07

## 2018-01-05 RX ADMIN — INSULIN LISPRO 3 UNITS: 100 INJECTION, SOLUTION INTRAVENOUS; SUBCUTANEOUS at 13:33

## 2018-01-05 RX ADMIN — LEVOTHYROXINE SODIUM 100 MCG: 100 TABLET ORAL at 09:12

## 2018-01-05 RX ADMIN — ISOSORBIDE MONONITRATE 30 MG: 60 TABLET, EXTENDED RELEASE ORAL at 09:07

## 2018-01-05 RX ADMIN — INSULIN LISPRO 3 UNITS: 100 INJECTION, SOLUTION INTRAVENOUS; SUBCUTANEOUS at 21:32

## 2018-01-05 RX ADMIN — FLUTICASONE PROPIONATE 2 SPRAY: 50 SPRAY, METERED NASAL at 09:14

## 2018-01-05 RX ADMIN — CLOPIDOGREL 75 MG: 75 TABLET, FILM COATED ORAL at 09:06

## 2018-01-05 RX ADMIN — OXYCODONE HYDROCHLORIDE AND ACETAMINOPHEN 1 TABLET: 10; 325 TABLET ORAL at 14:58

## 2018-01-05 ASSESSMENT — PAIN SCALES - GENERAL
PAINLEVEL_OUTOF10: 7
PAINLEVEL_OUTOF10: 7
PAINLEVEL_OUTOF10: 6
PAINLEVEL_OUTOF10: 7
PAINLEVEL_OUTOF10: 10
PAINLEVEL_OUTOF10: 8

## 2018-01-05 NOTE — DISCHARGE SUMMARY
Dictated Stat [id number]
Electrolytes were in order. Glucose was slightly elevated at 116. Phosphorus was 3.9 and normal.   Magnesium 1.7 and normal.  She was discharged to the [facility] L-TAC unit. Her activity was to progress as tolerated. Her  medications are listed in the medication reconciliation sheet. Of note,  she was started on Chantix on 01/04/2018 and she was started on a nicotine  patch on 01/05/2018. She was restarted back on her Xarelto on 01/05/2018. It was felt that most likely a negative pressure VAC type dressing for her  left groin would be beneficial to her. Her hemoglobin will need to be  monitored as well as her glucoses. I did speak with Dr. Arnulfo Workman who is the intake physician for the [facility]  today and went over her multiple problems.       Gurmeet Juarez MD    D: 01/05/2018 11:35:25       T: 01/05/2018 11:38:50     [email or web address]  Job#: [id number]     Doc#: [id number]  CC:

## 2018-01-05 NOTE — PROGRESS NOTES
drainage. Dakins moist to dry dressing applied. Wound bed pink color, clean-no granulation noted     Assessment/Plan  1. POD-#7 Repair of left CFA  2.. Hgb 8.8   3. Nada Rhonda today  4. .  Discussed LTACH with patient, patient agreeable and [place] Javon has made offer-will plan for today  5. Dakins moist to dry dressings-want VAC reapplied when she gets to [facility] later today                              DVT prophylaxis: bleeding risk, on xaralto plavix         Beta Blocker:  continued    Patient seen on rounds this am with -assessment and plan are mine, at [facility] can progess as tolerated in terms of activity.

## 2018-01-05 NOTE — PROGRESS NOTES
Right IJ Cortis removed as per order. Area cleaned with Betadine swab and allowed to dry. Sutures clipped, patient lying flat in bed and Cortis removed. 2X2 and Tegaderm over site. Pressure held at site X10 minutes, patient lying flat X20 minutes. No bleeding at site, no complaints voiced.

## 2018-01-05 NOTE — PROGRESS NOTES
drainage. Dakins moist to dry dressing applied. Wound bed pink color, clean-no granulation noted     Assessment/Plan  1. POD-#7 Repair of left CFA  2.. Hgb 8.8   3. Machado Schultz today  4. .  Discussed LTACH with patient, patient agreeable and Kike Noe has made offer-will plan for today  5.   Dakins moist to dry dressings-want VAC reapplied when she gets to [facility] later today                              DVT prophylaxis: bleeding risk, on xaralto plavix         Beta Blocker:  continued    Patient seen on rounds this am with

## 2018-01-06 LAB
ALBUMIN SERPL-MCNC: 2.9 G/DL (ref 3.5–5)
ALBUMIN/GLOB SERPL: 0.9 G/DL (ref 1.1–2.5)
ALP SERPL-CCNC: 83 U/L (ref 24–120)
ALT SERPL W P-5'-P-CCNC: 44 U/L (ref 0–54)
ANION GAP SERPL CALCULATED.3IONS-SCNC: 7 MMOL/L (ref 4–13)
AST SERPL-CCNC: 44 U/L (ref 7–45)
BASOPHILS # BLD AUTO: 0.02 10*3/MM3 (ref 0–0.2)
BASOPHILS NFR BLD AUTO: 0.2 % (ref 0–2)
BILIRUB SERPL-MCNC: 0.4 MG/DL (ref 0.1–1)
BUN BLD-MCNC: 12 MG/DL (ref 5–21)
BUN/CREAT SERPL: 12.8 (ref 7–25)
CALCIUM SPEC-SCNC: 9 MG/DL (ref 8.4–10.4)
CHLORIDE SERPL-SCNC: 104 MMOL/L (ref 98–110)
CO2 SERPL-SCNC: 31 MMOL/L (ref 24–31)
CREAT BLD-MCNC: 0.94 MG/DL (ref 0.5–1.4)
DEPRECATED RDW RBC AUTO: 49.1 FL (ref 40–54)
EOSINOPHIL # BLD AUTO: 0.42 10*3/MM3 (ref 0–0.7)
EOSINOPHIL NFR BLD AUTO: 3.6 % (ref 0–4)
ERYTHROCYTE [DISTWIDTH] IN BLOOD BY AUTOMATED COUNT: 15.9 % (ref 12–15)
GFR SERPL CREATININE-BSD FRML MDRD: 64 ML/MIN/1.73
GLOBULIN UR ELPH-MCNC: 3.4 GM/DL
GLUCOSE BLD-MCNC: 141 MG/DL (ref 70–100)
GLUCOSE BLDC GLUCOMTR-MCNC: 109 MG/DL (ref 70–130)
GLUCOSE BLDC GLUCOMTR-MCNC: 110 MG/DL (ref 70–130)
GLUCOSE BLDC GLUCOMTR-MCNC: 122 MG/DL (ref 70–130)
GLUCOSE BLDC GLUCOMTR-MCNC: 143 MG/DL (ref 70–130)
HCT VFR BLD AUTO: 26 % (ref 37–47)
HGB BLD-MCNC: 8.1 G/DL (ref 12–16)
IMM GRANULOCYTES # BLD: 0.13 10*3/MM3 (ref 0–0.03)
IMM GRANULOCYTES NFR BLD: 1.1 % (ref 0–5)
LYMPHOCYTES # BLD AUTO: 2.54 10*3/MM3 (ref 0.72–4.86)
LYMPHOCYTES NFR BLD AUTO: 21.5 % (ref 15–45)
MCH RBC QN AUTO: 27.6 PG (ref 28–32)
MCHC RBC AUTO-ENTMCNC: 31.2 G/DL (ref 33–36)
MCV RBC AUTO: 88.7 FL (ref 82–98)
MONOCYTES # BLD AUTO: 0.76 10*3/MM3 (ref 0.19–1.3)
MONOCYTES NFR BLD AUTO: 6.4 % (ref 4–12)
NEUTROPHILS # BLD AUTO: 7.92 10*3/MM3 (ref 1.87–8.4)
NEUTROPHILS NFR BLD AUTO: 67.2 % (ref 39–78)
NRBC BLD MANUAL-RTO: 0 /100 WBC (ref 0–0)
PLATELET # BLD AUTO: 432 10*3/MM3 (ref 130–400)
PMV BLD AUTO: 10.8 FL (ref 6–12)
POTASSIUM BLD-SCNC: 3.2 MMOL/L (ref 3.5–5.3)
PREALB SERPL-MCNC: 8.9 MG/DL (ref 18–36)
PROT SERPL-MCNC: 6.3 G/DL (ref 6.3–8.7)
RBC # BLD AUTO: 2.93 10*6/MM3 (ref 4.2–5.4)
SODIUM BLD-SCNC: 142 MMOL/L (ref 135–145)
WBC NRBC COR # BLD: 11.79 10*3/MM3 (ref 4.8–10.8)

## 2018-01-06 PROCEDURE — 90661 CCIIV3 VAC ABX FR 0.5 ML IM: CPT | Performed by: INTERNAL MEDICINE

## 2018-01-06 PROCEDURE — 97606 NEG PRS WND THER DME>50 SQCM: CPT

## 2018-01-06 PROCEDURE — 90732 PPSV23 VACC 2 YRS+ SUBQ/IM: CPT | Performed by: INTERNAL MEDICINE

## 2018-01-06 PROCEDURE — 82962 GLUCOSE BLOOD TEST: CPT

## 2018-01-06 PROCEDURE — G0009 ADMIN PNEUMOCOCCAL VACCINE: HCPCS | Performed by: INTERNAL MEDICINE

## 2018-01-06 PROCEDURE — 80053 COMPREHEN METABOLIC PANEL: CPT | Performed by: INTERNAL MEDICINE

## 2018-01-06 PROCEDURE — 84134 ASSAY OF PREALBUMIN: CPT | Performed by: INTERNAL MEDICINE

## 2018-01-06 PROCEDURE — 25010000002 PNEUMOCOCCAL VAC POLYVALENT PER 0.5 ML: Performed by: INTERNAL MEDICINE

## 2018-01-06 PROCEDURE — G0008 ADMIN INFLUENZA VIRUS VAC: HCPCS | Performed by: INTERNAL MEDICINE

## 2018-01-06 PROCEDURE — 85025 COMPLETE CBC W/AUTO DIFF WBC: CPT | Performed by: INTERNAL MEDICINE

## 2018-01-06 PROCEDURE — 97162 PT EVAL MOD COMPLEX 30 MIN: CPT

## 2018-01-06 PROCEDURE — 25010000002 INFLUENZA VAC SUBUNIT QUAD 0.5 ML SUSPENSION PREFILLED SYRINGE: Performed by: INTERNAL MEDICINE

## 2018-01-06 PROCEDURE — 97116 GAIT TRAINING THERAPY: CPT

## 2018-01-06 PROCEDURE — 97164 PT RE-EVAL EST PLAN CARE: CPT

## 2018-01-06 PROCEDURE — 97165 OT EVAL LOW COMPLEX 30 MIN: CPT

## 2018-01-06 RX ORDER — FLUTICASONE PROPIONATE 50 MCG
2 SPRAY, SUSPENSION (ML) NASAL DAILY
Status: DISCONTINUED | OUTPATIENT
Start: 2018-01-06 | End: 2018-01-22 | Stop reason: HOSPADM

## 2018-01-06 RX ORDER — OXYCODONE AND ACETAMINOPHEN 10; 325 MG/1; MG/1
1 TABLET ORAL EVERY 4 HOURS PRN
Status: DISPENSED | OUTPATIENT
Start: 2018-01-06 | End: 2018-01-16

## 2018-01-06 RX ORDER — PREGABALIN 100 MG/1
200 CAPSULE ORAL EVERY 8 HOURS SCHEDULED
Status: DISCONTINUED | OUTPATIENT
Start: 2018-01-06 | End: 2018-01-22 | Stop reason: HOSPADM

## 2018-01-06 RX ORDER — VARENICLINE TARTRATE 0.5 MG/1
0.5 TABLET, FILM COATED ORAL DAILY
Status: DISCONTINUED | OUTPATIENT
Start: 2018-01-06 | End: 2018-01-22 | Stop reason: HOSPADM

## 2018-01-06 RX ORDER — ASPIRIN 81 MG/1
81 TABLET ORAL DAILY
Status: DISCONTINUED | OUTPATIENT
Start: 2018-01-30 | End: 2018-01-22 | Stop reason: HOSPADM

## 2018-01-06 RX ORDER — MIRTAZAPINE 30 MG/1
30 TABLET, FILM COATED ORAL NIGHTLY
Status: DISCONTINUED | OUTPATIENT
Start: 2018-01-06 | End: 2018-01-22 | Stop reason: HOSPADM

## 2018-01-06 RX ORDER — POTASSIUM CHLORIDE 750 MG/1
40 CAPSULE, EXTENDED RELEASE ORAL ONCE
Status: DISCONTINUED | OUTPATIENT
Start: 2018-01-06 | End: 2018-01-17

## 2018-01-06 RX ORDER — DULOXETIN HYDROCHLORIDE 30 MG/1
60 CAPSULE, DELAYED RELEASE ORAL EVERY 12 HOURS SCHEDULED
Status: DISCONTINUED | OUTPATIENT
Start: 2018-01-06 | End: 2018-01-22 | Stop reason: HOSPADM

## 2018-01-06 RX ORDER — ALLOPURINOL 100 MG/1
100 TABLET ORAL DAILY
Status: DISCONTINUED | OUTPATIENT
Start: 2018-01-06 | End: 2018-01-22 | Stop reason: HOSPADM

## 2018-01-06 RX ORDER — ISOSORBIDE MONONITRATE 30 MG/1
30 TABLET, EXTENDED RELEASE ORAL
Status: DISCONTINUED | OUTPATIENT
Start: 2018-01-06 | End: 2018-01-22 | Stop reason: HOSPADM

## 2018-01-06 RX ORDER — CALCITRIOL 0.25 UG/1
0.25 CAPSULE, LIQUID FILLED ORAL DAILY
Status: DISCONTINUED | OUTPATIENT
Start: 2018-01-06 | End: 2018-01-22 | Stop reason: HOSPADM

## 2018-01-06 RX ORDER — CLOPIDOGREL BISULFATE 75 MG/1
75 TABLET ORAL DAILY
Status: DISCONTINUED | OUTPATIENT
Start: 2018-01-06 | End: 2018-01-22 | Stop reason: HOSPADM

## 2018-01-06 RX ORDER — LEVOTHYROXINE SODIUM 0.1 MG/1
100 TABLET ORAL
Status: DISCONTINUED | OUTPATIENT
Start: 2018-01-06 | End: 2018-01-22 | Stop reason: HOSPADM

## 2018-01-06 RX ORDER — POTASSIUM CHLORIDE 750 MG/1
20 CAPSULE, EXTENDED RELEASE ORAL DAILY
Status: DISCONTINUED | OUTPATIENT
Start: 2018-01-07 | End: 2018-01-22 | Stop reason: HOSPADM

## 2018-01-06 RX ORDER — TIZANIDINE 4 MG/1
8 TABLET ORAL EVERY 8 HOURS SCHEDULED
Status: DISCONTINUED | OUTPATIENT
Start: 2018-01-06 | End: 2018-01-22 | Stop reason: HOSPADM

## 2018-01-06 RX ORDER — NICOTINE 21 MG/24HR
1 PATCH, TRANSDERMAL 24 HOURS TRANSDERMAL
Status: DISCONTINUED | OUTPATIENT
Start: 2018-01-06 | End: 2018-01-13 | Stop reason: DRUGHIGH

## 2018-01-06 RX ORDER — LISINOPRIL 2.5 MG/1
2.5 TABLET ORAL
Status: DISCONTINUED | OUTPATIENT
Start: 2018-01-06 | End: 2018-01-22 | Stop reason: HOSPADM

## 2018-01-06 RX ORDER — POTASSIUM CHLORIDE 750 MG/1
20 CAPSULE, EXTENDED RELEASE ORAL ONCE
Status: DISCONTINUED | OUTPATIENT
Start: 2018-01-06 | End: 2018-01-22 | Stop reason: HOSPADM

## 2018-01-06 RX ORDER — CALCIUM CARBONATE 200(500)MG
2 TABLET,CHEWABLE ORAL EVERY 4 HOURS PRN
Status: DISCONTINUED | OUTPATIENT
Start: 2018-01-06 | End: 2018-01-22 | Stop reason: HOSPADM

## 2018-01-06 NOTE — PROGRESS NOTES
"Adult Nutrition  Assessment/PES    Patient Name:  Radha Boswell  YOB: 1970  MRN: [id number]  Admit Date:  1/5/2018    Assessment Date:  1/6/2018    Comments:            Reason for Assessment       01/06/18 1139    Reason for Assessment    Reason For Assessment/Visit admission assessment   Routine LTACH nutrition assessment              Nutrition/Diet History       01/06/18 1235    Nutrition/Diet History    Factors Affecting Nutritional Intake Factors   Nurse reports pt is out of room, but is eating and drinking pretty good.              Anthropometrics       01/06/18 1140    Anthropometrics    Height 175.3 cm (69\")    Weight (!)  167 kg (368 lb)    Ideal Body Weight (IBW)    Ideal Body Weight (IBW), Female 66.83    % Ideal Body Weight 250.28    Body Mass Index (BMI)    BMI (kg/m2) 54.46    BMI Grade greater than 40 - obesity grade III            Labs/Tests/Procedures/Meds       01/06/18 1140    Labs/Tests/Procedures/Meds    Diagnostic Test/Procedure Review reviewed    Labs/Tests Review Reviewed;K+;Glucose;Alb;Pre Albumin    Medication Review Reviewed, pertinent    Significant Vitals reviewed              Estimated/Assessed Needs       01/06/18 1141    Calculation Measurements    Weight Used For Calculations 167 kg (368 lb)    Height Used for Calculations 1.753 m (5' 9\")    Estimated/Assessed Energy Needs    Energy Need Method Kcal/kg    kcal/kg 14    14 Kcal/Kg (kcal) 2336.94    Estimated Kcal Range  5372-2933    Estimated/Assessed Protein Needs    Weight Used for Protein Calculation 167 kg (368 lb)    Protein (gm/kg) 0.6    0.6 Gm Protein (gm) 100.15    Estimated Protein Range     Estimated/Assessed Fluid Needs    Fluid Need Method --   2200            Nutrition Prescription Ordered       01/06/18 1142    Nutrition Prescription PO    Current PO Diet Regular    Common Modifiers Cardiac            Evaluation of Received Nutrient/Fluid Intake       01/06/18 1237    Evaluation of Received " Nutrient/Fluid Intake    Nutrition Delivered Fluid Evaluation    Fluid Intake Evaluation    Other Fluid (mL) 360   pt admitted to LTACH < 24 hrs ago    Total Fluid Intake (mL) 360    PO Evaluation    Number of Meals 2    % PO Intake 58            Problem/Interventions:                      Electronically signed by:  Ilana Guadalupe  01/06/18 12:39 PM

## 2018-01-06 NOTE — H&P
[facility]  ALONZO Akers M.D.  BRAYDEN Sheridan        Internal Medicine History and Physical      Name: Radha Boswell  MRN: [id number]     Acct: [id number]  Room: [facility]    Admit Date: 1/5/2018  PCP: BRAYDEN Edmond    Chief Complaint:     Left Femoral artery bleed with hematoma  Coronary artery disease  Recent MI  Diabetes type II with complication  Hypotension  Anemia secondary to acute blood loss      History Obtained From:     chart review and the patient    History of Present Illness:      Radha Boswell is a  47 y.o.  female who has been transferred to [facility] from [facility] for wound care to left groin and strengthening. Radha had a cardiac catheterization on 12/23/2017 via left femoral artery and was noted to have single-vessel coronary artery disease involving the mid RCA.  She was discharged home on 12/29/2017 and readmitted that same day for acute bleed to the left femoral groin requiring emergency repair and evacuation of hematoma to the left common femoral artery by Dr. Romero.  Radha will receive a wound VAC, physical therapy and occupational therapy during her admission at [facility].      Past Medical History:     Past Medical History:   Diagnosis Date   • Arthritis    • Diabetes mellitus    • Hyperlipidemia    • Hypertension    • Migraine    Non-STEMI      Past Surgical History:     Past Surgical History:   Procedure Laterality Date   • CHOLECYSTECTOMY     • TOTAL KNEE ARTHROPLASTY Bilateral    Cardiac catheterization      Medications Prior to Admission:       Prior to Admission medications       Zanaflex 8 mg by mouth 3 times a day  Xarelto 15 mg by mouth daily initiated on 01/05/2018 and 01/29/2018  Imdur 30 mg daily  NicoDerm CQ 21 mg/24 hours daily  Chantix 0.5 mg daily  Lisinopril 2.5 mg daily  Allopurinol 100 mg daily  Plavix 75 mg daily with last dose to be 01/29/2018  Calcitrol 0.25 µg daily  Cymbalta 60 mg twice a  day  Flonase 2 sprays daily  Synthroid 100 µg daily  Lyrica 200 mg 3 times a day  Metformin 500 mg twice a day  Metoprolol tartrate 12.5 mg twice a day  Remeron 30 mg daily  Oxycodone/acetaminophen 10/325 mg by mouth every 4 hours when necessary for pain    Aspirin 81 mg daily to begin on 01/30/2018  Xarelto 20 mg daily to begin on 01/30/2018  Allergies:       Review of patient's allergies indicates no known allergies.    Social History:     Tobacco:    reports that she has been smoking Cigarettes.  She has been smoking about 0.50 packs per day. She does not have any smokeless tobacco history on file.  Alcohol:      reports that she does not drink alcohol.  Drug Use:  reports that she does not use illicit drugs.    Family History:     No family history on file.    Review of Systems:     Review of Systems   Constitution: Positive for weakness and malaise/fatigue. Negative for chills, decreased appetite and fever.   HENT: Negative for congestion, hoarse voice, nosebleeds and sore throat.    Eyes: Negative for blurred vision, discharge, pain and visual disturbance.   Cardiovascular: Negative for chest pain, dyspnea on exertion, irregular heartbeat, leg swelling, orthopnea and palpitations.   Respiratory: Negative for cough, shortness of breath, sputum production and wheezing.    Hematologic/Lymphatic: Negative for bleeding problem. Does not bruise/bleed easily.   Skin: Negative for dry skin, flushing, itching, poor wound healing, rash and suspicious lesions.        Wound VAC to left groin    Musculoskeletal: Negative for arthritis, back pain, falls, joint pain, joint swelling, muscle weakness and myalgias.        Pain to left groin   Gastrointestinal: Negative for bloating, abdominal pain, change in bowel habit, diarrhea, flatus, heartburn, melena and nausea.   Genitourinary: Negative for frequency, hesitancy, incomplete emptying, pelvic pain and urgency.   Neurological: Negative for difficulty with concentration,  "disturbances in coordination, dizziness, headaches, numbness, paresthesias and tremors.   Psychiatric/Behavioral: Negative for altered mental status, hallucinations and memory loss. The patient is not nervous/anxious.        Code Status:  No Order    Physical Exam:     Vitals:  Ht 175.3 cm (69\")  Wt (!) 167 kg (368 lb)  BMI 54.34 kg/m2  T 98.5, P 74, R 18, /56, pulse ox 98% on room air    Physical Exam   Constitutional: Vital signs are normal. She appears well-developed and well-nourished. She is cooperative.   HENT:   Head: Normocephalic and atraumatic.   Nose: Nose normal.   Mouth/Throat: Oropharynx is clear and moist.   Eyes: Conjunctivae, EOM and lids are normal. Pupils are equal, round, and reactive to light.   Neck: Trachea normal and normal range of motion. Neck supple.   Cardiovascular: Normal rate, regular rhythm and normal heart sounds.    Abdominal: Soft. Normal appearance and bowel sounds are normal.   Obese   Musculoskeletal: Normal range of motion.   Lymphadenopathy:     She has no cervical adenopathy.     She has no axillary adenopathy.        Left: No supraclavicular adenopathy present.   Neurological: She is alert. She has normal strength. No cranial nerve deficit or sensory deficit.   Skin: Skin is warm, dry and intact. No petechiae and no rash noted. No cyanosis or erythema. Nails show no clubbing.   Wound VAC to left groin   Psychiatric: She has a normal mood and affect. Her speech is normal and behavior is normal. Judgment and thought content normal. Cognition and memory are normal.             Data:     Lab Results (last 7 days)     Procedure Component Value Units Date/Time    CBC & Differential [[id number]] Collected:  01/06/18 0338    Specimen:  Blood Updated:  01/06/18 9883    Narrative:       The following orders were created for panel order CBC & Differential.  Procedure                               Abnormality         Status                     ---------                            "    -----------         ------                     CBC Auto Differential[[id number]]        Abnormal            Final result                 Please view results for these tests on the individual orders.    CBC Auto Differential [[id number]]  (Abnormal) Collected:  01/06/18 0338    Specimen:  Blood Updated:  01/06/18 0432     WBC 11.79 (H) 10*3/mm3      RBC 2.93 (L) 10*6/mm3      Hemoglobin 8.1 (L) g/dL      Hematocrit 26.0 (L) %      MCV 88.7 fL      MCH 27.6 (L) pg      MCHC 31.2 (L) g/dL      RDW 15.9 (H) %      RDW-SD 49.1 fl      MPV 10.8 fL      Platelets 432 (H) 10*3/mm3      Neutrophil % 67.2 %      Lymphocyte % 21.5 %      Monocyte % 6.4 %      Eosinophil % 3.6 %      Basophil % 0.2 %      Immature Grans % 1.1 %      Neutrophils, Absolute 7.92 10*3/mm3      Lymphocytes, Absolute 2.54 10*3/mm3      Monocytes, Absolute 0.76 10*3/mm3      Eosinophils, Absolute 0.42 10*3/mm3      Basophils, Absolute 0.02 10*3/mm3      Immature Grans, Absolute 0.13 (H) 10*3/mm3      nRBC 0.0 /100 WBC     Prealbumin [[id number]]  (Abnormal) Collected:  01/06/18 0338    Specimen:  Blood Updated:  01/06/18 0439     Prealbumin 8.9 (L) mg/dL     Comprehensive Metabolic Panel [[id number]]  (Abnormal) Collected:  01/06/18 0338    Specimen:  Blood Updated:  01/06/18 0559     Glucose 141 (H) mg/dL      BUN 12 mg/dL      Creatinine 0.94 mg/dL      Sodium 142 mmol/L      Potassium 3.2 (L) mmol/L      Chloride 104 mmol/L      CO2 31.0 mmol/L      Calcium 9.0 mg/dL      Total Protein 6.3 g/dL      Albumin 2.90 (L) g/dL      ALT (SGPT) 44 U/L      AST (SGOT) 44 U/L      Alkaline Phosphatase 83 U/L      Total Bilirubin 0.4 mg/dL      eGFR Non African Amer 64 mL/min/1.73      Globulin 3.4 gm/dL      A/G Ratio 0.9 (L) g/dL      BUN/Creatinine Ratio 12.8     Anion Gap 7.0 mmol/L     POC Glucose Once [[id number]]  (Abnormal) Collected:  01/06/18 0613    Specimen:  Blood Updated:  01/06/18 0624     Glucose 143 (H) mg/dL       : MUSA Rg  AnnetteaMeter ID: [id number]       POC Glucose Once [[id number]]  (Normal) Collected:  01/06/18 1149    Specimen:  Blood Updated:  01/06/18 1201     Glucose 110 mg/dL       : [id number] Lamb KristaMeter ID: [id number]           No results found.      Assesment:       Past Medical History:   Diagnosis Date   • Arthritis    • Diabetes mellitus    • Hyperlipidemia    • Hypertension    • Migraine        Plan:     1. Left femoral artery bleed with hematoma  2. Coronary artery disease  3. Recent MI  4. Diabetes type II with complication  5.  History of hypertension assisting with Hypotension  6. Anemia secondary to acute blood loss  7.  Hypokalemia  8.  Leukocytosis - improving    Continue with wound care and medications as prior to transfer.  Physical therapy and occupational therapy to eval and treat. Replace potassium. Evaluate iron substrates and repeat CBCAnd BMP in a.m..  Labs on 01/08/2017.  Further treatment recommendations per Dr. Traore    Electronically signed by BRAYDEN Cleveland on 1/6/2018 at 1:22 PM     Copy sent to BRAYDEN Rivera

## 2018-01-06 NOTE — PROGRESS NOTES
"LTACH Fall Assessment Note    Radha Boswell is a 47 y.o.female  [Ht: 175.3 cm (69\"); Wt: (!) 167 kg (368 lb)] admitted 1/5/2018 11:08 PM.    Current medications associated with an increased risk for fall include:  •  DULoxetine (CYMBALTA) DR capsule 60 mg, 60 mg, Oral, Q12H, Rashi Traore MD  •  isosorbide mononitrate (IMDUR) 24 hr tablet 30 mg, 30 mg, Oral, Q24H, Rashi Traore MD  •  lisinopril (PRINIVIL,ZESTRIL) tablet 2.5 mg, 2.5 mg, Oral, Q24H, Rashi Traore MD  •  metoprolol tartrate (LOPRESSOR) tablet 12.5 mg, 12.5 mg, Oral, Q12H, Rashi Traore MD  •  mirtazapine (REMERON) tablet 30 mg, 30 mg, Oral, Nightly, Rashi Traore MD  •  oxyCODONE-acetaminophen (PERCOCET)  MG per tablet 1 tablet, 1 tablet, Oral, Q4H PRN, Rashi Traore MD  •  pregabalin (LYRICA) capsule 200 mg, 200 mg, Oral, Q8H, Rashi Traore MD  •  rivaroxaban (XARELTO) tablet 15 mg, 15 mg, Oral, Daily With Dinner **FOLLOWED BY** [START ON 1/30/2018] rivaroxaban (XARELTO) tablet 20 mg, 20 mg, Oral, Daily With Dinner, Rashi Traore MD  •  tiZANidine (ZANAFLEX) tablet 8 mg, 8 mg, Oral, Q8H, MD Ranjan Santiago, [facility]  01/06/181:22 AM       "

## 2018-01-07 LAB
ANION GAP SERPL CALCULATED.3IONS-SCNC: 7 MMOL/L (ref 4–13)
BASOPHILS # BLD AUTO: 0.02 10*3/MM3 (ref 0–0.2)
BASOPHILS NFR BLD AUTO: 0.2 % (ref 0–2)
BUN BLD-MCNC: 11 MG/DL (ref 5–21)
BUN/CREAT SERPL: 11.3 (ref 7–25)
CALCIUM SPEC-SCNC: 9.5 MG/DL (ref 8.4–10.4)
CHLORIDE SERPL-SCNC: 103 MMOL/L (ref 98–110)
CO2 SERPL-SCNC: 33 MMOL/L (ref 24–31)
CREAT BLD-MCNC: 0.97 MG/DL (ref 0.5–1.4)
DEPRECATED RDW RBC AUTO: 50.7 FL (ref 40–54)
EOSINOPHIL # BLD AUTO: 0.36 10*3/MM3 (ref 0–0.7)
EOSINOPHIL NFR BLD AUTO: 3.1 % (ref 0–4)
ERYTHROCYTE [DISTWIDTH] IN BLOOD BY AUTOMATED COUNT: 15.9 % (ref 12–15)
FERRITIN SERPL-MCNC: 106 NG/ML (ref 6.24–137)
FOLATE SERPL-MCNC: 3.4 NG/ML
GFR SERPL CREATININE-BSD FRML MDRD: 62 ML/MIN/1.73
GLUCOSE BLD-MCNC: 110 MG/DL (ref 70–100)
GLUCOSE BLDC GLUCOMTR-MCNC: 121 MG/DL (ref 70–130)
GLUCOSE BLDC GLUCOMTR-MCNC: 134 MG/DL (ref 70–130)
GLUCOSE BLDC GLUCOMTR-MCNC: 140 MG/DL (ref 70–130)
GLUCOSE BLDC GLUCOMTR-MCNC: 155 MG/DL (ref 70–130)
HCT VFR BLD AUTO: 26.5 % (ref 37–47)
HGB BLD-MCNC: 8.1 G/DL (ref 12–16)
IMM GRANULOCYTES # BLD: 0.12 10*3/MM3 (ref 0–0.03)
IMM GRANULOCYTES NFR BLD: 1 % (ref 0–5)
IRON 24H UR-MRATE: 26 MCG/DL (ref 42–180)
IRON SATN MFR SERPL: 9 % (ref 20–45)
LYMPHOCYTES # BLD AUTO: 2.22 10*3/MM3 (ref 0.72–4.86)
LYMPHOCYTES NFR BLD AUTO: 19 % (ref 15–45)
MAGNESIUM SERPL-MCNC: 1.7 MG/DL (ref 1.4–2.2)
MCH RBC QN AUTO: 27.2 PG (ref 28–32)
MCHC RBC AUTO-ENTMCNC: 30.6 G/DL (ref 33–36)
MCV RBC AUTO: 88.9 FL (ref 82–98)
MONOCYTES # BLD AUTO: 0.82 10*3/MM3 (ref 0.19–1.3)
MONOCYTES NFR BLD AUTO: 7 % (ref 4–12)
NEUTROPHILS # BLD AUTO: 8.17 10*3/MM3 (ref 1.87–8.4)
NEUTROPHILS NFR BLD AUTO: 69.7 % (ref 39–78)
NRBC BLD MANUAL-RTO: 0 /100 WBC (ref 0–0)
PLATELET # BLD AUTO: 461 10*3/MM3 (ref 130–400)
PMV BLD AUTO: 10.6 FL (ref 6–12)
POTASSIUM BLD-SCNC: 3.8 MMOL/L (ref 3.5–5.3)
RBC # BLD AUTO: 2.98 10*6/MM3 (ref 4.2–5.4)
SODIUM BLD-SCNC: 143 MMOL/L (ref 135–145)
TIBC SERPL-MCNC: 291 MCG/DL (ref 225–420)
VIT B12 BLD-MCNC: 212 PG/ML (ref 239–931)
WBC NRBC COR # BLD: 11.71 10*3/MM3 (ref 4.8–10.8)

## 2018-01-07 PROCEDURE — 97535 SELF CARE MNGMENT TRAINING: CPT

## 2018-01-07 PROCEDURE — 82607 VITAMIN B-12: CPT | Performed by: INTERNAL MEDICINE

## 2018-01-07 PROCEDURE — 82728 ASSAY OF FERRITIN: CPT | Performed by: INTERNAL MEDICINE

## 2018-01-07 PROCEDURE — 83735 ASSAY OF MAGNESIUM: CPT | Performed by: INTERNAL MEDICINE

## 2018-01-07 PROCEDURE — 83540 ASSAY OF IRON: CPT | Performed by: INTERNAL MEDICINE

## 2018-01-07 PROCEDURE — 85025 COMPLETE CBC W/AUTO DIFF WBC: CPT | Performed by: INTERNAL MEDICINE

## 2018-01-07 PROCEDURE — 83550 IRON BINDING TEST: CPT | Performed by: INTERNAL MEDICINE

## 2018-01-07 PROCEDURE — 97606 NEG PRS WND THER DME>50 SQCM: CPT

## 2018-01-07 PROCEDURE — 80048 BASIC METABOLIC PNL TOTAL CA: CPT | Performed by: INTERNAL MEDICINE

## 2018-01-07 PROCEDURE — 82962 GLUCOSE BLOOD TEST: CPT

## 2018-01-07 PROCEDURE — 82746 ASSAY OF FOLIC ACID SERUM: CPT | Performed by: INTERNAL MEDICINE

## 2018-01-07 RX ORDER — FERROUS SULFATE 325(65) MG
325 TABLET ORAL
Status: DISCONTINUED | OUTPATIENT
Start: 2018-01-07 | End: 2018-01-22 | Stop reason: HOSPADM

## 2018-01-07 RX ORDER — PANTOPRAZOLE SODIUM 40 MG/1
40 TABLET, DELAYED RELEASE ORAL DAILY
Status: DISCONTINUED | OUTPATIENT
Start: 2018-01-07 | End: 2018-01-22 | Stop reason: HOSPADM

## 2018-01-07 RX ORDER — DOCUSATE SODIUM 100 MG/1
100 CAPSULE, LIQUID FILLED ORAL 2 TIMES DAILY PRN
Status: DISCONTINUED | OUTPATIENT
Start: 2018-01-07 | End: 2018-01-22 | Stop reason: HOSPADM

## 2018-01-08 LAB
ANION GAP SERPL CALCULATED.3IONS-SCNC: 11 MMOL/L (ref 4–13)
BASOPHILS # BLD AUTO: 0.04 10*3/MM3 (ref 0–0.2)
BASOPHILS NFR BLD AUTO: 0.4 % (ref 0–2)
BUN BLD-MCNC: 11 MG/DL (ref 5–21)
BUN/CREAT SERPL: 11 (ref 7–25)
CALCIUM SPEC-SCNC: 9.3 MG/DL (ref 8.4–10.4)
CHLORIDE SERPL-SCNC: 103 MMOL/L (ref 98–110)
CO2 SERPL-SCNC: 31 MMOL/L (ref 24–31)
CREAT BLD-MCNC: 1 MG/DL (ref 0.5–1.4)
DEPRECATED RDW RBC AUTO: 50.2 FL (ref 40–54)
EKG P AXIS: 57 DEGREES
EKG P-R INTERVAL: 106 MS
EKG Q-T INTERVAL: 330 MS
EKG QRS DURATION: 92 MS
EKG QTC CALCULATION (BAZETT): 417 MS
EKG T AXIS: 40 DEGREES
EOSINOPHIL # BLD AUTO: 0.39 10*3/MM3 (ref 0–0.7)
EOSINOPHIL NFR BLD AUTO: 3.4 % (ref 0–4)
ERYTHROCYTE [DISTWIDTH] IN BLOOD BY AUTOMATED COUNT: 15.8 % (ref 12–15)
GFR SERPL CREATININE-BSD FRML MDRD: 59 ML/MIN/1.73
GLUCOSE BLD-MCNC: 123 MG/DL (ref 70–100)
GLUCOSE BLDC GLUCOMTR-MCNC: 113 MG/DL (ref 70–130)
GLUCOSE BLDC GLUCOMTR-MCNC: 132 MG/DL (ref 70–130)
GLUCOSE BLDC GLUCOMTR-MCNC: 137 MG/DL (ref 70–130)
GLUCOSE BLDC GLUCOMTR-MCNC: 199 MG/DL (ref 70–130)
HCT VFR BLD AUTO: 27.8 % (ref 37–47)
HGB BLD-MCNC: 8.6 G/DL (ref 12–16)
IMM GRANULOCYTES # BLD: 0.13 10*3/MM3 (ref 0–0.03)
IMM GRANULOCYTES NFR BLD: 1.1 % (ref 0–5)
LYMPHOCYTES # BLD AUTO: 2.36 10*3/MM3 (ref 0.72–4.86)
LYMPHOCYTES NFR BLD AUTO: 20.7 % (ref 15–45)
MCH RBC QN AUTO: 27.6 PG (ref 28–32)
MCHC RBC AUTO-ENTMCNC: 30.9 G/DL (ref 33–36)
MCV RBC AUTO: 89.1 FL (ref 82–98)
MONOCYTES # BLD AUTO: 0.71 10*3/MM3 (ref 0.19–1.3)
MONOCYTES NFR BLD AUTO: 6.2 % (ref 4–12)
NEUTROPHILS # BLD AUTO: 7.79 10*3/MM3 (ref 1.87–8.4)
NEUTROPHILS NFR BLD AUTO: 68.2 % (ref 39–78)
NRBC BLD MANUAL-RTO: 0 /100 WBC (ref 0–0)
PLATELET # BLD AUTO: 505 10*3/MM3 (ref 130–400)
PMV BLD AUTO: 10.8 FL (ref 6–12)
POTASSIUM BLD-SCNC: 3.6 MMOL/L (ref 3.5–5.3)
RBC # BLD AUTO: 3.12 10*6/MM3 (ref 4.2–5.4)
SODIUM BLD-SCNC: 145 MMOL/L (ref 135–145)
WBC NRBC COR # BLD: 11.42 10*3/MM3 (ref 4.8–10.8)

## 2018-01-08 PROCEDURE — 97535 SELF CARE MNGMENT TRAINING: CPT

## 2018-01-08 PROCEDURE — 97116 GAIT TRAINING THERAPY: CPT

## 2018-01-08 PROCEDURE — 97605 NEG PRS WND THER DME<=50SQCM: CPT

## 2018-01-08 PROCEDURE — 82962 GLUCOSE BLOOD TEST: CPT

## 2018-01-08 PROCEDURE — 80048 BASIC METABOLIC PNL TOTAL CA: CPT | Performed by: INTERNAL MEDICINE

## 2018-01-08 PROCEDURE — 85025 COMPLETE CBC W/AUTO DIFF WBC: CPT | Performed by: INTERNAL MEDICINE

## 2018-01-08 RX ORDER — ALPRAZOLAM 0.5 MG/1
0.5 TABLET ORAL 3 TIMES DAILY PRN
Status: DISPENSED | OUTPATIENT
Start: 2018-01-08 | End: 2018-01-18

## 2018-01-08 NOTE — PROGRESS NOTES
"[facility]  ALONZO Akers M.D.  BRAYDEN Sheridan      Internal Medicine Progress Note    1/8/2018   10:17 AM    Name:  Radha Boswell  MRN:    [id number]     Acct:     [id number]   Room:  [address] [place] [address] Day: 0     Admit Date: 1/5/2018 11:08 PM  PCP: BRAYDEN Edmond    Subjective:     C/C: \"Gas\" and left groin pain    Interval History: Status: Stable/improved.  Resting in bed with wound vac intact.    Review of Systems   Constitution: Positive for weakness and malaise/fatigue. Negative for chills, decreased appetite and fever.   HENT: Negative for congestion, hoarse voice, nosebleeds and sore throat.    Eyes: Negative for blurred vision, discharge, pain and visual disturbance.   Cardiovascular: Negative for chest pain, dyspnea on exertion, irregular heartbeat, leg swelling, orthopnea and palpitations.   Respiratory: Negative for cough, shortness of breath, sputum production and wheezing.    Hematologic/Lymphatic: Negative for bleeding problem. Does not bruise/bleed easily.   Skin: Negative for dry skin, flushing, itching, poor wound healing, rash and suspicious lesions.   Musculoskeletal: Positive for muscle weakness. Negative for arthritis, back pain, falls, joint pain, joint swelling and myalgias.   Gastrointestinal: Positive for heartburn. Negative for bloating, abdominal pain, change in bowel habit, diarrhea, flatus, melena and nausea.   Genitourinary: Negative for frequency, hesitancy, incomplete emptying, pelvic pain and urgency.   Neurological: Negative for difficulty with concentration, disturbances in coordination, dizziness, headaches, numbness, paresthesias and tremors.   Psychiatric/Behavioral: Negative for altered mental status, hallucinations and memory loss. The patient is not nervous/anxious.          Medications:     Allergies: No Known Allergies    Current Meds:   Current Facility-Administered Medications:   •  allopurinol (ZYLOPRIM) tablet 100 mg, 100 mg, " Oral, Daily, Rashi Traore MD  •  [START ON 1/30/2018] aspirin EC tablet 81 mg, 81 mg, Oral, Daily, Rashi Traore MD  •  calcitriol (ROCALTROL) capsule 0.25 mcg, 0.25 mcg, Oral, Daily, Rashi Traore MD  •  calcium carbonate (TUMS) chewable tablet 500 mg (200 mg elemental), 2 tablet, Oral, Q4H PRN, Rashi Traore MD  •  clopidogrel (PLAVIX) tablet 75 mg, 75 mg, Oral, Daily, Rashi Traore MD  •  docusate sodium (COLACE) capsule 100 mg, 100 mg, Oral, BID PRN, Rashi Traore MD  •  DULoxetine (CYMBALTA) DR capsule 60 mg, 60 mg, Oral, Q12H, Rashi Traore MD  •  ferrous sulfate tablet 325 mg, 325 mg, Oral, TID With Meals, Rashi Traore MD  •  fluticasone (FLONASE) 50 MCG/ACT nasal spray 2 spray, 2 spray, Each Nare, Daily, Rashi Traore MD  •  Influenza Vac Subunit Quad (FLUCELVAX) injection 0.5 mL, 0.5 mL, Intramuscular, Once, Rashi Traore MD  •  insulin lispro (humaLOG) injection 2-7 Units, 2-7 Units, Subcutaneous, 4x Daily AC & at Bedtime, Rashi Traore MD  •  isosorbide mononitrate (IMDUR) 24 hr tablet 30 mg, 30 mg, Oral, Q24H, Rashi Traore MD  •  levothyroxine (SYNTHROID, LEVOTHROID) tablet 100 mcg, 100 mcg, Oral, Q AM, Rashi Traore MD  •  lisinopril (PRINIVIL,ZESTRIL) tablet 2.5 mg, 2.5 mg, Oral, Q24H, Rashi Traore MD  •  metFORMIN (GLUCOPHAGE) tablet 500 mg, 500 mg, Oral, BID With Meals, Rashi Traore MD  •  metoprolol tartrate (LOPRESSOR) tablet 12.5 mg, 12.5 mg, Oral, Q12H, Rashi Traore MD  •  mirtazapine (REMERON) tablet 30 mg, 30 mg, Oral, Nightly, Rashi Traore MD  •  nicotine (NICODERM CQ) 21 MG/24HR patch 1 patch, 1 patch, Transdermal, Q24H, Rashi Traore MD  •  oxyCODONE-acetaminophen (PERCOCET)  MG per tablet 1 tablet, 1 tablet, Oral, Q4H PRN, Rashi Traore MD  •  pantoprazole (PROTONIX) EC tablet 40 mg, 40 mg, Oral, Daily, Rashi Traore,  "MD  •  pneumococcal polysaccharide 23-valent (PNEUMOVAX-23) vaccine 0.5 mL, 0.5 mL, Intramuscular, Once, Rashi Traore MD  •  potassium chloride (MICRO-K) CR capsule 20 mEq, 20 mEq, Oral, Once, Rashi Traore MD  •  potassium chloride (MICRO-K) CR capsule 20 mEq, 20 mEq, Oral, Daily, Rashi Traore MD  •  potassium chloride (MICRO-K) CR capsule 40 mEq, 40 mEq, Oral, Once, Rashi Traore MD  •  pregabalin (LYRICA) capsule 200 mg, 200 mg, Oral, Q8H, Rashi Traore MD  •  rivaroxaban (XARELTO) tablet 15 mg, 15 mg, Oral, Daily With Dinner **FOLLOWED BY** [START ON 1/30/2018] rivaroxaban (XARELTO) tablet 20 mg, 20 mg, Oral, Daily With Dinner, Rashi Traore MD  •  tiZANidine (ZANAFLEX) tablet 8 mg, 8 mg, Oral, Q8H, Rashi Traore MD  •  varenicline (CHANTIX) tablet 0.5 mg, 0.5 mg, Oral, Daily, Rashi Traore MD    Data:     Code Status:  No Order    No family history on file.    Social History     Social History   • Marital status: Legally      Spouse name: N/A   • Number of children: N/A   • Years of education: N/A     Occupational History   • Not on file.     Social History Main Topics   • Smoking status: Current Every Day Smoker     Packs/day: 0.50     Types: Cigarettes   • Smokeless tobacco: Not on file   • Alcohol use No   • Drug use: No   • Sexual activity: Not on file     Other Topics Concern   • Not on file     Social History Narrative   • No narrative on file       Vitals:  Ht 175.3 cm (69\")  Wt (!) 167 kg (368 lb)  BMI 54.34 kg/m2    T 96.7, P 78, RR 18, /63, pulse ox 97% on room air        I/O (24Hr):  No intake or output data in the 24 hours ending 01/08/18 1017    Labs and imaging:      Lab Results (last 24 hours)     Procedure Component Value Units Date/Time    POC Glucose Once [[id number]]  (Abnormal) Collected:  01/07/18 1155    Specimen:  Blood Updated:  01/07/18 1212     Glucose 134 (H) mg/dL       : [id number] Julianna " CarolynMeter ID: [id number]       POC Glucose Once [[id number]]  (Abnormal) Collected:  01/07/18 1709    Specimen:  Blood Updated:  01/07/18 1725     Glucose 155 (H) mg/dL       : ANDRES Levine CarolynMeter ID: [id number]       POC Glucose Once [[id number]]  (Abnormal) Collected:  01/07/18 2028    Specimen:  Blood Updated:  01/07/18 2117     Glucose 140 (H) mg/dL       : AJ Lane KathyMeter ID: [id number]       Basic Metabolic Panel [[id number]]  (Abnormal) Collected:  01/08/18 0426    Specimen:  Blood Updated:  01/08/18 0531     Glucose 123 (H) mg/dL      BUN 11 mg/dL      Creatinine 1.00 mg/dL      Sodium 145 mmol/L      Potassium 3.6 mmol/L      Chloride 103 mmol/L      CO2 31.0 mmol/L      Calcium 9.3 mg/dL      eGFR Non African Amer 59 (L) mL/min/1.73      BUN/Creatinine Ratio 11.0     Anion Gap 11.0 mmol/L     Narrative:       GFR Normal >60  Chronic Kidney Disease <60  Kidney Failure <15    CBC & Differential [[id number]] Collected:  01/08/18 0426    Specimen:  Blood Updated:  01/08/18 0537    Narrative:       The following orders were created for panel order CBC & Differential.  Procedure                               Abnormality         Status                     ---------                               -----------         ------                     CBC Auto Differential[[id number]]        Abnormal            Final result                 Please view results for these tests on the individual orders.    CBC Auto Differential [[id number]]  (Abnormal) Collected:  01/08/18 0426    Specimen:  Blood Updated:  01/08/18 0537     WBC 11.42 (H) 10*3/mm3      RBC 3.12 (L) 10*6/mm3      Hemoglobin 8.6 (L) g/dL      Hematocrit 27.8 (L) %      MCV 89.1 fL      MCH 27.6 (L) pg      MCHC 30.9 (L) g/dL      RDW 15.8 (H) %      RDW-SD 50.2 fl      MPV 10.8 fL      Platelets 505 (H) 10*3/mm3      Neutrophil % 68.2 %      Lymphocyte % 20.7 %      Monocyte % 6.2 %      Eosinophil % 3.4 %      Basophil % 0.4 %       Immature Grans % 1.1 %      Neutrophils, Absolute 7.79 10*3/mm3      Lymphocytes, Absolute 2.36 10*3/mm3      Monocytes, Absolute 0.71 10*3/mm3      Eosinophils, Absolute 0.39 10*3/mm3      Basophils, Absolute 0.04 10*3/mm3      Immature Grans, Absolute 0.13 (H) 10*3/mm3      nRBC 0.0 /100 WBC     POC Glucose Once [[id number]]  (Abnormal) Collected:  01/08/18 0546    Specimen:  Blood Updated:  01/08/18 0618     Glucose 137 (H) mg/dL       : AJ Lane KathyMeter ID: [id number]             Physical Examination:        Physical Exam   Constitutional: Vital signs are normal. She appears well-developed and well-nourished. She is cooperative.   HENT:   Head: Normocephalic and atraumatic.   Nose: Nose normal.   Mouth/Throat: Oropharynx is clear and moist.   Eyes: Conjunctivae, EOM and lids are normal. Pupils are equal, round, and reactive to light.   Neck: Trachea normal and normal range of motion. Neck supple.   Cardiovascular: Normal rate, regular rhythm and normal heart sounds.    Abdominal: Soft. Normal appearance and bowel sounds are normal.   Obese   Musculoskeletal: Normal range of motion.   Wound VAC to left groin intact   Lymphadenopathy:     She has no cervical adenopathy.     She has no axillary adenopathy.        Left: No supraclavicular adenopathy present.   Neurological: She is alert. She has normal strength. No cranial nerve deficit or sensory deficit.   Skin: Skin is warm, dry and intact. No petechiae and no rash noted. No cyanosis or erythema. Nails show no clubbing.   Psychiatric: She has a normal mood and affect. Her speech is normal and behavior is normal. Judgment and thought content normal. Cognition and memory are normal.         Assessment:          Past Medical History:   Diagnosis Date   • Arthritis    • Diabetes mellitus    • Hyperlipidemia    • Hypertension    • Migraine         Plan:           1. Left femoral artery bleed with hematoma  2.   Coronary artery disease  3.   Recent  MI  4.   Diabetes type II with complication  5.   History of hypertension assisting with Hypotension  6.   Multifactorial Anemia to include acute blood loss and iron deficiency  7.   Hypokalemia - resolved  8.   Leukocytosis - improving     Continue with current plan of care.  Continue wound care. Labs Thursday. Compliance stressed.      Electronically signed by Rashi Traore MD on 1/8/2018 at 10:17 AM

## 2018-01-09 LAB
GLUCOSE BLDC GLUCOMTR-MCNC: 105 MG/DL (ref 70–130)
GLUCOSE BLDC GLUCOMTR-MCNC: 113 MG/DL (ref 70–130)
GLUCOSE BLDC GLUCOMTR-MCNC: 144 MG/DL (ref 70–130)
GLUCOSE BLDC GLUCOMTR-MCNC: 158 MG/DL (ref 70–130)

## 2018-01-09 PROCEDURE — 97535 SELF CARE MNGMENT TRAINING: CPT

## 2018-01-09 PROCEDURE — 82962 GLUCOSE BLOOD TEST: CPT

## 2018-01-09 PROCEDURE — 97605 NEG PRS WND THER DME<=50SQCM: CPT

## 2018-01-09 PROCEDURE — 97116 GAIT TRAINING THERAPY: CPT

## 2018-01-09 PROCEDURE — 97110 THERAPEUTIC EXERCISES: CPT

## 2018-01-09 RX ORDER — FUROSEMIDE 40 MG/1
40 TABLET ORAL DAILY
Status: DISCONTINUED | OUTPATIENT
Start: 2018-01-09 | End: 2018-01-13 | Stop reason: DRUGHIGH

## 2018-01-09 NOTE — PROGRESS NOTES
"[facility]  ALONZO Akers M.D.  BRAYDEN Sheridan      Internal Medicine Progress Note    1/9/2018   10:57 AM    Name:  Radha Boswell  MRN:    [id number]     Acct:     [id number]   Room:  [address] [place] [address] Day: 0     Admit Date: 1/5/2018 11:08 PM  PCP: BRAYDEN Edmond    Subjective:     C/C: \"Gas\" and left groin pain    Interval History: Status: Improving slowly. Uncomfortable bilateral lower extremities with swelling.    Review of Systems   Constitution: Positive for weakness and malaise/fatigue. Negative for chills, decreased appetite and fever.   HENT: Negative for congestion, hoarse voice, nosebleeds and sore throat.    Eyes: Negative for blurred vision, discharge, pain and visual disturbance.   Cardiovascular: Positive for leg swelling. Negative for chest pain, dyspnea on exertion, irregular heartbeat, orthopnea and palpitations.   Respiratory: Negative for cough, shortness of breath, sputum production and wheezing.    Hematologic/Lymphatic: Negative for bleeding problem. Does not bruise/bleed easily.   Skin: Negative for dry skin, flushing, itching, poor wound healing, rash and suspicious lesions.   Musculoskeletal: Positive for muscle weakness. Negative for arthritis, back pain, falls, joint pain, joint swelling and myalgias.   Gastrointestinal: Positive for heartburn. Negative for bloating, abdominal pain, change in bowel habit, diarrhea, flatus, melena and nausea.   Genitourinary: Negative for frequency, hesitancy, incomplete emptying, pelvic pain and urgency.   Neurological: Negative for difficulty with concentration, disturbances in coordination, dizziness, headaches, numbness, paresthesias and tremors.   Psychiatric/Behavioral: Negative for altered mental status, hallucinations and memory loss. The patient is not nervous/anxious.          Medications:     Allergies: No Known Allergies    Current Meds:   Current Facility-Administered Medications:   •  allopurinol " (ZYLOPRIM) tablet 100 mg, 100 mg, Oral, Daily, Rashi Traore MD  •  ALPRAZolam (XANAX) tablet 0.5 mg, 0.5 mg, Oral, TID PRN, Rashi Traore MD  •  [START ON 1/30/2018] aspirin EC tablet 81 mg, 81 mg, Oral, Daily, Rashi Traore MD  •  calcitriol (ROCALTROL) capsule 0.25 mcg, 0.25 mcg, Oral, Daily, Rashi Traore MD  •  calcium carbonate (TUMS) chewable tablet 500 mg (200 mg elemental), 2 tablet, Oral, Q4H PRN, Rashi Traore MD  •  clopidogrel (PLAVIX) tablet 75 mg, 75 mg, Oral, Daily, Rashi Traore MD  •  docusate sodium (COLACE) capsule 100 mg, 100 mg, Oral, BID PRN, Rashi Traore MD  •  DULoxetine (CYMBALTA) DR capsule 60 mg, 60 mg, Oral, Q12H, Rashi Traore MD  •  ferrous sulfate tablet 325 mg, 325 mg, Oral, TID With Meals, Rashi Traore MD  •  fluticasone (FLONASE) 50 MCG/ACT nasal spray 2 spray, 2 spray, Each Nare, Daily, Rashi Traore MD  •  Influenza Vac Subunit Quad (FLUCELVAX) injection 0.5 mL, 0.5 mL, Intramuscular, Once, Rashi Traore MD  •  insulin lispro (humaLOG) injection 2-7 Units, 2-7 Units, Subcutaneous, 4x Daily AC & at Bedtime, Rashi Traore MD  •  isosorbide mononitrate (IMDUR) 24 hr tablet 30 mg, 30 mg, Oral, Q24H, Rashi Traore MD  •  levothyroxine (SYNTHROID, LEVOTHROID) tablet 100 mcg, 100 mcg, Oral, Q AM, Rashi Traore MD  •  lisinopril (PRINIVIL,ZESTRIL) tablet 2.5 mg, 2.5 mg, Oral, Q24H, Rashi Traore MD  •  metFORMIN (GLUCOPHAGE) tablet 500 mg, 500 mg, Oral, BID With Meals, Rashi Traore MD  •  metoprolol tartrate (LOPRESSOR) tablet 12.5 mg, 12.5 mg, Oral, Q12H, Rashi Traore MD  •  mirtazapine (REMERON) tablet 30 mg, 30 mg, Oral, Nightly, Rashi Traore MD  •  nicotine (NICODERM CQ) 21 MG/24HR patch 1 patch, 1 patch, Transdermal, Q24H, Rashi Traore MD  •  oxyCODONE-acetaminophen (PERCOCET)  MG per tablet 1 tablet, 1 tablet, Oral, Q4H  "PRN, Rashi Traore MD  •  pantoprazole (PROTONIX) EC tablet 40 mg, 40 mg, Oral, Daily, Rahsi Traore MD  •  pneumococcal polysaccharide 23-valent (PNEUMOVAX-23) vaccine 0.5 mL, 0.5 mL, Intramuscular, Once, Rashi Traore MD  •  potassium chloride (MICRO-K) CR capsule 20 mEq, 20 mEq, Oral, Once, Rashi Traore MD  •  potassium chloride (MICRO-K) CR capsule 20 mEq, 20 mEq, Oral, Daily, Rashi Traore MD  •  potassium chloride (MICRO-K) CR capsule 40 mEq, 40 mEq, Oral, Once, Rashi Traore MD  •  pregabalin (LYRICA) capsule 200 mg, 200 mg, Oral, Q8H, Rashi Traore MD  •  rivaroxaban (XARELTO) tablet 15 mg, 15 mg, Oral, Daily With Dinner **FOLLOWED BY** [START ON 1/30/2018] rivaroxaban (XARELTO) tablet 20 mg, 20 mg, Oral, Daily With Dinner, Rashi Traore MD  •  tiZANidine (ZANAFLEX) tablet 8 mg, 8 mg, Oral, Q8H, Rashi Traore MD  •  varenicline (CHANTIX) tablet 0.5 mg, 0.5 mg, Oral, Daily, Rashi Traore MD    Data:     Code Status:  No Order    No family history on file.    Social History     Social History   • Marital status: Legally      Spouse name: N/A   • Number of children: N/A   • Years of education: N/A     Occupational History   • Not on file.     Social History Main Topics   • Smoking status: Current Every Day Smoker     Packs/day: 0.50     Types: Cigarettes   • Smokeless tobacco: Not on file   • Alcohol use No   • Drug use: No   • Sexual activity: Not on file     Other Topics Concern   • Not on file     Social History Narrative   • No narrative on file       Vitals:  Ht 175.3 cm (69\")  Wt (!) 167 kg (368 lb)  BMI 54.34 kg/m2    T 97.3, P 70, RR 20, /57, pulse ox 99% on room air        I/O (24Hr):  No intake or output data in the 24 hours ending 01/09/18 1057    Labs and imaging:      Lab Results (last 24 hours)     Procedure Component Value Units Date/Time    POC Glucose Once [[id number]]  (Abnormal) Collected:  " 01/08/18 1240    Specimen:  Blood Updated:  01/08/18 1251     Glucose 199 (H) mg/dL       : MUMTAZ Lamb KristaMeter ID: [id number]       POC Glucose Once [[id number]]  (Abnormal) Collected:  01/08/18 1710    Specimen:  Blood Updated:  01/08/18 1721     Glucose 132 (H) mg/dL       : MUMTAZ Lamb KristaMeter ID: [id number]       POC Glucose Once [[id number]]  (Normal) Collected:  01/08/18 1956    Specimen:  Blood Updated:  01/08/18 2011     Glucose 113 mg/dL       : AJ Lane KathyMeter ID: [id number]       POC Glucose Once [[id number]]  (Abnormal) Collected:  01/09/18 0552    Specimen:  Blood Updated:  01/09/18 0619     Glucose 144 (H) mg/dL       : AJ Lane KathyMeter ID: [id number]             Physical Examination:        Physical Exam   Constitutional: Vital signs are normal. She appears well-developed and well-nourished. She is cooperative.   HENT:   Head: Normocephalic and atraumatic.   Nose: Nose normal.   Mouth/Throat: Oropharynx is clear and moist.   Eyes: Conjunctivae, EOM and lids are normal. Pupils are equal, round, and reactive to light.   Neck: Trachea normal and normal range of motion. Neck supple.   Cardiovascular: Normal rate, regular rhythm and normal heart sounds.    Abdominal: Soft. Normal appearance and bowel sounds are normal.   Obese   Musculoskeletal: Normal range of motion. She exhibits edema (1+ ble).   Wound VAC to left groin intact   Lymphadenopathy:     She has no cervical adenopathy.     She has no axillary adenopathy.        Left: No supraclavicular adenopathy present.   Neurological: She is alert. She has normal strength. No cranial nerve deficit or sensory deficit.   Skin: Skin is warm, dry and intact. No petechiae and no rash noted. No cyanosis or erythema. Nails show no clubbing.   Psychiatric: She has a normal mood and affect. Her speech is normal and behavior is normal. Judgment and thought content normal. Cognition and memory are  normal.         Assessment:          Past Medical History:   Diagnosis Date   • Arthritis    • Diabetes mellitus    • Hyperlipidemia    • Hypertension    • Migraine         Plan:           1. Left femoral artery bleed with hematoma  2.   Coronary artery disease  3.   Recent MI  4.   Diabetes type II with complication  5.   History of hypertension assisting with Hypotension  6.   Multifactorial Anemia to include acute blood loss and iron deficiency  7.   Hypokalemia - resolved  8.   Leukocytosis - improving     Continue with current plan of care.  Continue wound care. Labs Thursday. Start lasix 40 mg in am and monitor electrolytes.      Electronically signed by Rashi Traore MD on 1/9/2018 at 10:57 AM

## 2018-01-09 NOTE — PROGRESS NOTES
"[facility] Fall Assessment Note    Radha Boswell is a 47 y.o.female  [Ht: 175.3 cm (69\"); Wt: (!) 167 kg (368 lb)] admitted 1/5/2018 11:08 PM.    Current medications associated with an increased risk for fall include:  Alprazolam  Aspirin  Duloxetine  Humalog  Isosorbide mononitrate  Lisinopril  Metformin  Metoprolol tartrate  Mirtazapine  Nicoderm  Percocet  Protonix  Pregabalin  Rivaroxaban   Zanaflex    Enio Larios, [facility]  01/09/183:20 PM       "

## 2018-01-10 LAB
GLUCOSE BLDC GLUCOMTR-MCNC: 122 MG/DL (ref 70–130)
GLUCOSE BLDC GLUCOMTR-MCNC: 127 MG/DL (ref 70–130)
GLUCOSE BLDC GLUCOMTR-MCNC: 161 MG/DL (ref 70–130)
GLUCOSE BLDC GLUCOMTR-MCNC: 193 MG/DL (ref 70–130)

## 2018-01-10 PROCEDURE — 82962 GLUCOSE BLOOD TEST: CPT

## 2018-01-10 PROCEDURE — 63710000001 INSULIN LISPRO (HUMAN) PER 5 UNITS: Performed by: INTERNAL MEDICINE

## 2018-01-10 PROCEDURE — 97605 NEG PRS WND THER DME<=50SQCM: CPT

## 2018-01-10 PROCEDURE — 97535 SELF CARE MNGMENT TRAINING: CPT

## 2018-01-10 NOTE — PROGRESS NOTES
"[facility]  ALONZO Akers M.D.  BRAYDEN Sheridan      Internal Medicine Progress Note    1/10/2018   2:02 PM    Name:  Radha Boswell  MRN:    [id number]     Acct:     [id number]   Room:  [address] [place] [address] Day: 0     Admit Date: 1/5/2018 11:08 PM  PCP: BRAYDEN Edmond    Subjective:     C/C: \"Gas\" and left groin pain    Interval History: Status: Improving slowly.better with diuresis.    Review of Systems   Constitution: Positive for weakness and malaise/fatigue. Negative for chills, decreased appetite and fever.   HENT: Negative for congestion, hoarse voice, nosebleeds and sore throat.    Eyes: Negative for blurred vision, discharge, pain and visual disturbance.   Cardiovascular: Positive for leg swelling. Negative for chest pain, dyspnea on exertion, irregular heartbeat, orthopnea and palpitations.   Respiratory: Negative for cough, shortness of breath, sputum production and wheezing.    Hematologic/Lymphatic: Negative for bleeding problem. Does not bruise/bleed easily.   Skin: Negative for dry skin, flushing, itching, poor wound healing, rash and suspicious lesions.   Musculoskeletal: Positive for muscle weakness. Negative for arthritis, back pain, falls, joint pain, joint swelling and myalgias.   Gastrointestinal: Positive for heartburn. Negative for bloating, abdominal pain, change in bowel habit, diarrhea, flatus, melena and nausea.   Genitourinary: Negative for frequency, hesitancy, incomplete emptying, pelvic pain and urgency.   Neurological: Negative for difficulty with concentration, disturbances in coordination, dizziness, headaches, numbness, paresthesias and tremors.   Psychiatric/Behavioral: Negative for altered mental status, hallucinations and memory loss. The patient is not nervous/anxious.          Medications:     Allergies: No Known Allergies    Current Meds:   Current Facility-Administered Medications:   •  allopurinol (ZYLOPRIM) tablet 100 mg, 100 mg, Oral, " Daily, Rashi Traore MD  •  ALPRAZolam (XANAX) tablet 0.5 mg, 0.5 mg, Oral, TID PRN, Rashi Traore MD  •  [START ON 1/30/2018] aspirin EC tablet 81 mg, 81 mg, Oral, Daily, Rashi Traore MD  •  calcitriol (ROCALTROL) capsule 0.25 mcg, 0.25 mcg, Oral, Daily, Rashi Traore MD  •  calcium carbonate (TUMS) chewable tablet 500 mg (200 mg elemental), 2 tablet, Oral, Q4H PRN, Rashi Traore MD  •  clopidogrel (PLAVIX) tablet 75 mg, 75 mg, Oral, Daily, Rashi Traore MD  •  docusate sodium (COLACE) capsule 100 mg, 100 mg, Oral, BID PRN, Rashi Tarore MD  •  DULoxetine (CYMBALTA) DR capsule 60 mg, 60 mg, Oral, Q12H, Rashi Traore MD  •  ferrous sulfate tablet 325 mg, 325 mg, Oral, TID With Meals, Rashi Traore MD  •  fluticasone (FLONASE) 50 MCG/ACT nasal spray 2 spray, 2 spray, Each Nare, Daily, Rashi Traore MD  •  furosemide (LASIX) tablet 40 mg, 40 mg, Oral, Daily, Rashi Traore MD  •  Influenza Vac Subunit Quad (FLUCELVAX) injection 0.5 mL, 0.5 mL, Intramuscular, Once, Rashi Traore MD  •  insulin lispro (humaLOG) injection 2-7 Units, 2-7 Units, Subcutaneous, 4x Daily AC & at Bedtime, Rashi Traore MD  •  isosorbide mononitrate (IMDUR) 24 hr tablet 30 mg, 30 mg, Oral, Q24H, Rashi Traore MD  •  levothyroxine (SYNTHROID, LEVOTHROID) tablet 100 mcg, 100 mcg, Oral, Q AM, Rashi Traore MD  •  lisinopril (PRINIVIL,ZESTRIL) tablet 2.5 mg, 2.5 mg, Oral, Q24H, Rashi Traore MD  •  metFORMIN (GLUCOPHAGE) tablet 500 mg, 500 mg, Oral, BID With Meals, Rashi Traore MD  •  metoprolol tartrate (LOPRESSOR) tablet 12.5 mg, 12.5 mg, Oral, Q12H, Rashi Traore MD  •  mirtazapine (REMERON) tablet 30 mg, 30 mg, Oral, Nightly, Rashi Traore MD  •  nicotine (NICODERM CQ) 21 MG/24HR patch 1 patch, 1 patch, Transdermal, Q24H, Rashi Traore MD  •  oxyCODONE-acetaminophen (PERCOCET)  MG  "per tablet 1 tablet, 1 tablet, Oral, Q4H PRN, Rashi Traore MD  •  pantoprazole (PROTONIX) EC tablet 40 mg, 40 mg, Oral, Daily, Rashi Traore MD  •  pneumococcal polysaccharide 23-valent (PNEUMOVAX-23) vaccine 0.5 mL, 0.5 mL, Intramuscular, Once, Rashi Traore MD  •  potassium chloride (MICRO-K) CR capsule 20 mEq, 20 mEq, Oral, Once, Rashi Traore MD  •  potassium chloride (MICRO-K) CR capsule 20 mEq, 20 mEq, Oral, Daily, Rashi Traore MD  •  potassium chloride (MICRO-K) CR capsule 40 mEq, 40 mEq, Oral, Once, Rashi Traore MD  •  pregabalin (LYRICA) capsule 200 mg, 200 mg, Oral, Q8H, Rashi Traore MD  •  rivaroxaban (XARELTO) tablet 15 mg, 15 mg, Oral, Daily With Dinner **FOLLOWED BY** [START ON 1/30/2018] rivaroxaban (XARELTO) tablet 20 mg, 20 mg, Oral, Daily With Dinner, Rashi Traore MD  •  tiZANidine (ZANAFLEX) tablet 8 mg, 8 mg, Oral, Q8H, Rashi Traore MD  •  varenicline (CHANTIX) tablet 0.5 mg, 0.5 mg, Oral, Daily, Rashi Traore MD    Data:     Code Status:  No Order    No family history on file.    Social History     Social History   • Marital status: Legally      Spouse name: N/A   • Number of children: N/A   • Years of education: N/A     Occupational History   • Not on file.     Social History Main Topics   • Smoking status: Current Every Day Smoker     Packs/day: 0.50     Types: Cigarettes   • Smokeless tobacco: Not on file   • Alcohol use No   • Drug use: No   • Sexual activity: Not on file     Other Topics Concern   • Not on file     Social History Narrative   • No narrative on file       Vitals:  Ht 175.3 cm (69\")  Wt (!) 167 kg (368 lb)  BMI 54.34 kg/m2    T 98.0, P 75, RR 16, BP 86/50, pulse ox 96% on room air        I/O (24Hr):  No intake or output data in the 24 hours ending 01/10/18 1402    Labs and imaging:      Lab Results (last 24 hours)     Procedure Component Value Units Date/Time    POC Glucose Once " [[id number]]  (Abnormal) Collected:  01/09/18 1709    Specimen:  Blood Updated:  01/09/18 1720     Glucose 158 (H) mg/dL       : MUMTAZ Lamb KristaMeter ID: [id number]       POC Glucose Once [[id number]]  (Normal) Collected:  01/09/18 2019    Specimen:  Blood Updated:  01/09/18 2034     Glucose 113 mg/dL       : AJ Lane KathyMeter ID: [id number]       POC Glucose Once [[id number]]  (Normal) Collected:  01/10/18 0528    Specimen:  Blood Updated:  01/10/18 0600     Glucose 122 mg/dL       : AJ Lane KathyMeter ID: [id number]       POC Glucose Once [[id number]]  (Normal) Collected:  01/10/18 1105    Specimen:  Blood Updated:  01/10/18 1117     Glucose 127 mg/dL       : SCOTTIE CastilloeMeter ID: [id number]             Physical Examination:        Physical Exam   Constitutional: Vital signs are normal. She appears well-developed and well-nourished. She is cooperative.   HENT:   Head: Normocephalic and atraumatic.   Nose: Nose normal.   Mouth/Throat: Oropharynx is clear and moist.   Eyes: Conjunctivae, EOM and lids are normal. Pupils are equal, round, and reactive to light.   Neck: Trachea normal and normal range of motion. Neck supple.   Cardiovascular: Normal rate, regular rhythm and normal heart sounds.    Abdominal: Soft. Normal appearance and bowel sounds are normal.   Obese   Musculoskeletal: Normal range of motion. She exhibits edema (1+ ble).   Wound VAC to left groin intact   Lymphadenopathy:     She has no cervical adenopathy.     She has no axillary adenopathy.        Left: No supraclavicular adenopathy present.   Neurological: She is alert. She has normal strength. No cranial nerve deficit or sensory deficit.   Skin: Skin is warm, dry and intact. No petechiae and no rash noted. No cyanosis or erythema. Nails show no clubbing.   Psychiatric: She has a normal mood and affect. Her speech is normal and behavior is normal. Judgment and thought content normal.  Cognition and memory are normal.         Assessment:          Past Medical History:   Diagnosis Date   • Arthritis    • Diabetes mellitus    • Hyperlipidemia    • Hypertension    • Migraine         Plan:           1. Left femoral artery bleed with hematoma  2.   Coronary artery disease  3.   Recent MI  4.   Diabetes type II with complication  5.   History of hypertension assisting with Hypotension  6.   Multifactorial Anemia to include acute blood loss and iron deficiency  7.   Hypokalemia - resolved  8.   Leukocytosis - improving     Continue with current plan of care.  Continue wound care. Labs Thursday. Continue diuresis.      Electronically signed by Rashi Traore MD on 1/10/2018 at 2:02 PM

## 2018-01-11 LAB
GLUCOSE BLDC GLUCOMTR-MCNC: 123 MG/DL (ref 70–130)
GLUCOSE BLDC GLUCOMTR-MCNC: 139 MG/DL (ref 70–130)
GLUCOSE BLDC GLUCOMTR-MCNC: 164 MG/DL (ref 70–130)
GLUCOSE BLDC GLUCOMTR-MCNC: 192 MG/DL (ref 70–130)

## 2018-01-11 PROCEDURE — 63710000001 INSULIN LISPRO (HUMAN) PER 5 UNITS: Performed by: INTERNAL MEDICINE

## 2018-01-11 PROCEDURE — 82962 GLUCOSE BLOOD TEST: CPT

## 2018-01-11 PROCEDURE — 97535 SELF CARE MNGMENT TRAINING: CPT

## 2018-01-11 PROCEDURE — 97116 GAIT TRAINING THERAPY: CPT

## 2018-01-11 NOTE — PROGRESS NOTES
"[facility]  ALONZO Akers M.D.  BRAYDEN Sheridan      Internal Medicine Progress Note    1/11/2018   1:40 PM    Name:  Radha Boswell  MRN:    [id number]     Acct:     [id number]   Room:  [address] [place] [address] Day: 0     Admit Date: 1/5/2018 11:08 PM  PCP: BRAYDEN Edmond    Subjective:     C/C: \"Gas\" and left groin pain    Interval History: Status: Improving slowly but leaves floor to smoke.    Review of Systems   Constitution: Positive for weakness and malaise/fatigue. Negative for chills, decreased appetite and fever.   HENT: Negative for congestion, hoarse voice, nosebleeds and sore throat.    Eyes: Negative for blurred vision, discharge, pain and visual disturbance.   Cardiovascular: Positive for leg swelling. Negative for chest pain, dyspnea on exertion, irregular heartbeat, orthopnea and palpitations.   Respiratory: Negative for cough, shortness of breath, sputum production and wheezing.    Hematologic/Lymphatic: Negative for bleeding problem. Does not bruise/bleed easily.   Skin: Negative for dry skin, flushing, itching, poor wound healing, rash and suspicious lesions.   Musculoskeletal: Positive for muscle weakness. Negative for arthritis, back pain, falls, joint pain, joint swelling and myalgias.   Gastrointestinal: Positive for heartburn. Negative for bloating, abdominal pain, change in bowel habit, diarrhea, flatus, melena and nausea.   Genitourinary: Negative for frequency, hesitancy, incomplete emptying, pelvic pain and urgency.   Neurological: Negative for difficulty with concentration, disturbances in coordination, dizziness, headaches, numbness, paresthesias and tremors.   Psychiatric/Behavioral: Negative for altered mental status, hallucinations and memory loss. The patient is not nervous/anxious.          Medications:     Allergies: No Known Allergies    Current Meds:   Current Facility-Administered Medications:   •  allopurinol (ZYLOPRIM) tablet 100 mg, 100 mg, " Oral, Daily, Rashi Traore MD  •  ALPRAZolam (XANAX) tablet 0.5 mg, 0.5 mg, Oral, TID PRN, Rashi Traore MD  •  [START ON 1/30/2018] aspirin EC tablet 81 mg, 81 mg, Oral, Daily, Rashi Traore MD  •  calcitriol (ROCALTROL) capsule 0.25 mcg, 0.25 mcg, Oral, Daily, Rashi Traore MD  •  calcium carbonate (TUMS) chewable tablet 500 mg (200 mg elemental), 2 tablet, Oral, Q4H PRN, Rashi Traore MD  •  clopidogrel (PLAVIX) tablet 75 mg, 75 mg, Oral, Daily, Rashi Traore MD  •  docusate sodium (COLACE) capsule 100 mg, 100 mg, Oral, BID PRN, Rashi Traore MD  •  DULoxetine (CYMBALTA) DR capsule 60 mg, 60 mg, Oral, Q12H, Rashi Traore MD  •  ferrous sulfate tablet 325 mg, 325 mg, Oral, TID With Meals, Rashi Traore MD  •  fluticasone (FLONASE) 50 MCG/ACT nasal spray 2 spray, 2 spray, Each Nare, Daily, Rashi Traore MD  •  furosemide (LASIX) tablet 40 mg, 40 mg, Oral, Daily, Rashi Traore MD  •  Influenza Vac Subunit Quad (FLUCELVAX) injection 0.5 mL, 0.5 mL, Intramuscular, Once, Rashi Traore MD  •  insulin lispro (humaLOG) injection 2-7 Units, 2-7 Units, Subcutaneous, 4x Daily AC & at Bedtime, Rashi Traore MD  •  isosorbide mononitrate (IMDUR) 24 hr tablet 30 mg, 30 mg, Oral, Q24H, Rashi Traore MD  •  levothyroxine (SYNTHROID, LEVOTHROID) tablet 100 mcg, 100 mcg, Oral, Q AM, Rashi Traore MD  •  lisinopril (PRINIVIL,ZESTRIL) tablet 2.5 mg, 2.5 mg, Oral, Q24H, Rashi Traore MD  •  metFORMIN (GLUCOPHAGE) tablet 500 mg, 500 mg, Oral, BID With Meals, Rashi Traore MD  •  metoprolol tartrate (LOPRESSOR) tablet 12.5 mg, 12.5 mg, Oral, Q12H, Rashi Traore MD  •  mirtazapine (REMERON) tablet 30 mg, 30 mg, Oral, Nightly, Rashi Traore MD  •  nicotine (NICODERM CQ) 21 MG/24HR patch 1 patch, 1 patch, Transdermal, Q24H, Rashi Traore MD  •  oxyCODONE-acetaminophen (PERCOCET)  " MG per tablet 1 tablet, 1 tablet, Oral, Q4H PRN, Rashi Traore MD  •  pantoprazole (PROTONIX) EC tablet 40 mg, 40 mg, Oral, Daily, Rashi Traore MD  •  pneumococcal polysaccharide 23-valent (PNEUMOVAX-23) vaccine 0.5 mL, 0.5 mL, Intramuscular, Once, Rashi Traroe MD  •  potassium chloride (MICRO-K) CR capsule 20 mEq, 20 mEq, Oral, Once, Rashi Traore MD  •  potassium chloride (MICRO-K) CR capsule 20 mEq, 20 mEq, Oral, Daily, Rashi Traore MD  •  potassium chloride (MICRO-K) CR capsule 40 mEq, 40 mEq, Oral, Once, Rashi Traore MD  •  pregabalin (LYRICA) capsule 200 mg, 200 mg, Oral, Q8H, Rashi Traore MD  •  rivaroxaban (XARELTO) tablet 15 mg, 15 mg, Oral, Daily With Dinner **FOLLOWED BY** [START ON 1/30/2018] rivaroxaban (XARELTO) tablet 20 mg, 20 mg, Oral, Daily With Dinner, Rashi Traore MD  •  tiZANidine (ZANAFLEX) tablet 8 mg, 8 mg, Oral, Q8H, Rashi Traore MD  •  varenicline (CHANTIX) tablet 0.5 mg, 0.5 mg, Oral, Daily, Rashi Traore MD    Data:     Code Status:  No Order    No family history on file.    Social History     Social History   • Marital status: Legally      Spouse name: N/A   • Number of children: N/A   • Years of education: N/A     Occupational History   • Not on file.     Social History Main Topics   • Smoking status: Current Every Day Smoker     Packs/day: 0.50     Types: Cigarettes   • Smokeless tobacco: Not on file   • Alcohol use No   • Drug use: No   • Sexual activity: Not on file     Other Topics Concern   • Not on file     Social History Narrative   • No narrative on file       Vitals:  Ht 175.3 cm (69\")  Wt (!) 167 kg (368 lb)  BMI 54.34 kg/m2    T 96.9, P 71, RR 16, BP 87/46, pulse ox 96% on room air        I/O (24Hr):  No intake or output data in the 24 hours ending 01/11/18 1340    Labs and imaging:      Lab Results (last 24 hours)     Procedure Component Value Units Date/Time    POC " Glucose Once [[id number]]  (Abnormal) Collected:  01/10/18 1701    Specimen:  Blood Updated:  01/10/18 1712     Glucose 193 (H) mg/dL       : SCOTTIE Murillo ID: [id number]       POC Glucose Once [[id number]]  (Abnormal) Collected:  01/10/18 2047    Specimen:  Blood Updated:  01/10/18 2120     Glucose 161 (H) mg/dL       : AJ Lane KathyMeter ID: [id number]       POC Glucose Once [[id number]]  (Normal) Collected:  01/11/18 0440    Specimen:  Blood Updated:  01/11/18 0511     Glucose 123 mg/dL       : AJ Lane KathyMeter ID: [id number]       POC Glucose Once [[id number]]  (Abnormal) Collected:  01/11/18 1146    Specimen:  Blood Updated:  01/11/18 1158     Glucose 139 (H) mg/dL       : TANIA Valadez VictoriaMeter ID: [id number]             Physical Examination:        Physical Exam   Constitutional: Vital signs are normal. She appears well-developed and well-nourished. She is cooperative.   HENT:   Head: Normocephalic and atraumatic.   Nose: Nose normal.   Mouth/Throat: Oropharynx is clear and moist.   Eyes: Conjunctivae, EOM and lids are normal. Pupils are equal, round, and reactive to light.   Neck: Trachea normal and normal range of motion. Neck supple.   Cardiovascular: Normal rate, regular rhythm and normal heart sounds.    Abdominal: Soft. Normal appearance and bowel sounds are normal.   Obese   Musculoskeletal: Normal range of motion. She exhibits edema (1+ ble).   Wound VAC to left groin intact   Lymphadenopathy:     She has no cervical adenopathy.     She has no axillary adenopathy.        Left: No supraclavicular adenopathy present.   Neurological: She is alert. She has normal strength. No cranial nerve deficit or sensory deficit.   Skin: Skin is warm, dry and intact. No petechiae and no rash noted. No cyanosis or erythema. Nails show no clubbing.   Psychiatric: She has a normal mood and affect. Her speech is normal and behavior is normal. Judgment and  thought content normal. Cognition and memory are normal.         Assessment:          Past Medical History:   Diagnosis Date   • Arthritis    • Diabetes mellitus    • Hyperlipidemia    • Hypertension    • Migraine         Plan:           1. Left femoral artery bleed with hematoma  2.   Coronary artery disease  3.   Recent MI  4.   Diabetes type II with complication  5.   History of hypertension assisting with Hypotension  6.   Multifactorial Anemia to include acute blood loss and iron deficiency  7.   Hypokalemia - resolved  8.   Leukocytosis - improving     Continue with current plan of care.  Continue wound care. Labs Monday. Rec not to leave floor to smoke as it will inhibit wound healing.      Electronically signed by Rashi Traore MD on 1/11/2018 at 1:40 PM

## 2018-01-12 LAB
GLUCOSE BLDC GLUCOMTR-MCNC: 141 MG/DL (ref 70–130)
GLUCOSE BLDC GLUCOMTR-MCNC: 151 MG/DL (ref 70–130)
GLUCOSE BLDC GLUCOMTR-MCNC: 160 MG/DL (ref 70–130)
GLUCOSE BLDC GLUCOMTR-MCNC: 173 MG/DL (ref 70–130)

## 2018-01-12 PROCEDURE — 97605 NEG PRS WND THER DME<=50SQCM: CPT

## 2018-01-12 PROCEDURE — 82962 GLUCOSE BLOOD TEST: CPT

## 2018-01-12 PROCEDURE — 97116 GAIT TRAINING THERAPY: CPT

## 2018-01-12 NOTE — PROGRESS NOTES
Adult Nutrition  Assessment/PES    Patient Name:  Radha Boswell  YOB: 1970  MRN: [id number]  Admit Date:  1/5/2018    Assessment Date:  1/12/2018    Comments:  Pts PO intake is good, 98% avg over 9 meals.           Reason for Assessment       01/12/18 1335    Reason for Assessment    Reason For Assessment/Visit (P)  follow up protocol    Diagnosis (P)  Diagnosis    Cardiac (P)  CAD;MI   L central femoral artery bleed c groin hematoma ;recent hx of MI    Endocrine (P)  DM Type 2              Nutrition/Diet History       01/12/18 1434    Nutrition/Diet History    Factors Affecting Nutritional Intake (P)  Factors    Reported/Observed By (P)  Patient    Appetite (P)  Good            Anthropometrics       01/12/18 1336    Anthropometrics    RD Documented Current Weight  (P)  167 kg (368 lb)    RD Documented Weight on Admission (P)  167 kg (368 lb)    Body Mass Index (BMI)    BMI Grade (P)  greater than 40 - obesity grade III            Labs/Tests/Procedures/Meds       01/12/18 1336    Labs/Tests/Procedures/Meds    Labs/Tests Review (P)  Reviewed;Glucose;WBC;Platlets;GFR;Hgb Hct   GFR 59    Medication Review (P)  Reviewed, pertinent;Diuretic;Insulin;Diabetic Oral Agent;Pressors;Prokinetic Agent;Anticoag    Significant Vitals (P)  reviewed            Physical Findings       01/12/18 1433    Physical Findings/Assessment    Additional Documentation (P)  Physical Appearance (Group)    Physical Appearance    Overall Physical Appearance (P)  obese    Gastrointestinal (P)  --   BM 1/9    Skin (P)  other (see comments);edema;surgical wound   L groin wound vac; rani score 22              Nutrition Prescription Ordered       01/12/18 1338    Nutrition Prescription PO    Current PO Diet (P)  Regular    Common Modifiers (P)  Cardiac;Low Sodium    Low Sodium Details (P)  2,000 mg Sodium            Evaluation of Received Nutrient/Fluid Intake       01/12/18 1338    Evaluation of Received Nutrient/Fluid Intake     Number of Days Evaluated (P)  2 days    Nutrition Delivered (P)  Fluid Evaluation    Fluid Intake Evaluation    Oral Fluid (mL) (P)  1922.5    Total Fluid Intake (mL) (P)  1922.5    PO Evaluation    Number of Days PO Intake Evaluated (P)  3 days    Number of Meals (P)  9    % PO Intake (P)  98%            Problem/Interventions:        Problem 1       01/12/18 1340    Nutrition Diagnoses Problem 1    Problem 1 (P)  Increased Nutrient Needs    Macronutrient (P)  Kcal;Protein    Etiology (related to) (P)  Medical Diagnosis    Cardiac (P)  CAD   L central femoral artery bleed c groin hematoma    Endocrine (P)  DM2    Signs/Symptoms (evidenced by) (P)  PO Intake    Percent (%) intake recorded (P)  98 %   PO intake does note meet pts estimated nutritional needs.    Over number of meals (P)  9                    Intervention Goal       01/12/18 1340    Intervention Goal    General (P)  Maintain nutrition;Meet nutritional needs for age/condition;Reduce/improve symptoms;Disease management/therapy;Improved nutrition related lab(s)    PO (P)  Maintain intake;Meet estimated needs    Weight (P)  Appropriate weight loss            Nutrition Intervention       01/12/18 1429    Nutrition Intervention    RD/Tech Action (P)  Encourage intake      01/12/18 1341    Nutrition Intervention    RD/Tech Action (P)  Follow Tx progress              Education/Evaluation       01/12/18 1341    Monitor/Evaluation    Monitor (P)  Per protocol;PO intake;Pertinent labs;Skin status   no d/c needs noted at this time, will continue to follow.         Electronically signed by:  Maia Webster  01/12/18 2:38 PM

## 2018-01-12 NOTE — PROGRESS NOTES
Adult Nutrition  Assessment/PES    Patient Name:  Radha Boswell  YOB: 1970  MRN: [id number]  Admit Date:  1/5/2018    Assessment Date:  1/12/2018    Comments:  Pts PO intake is good, 98% avg over 9 meals.           Reason for Assessment       01/12/18 1335    Reason for Assessment    Reason For Assessment/Visit (P)  follow up protocol    Diagnosis (P)  Diagnosis              Nutrition/Diet History       01/12/18 1434    Nutrition/Diet History    Factors Affecting Nutritional Intake (P)  Factors    Reported/Observed By (P)  Patient    Appetite (P)  Good            Anthropometrics       01/12/18 1336    Anthropometrics    RD Documented Current Weight  (P)  167 kg (368 lb)    RD Documented Weight on Admission (P)  167 kg (368 lb)    Body Mass Index (BMI)    BMI Grade (P)  greater than 40 - obesity grade III            Labs/Tests/Procedures/Meds       01/12/18 1336    Labs/Tests/Procedures/Meds    Labs/Tests Review (P)  Reviewed;Glucose;WBC;Platlets;GFR;Hgb Hct   GFR 59    Medication Review (P)  Reviewed, pertinent;Diuretic;Insulin;Diabetic Oral Agent;Pressors;Prokinetic Agent;Anticoag    Significant Vitals (P)  reviewed            Physical Findings       01/12/18 1433    Physical Findings/Assessment    Additional Documentation (P)  Physical Appearance (Group)    Physical Appearance    Overall Physical Appearance (P)  obese    Gastrointestinal (P)  --   BM 1/9    Skin (P)  other (see comments);edema;surgical wound   L groin wound vac; rani score 22              Nutrition Prescription Ordered       01/12/18 1338    Nutrition Prescription PO    Current PO Diet (P)  Regular    Common Modifiers (P)  Cardiac;Low Sodium    Low Sodium Details (P)  2,000 mg Sodium            Evaluation of Received Nutrient/Fluid Intake       01/12/18 1338    Evaluation of Received Nutrient/Fluid Intake    Number of Days Evaluated (P)  2 days    Nutrition Delivered (P)  Fluid Evaluation    Fluid Intake Evaluation    Oral Fluid  (mL) (P)  1922.5    Total Fluid Intake (mL) (P)  1922.5    PO Evaluation    Number of Days PO Intake Evaluated (P)  3 days    Number of Meals (P)  9    % PO Intake (P)  98%            Problem/Interventions:        Problem 1       01/12/18 1340    Nutrition Diagnoses Problem 1    Problem 1 (P)  Increased Nutrient Needs    Macronutrient (P)  Kcal;Protein    Etiology (related to) (P)  Medical Diagnosis    Cardiac (P)  CAD   L central femoral artery bleed c groin hematoma    Endocrine (P)  DM2    Signs/Symptoms (evidenced by) (P)  PO Intake    Percent (%) intake recorded (P)  98 %   PO intake does note meet pts estimated nutritional needs.    Over number of meals (P)  9                    Intervention Goal       01/12/18 1340    Intervention Goal    General (P)  Maintain nutrition;Meet nutritional needs for age/condition;Reduce/improve symptoms;Disease management/therapy;Improved nutrition related lab(s)    PO (P)  Maintain intake;Meet estimated needs    Weight (P)  Appropriate weight loss            Nutrition Intervention       01/12/18 1429    Nutrition Intervention    RD/Tech Action (P)  Encourage intake      01/12/18 1341    Nutrition Intervention    RD/Tech Action (P)  Follow Tx progress              Education/Evaluation       01/12/18 1341    Monitor/Evaluation    Monitor (P)  Per protocol;PO intake;Pertinent labs;Skin status   no d/c needs noted at this time, will continue to follow.         Electronically signed by:  Maia Webster  01/12/18 2:35 PM

## 2018-01-12 NOTE — PROGRESS NOTES
"[facility]  ALONZO Akers M.D.  BRAYDEN Sheridan      Internal Medicine Progress Note    1/12/2018   4:41 PM    Name:  Radha Boswell  MRN:    [id number]     Acct:     [id number]   Room:  [address] [place] [address] Day: 0     Admit Date: 1/5/2018 11:08 PM  PCP: BRAYDEN Edmond    Subjective:     C/C: \"Gas\" and left groin pain    Interval History: Status: Improving with less swelling.    Review of Systems   Constitution: Positive for weakness and malaise/fatigue. Negative for chills, decreased appetite and fever.   HENT: Negative for congestion, hoarse voice, nosebleeds and sore throat.    Eyes: Negative for blurred vision, discharge, pain and visual disturbance.   Cardiovascular: Positive for leg swelling. Negative for chest pain, dyspnea on exertion, irregular heartbeat, orthopnea and palpitations.   Respiratory: Negative for cough, shortness of breath, sputum production and wheezing.    Hematologic/Lymphatic: Negative for bleeding problem. Does not bruise/bleed easily.   Skin: Negative for dry skin, flushing, itching, poor wound healing, rash and suspicious lesions.   Musculoskeletal: Positive for muscle weakness. Negative for arthritis, back pain, falls, joint pain, joint swelling and myalgias.   Gastrointestinal: Positive for heartburn. Negative for bloating, abdominal pain, change in bowel habit, diarrhea, flatus, melena and nausea.   Genitourinary: Negative for frequency, hesitancy, incomplete emptying, pelvic pain and urgency.   Neurological: Negative for difficulty with concentration, disturbances in coordination, dizziness, headaches, numbness, paresthesias and tremors.   Psychiatric/Behavioral: Negative for altered mental status, hallucinations and memory loss. The patient is not nervous/anxious.          Medications:     Allergies: No Known Allergies    Current Meds:   Current Facility-Administered Medications:   •  allopurinol (ZYLOPRIM) tablet 100 mg, 100 mg, Oral, Daily, " Rashi Traore MD  •  ALPRAZolam (XANAX) tablet 0.5 mg, 0.5 mg, Oral, TID PRN, Rashi Traore MD  •  [START ON 1/30/2018] aspirin EC tablet 81 mg, 81 mg, Oral, Daily, Rashi Traore MD  •  calcitriol (ROCALTROL) capsule 0.25 mcg, 0.25 mcg, Oral, Daily, Rashi Traore MD  •  calcium carbonate (TUMS) chewable tablet 500 mg (200 mg elemental), 2 tablet, Oral, Q4H PRN, Rashi Traore MD  •  clopidogrel (PLAVIX) tablet 75 mg, 75 mg, Oral, Daily, Rashi Traore MD  •  docusate sodium (COLACE) capsule 100 mg, 100 mg, Oral, BID PRN, Rashi Traore MD  •  DULoxetine (CYMBALTA) DR capsule 60 mg, 60 mg, Oral, Q12H, Rashi Traore MD  •  ferrous sulfate tablet 325 mg, 325 mg, Oral, TID With Meals, Rashi Traore MD  •  fluticasone (FLONASE) 50 MCG/ACT nasal spray 2 spray, 2 spray, Each Nare, Daily, Rashi Traore MD  •  furosemide (LASIX) tablet 40 mg, 40 mg, Oral, Daily, Rashi Traore MD  •  Influenza Vac Subunit Quad (FLUCELVAX) injection 0.5 mL, 0.5 mL, Intramuscular, Once, Rashi Traore MD  •  insulin lispro (humaLOG) injection 2-7 Units, 2-7 Units, Subcutaneous, 4x Daily AC & at Bedtime, Rashi Traore MD  •  isosorbide mononitrate (IMDUR) 24 hr tablet 30 mg, 30 mg, Oral, Q24H, Rashi Traore MD  •  levothyroxine (SYNTHROID, LEVOTHROID) tablet 100 mcg, 100 mcg, Oral, Q AM, Rashi Traore MD  •  lisinopril (PRINIVIL,ZESTRIL) tablet 2.5 mg, 2.5 mg, Oral, Q24H, Rashi Traore MD  •  metFORMIN (GLUCOPHAGE) tablet 500 mg, 500 mg, Oral, BID With Meals, Rashi Traore MD  •  metoprolol tartrate (LOPRESSOR) tablet 12.5 mg, 12.5 mg, Oral, Q12H, Rashi Traore MD  •  mirtazapine (REMERON) tablet 30 mg, 30 mg, Oral, Nightly, Rashi Traore MD  •  nicotine (NICODERM CQ) 21 MG/24HR patch 1 patch, 1 patch, Transdermal, Q24H, Rashi Traore MD  •  oxyCODONE-acetaminophen (PERCOCET)  MG per  "tablet 1 tablet, 1 tablet, Oral, Q4H PRN, Rashi Traore MD  •  pantoprazole (PROTONIX) EC tablet 40 mg, 40 mg, Oral, Daily, Rashi Traore MD  •  pneumococcal polysaccharide 23-valent (PNEUMOVAX-23) vaccine 0.5 mL, 0.5 mL, Intramuscular, Once, Rashi Traore MD  •  potassium chloride (MICRO-K) CR capsule 20 mEq, 20 mEq, Oral, Once, Rashi Traore MD  •  potassium chloride (MICRO-K) CR capsule 20 mEq, 20 mEq, Oral, Daily, Rashi Traore MD  •  potassium chloride (MICRO-K) CR capsule 40 mEq, 40 mEq, Oral, Once, Rashi Traore MD  •  pregabalin (LYRICA) capsule 200 mg, 200 mg, Oral, Q8H, Rashi Traore MD  •  rivaroxaban (XARELTO) tablet 15 mg, 15 mg, Oral, Daily With Dinner **FOLLOWED BY** [START ON 1/30/2018] rivaroxaban (XARELTO) tablet 20 mg, 20 mg, Oral, Daily With Dinner, Rashi Traore MD  •  tiZANidine (ZANAFLEX) tablet 8 mg, 8 mg, Oral, Q8H, Rashi Traore MD  •  varenicline (CHANTIX) tablet 0.5 mg, 0.5 mg, Oral, Daily, Rashi Traore MD    Data:     Code Status:  No Order    No family history on file.    Social History     Social History   • Marital status: Legally      Spouse name: N/A   • Number of children: N/A   • Years of education: N/A     Occupational History   • Not on file.     Social History Main Topics   • Smoking status: Current Every Day Smoker     Packs/day: 0.50     Types: Cigarettes   • Smokeless tobacco: Not on file   • Alcohol use No   • Drug use: No   • Sexual activity: Not on file     Other Topics Concern   • Not on file     Social History Narrative   • No narrative on file       Vitals:  Ht 175.3 cm (69\")  Wt (!) 167 kg (368 lb)  BMI 54.34 kg/m2    T 96.9, P 71, RR 16, BP 87/46, pulse ox 96% on room air        I/O (24Hr):  No intake or output data in the 24 hours ending 01/12/18 1641    Labs and imaging:      Lab Results (last 24 hours)     Procedure Component Value Units Date/Time    POC Glucose Once " [[id number]]  (Abnormal) Collected:  01/11/18 1653    Specimen:  Blood Updated:  01/11/18 1717     Glucose 192 (H) mg/dL       : TANIA Allyson VictoriaMeter ID: [id number]       POC Glucose Once [[id number]]  (Abnormal) Collected:  01/11/18 2057    Specimen:  Blood Updated:  01/11/18 2109     Glucose 164 (H) mg/dL       : BRANDIN koenigrott CarlaMeter ID: [id number]       POC Glucose Once [[id number]]  (Abnormal) Collected:  01/12/18 0600    Specimen:  Blood Updated:  01/12/18 0621     Glucose 151 (H) mg/dL       : BRANDIN koenigrott CarlaMeter ID: [id number]       POC Glucose Once [[id number]]  (Abnormal) Collected:  01/12/18 1232    Specimen:  Blood Updated:  01/12/18 1244     Glucose 141 (H) mg/dL       : ChainBASSAM StorieMeter ID: [id number]             Physical Examination:        Physical Exam   Constitutional: Vital signs are normal. She appears well-developed and well-nourished. She is cooperative.   HENT:   Head: Normocephalic and atraumatic.   Nose: Nose normal.   Mouth/Throat: Oropharynx is clear and moist.   Eyes: Conjunctivae, EOM and lids are normal. Pupils are equal, round, and reactive to light.   Neck: Trachea normal and normal range of motion. Neck supple.   Cardiovascular: Normal rate, regular rhythm and normal heart sounds.    Abdominal: Soft. Normal appearance and bowel sounds are normal.   Obese   Musculoskeletal: Normal range of motion. She exhibits edema (1+ ble).   Wound VAC to left groin intact   Lymphadenopathy:     She has no cervical adenopathy.     She has no axillary adenopathy.        Left: No supraclavicular adenopathy present.   Neurological: She is alert. She has normal strength. No cranial nerve deficit or sensory deficit.   Skin: Skin is warm, dry and intact. No petechiae and no rash noted. No cyanosis or erythema. Nails show no clubbing.   Psychiatric: She has a normal mood and affect. Her speech is normal and behavior is normal. Judgment and  thought content normal. Cognition and memory are normal.         Assessment:          Past Medical History:   Diagnosis Date   • Arthritis    • Diabetes mellitus    • Hyperlipidemia    • Hypertension    • Migraine         Plan:           1. Left femoral artery bleed with hematoma  2.   Coronary artery disease  3.   Recent MI  4.   Diabetes type II with complication  5.   History of hypertension assisting with Hypotension  6.   Multifactorial Anemia to include acute blood loss and iron deficiency  7.   Hypokalemia - resolved  8.   Leukocytosis - improving     Continue with current plan of care.  Continue wound care. Labs Monday. Encourage compliance.      Electronically signed by Rashi Traore MD on 1/12/2018 at 4:41 PM

## 2018-01-13 LAB
GLUCOSE BLDC GLUCOMTR-MCNC: 135 MG/DL (ref 70–130)
GLUCOSE BLDC GLUCOMTR-MCNC: 141 MG/DL (ref 70–130)
GLUCOSE BLDC GLUCOMTR-MCNC: 149 MG/DL (ref 70–130)
GLUCOSE BLDC GLUCOMTR-MCNC: 173 MG/DL (ref 70–130)

## 2018-01-13 PROCEDURE — 82962 GLUCOSE BLOOD TEST: CPT

## 2018-01-13 RX ORDER — FUROSEMIDE 20 MG/1
20 TABLET ORAL DAILY
Status: DISCONTINUED | OUTPATIENT
Start: 2018-01-14 | End: 2018-01-22 | Stop reason: HOSPADM

## 2018-01-13 RX ORDER — NICOTINE 21 MG/24HR
1 PATCH, TRANSDERMAL 24 HOURS TRANSDERMAL DAILY
Status: DISCONTINUED | OUTPATIENT
Start: 2018-01-13 | End: 2018-01-22 | Stop reason: HOSPADM

## 2018-01-13 NOTE — PROGRESS NOTES
"[facility]  ALONZO Akers M.D.  BRAYDEN Sheridan      Internal Medicine Progress Note    1/13/2018   10:39 AM    Name:  Radha Boswell  MRN:    [id number]     Acct:     [id number]   Room:  [address] [place] [address] Day: 0     Admit Date: 1/5/2018 11:08 PM  PCP: BRAYDEN Edmond    Subjective:     C/C: \"Gas\" and left groin pain    Interval History: Status: improved. Resting in bed - sleeping on pullout. Woke from sleep. No family at bedside. C/o left groin pain. Tolerating treatment. Slow progress with therapy.   Review of Systems   Constitution: Positive for weakness and malaise/fatigue. Negative for chills, decreased appetite and fever.   HENT: Negative for congestion, hoarse voice, nosebleeds and sore throat.    Eyes: Negative for blurred vision, discharge, pain and visual disturbance.   Cardiovascular: Positive for leg swelling. Negative for chest pain, dyspnea on exertion, irregular heartbeat, orthopnea and palpitations.   Respiratory: Negative for cough, shortness of breath, sputum production and wheezing.    Hematologic/Lymphatic: Negative for bleeding problem. Does not bruise/bleed easily.   Skin: Positive for poor wound healing. Negative for dry skin, flushing, itching, rash and suspicious lesions.   Musculoskeletal: Positive for muscle weakness. Negative for arthritis, back pain, falls, joint pain, joint swelling and myalgias.   Gastrointestinal: Positive for heartburn. Negative for bloating, abdominal pain, change in bowel habit, diarrhea, flatus, melena and nausea.   Genitourinary: Negative for frequency, hesitancy, incomplete emptying, pelvic pain and urgency.   Neurological: Negative for difficulty with concentration, disturbances in coordination, dizziness, headaches, numbness, paresthesias and tremors.   Psychiatric/Behavioral: Negative for altered mental status, hallucinations and memory loss. The patient is not nervous/anxious.      Medications:     Allergies: No Known " Allergies    Current Meds:   Current Facility-Administered Medications:   •  allopurinol (ZYLOPRIM) tablet 100 mg, 100 mg, Oral, Daily, Rashi Traore MD  •  ALPRAZolam (XANAX) tablet 0.5 mg, 0.5 mg, Oral, TID PRN, Rashi Traore MD  •  [START ON 1/30/2018] aspirin EC tablet 81 mg, 81 mg, Oral, Daily, Rashi Traore MD  •  calcitriol (ROCALTROL) capsule 0.25 mcg, 0.25 mcg, Oral, Daily, Rashi Traore MD  •  calcium carbonate (TUMS) chewable tablet 500 mg (200 mg elemental), 2 tablet, Oral, Q4H PRN, Rashi Traore MD  •  clopidogrel (PLAVIX) tablet 75 mg, 75 mg, Oral, Daily, Rashi Traore MD  •  docusate sodium (COLACE) capsule 100 mg, 100 mg, Oral, BID PRN, Rashi Traore MD  •  DULoxetine (CYMBALTA) DR capsule 60 mg, 60 mg, Oral, Q12H, Rashi Traore MD  •  ferrous sulfate tablet 325 mg, 325 mg, Oral, TID With Meals, Rashi Traore MD  •  fluticasone (FLONASE) 50 MCG/ACT nasal spray 2 spray, 2 spray, Each Nare, Daily, Rashi Traore MD  •  furosemide (LASIX) tablet 40 mg, 40 mg, Oral, Daily, Rashi Traore MD  •  Influenza Vac Subunit Quad (FLUCELVAX) injection 0.5 mL, 0.5 mL, Intramuscular, Once, Rashi Traore MD  •  insulin lispro (humaLOG) injection 2-7 Units, 2-7 Units, Subcutaneous, 4x Daily AC & at Bedtime, Rashi Traore MD  •  isosorbide mononitrate (IMDUR) 24 hr tablet 30 mg, 30 mg, Oral, Q24H, Rashi Traore MD  •  levothyroxine (SYNTHROID, LEVOTHROID) tablet 100 mcg, 100 mcg, Oral, Q AM, Rashi Traore MD  •  lisinopril (PRINIVIL,ZESTRIL) tablet 2.5 mg, 2.5 mg, Oral, Q24H, Rashi Traore MD  •  metFORMIN (GLUCOPHAGE) tablet 500 mg, 500 mg, Oral, BID With Meals, Rashi Traroe MD  •  metoprolol tartrate (LOPRESSOR) tablet 12.5 mg, 12.5 mg, Oral, Q12H, Rashi Traore MD  •  mirtazapine (REMERON) tablet 30 mg, 30 mg, Oral, Nightly, Rashi Traore MD  •  nicotine (NICODERM  "CQ) 21 MG/24HR patch 1 patch, 1 patch, Transdermal, Q24H, Rashi Traore MD  •  oxyCODONE-acetaminophen (PERCOCET)  MG per tablet 1 tablet, 1 tablet, Oral, Q4H PRN, Rashi Traore MD  •  pantoprazole (PROTONIX) EC tablet 40 mg, 40 mg, Oral, Daily, Rashi Traore MD  •  pneumococcal polysaccharide 23-valent (PNEUMOVAX-23) vaccine 0.5 mL, 0.5 mL, Intramuscular, Once, Rashi Traore MD  •  potassium chloride (MICRO-K) CR capsule 20 mEq, 20 mEq, Oral, Once, Rashi Traore MD  •  potassium chloride (MICRO-K) CR capsule 20 mEq, 20 mEq, Oral, Daily, Rashi Traore MD  •  potassium chloride (MICRO-K) CR capsule 40 mEq, 40 mEq, Oral, Once, Rashi Traore MD  •  pregabalin (LYRICA) capsule 200 mg, 200 mg, Oral, Q8H, Rashi Traore MD  •  rivaroxaban (XARELTO) tablet 15 mg, 15 mg, Oral, Daily With Dinner **FOLLOWED BY** [START ON 1/30/2018] rivaroxaban (XARELTO) tablet 20 mg, 20 mg, Oral, Daily With Dinner, Rashi Traore MD  •  tiZANidine (ZANAFLEX) tablet 8 mg, 8 mg, Oral, Q8H, Rashi Traore MD  •  varenicline (CHANTIX) tablet 0.5 mg, 0.5 mg, Oral, Daily, Rashi Traore MD    Data:     Code Status:  No Order    No family history on file.    Social History     Social History   • Marital status: Legally      Spouse name: N/A   • Number of children: N/A   • Years of education: N/A     Occupational History   • Not on file.     Social History Main Topics   • Smoking status: Current Every Day Smoker     Packs/day: 0.50     Types: Cigarettes   • Smokeless tobacco: Not on file   • Alcohol use No   • Drug use: No   • Sexual activity: Not on file     Other Topics Concern   • Not on file     Social History Narrative   • No narrative on file       Vitals:  Ht 175.3 cm (69\")  Wt (!) 167 kg (368 lb)  BMI 54.34 kg/m2    T 97.2, P 78, RR 18, /53, Sp02 94% (room air)    I/O (24Hr):  No intake or output data in the 24 hours ending 01/13/18 " 1039    Labs and imaging:      Lab Results (last 24 hours)     Procedure Component Value Units Date/Time    POC Glucose Once [[id number]]  (Abnormal) Collected:  01/12/18 1232    Specimen:  Blood Updated:  01/12/18 1244     Glucose 141 (H) mg/dL       : TANIA Valadez VictoriaMeter ID: [id number]       POC Glucose Once [[id number]]  (Abnormal) Collected:  01/12/18 1709    Specimen:  Blood Updated:  01/12/18 1720     Glucose 173 (H) mg/dL       : KATJAPao Oliveira RonicaMeter ID: [id number]       POC Glucose Once [[id number]]  (Abnormal) Collected:  01/12/18 2222    Specimen:  Blood Updated:  01/12/18 2241     Glucose 160 (H) mg/dL       : TASHIAJamal Axel KimMeter ID: [id number]       POC Glucose Once [[id number]]  (Abnormal) Collected:  01/13/18 0710    Specimen:  Blood Updated:  01/13/18 0737     Glucose 135 (H) mg/dL       : JACOB Reyna KimMeter ID: [id number]             Physical Examination:        Physical Exam   Constitutional: Vital signs are normal. She appears well-developed and well-nourished. She is cooperative.   HENT:   Head: Normocephalic and atraumatic.   Nose: Nose normal.   Mouth/Throat: Oropharynx is clear and moist.   Eyes: Conjunctivae, EOM and lids are normal. Pupils are equal, round, and reactive to light.   Neck: Trachea normal and normal range of motion. Neck supple.   Cardiovascular: Normal rate, regular rhythm and normal heart sounds.    Abdominal: Soft. Normal appearance and bowel sounds are normal.   Obese   Musculoskeletal: Normal range of motion. She exhibits edema (1+ ble).   Wound VAC to left groin intact   Lymphadenopathy:     She has no cervical adenopathy.     She has no axillary adenopathy.        Left: No supraclavicular adenopathy present.   Neurological: She is alert. She has normal strength. No cranial nerve deficit or sensory deficit.   Skin: Skin is warm, dry and intact. No petechiae and no rash noted. No cyanosis or erythema. Nails show  no clubbing.   Wound vac c/d/i - no surrounding erythema   Psychiatric: She has a normal mood and affect. Her speech is normal and behavior is normal. Judgment and thought content normal. Cognition and memory are normal.   Nursing note and vitals reviewed.      Assessment:          Past Medical History:   Diagnosis Date   • Arthritis    • Diabetes mellitus    • Hyperlipidemia    • Hypertension    • Migraine         Plan:           1. Left femoral artery bleed with hematoma  2.   Coronary artery disease  3.   Recent MI  4.   Diabetes type II with complication  5.   History of hypertension assisting with Hypotension  6.   Multifactorial Anemia to include acute blood loss and iron deficiency  7.   Hypokalemia - resolved  8.   Leukocytosis - improving  9.   Chronic anticoagulation  10.  Neuropathy     Continue current treatment. Monitor counts. Increase activity. Continue wound care. Labs Monday. Decrease nicotine patch.       Electronically signed by BRAYDEN Sethi on 1/13/2018 at 10:39 AM

## 2018-01-14 LAB
GLUCOSE BLDC GLUCOMTR-MCNC: 148 MG/DL (ref 70–130)
GLUCOSE BLDC GLUCOMTR-MCNC: 157 MG/DL (ref 70–130)
GLUCOSE BLDC GLUCOMTR-MCNC: 159 MG/DL (ref 70–130)
GLUCOSE BLDC GLUCOMTR-MCNC: 162 MG/DL (ref 70–130)

## 2018-01-14 PROCEDURE — 82962 GLUCOSE BLOOD TEST: CPT

## 2018-01-14 NOTE — PROGRESS NOTES
"[facility]  ALONZO Akers M.D.  BRAYDEN Sheridan      Internal Medicine Progress Note    1/14/2018   10:23 AM    Name:  Radha Boswell  MRN:    [id number]     Acct:     [id number]   Room:  [address] [place] [address] Day: 0     Admit Date: 1/5/2018 11:08 PM  PCP: BRAYDEN Edmond    Subjective:     C/C: \"Gas\" and left groin pain    Interval History: Status: improved. Up to chair. No family at bedside. C/o increased pain today. Progressing with therapy. Tolerating treatment thus far.   Review of Systems   Constitution: Positive for weakness and malaise/fatigue. Negative for chills, decreased appetite and fever.   HENT: Negative for congestion, hoarse voice, nosebleeds and sore throat.    Eyes: Negative for blurred vision, discharge, pain and visual disturbance.   Cardiovascular: Positive for leg swelling. Negative for chest pain, dyspnea on exertion, irregular heartbeat, orthopnea and palpitations.   Respiratory: Negative for cough, shortness of breath, sputum production and wheezing.    Hematologic/Lymphatic: Negative for bleeding problem. Does not bruise/bleed easily.   Skin: Positive for poor wound healing. Negative for dry skin, flushing, itching, rash and suspicious lesions.   Musculoskeletal: Positive for muscle weakness. Negative for arthritis, back pain, falls, joint pain, joint swelling and myalgias.   Gastrointestinal: Positive for heartburn. Negative for bloating, abdominal pain, change in bowel habit, diarrhea, flatus, melena and nausea.   Genitourinary: Negative for frequency, hesitancy, incomplete emptying, pelvic pain and urgency.   Neurological: Negative for difficulty with concentration, disturbances in coordination, dizziness, headaches, numbness, paresthesias and tremors.   Psychiatric/Behavioral: Negative for altered mental status, hallucinations and memory loss. The patient is not nervous/anxious.      Medications:     Allergies: No Known Allergies    Current Meds: "   Current Facility-Administered Medications:   •  allopurinol (ZYLOPRIM) tablet 100 mg, 100 mg, Oral, Daily, Rashi Traore MD  •  ALPRAZolam (XANAX) tablet 0.5 mg, 0.5 mg, Oral, TID PRN, Rashi Traore MD  •  [START ON 1/30/2018] aspirin EC tablet 81 mg, 81 mg, Oral, Daily, Rashi Traore MD  •  calcitriol (ROCALTROL) capsule 0.25 mcg, 0.25 mcg, Oral, Daily, Rashi Traore MD  •  calcium carbonate (TUMS) chewable tablet 500 mg (200 mg elemental), 2 tablet, Oral, Q4H PRN, Rashi Traore MD  •  clopidogrel (PLAVIX) tablet 75 mg, 75 mg, Oral, Daily, Rashi Traore MD  •  docusate sodium (COLACE) capsule 100 mg, 100 mg, Oral, BID PRN, Rashi Traore MD  •  DULoxetine (CYMBALTA) DR capsule 60 mg, 60 mg, Oral, Q12H, Rashi Traore MD  •  ferrous sulfate tablet 325 mg, 325 mg, Oral, TID With Meals, Rashi Traore MD  •  fluticasone (FLONASE) 50 MCG/ACT nasal spray 2 spray, 2 spray, Each Nare, Daily, Rashi Traore MD  •  furosemide (LASIX) tablet 20 mg, 20 mg, Oral, Daily, Rashi Traore MD  •  Influenza Vac Subunit Quad (FLUCELVAX) injection 0.5 mL, 0.5 mL, Intramuscular, Once, Rashi Traore MD  •  insulin lispro (humaLOG) injection 2-7 Units, 2-7 Units, Subcutaneous, 4x Daily AC & at Bedtime, Rashi Traore MD  •  isosorbide mononitrate (IMDUR) 24 hr tablet 30 mg, 30 mg, Oral, Q24H, Rashi Traore MD  •  levothyroxine (SYNTHROID, LEVOTHROID) tablet 100 mcg, 100 mcg, Oral, Q AM, Rashi Traore MD  •  lisinopril (PRINIVIL,ZESTRIL) tablet 2.5 mg, 2.5 mg, Oral, Q24H, Rashi Traore MD  •  metFORMIN (GLUCOPHAGE) tablet 1,000 mg, 1,000 mg, Oral, BID With Meals, Rashi Traore MD  •  metoprolol tartrate (LOPRESSOR) tablet 12.5 mg, 12.5 mg, Oral, Q12H, Rashi Traore MD  •  mirtazapine (REMERON) tablet 30 mg, 30 mg, Oral, Nightly, Rashi Traore MD  •  nicotine (NICODERM CQ) 14 MG/24HR patch 1  "patch, 1 patch, Transdermal, Daily, Rashi Traore MD  •  oxyCODONE-acetaminophen (PERCOCET)  MG per tablet 1 tablet, 1 tablet, Oral, Q4H PRN, Rashi Traore MD  •  pantoprazole (PROTONIX) EC tablet 40 mg, 40 mg, Oral, Daily, Rashi Traore MD  •  pneumococcal polysaccharide 23-valent (PNEUMOVAX-23) vaccine 0.5 mL, 0.5 mL, Intramuscular, Once, Rashi Traore MD  •  potassium chloride (MICRO-K) CR capsule 20 mEq, 20 mEq, Oral, Once, Rashi Traore MD  •  potassium chloride (MICRO-K) CR capsule 20 mEq, 20 mEq, Oral, Daily, Rashi Traore MD  •  potassium chloride (MICRO-K) CR capsule 40 mEq, 40 mEq, Oral, Once, Rashi Traore MD  •  pregabalin (LYRICA) capsule 200 mg, 200 mg, Oral, Q8H, Rashi Traore MD  •  rivaroxaban (XARELTO) tablet 15 mg, 15 mg, Oral, Daily With Dinner **FOLLOWED BY** [START ON 1/30/2018] rivaroxaban (XARELTO) tablet 20 mg, 20 mg, Oral, Daily With Dinner, Rashi Traore MD  •  tiZANidine (ZANAFLEX) tablet 8 mg, 8 mg, Oral, Q8H, Rashi Traore MD  •  varenicline (CHANTIX) tablet 0.5 mg, 0.5 mg, Oral, Daily, Rashi Traore MD    Data:     Code Status:  No Order    No family history on file.    Social History     Social History   • Marital status: Legally      Spouse name: N/A   • Number of children: N/A   • Years of education: N/A     Occupational History   • Not on file.     Social History Main Topics   • Smoking status: Current Every Day Smoker     Packs/day: 0.50     Types: Cigarettes   • Smokeless tobacco: Not on file   • Alcohol use No   • Drug use: No   • Sexual activity: Not on file     Other Topics Concern   • Not on file     Social History Narrative   • No narrative on file       Vitals:  Ht 175.3 cm (69\")  Wt (!) 167 kg (368 lb)  BMI 54.34 kg/m2    T 97.3, P 63, RR 18, BP 88/44, Sp02 94% (room air)    I/O (24Hr):  No intake or output data in the 24 hours ending 01/14/18 1023    Labs and " imaging:      Lab Results (last 24 hours)     Procedure Component Value Units Date/Time    POC Glucose Once [[id number]]  (Abnormal) Collected:  01/13/18 1203    Specimen:  Blood Updated:  01/13/18 1214     Glucose 149 (H) mg/dL       : MUMTAZ Lamb KristaMeter ID: [id number]       POC Glucose Once [[id number]]  (Abnormal) Collected:  01/13/18 1712    Specimen:  Blood Updated:  01/13/18 1724     Glucose 141 (H) mg/dL       : MUMTAZ Lamb KristaMeter ID: [id number]       POC Glucose Once [[id number]]  (Abnormal) Collected:  01/13/18 2250    Specimen:  Blood Updated:  01/13/18 2312     Glucose 173 (H) mg/dL       : ALONZOMARY Call ConnieMeter ID: [id number]       POC Glucose Once [[id number]]  (Abnormal) Collected:  01/14/18 0621    Specimen:  Blood Updated:  01/14/18 0643     Glucose 157 (H) mg/dL       : GUILHERMELUCIANA Call ConnieMeter ID: [id number]             Physical Examination:        Physical Exam   Constitutional: Vital signs are normal. She appears well-developed and well-nourished. She is cooperative.   HENT:   Head: Normocephalic and atraumatic.   Nose: Nose normal.   Mouth/Throat: Oropharynx is clear and moist.   Eyes: Conjunctivae, EOM and lids are normal. Pupils are equal, round, and reactive to light.   Neck: Trachea normal and normal range of motion. Neck supple.   Cardiovascular: Normal rate, regular rhythm and normal heart sounds.    Abdominal: Soft. Normal appearance and bowel sounds are normal.   Obese   Musculoskeletal: Normal range of motion. She exhibits edema (1+ ble).   Wound VAC to left groin intact   Lymphadenopathy:     She has no cervical adenopathy.     She has no axillary adenopathy.        Left: No supraclavicular adenopathy present.   Neurological: She is alert. She has normal strength. No cranial nerve deficit or sensory deficit.   Skin: Skin is warm, dry and intact. No petechiae and no rash noted. No cyanosis or erythema. Nails show no clubbing.    Wound vac c/d/i - no surrounding erythema   Psychiatric: She has a normal mood and affect. Her speech is normal and behavior is normal. Judgment and thought content normal. Cognition and memory are normal.   Nursing note and vitals reviewed.      Assessment:          Past Medical History:   Diagnosis Date   • Arthritis    • Diabetes mellitus    • Hyperlipidemia    • Hypertension    • Migraine         Plan:           1. Left femoral artery bleed with hematoma  2.   Coronary artery disease  3.   Recent MI  4.   Diabetes type II with complication  5.   History of hypertension assisting with Hypotension  6.   Multifactorial Anemia to include acute blood loss and iron deficiency  7.   Hypokalemia - resolved  8.   Leukocytosis - improving  9.   Chronic anticoagulation  10.  Neuropathy     Continue current treatment. Monitor counts. Increase activity. Continue wound care. Continue pain control efforts. Labs Monday.     Electronically signed by BRAYDEN Sethi on 1/14/2018 at 10:23 AM   I have discussed the care of Radha Boswell, including pertinent history and exam findings, with the nurse practitioner.    I have seen and examined the patient and the key elements of all parts of the encounter have been performed by me.  I agree with the assessment, plan and orders as documented by Misty OWEN, after I modified the exam findings and the plan of treatments and the final version is my approved version of the assessment.        Electronically signed by Rashi Traore MD on 1/14/2018 at 6:21 PM

## 2018-01-15 LAB
ANION GAP SERPL CALCULATED.3IONS-SCNC: 7 MMOL/L (ref 4–13)
BASOPHILS # BLD AUTO: 0.04 10*3/MM3 (ref 0–0.2)
BASOPHILS NFR BLD AUTO: 0.4 % (ref 0–2)
BUN BLD-MCNC: 12 MG/DL (ref 5–21)
BUN/CREAT SERPL: 11.1 (ref 7–25)
CALCIUM SPEC-SCNC: 9.4 MG/DL (ref 8.4–10.4)
CHLORIDE SERPL-SCNC: 99 MMOL/L (ref 98–110)
CO2 SERPL-SCNC: 37 MMOL/L (ref 24–31)
CREAT BLD-MCNC: 1.08 MG/DL (ref 0.5–1.4)
CRP SERPL-MCNC: 4.51 MG/DL (ref 0–0.99)
DEPRECATED RDW RBC AUTO: 49 FL (ref 40–54)
EOSINOPHIL # BLD AUTO: 0.34 10*3/MM3 (ref 0–0.7)
EOSINOPHIL NFR BLD AUTO: 3.5 % (ref 0–4)
ERYTHROCYTE [DISTWIDTH] IN BLOOD BY AUTOMATED COUNT: 15.7 % (ref 12–15)
ERYTHROCYTE [SEDIMENTATION RATE] IN BLOOD: 64 MM/HR (ref 0–20)
GFR SERPL CREATININE-BSD FRML MDRD: 54 ML/MIN/1.73
GLUCOSE BLD-MCNC: 101 MG/DL (ref 70–100)
GLUCOSE BLDC GLUCOMTR-MCNC: 103 MG/DL (ref 70–130)
GLUCOSE BLDC GLUCOMTR-MCNC: 107 MG/DL (ref 70–130)
GLUCOSE BLDC GLUCOMTR-MCNC: 150 MG/DL (ref 70–130)
GLUCOSE BLDC GLUCOMTR-MCNC: 165 MG/DL (ref 70–130)
HCT VFR BLD AUTO: 29.7 % (ref 37–47)
HGB BLD-MCNC: 9.6 G/DL (ref 12–16)
IMM GRANULOCYTES # BLD: 0.05 10*3/MM3 (ref 0–0.03)
IMM GRANULOCYTES NFR BLD: 0.5 % (ref 0–5)
LYMPHOCYTES # BLD AUTO: 2.9 10*3/MM3 (ref 0.72–4.86)
LYMPHOCYTES NFR BLD AUTO: 29.9 % (ref 15–45)
MCH RBC QN AUTO: 28.1 PG (ref 28–32)
MCHC RBC AUTO-ENTMCNC: 32.3 G/DL (ref 33–36)
MCV RBC AUTO: 86.8 FL (ref 82–98)
MONOCYTES # BLD AUTO: 0.9 10*3/MM3 (ref 0.19–1.3)
MONOCYTES NFR BLD AUTO: 9.3 % (ref 4–12)
NEUTROPHILS # BLD AUTO: 5.47 10*3/MM3 (ref 1.87–8.4)
NEUTROPHILS NFR BLD AUTO: 56.4 % (ref 39–78)
NRBC BLD MANUAL-RTO: 0 /100 WBC (ref 0–0)
PLATELET # BLD AUTO: 469 10*3/MM3 (ref 130–400)
PMV BLD AUTO: 10.3 FL (ref 6–12)
POTASSIUM BLD-SCNC: 3.8 MMOL/L (ref 3.5–5.3)
RBC # BLD AUTO: 3.42 10*6/MM3 (ref 4.2–5.4)
SODIUM BLD-SCNC: 143 MMOL/L (ref 135–145)
WBC NRBC COR # BLD: 9.7 10*3/MM3 (ref 4.8–10.8)

## 2018-01-15 PROCEDURE — 86140 C-REACTIVE PROTEIN: CPT | Performed by: NURSE PRACTITIONER

## 2018-01-15 PROCEDURE — 85025 COMPLETE CBC W/AUTO DIFF WBC: CPT | Performed by: NURSE PRACTITIONER

## 2018-01-15 PROCEDURE — 97605 NEG PRS WND THER DME<=50SQCM: CPT

## 2018-01-15 PROCEDURE — 85651 RBC SED RATE NONAUTOMATED: CPT | Performed by: NURSE PRACTITIONER

## 2018-01-15 PROCEDURE — 82962 GLUCOSE BLOOD TEST: CPT

## 2018-01-15 PROCEDURE — 80048 BASIC METABOLIC PNL TOTAL CA: CPT | Performed by: NURSE PRACTITIONER

## 2018-01-15 NOTE — PROGRESS NOTES
"[facility]  ALONZO Akers M.D.  BRAYDEN Sheridan      Internal Medicine Progress Note    1/15/2018   2:09 PM    Name:  Radha Boswell  MRN:    [id number]     Acct:     [id number]   Room:  [address] [place] [address] Day: 0     Admit Date: 1/5/2018 11:08 PM  PCP: BRAYDEN Edmond    Subjective:     C/C: \"Gas\" and left groin pain    Interval History: Status: improved. Up to chair. No family at bedside. Pain adequately controlled. Progressing with therapy - ambulating about room. No new concerns.   Review of Systems   Constitution: Positive for weakness and malaise/fatigue. Negative for chills, decreased appetite and fever.   HENT: Negative for congestion, hoarse voice, nosebleeds and sore throat.    Eyes: Negative for blurred vision, discharge, pain and visual disturbance.   Cardiovascular: Positive for leg swelling. Negative for chest pain, dyspnea on exertion, irregular heartbeat, orthopnea and palpitations.   Respiratory: Negative for cough, shortness of breath, sputum production and wheezing.    Hematologic/Lymphatic: Negative for bleeding problem. Does not bruise/bleed easily.   Skin: Positive for poor wound healing. Negative for dry skin, flushing, itching, rash and suspicious lesions.   Musculoskeletal: Positive for muscle weakness. Negative for arthritis, back pain, falls, joint pain, joint swelling and myalgias.   Gastrointestinal: Positive for heartburn. Negative for bloating, abdominal pain, change in bowel habit, diarrhea, flatus, melena and nausea.   Genitourinary: Negative for frequency, hesitancy, incomplete emptying, pelvic pain and urgency.   Neurological: Negative for difficulty with concentration, disturbances in coordination, dizziness, headaches, numbness, paresthesias and tremors.   Psychiatric/Behavioral: Negative for altered mental status, hallucinations and memory loss. The patient is not nervous/anxious.      Medications:     Allergies: No Known Allergies    Current " Meds:   Current Facility-Administered Medications:   •  allopurinol (ZYLOPRIM) tablet 100 mg, 100 mg, Oral, Daily, Rashi Traore MD  •  ALPRAZolam (XANAX) tablet 0.5 mg, 0.5 mg, Oral, TID PRN, Rashi Traore MD  •  [START ON 1/30/2018] aspirin EC tablet 81 mg, 81 mg, Oral, Daily, Rashi Traore MD  •  calcitriol (ROCALTROL) capsule 0.25 mcg, 0.25 mcg, Oral, Daily, Rashi Traore MD  •  calcium carbonate (TUMS) chewable tablet 500 mg (200 mg elemental), 2 tablet, Oral, Q4H PRN, Rashi Traore MD  •  clopidogrel (PLAVIX) tablet 75 mg, 75 mg, Oral, Daily, Rashi Traore MD  •  docusate sodium (COLACE) capsule 100 mg, 100 mg, Oral, BID PRN, Rashi Traore MD  •  DULoxetine (CYMBALTA) DR capsule 60 mg, 60 mg, Oral, Q12H, Rashi Traore MD  •  ferrous sulfate tablet 325 mg, 325 mg, Oral, TID With Meals, Rashi Traore MD  •  fluticasone (FLONASE) 50 MCG/ACT nasal spray 2 spray, 2 spray, Each Nare, Daily, Rashi Traore MD  •  furosemide (LASIX) tablet 20 mg, 20 mg, Oral, Daily, Rashi Traore MD  •  Influenza Vac Subunit Quad (FLUCELVAX) injection 0.5 mL, 0.5 mL, Intramuscular, Once, Rashi Traore MD  •  insulin lispro (humaLOG) injection 2-7 Units, 2-7 Units, Subcutaneous, 4x Daily AC & at Bedtime, Rashi Traore MD  •  isosorbide mononitrate (IMDUR) 24 hr tablet 30 mg, 30 mg, Oral, Q24H, Rashi Traore MD  •  levothyroxine (SYNTHROID, LEVOTHROID) tablet 100 mcg, 100 mcg, Oral, Q AM, Rashi Traore MD  •  lisinopril (PRINIVIL,ZESTRIL) tablet 2.5 mg, 2.5 mg, Oral, Q24H, Rashi Traore MD  •  metFORMIN (GLUCOPHAGE) tablet 1,000 mg, 1,000 mg, Oral, BID With Meals, Rashi Traore MD  •  metoprolol tartrate (LOPRESSOR) tablet 12.5 mg, 12.5 mg, Oral, Q12H, Rashi Traore MD  •  mirtazapine (REMERON) tablet 30 mg, 30 mg, Oral, Nightly, Rashi Traore MD  •  nicotine (NICODERM CQ) 14 MG/24HR  "patch 1 patch, 1 patch, Transdermal, Daily, Rashi Traore MD  •  oxyCODONE-acetaminophen (PERCOCET)  MG per tablet 1 tablet, 1 tablet, Oral, Q4H PRN, Rashi Traore MD  •  pantoprazole (PROTONIX) EC tablet 40 mg, 40 mg, Oral, Daily, Rashi Traore MD  •  pneumococcal polysaccharide 23-valent (PNEUMOVAX-23) vaccine 0.5 mL, 0.5 mL, Intramuscular, Once, Rashi Traore MD  •  potassium chloride (MICRO-K) CR capsule 20 mEq, 20 mEq, Oral, Once, Rashi Traore MD  •  potassium chloride (MICRO-K) CR capsule 20 mEq, 20 mEq, Oral, Daily, Rashi Traore MD  •  potassium chloride (MICRO-K) CR capsule 40 mEq, 40 mEq, Oral, Once, Rashi Traore MD  •  pregabalin (LYRICA) capsule 200 mg, 200 mg, Oral, Q8H, Rashi Traore MD  •  rivaroxaban (XARELTO) tablet 15 mg, 15 mg, Oral, Daily With Dinner **FOLLOWED BY** [START ON 1/30/2018] rivaroxaban (XARELTO) tablet 20 mg, 20 mg, Oral, Daily With Dinner, Rashi Traore MD  •  tiZANidine (ZANAFLEX) tablet 8 mg, 8 mg, Oral, Q8H, Rashi Traore MD  •  varenicline (CHANTIX) tablet 0.5 mg, 0.5 mg, Oral, Daily, Rashi Traore MD    Data:     Code Status:  No Order    No family history on file.    Social History     Social History   • Marital status: Legally      Spouse name: N/A   • Number of children: N/A   • Years of education: N/A     Occupational History   • Not on file.     Social History Main Topics   • Smoking status: Current Every Day Smoker     Packs/day: 0.50     Types: Cigarettes   • Smokeless tobacco: Not on file   • Alcohol use No   • Drug use: No   • Sexual activity: Not on file     Other Topics Concern   • Not on file     Social History Narrative   • No narrative on file       Vitals:  Ht 175.3 cm (69\")  Wt (!) 160 kg (353 lb 6.4 oz)  BMI 52.19 kg/m2    T 97.6, P 68, RR 18, BP 92/62, Sp02 98% (room air)    I/O (24Hr):  No intake or output data in the 24 hours ending 01/15/18 " 1409    Labs and imaging:      Lab Results (last 24 hours)     Procedure Component Value Units Date/Time    POC Glucose Once [[id number]]  (Abnormal) Collected:  01/14/18 1700    Specimen:  Blood Updated:  01/14/18 1712     Glucose 148 (H) mg/dL       : MUMTAZ Lamb KristaMeter ID: [id number]       POC Glucose Once [[id number]]  (Abnormal) Collected:  01/14/18 2206    Specimen:  Blood Updated:  01/14/18 2231     Glucose 162 (H) mg/dL       : ТАТЬЯНА Call ConnieMeter ID: [id number]       POC Glucose Once [[id number]]  (Normal) Collected:  01/15/18 0609    Specimen:  Blood Updated:  01/15/18 0621     Glucose 103 mg/dL       : ТАТЬЯНА Call ConnieMeter ID: [id number]       Sedimentation Rate [[id number]]  (Abnormal) Collected:  01/15/18 0559    Specimen:  Blood Updated:  01/15/18 0639     Sed Rate 64 (H) mm/hr     CBC & Differential [[id number]] Collected:  01/15/18 0559    Specimen:  Blood Updated:  01/15/18 0642    Narrative:       The following orders were created for panel order CBC & Differential.  Procedure                               Abnormality         Status                     ---------                               -----------         ------                     CBC Auto Differential[[id number]]        Abnormal            Final result                 Please view results for these tests on the individual orders.    CBC Auto Differential [[id number]]  (Abnormal) Collected:  01/15/18 0559    Specimen:  Blood Updated:  01/15/18 0642     WBC 9.70 10*3/mm3      RBC 3.42 (L) 10*6/mm3      Hemoglobin 9.6 (L) g/dL      Hematocrit 29.7 (L) %      MCV 86.8 fL      MCH 28.1 pg      MCHC 32.3 (L) g/dL      RDW 15.7 (H) %      RDW-SD 49.0 fl      MPV 10.3 fL      Platelets 469 (H) 10*3/mm3      Neutrophil % 56.4 %      Lymphocyte % 29.9 %      Monocyte % 9.3 %      Eosinophil % 3.5 %      Basophil % 0.4 %      Immature Grans % 0.5 %      Neutrophils, Absolute 5.47 10*3/mm3      Lymphocytes,  Absolute 2.90 10*3/mm3      Monocytes, Absolute 0.90 10*3/mm3      Eosinophils, Absolute 0.34 10*3/mm3      Basophils, Absolute 0.04 10*3/mm3      Immature Grans, Absolute 0.05 (H) 10*3/mm3      nRBC 0.0 /100 WBC     Basic Metabolic Panel [[id number]]  (Abnormal) Collected:  01/15/18 0559    Specimen:  Blood Updated:  01/15/18 0652     Glucose 101 (H) mg/dL      BUN 12 mg/dL      Creatinine 1.08 mg/dL      Sodium 143 mmol/L      Potassium 3.8 mmol/L      Chloride 99 mmol/L      CO2 37.0 (H) mmol/L      Calcium 9.4 mg/dL      eGFR Non African Amer 54 (L) mL/min/1.73      BUN/Creatinine Ratio 11.1     Anion Gap 7.0 mmol/L     Narrative:       GFR Normal >60  Chronic Kidney Disease <60  Kidney Failure <15    C-reactive Protein [[id number]]  (Abnormal) Collected:  01/15/18 0559    Specimen:  Blood Updated:  01/15/18 0652     C-Reactive Protein 4.51 (H) mg/dL     POC Glucose Once [[id number]]  (Abnormal) Collected:  01/15/18 1130    Specimen:  Blood Updated:  01/15/18 1143     Glucose 165 (H) mg/dL       : SCOTTIE Murillo ID: [id number]             Physical Examination:        Physical Exam   Constitutional: Vital signs are normal. She appears well-developed and well-nourished. She is cooperative.   HENT:   Head: Normocephalic and atraumatic.   Nose: Nose normal.   Mouth/Throat: Oropharynx is clear and moist.   Eyes: Conjunctivae, EOM and lids are normal. Pupils are equal, round, and reactive to light.   Neck: Trachea normal and normal range of motion. Neck supple.   Cardiovascular: Normal rate, regular rhythm and normal heart sounds.    Abdominal: Soft. Normal appearance and bowel sounds are normal.   Obese   Musculoskeletal: Normal range of motion. She exhibits edema (1+ ble).   Wound VAC to left groin intact   Lymphadenopathy:     She has no cervical adenopathy.     She has no axillary adenopathy.        Left: No supraclavicular adenopathy present.   Neurological: She is alert. She has normal strength.  No cranial nerve deficit or sensory deficit.   Skin: Skin is warm, dry and intact. No petechiae and no rash noted. No cyanosis or erythema. Nails show no clubbing.   Wound vac c/d/i - no surrounding erythema   Psychiatric: She has a normal mood and affect. Her speech is normal and behavior is normal. Judgment and thought content normal. Cognition and memory are normal.   Nursing note and vitals reviewed.      Assessment:          Past Medical History:   Diagnosis Date   • Arthritis    • Diabetes mellitus    • Hyperlipidemia    • Hypertension    • Migraine         Plan:           1. Left femoral artery bleed with hematoma  2.   Coronary artery disease  3.   Recent MI  4.   Diabetes type II with complication  5.   History of hypertension assisting with Hypotension  6.   Multifactorial Anemia to include acute blood loss and iron deficiency  7.   Hypokalemia - resolved  8.   Leukocytosis - improving  9.   Chronic anticoagulation  10.  Neuropathy     Continue current treatment. Monitor counts. Increase activity. Continue wound care. Continue pain control efforts. Labs Thursday.     Electronically signed by BRAYDEN Sethi on 1/15/2018 at 2:09 PM

## 2018-01-16 LAB
GLUCOSE BLDC GLUCOMTR-MCNC: 105 MG/DL (ref 70–130)
GLUCOSE BLDC GLUCOMTR-MCNC: 139 MG/DL (ref 70–130)
GLUCOSE BLDC GLUCOMTR-MCNC: 141 MG/DL (ref 70–130)
GLUCOSE BLDC GLUCOMTR-MCNC: 220 MG/DL (ref 70–130)

## 2018-01-16 PROCEDURE — 63710000001 INSULIN LISPRO (HUMAN) PER 5 UNITS: Performed by: INTERNAL MEDICINE

## 2018-01-16 PROCEDURE — 82962 GLUCOSE BLOOD TEST: CPT

## 2018-01-16 RX ORDER — OXYCODONE AND ACETAMINOPHEN 10; 325 MG/1; MG/1
1 TABLET ORAL EVERY 4 HOURS PRN
Status: DISCONTINUED | OUTPATIENT
Start: 2018-01-16 | End: 2018-01-22 | Stop reason: HOSPADM

## 2018-01-16 NOTE — PROGRESS NOTES
"[facility]  ALONZO Akers M.D.  BRAYDEN Sheridan      Internal Medicine Progress Note    1/16/2018   10:27 AM    Name:  Radha Boswell  MRN:    [id number]     Acct:     [id number]   Room:  [address] [place] [address] Day: 0     Admit Date: 1/5/2018 11:08 PM  PCP: BRAYDEN Edmond    Subjective:     C/C: \"Gas\" and left groin pain    Interval History: Status: improved. Up to chair. Family at bedside. Progressing with therapy. Pain adequately controlled. Tolerating treatment. No new concerns.   Review of Systems   Constitution: Positive for weakness and malaise/fatigue. Negative for chills, decreased appetite and fever.   HENT: Negative for congestion, hoarse voice, nosebleeds and sore throat.    Eyes: Negative for blurred vision, discharge, pain and visual disturbance.   Cardiovascular: Positive for leg swelling. Negative for chest pain, dyspnea on exertion, irregular heartbeat, orthopnea and palpitations.   Respiratory: Negative for cough, shortness of breath, sputum production and wheezing.    Hematologic/Lymphatic: Negative for bleeding problem. Does not bruise/bleed easily.   Skin: Positive for poor wound healing. Negative for dry skin, flushing, itching, rash and suspicious lesions.   Musculoskeletal: Positive for muscle weakness. Negative for arthritis, back pain, falls, joint pain, joint swelling and myalgias.   Gastrointestinal: Positive for heartburn. Negative for bloating, abdominal pain, change in bowel habit, diarrhea, flatus, melena and nausea.   Genitourinary: Negative for frequency, hesitancy, incomplete emptying, pelvic pain and urgency.   Neurological: Negative for difficulty with concentration, disturbances in coordination, dizziness, headaches, numbness, paresthesias and tremors.   Psychiatric/Behavioral: Negative for altered mental status, hallucinations and memory loss. The patient is not nervous/anxious.      Medications:     Allergies: No Known Allergies    Current " Meds:   Current Facility-Administered Medications:   •  allopurinol (ZYLOPRIM) tablet 100 mg, 100 mg, Oral, Daily, Rashi Traore MD  •  ALPRAZolam (XANAX) tablet 0.5 mg, 0.5 mg, Oral, TID PRN, Rashi Traore MD  •  [START ON 1/30/2018] aspirin EC tablet 81 mg, 81 mg, Oral, Daily, Rashi Traore MD  •  calcitriol (ROCALTROL) capsule 0.25 mcg, 0.25 mcg, Oral, Daily, Rashi Traore MD  •  calcium carbonate (TUMS) chewable tablet 500 mg (200 mg elemental), 2 tablet, Oral, Q4H PRN, Rashi Traore MD  •  clopidogrel (PLAVIX) tablet 75 mg, 75 mg, Oral, Daily, Rashi Traore MD  •  docusate sodium (COLACE) capsule 100 mg, 100 mg, Oral, BID PRN, Rashi Traore MD  •  DULoxetine (CYMBALTA) DR capsule 60 mg, 60 mg, Oral, Q12H, Rashi Traore MD  •  ferrous sulfate tablet 325 mg, 325 mg, Oral, TID With Meals, Rashi Traore MD  •  fluticasone (FLONASE) 50 MCG/ACT nasal spray 2 spray, 2 spray, Each Nare, Daily, Rashi Traore MD  •  furosemide (LASIX) tablet 20 mg, 20 mg, Oral, Daily, Rashi Traore MD  •  Influenza Vac Subunit Quad (FLUCELVAX) injection 0.5 mL, 0.5 mL, Intramuscular, Once, Rashi Traore MD  •  insulin lispro (humaLOG) injection 2-7 Units, 2-7 Units, Subcutaneous, 4x Daily AC & at Bedtime, Rashi Traore MD  •  isosorbide mononitrate (IMDUR) 24 hr tablet 30 mg, 30 mg, Oral, Q24H, Rashi Traore MD  •  levothyroxine (SYNTHROID, LEVOTHROID) tablet 100 mcg, 100 mcg, Oral, Q AM, Rashi Traore MD  •  lisinopril (PRINIVIL,ZESTRIL) tablet 2.5 mg, 2.5 mg, Oral, Q24H, Rashi Traore MD  •  metFORMIN (GLUCOPHAGE) tablet 1,000 mg, 1,000 mg, Oral, BID With Meals, Rashi Traore MD  •  metoprolol tartrate (LOPRESSOR) tablet 12.5 mg, 12.5 mg, Oral, Q12H, Rashi Traore MD  •  mirtazapine (REMERON) tablet 30 mg, 30 mg, Oral, Nightly, Rashi Traore MD  •  nicotine (NICODERM CQ) 14 MG/24HR  "patch 1 patch, 1 patch, Transdermal, Daily, Rashi Traore MD  •  pantoprazole (PROTONIX) EC tablet 40 mg, 40 mg, Oral, Daily, Rashi Traore MD  •  pneumococcal polysaccharide 23-valent (PNEUMOVAX-23) vaccine 0.5 mL, 0.5 mL, Intramuscular, Once, Rashi Traore MD  •  potassium chloride (MICRO-K) CR capsule 20 mEq, 20 mEq, Oral, Once, Rashi Traore MD  •  potassium chloride (MICRO-K) CR capsule 20 mEq, 20 mEq, Oral, Daily, Rashi Traore MD  •  potassium chloride (MICRO-K) CR capsule 40 mEq, 40 mEq, Oral, Once, Rashi Traore MD  •  pregabalin (LYRICA) capsule 200 mg, 200 mg, Oral, Q8H, Rashi Traore MD  •  rivaroxaban (XARELTO) tablet 15 mg, 15 mg, Oral, Daily With Dinner **FOLLOWED BY** [START ON 1/30/2018] rivaroxaban (XARELTO) tablet 20 mg, 20 mg, Oral, Daily With Dinner, Rashi Traore MD  •  tiZANidine (ZANAFLEX) tablet 8 mg, 8 mg, Oral, Q8H, Rashi Traore MD  •  varenicline (CHANTIX) tablet 0.5 mg, 0.5 mg, Oral, Daily, Rashi Traore MD    Data:     Code Status:  No Order    No family history on file.    Social History     Social History   • Marital status: Legally      Spouse name: N/A   • Number of children: N/A   • Years of education: N/A     Occupational History   • Not on file.     Social History Main Topics   • Smoking status: Current Every Day Smoker     Packs/day: 0.50     Types: Cigarettes   • Smokeless tobacco: Not on file   • Alcohol use No   • Drug use: No   • Sexual activity: Not on file     Other Topics Concern   • Not on file     Social History Narrative   • No narrative on file       Vitals:  Ht 175.3 cm (69\")  Wt (!) 160 kg (353 lb 6.4 oz)  BMI 52.19 kg/m2    T 97.3, P 62, RR 20, BP 93/49, Sp02 96% (room air)    I/O (24Hr):  No intake or output data in the 24 hours ending 01/16/18 1027    Labs and imaging:      Lab Results (last 24 hours)     Procedure Component Value Units Date/Time    POC Glucose Once " [[id number]]  (Abnormal) Collected:  01/15/18 1130    Specimen:  Blood Updated:  01/15/18 1143     Glucose 165 (H) mg/dL       : SCOTTIE Murillo ID: [id number]       POC Glucose Once [[id number]]  (Abnormal) Collected:  01/15/18 1618    Specimen:  Blood Updated:  01/15/18 1633     Glucose 150 (H) mg/dL       : TANIA Allyson VictoriaMeter ID: [id number]       POC Glucose Once [[id number]]  (Normal) Collected:  01/15/18 2211    Specimen:  Blood Updated:  01/15/18 2313     Glucose 107 mg/dL       : JACOB Reyna KimMeter ID: [id number]       POC Glucose Once [[id number]]  (Abnormal) Collected:  01/16/18 0611    Specimen:  Blood Updated:  01/16/18 0636     Glucose 139 (H) mg/dL       : JACOB Reyna KimMeter ID: [id number]             Physical Examination:        Physical Exam   Constitutional: Vital signs are normal. She appears well-developed and well-nourished. She is cooperative.   HENT:   Head: Normocephalic and atraumatic.   Nose: Nose normal.   Mouth/Throat: Oropharynx is clear and moist.   Eyes: Conjunctivae, EOM and lids are normal. Pupils are equal, round, and reactive to light.   Neck: Trachea normal and normal range of motion. Neck supple.   Cardiovascular: Normal rate, regular rhythm and normal heart sounds.    Abdominal: Soft. Normal appearance and bowel sounds are normal.   Obese   Musculoskeletal: Normal range of motion. She exhibits edema (1+ ble).   Wound VAC to left groin intact   Lymphadenopathy:     She has no cervical adenopathy.     She has no axillary adenopathy.        Left: No supraclavicular adenopathy present.   Neurological: She is alert. She has normal strength. No cranial nerve deficit or sensory deficit.   Skin: Skin is warm, dry and intact. No petechiae and no rash noted. No cyanosis or erythema. Nails show no clubbing.   Wound vac c/d/i - no surrounding erythema   Psychiatric: She has a normal mood and affect. Her speech is normal and  behavior is normal. Judgment and thought content normal. Cognition and memory are normal.   Nursing note and vitals reviewed.      Assessment:          Past Medical History:   Diagnosis Date   • Arthritis    • Diabetes mellitus    • Hyperlipidemia    • Hypertension    • Migraine         Plan:           1. Left femoral artery bleed with hematoma  2.   Coronary artery disease  3.   Recent MI  4.   Diabetes type II with complication  5.   History of hypertension assisting with Hypotension  6.   Multifactorial Anemia to include acute blood loss and iron deficiency  7.   Hypokalemia - resolved  8.   Leukocytosis - improving  9.   Chronic anticoagulation  10.  Neuropathy     Continue current treatment. Monitor counts. Increase activity. Continue wound care. Continue pain control efforts. Labs Thursday.     Electronically signed by BRAYDEN Sethi on 1/16/2018 at 10:27 AM   I have discussed the care of Radha Boswell, including pertinent history and exam findings, with the nurse practitioner.    I have seen and examined the patient and the key elements of all parts of the encounter have been performed by me.  I agree with the assessment, plan and orders as documented by Misty OWEN, after I modified the exam findings and the plan of treatments and the final version is my approved version of the assessment.        Electronically signed by Rashi Traore MD on 1/16/2018 at 1:41 PM

## 2018-01-17 LAB
GLUCOSE BLDC GLUCOMTR-MCNC: 103 MG/DL (ref 70–130)
GLUCOSE BLDC GLUCOMTR-MCNC: 145 MG/DL (ref 70–130)
GLUCOSE BLDC GLUCOMTR-MCNC: 154 MG/DL (ref 70–130)
GLUCOSE BLDC GLUCOMTR-MCNC: 165 MG/DL (ref 70–130)

## 2018-01-17 PROCEDURE — 97606 NEG PRS WND THER DME>50 SQCM: CPT

## 2018-01-17 PROCEDURE — 82962 GLUCOSE BLOOD TEST: CPT

## 2018-01-17 NOTE — PROGRESS NOTES
"[facility] Fall Assessment Note    Radha Boswell is a 47 y.o.female  [Ht: 175.3 cm (69\"); Wt: (!) 160 kg (353 lb 6.4 oz)] admitted 1/5/2018 11:08 PM.    Current medications associated with an increased risk for fall include:  Alprazolam  Aspirin  Duloxetine  Furosemide  Humalog  Isosorbide mononitrate  Lisinopril  Metformin  Metoprolol tartrate  Mirtazapine  Nicoderm  Percocet  Protonix  Pregabalin  Rivaroxaban   Zanaflex  Varenicline    Enio Larios, [facility]  01/17/185:31 PM       "

## 2018-01-17 NOTE — PROGRESS NOTES
"[facility]  ALONZO Akers M.D.  BRAYDEN Sheridan      Internal Medicine Progress Note    1/17/2018   10:37 AM    Name:  Radha Boswell  MRN:    [id number]     Acct:     [id number]   Room:  [address] [place] [address] Day: 0     Admit Date: 1/5/2018 11:08 PM  PCP: BRAYDEN Edmond    Subjective:     C/C: \"Gas\" and left groin pain    Interval History: Status: improved. Up to chair. Family at bedside. Progressing with therapy. Pain adequately controlled. Tolerating treatment. No new concerns. Tolerated wound vac change today. Discharge planning in progress.   Review of Systems   Constitution: Positive for weakness and malaise/fatigue. Negative for chills, decreased appetite and fever.   HENT: Negative for congestion, hoarse voice, nosebleeds and sore throat.    Eyes: Negative for blurred vision, discharge, pain and visual disturbance.   Cardiovascular: Positive for leg swelling. Negative for chest pain, dyspnea on exertion, irregular heartbeat, orthopnea and palpitations.   Respiratory: Negative for cough, shortness of breath, sputum production and wheezing.    Hematologic/Lymphatic: Negative for bleeding problem. Does not bruise/bleed easily.   Skin: Positive for poor wound healing. Negative for dry skin, flushing, itching, rash and suspicious lesions.   Musculoskeletal: Positive for muscle weakness. Negative for arthritis, back pain, falls, joint pain, joint swelling and myalgias.   Gastrointestinal: Positive for heartburn. Negative for bloating, abdominal pain, change in bowel habit, diarrhea, flatus, melena and nausea.   Genitourinary: Negative for frequency, hesitancy, incomplete emptying, pelvic pain and urgency.   Neurological: Negative for difficulty with concentration, disturbances in coordination, dizziness, headaches, numbness, paresthesias and tremors.   Psychiatric/Behavioral: Negative for altered mental status, hallucinations and memory loss. The patient is not nervous/anxious.  "     Medications:     Allergies: No Known Allergies    Current Meds:   Current Facility-Administered Medications:   •  allopurinol (ZYLOPRIM) tablet 100 mg, 100 mg, Oral, Daily, Rashi Traore MD  •  ALPRAZolam (XANAX) tablet 0.5 mg, 0.5 mg, Oral, TID PRN, Rashi Traore MD  •  [START ON 1/30/2018] aspirin EC tablet 81 mg, 81 mg, Oral, Daily, Rashi Traore MD  •  calcitriol (ROCALTROL) capsule 0.25 mcg, 0.25 mcg, Oral, Daily, Rashi Traore MD  •  calcium carbonate (TUMS) chewable tablet 500 mg (200 mg elemental), 2 tablet, Oral, Q4H PRN, Rashi Traore MD  •  clopidogrel (PLAVIX) tablet 75 mg, 75 mg, Oral, Daily, Rashi Traore MD  •  docusate sodium (COLACE) capsule 100 mg, 100 mg, Oral, BID PRN, Rashi Traore MD  •  DULoxetine (CYMBALTA) DR capsule 60 mg, 60 mg, Oral, Q12H, Rashi Traore MD  •  ferrous sulfate tablet 325 mg, 325 mg, Oral, TID With Meals, Rashi Traore MD  •  fluticasone (FLONASE) 50 MCG/ACT nasal spray 2 spray, 2 spray, Each Nare, Daily, Rashi Traore MD  •  furosemide (LASIX) tablet 20 mg, 20 mg, Oral, Daily, Rashi Traore MD  •  Influenza Vac Subunit Quad (FLUCELVAX) injection 0.5 mL, 0.5 mL, Intramuscular, Once, Rashi Traore MD  •  insulin lispro (humaLOG) injection 2-7 Units, 2-7 Units, Subcutaneous, 4x Daily AC & at Bedtime, Rashi Traore MD  •  isosorbide mononitrate (IMDUR) 24 hr tablet 30 mg, 30 mg, Oral, Q24H, Rashi Traore MD  •  levothyroxine (SYNTHROID, LEVOTHROID) tablet 100 mcg, 100 mcg, Oral, Q AM, Rashi Traore MD  •  lisinopril (PRINIVIL,ZESTRIL) tablet 2.5 mg, 2.5 mg, Oral, Q24H, Rashi Traore MD  •  metFORMIN (GLUCOPHAGE) tablet 1,000 mg, 1,000 mg, Oral, BID With Meals, Rashi Traore MD  •  metoprolol tartrate (LOPRESSOR) tablet 12.5 mg, 12.5 mg, Oral, Q12H, Rashi Traroe MD  •  mirtazapine (REMERON) tablet 30 mg, 30 mg, Oral, Nightly,  "Rashi Traore MD  •  nicotine (NICODERM CQ) 14 MG/24HR patch 1 patch, 1 patch, Transdermal, Daily, Rashi Traore MD  •  oxyCODONE-acetaminophen (PERCOCET)  MG per tablet 1 tablet, 1 tablet, Oral, Q4H PRN, Rashi Traore MD  •  pantoprazole (PROTONIX) EC tablet 40 mg, 40 mg, Oral, Daily, Rashi Traore MD  •  pneumococcal polysaccharide 23-valent (PNEUMOVAX-23) vaccine 0.5 mL, 0.5 mL, Intramuscular, Once, Rashi Traore MD  •  potassium chloride (MICRO-K) CR capsule 20 mEq, 20 mEq, Oral, Once, Rashi Traore MD  •  potassium chloride (MICRO-K) CR capsule 20 mEq, 20 mEq, Oral, Daily, Rashi Traore MD  •  potassium chloride (MICRO-K) CR capsule 40 mEq, 40 mEq, Oral, Once, Rashi Traore MD  •  pregabalin (LYRICA) capsule 200 mg, 200 mg, Oral, Q8H, Rashi Traore MD  •  rivaroxaban (XARELTO) tablet 15 mg, 15 mg, Oral, Daily With Dinner **FOLLOWED BY** [START ON 1/30/2018] rivaroxaban (XARELTO) tablet 20 mg, 20 mg, Oral, Daily With Dinner, Rashi Traore MD  •  tiZANidine (ZANAFLEX) tablet 8 mg, 8 mg, Oral, Q8H, Rashi Traore MD  •  varenicline (CHANTIX) tablet 0.5 mg, 0.5 mg, Oral, Daily, Rashi Traore MD    Data:     Code Status:  No Order    No family history on file.    Social History     Social History   • Marital status: Legally      Spouse name: N/A   • Number of children: N/A   • Years of education: N/A     Occupational History   • Not on file.     Social History Main Topics   • Smoking status: Current Every Day Smoker     Packs/day: 0.50     Types: Cigarettes   • Smokeless tobacco: Not on file   • Alcohol use No   • Drug use: No   • Sexual activity: Not on file     Other Topics Concern   • Not on file     Social History Narrative   • No narrative on file       Vitals:  Ht 175.3 cm (69\")  Wt (!) 160 kg (353 lb 6.4 oz)  BMI 52.19 kg/m2    T 97.8, P 60, RR 16, BP 74/41, Sp02 94% (room air)    I/O (24Hr):  No " intake or output data in the 24 hours ending 01/17/18 1037    Labs and imaging:      Lab Results (last 24 hours)     Procedure Component Value Units Date/Time    POC Glucose Once [[id number]]  (Abnormal) Collected:  01/16/18 1107    Specimen:  Blood Updated:  01/16/18 1123     Glucose 141 (H) mg/dL       : TANIA Valadez VictoriaMeter ID: [id number]       POC Glucose Once [[id number]]  (Abnormal) Collected:  01/16/18 1554    Specimen:  Blood Updated:  01/16/18 1613     Glucose 220 (H) mg/dL       : TANIA Yreka VictoriaMeter ID: [id number]       POC Glucose Once [[id number]]  (Normal) Collected:  01/16/18 2313    Specimen:  Blood Updated:  01/16/18 2324     Glucose 105 mg/dL       : JACOB Reyna KimMeter ID: [id number]       POC Glucose Once [[id number]]  (Abnormal) Collected:  01/17/18 0608    Specimen:  Blood Updated:  01/17/18 0620     Glucose 165 (H) mg/dL       : JACOB Reyna KimMeter ID: [id number]             Physical Examination:        Physical Exam   Constitutional: Vital signs are normal. She appears well-developed and well-nourished. She is cooperative.   HENT:   Head: Normocephalic and atraumatic.   Nose: Nose normal.   Mouth/Throat: Oropharynx is clear and moist.   Eyes: Conjunctivae, EOM and lids are normal. Pupils are equal, round, and reactive to light.   Neck: Trachea normal and normal range of motion. Neck supple.   Cardiovascular: Normal rate, regular rhythm and normal heart sounds.    Abdominal: Soft. Normal appearance and bowel sounds are normal.   Obese   Musculoskeletal: Normal range of motion. She exhibits edema (1+ ble).   Wound VAC to left groin intact   Lymphadenopathy:     She has no cervical adenopathy.     She has no axillary adenopathy.        Left: No supraclavicular adenopathy present.   Neurological: She is alert. She has normal strength. No cranial nerve deficit or sensory deficit.   Skin: Skin is warm, dry and intact. No petechiae and no  rash noted. No cyanosis or erythema. Nails show no clubbing.   Wound vac c/d/i - no surrounding erythema   Psychiatric: She has a normal mood and affect. Her speech is normal and behavior is normal. Judgment and thought content normal. Cognition and memory are normal.   Nursing note and vitals reviewed.      Assessment:          Past Medical History:   Diagnosis Date   • Arthritis    • Diabetes mellitus    • Hyperlipidemia    • Hypertension    • Migraine         Plan:         1. Left femoral artery bleed with hematoma  2.   Coronary artery disease  3.   Recent MI  4.   Diabetes type II with complication  5.   History of hypertension assisting with Hypotension  6.   Multifactorial Anemia to include acute blood loss and iron deficiency  7.   Hypokalemia - resolved  8.   Leukocytosis - improving  9.   Chronic anticoagulation  10.  Neuropathy     Continue current treatment. Monitor counts. Increase activity. Continue wound care. Continue pain control efforts. Labs in am.     Electronically signed by BRAYDEN Sethi on 1/17/2018 at 10:37 AM   I have discussed the care of Radha Boswell, including pertinent history and exam findings, with the nurse practitioner.    I have seen and examined the patient and the key elements of all parts of the encounter have been performed by me.  I agree with the assessment, plan and orders as documented by Misty OWEN, after I modified the exam findings and the plan of treatments and the final version is my approved version of the assessment.        Electronically signed by Rashi Traore MD on 1/17/2018 at 12:35 PM

## 2018-01-18 LAB
ANION GAP SERPL CALCULATED.3IONS-SCNC: 7 MMOL/L (ref 4–13)
BASOPHILS # BLD AUTO: 0.03 10*3/MM3 (ref 0–0.2)
BASOPHILS NFR BLD AUTO: 0.3 % (ref 0–2)
BUN BLD-MCNC: 12 MG/DL (ref 5–21)
BUN/CREAT SERPL: 10.9 (ref 7–25)
CALCIUM SPEC-SCNC: 9.7 MG/DL (ref 8.4–10.4)
CHLORIDE SERPL-SCNC: 98 MMOL/L (ref 98–110)
CO2 SERPL-SCNC: 38 MMOL/L (ref 24–31)
CREAT BLD-MCNC: 1.1 MG/DL (ref 0.5–1.4)
CRP SERPL-MCNC: 3.78 MG/DL (ref 0–0.99)
DEPRECATED RDW RBC AUTO: 52.4 FL (ref 40–54)
EOSINOPHIL # BLD AUTO: 0.46 10*3/MM3 (ref 0–0.7)
EOSINOPHIL NFR BLD AUTO: 4.3 % (ref 0–4)
ERYTHROCYTE [DISTWIDTH] IN BLOOD BY AUTOMATED COUNT: 16.1 % (ref 12–15)
ERYTHROCYTE [SEDIMENTATION RATE] IN BLOOD: 44 MM/HR (ref 0–20)
GFR SERPL CREATININE-BSD FRML MDRD: 53 ML/MIN/1.73
GLUCOSE BLD-MCNC: 150 MG/DL (ref 70–100)
GLUCOSE BLDC GLUCOMTR-MCNC: 146 MG/DL (ref 70–130)
GLUCOSE BLDC GLUCOMTR-MCNC: 168 MG/DL (ref 70–130)
GLUCOSE BLDC GLUCOMTR-MCNC: 171 MG/DL (ref 70–130)
GLUCOSE BLDC GLUCOMTR-MCNC: 178 MG/DL (ref 70–130)
HCT VFR BLD AUTO: 35.8 % (ref 37–47)
HGB BLD-MCNC: 11.1 G/DL (ref 12–16)
IMM GRANULOCYTES # BLD: 0.05 10*3/MM3 (ref 0–0.03)
IMM GRANULOCYTES NFR BLD: 0.5 % (ref 0–5)
LYMPHOCYTES # BLD AUTO: 3.03 10*3/MM3 (ref 0.72–4.86)
LYMPHOCYTES NFR BLD AUTO: 28.1 % (ref 15–45)
MCH RBC QN AUTO: 28.3 PG (ref 28–32)
MCHC RBC AUTO-ENTMCNC: 31 G/DL (ref 33–36)
MCV RBC AUTO: 91.3 FL (ref 82–98)
MONOCYTES # BLD AUTO: 0.95 10*3/MM3 (ref 0.19–1.3)
MONOCYTES NFR BLD AUTO: 8.8 % (ref 4–12)
NEUTROPHILS # BLD AUTO: 6.28 10*3/MM3 (ref 1.87–8.4)
NEUTROPHILS NFR BLD AUTO: 58 % (ref 39–78)
NRBC BLD MANUAL-RTO: 0 /100 WBC (ref 0–0)
PLATELET # BLD AUTO: 415 10*3/MM3 (ref 130–400)
PMV BLD AUTO: 10.5 FL (ref 6–12)
POTASSIUM BLD-SCNC: 3.9 MMOL/L (ref 3.5–5.3)
RBC # BLD AUTO: 3.92 10*6/MM3 (ref 4.2–5.4)
SODIUM BLD-SCNC: 143 MMOL/L (ref 135–145)
WBC NRBC COR # BLD: 10.8 10*3/MM3 (ref 4.8–10.8)

## 2018-01-18 PROCEDURE — 86140 C-REACTIVE PROTEIN: CPT | Performed by: INTERNAL MEDICINE

## 2018-01-18 PROCEDURE — 85025 COMPLETE CBC W/AUTO DIFF WBC: CPT | Performed by: INTERNAL MEDICINE

## 2018-01-18 PROCEDURE — 85651 RBC SED RATE NONAUTOMATED: CPT | Performed by: INTERNAL MEDICINE

## 2018-01-18 PROCEDURE — 85007 BL SMEAR W/DIFF WBC COUNT: CPT | Performed by: INTERNAL MEDICINE

## 2018-01-18 PROCEDURE — 80048 BASIC METABOLIC PNL TOTAL CA: CPT | Performed by: INTERNAL MEDICINE

## 2018-01-18 PROCEDURE — 82962 GLUCOSE BLOOD TEST: CPT

## 2018-01-18 NOTE — PROGRESS NOTES
"[facility]  ALONZO Akers M.D.  BRAYDEN Sheridan      Internal Medicine Progress Note    1/18/2018   2:25 PM    Name:  Radha Boswell  MRN:    [id number]     Acct:     [id number]   Room:  [address] [place] [address] Day: 0     Admit Date: 1/5/2018 11:08 PM  PCP: BRAYDEN Edmond    Subjective:     C/C: \"Gas\" and left groin pain    Interval History: Status: improved. Up to chair. Possible discharge Monday if wound vac in place.    Review of Systems   Constitution: Positive for weakness and malaise/fatigue. Negative for chills, decreased appetite and fever.   HENT: Negative for congestion, hoarse voice, nosebleeds and sore throat.    Eyes: Negative for blurred vision, discharge, pain and visual disturbance.   Cardiovascular: Negative for chest pain, dyspnea on exertion, irregular heartbeat, leg swelling, orthopnea and palpitations.   Respiratory: Negative for cough, shortness of breath, sputum production and wheezing.    Hematologic/Lymphatic: Negative for bleeding problem. Does not bruise/bleed easily.   Skin: Positive for poor wound healing. Negative for dry skin, flushing, itching, rash and suspicious lesions.   Musculoskeletal: Positive for muscle weakness. Negative for arthritis, back pain, falls, joint pain, joint swelling and myalgias.   Gastrointestinal: Positive for heartburn. Negative for bloating, abdominal pain, change in bowel habit, diarrhea, flatus, melena and nausea.   Genitourinary: Negative for frequency, hesitancy, incomplete emptying, pelvic pain and urgency.   Neurological: Negative for difficulty with concentration, disturbances in coordination, dizziness, headaches, numbness, paresthesias and tremors.   Psychiatric/Behavioral: Negative for altered mental status, hallucinations and memory loss. The patient is not nervous/anxious.      Medications:     Allergies: No Known Allergies    Current Meds:   Current Facility-Administered Medications:   •  allopurinol (ZYLOPRIM) " tablet 100 mg, 100 mg, Oral, Daily, Rashi Traore MD  •  [START ON 1/30/2018] aspirin EC tablet 81 mg, 81 mg, Oral, Daily, Rashi Traore MD  •  calcitriol (ROCALTROL) capsule 0.25 mcg, 0.25 mcg, Oral, Daily, Rashi Traore MD  •  calcium carbonate (TUMS) chewable tablet 500 mg (200 mg elemental), 2 tablet, Oral, Q4H PRN, Rashi Traore MD  •  clopidogrel (PLAVIX) tablet 75 mg, 75 mg, Oral, Daily, Rashi Traore MD  •  docusate sodium (COLACE) capsule 100 mg, 100 mg, Oral, BID PRN, Rashi Traore MD  •  DULoxetine (CYMBALTA) DR capsule 60 mg, 60 mg, Oral, Q12H, Rashi Traore MD  •  ferrous sulfate tablet 325 mg, 325 mg, Oral, TID With Meals, Rashi Traore MD  •  fluticasone (FLONASE) 50 MCG/ACT nasal spray 2 spray, 2 spray, Each Nare, Daily, Rashi Traore MD  •  furosemide (LASIX) tablet 20 mg, 20 mg, Oral, Daily, Rashi Traore MD  •  Influenza Vac Subunit Quad (FLUCELVAX) injection 0.5 mL, 0.5 mL, Intramuscular, Once, Rashi Traore MD  •  insulin lispro (humaLOG) injection 2-7 Units, 2-7 Units, Subcutaneous, 4x Daily AC & at Bedtime, Rashi Traore MD  •  isosorbide mononitrate (IMDUR) 24 hr tablet 30 mg, 30 mg, Oral, Q24H, Rashi Traore MD  •  levothyroxine (SYNTHROID, LEVOTHROID) tablet 100 mcg, 100 mcg, Oral, Q AM, Rashi Traore MD  •  lisinopril (PRINIVIL,ZESTRIL) tablet 2.5 mg, 2.5 mg, Oral, Q24H, Rashi Traore MD  •  metFORMIN (GLUCOPHAGE) tablet 1,000 mg, 1,000 mg, Oral, BID With Meals, Rashi Traore MD  •  metoprolol tartrate (LOPRESSOR) tablet 12.5 mg, 12.5 mg, Oral, Q12H, Rashi Traore MD  •  mirtazapine (REMERON) tablet 30 mg, 30 mg, Oral, Nightly, Rashi Traore MD  •  nicotine (NICODERM CQ) 14 MG/24HR patch 1 patch, 1 patch, Transdermal, Daily, Rashi Traore MD  •  oxyCODONE-acetaminophen (PERCOCET)  MG per tablet 1 tablet, 1 tablet, Oral, Q4H PRN,  "Rashi Traore MD  •  pantoprazole (PROTONIX) EC tablet 40 mg, 40 mg, Oral, Daily, Rashi Traore MD  •  pneumococcal polysaccharide 23-valent (PNEUMOVAX-23) vaccine 0.5 mL, 0.5 mL, Intramuscular, Once, Rashi Traore MD  •  potassium chloride (MICRO-K) CR capsule 20 mEq, 20 mEq, Oral, Once, Rashi Traore MD  •  potassium chloride (MICRO-K) CR capsule 20 mEq, 20 mEq, Oral, Daily, Rashi Traore MD  •  pregabalin (LYRICA) capsule 200 mg, 200 mg, Oral, Q8H, Rashi Traore MD  •  rivaroxaban (XARELTO) tablet 15 mg, 15 mg, Oral, Daily With Dinner **FOLLOWED BY** [START ON 1/30/2018] rivaroxaban (XARELTO) tablet 20 mg, 20 mg, Oral, Daily With Dinner, Rashi Traore MD  •  tiZANidine (ZANAFLEX) tablet 8 mg, 8 mg, Oral, Q8H, Rashi Traore MD  •  varenicline (CHANTIX) tablet 0.5 mg, 0.5 mg, Oral, Daily, Rashi Traore MD    Data:     Code Status:  No Order    No family history on file.    Social History     Social History   • Marital status: Legally      Spouse name: N/A   • Number of children: N/A   • Years of education: N/A     Occupational History   • Not on file.     Social History Main Topics   • Smoking status: Current Every Day Smoker     Packs/day: 0.50     Types: Cigarettes   • Smokeless tobacco: Not on file   • Alcohol use No   • Drug use: No   • Sexual activity: Not on file     Other Topics Concern   • Not on file     Social History Narrative   • No narrative on file       Vitals:  Ht 175.3 cm (69\")  Wt (!) 160 kg (353 lb 6.4 oz)  BMI 52.19 kg/m2    T 97.5, P 65, RR 18, /99, Sp02 96% (room air)    I/O (24Hr):  No intake or output data in the 24 hours ending 01/18/18 1425    Labs and imaging:      Lab Results (last 24 hours)     Procedure Component Value Units Date/Time    POC Glucose Once [[id number]]  (Abnormal) Collected:  01/17/18 1645    Specimen:  Blood Updated:  01/17/18 1657     Glucose 154 (H) mg/dL       : TANIA " Allyson VictoriaMeter ID: [id number]       POC Glucose Once [[id number]]  (Abnormal) Collected:  01/17/18 2006    Specimen:  Blood Updated:  01/17/18 2043     Glucose 145 (H) mg/dL       : AJ Lane KathyMeter ID: [id number]       POC Glucose Once [[id number]]  (Abnormal) Collected:  01/18/18 0534    Specimen:  Blood Updated:  01/18/18 0555     Glucose 178 (H) mg/dL       : AJ Lane KathyMeter ID: [id number]       Sedimentation Rate [[id number]]  (Abnormal) Collected:  01/18/18 0630    Specimen:  Blood Updated:  01/18/18 0652     Sed Rate 44 (H) mm/hr     CBC Auto Differential [[id number]]  (Abnormal) Collected:  01/18/18 0630    Specimen:  Blood Updated:  01/18/18 0707     WBC 10.80 10*3/mm3      RBC 3.92 (L) 10*6/mm3      Hemoglobin 11.1 (L) g/dL      Hematocrit 35.8 (L) %      MCV 91.3 fL      MCH 28.3 pg      MCHC 31.0 (L) g/dL      RDW 16.1 (H) %      RDW-SD 52.4 fl      MPV 10.5 fL      Platelets 415 (H) 10*3/mm3      Neutrophil % 58.0 %      Lymphocyte % 28.1 %      Monocyte % 8.8 %      Eosinophil % 4.3 (H) %      Basophil % 0.3 %      Immature Grans % 0.5 %      Neutrophils, Absolute 6.28 10*3/mm3      Lymphocytes, Absolute 3.03 10*3/mm3      Monocytes, Absolute 0.95 10*3/mm3      Eosinophils, Absolute 0.46 10*3/mm3      Basophils, Absolute 0.03 10*3/mm3      Immature Grans, Absolute 0.05 (H) 10*3/mm3      nRBC 0.0 /100 WBC     CBC & Differential [[id number]] Collected:  01/18/18 0630    Specimen:  Blood Updated:  01/18/18 0707    Narrative:       The following orders were created for panel order CBC & Differential.  Procedure                               Abnormality         Status                     ---------                               -----------         ------                     Scan Slide[[id number]]                                       Final result               CBC Auto Differential[[id number]]        Abnormal            Final result                 Please view results  for these tests on the individual orders.    Scan Slide [[id number]] Collected:  01/18/18 0630    Specimen:  Blood Updated:  01/18/18 0707    Basic Metabolic Panel [[id number]]  (Abnormal) Collected:  01/18/18 0630    Specimen:  Blood Updated:  01/18/18 0709     Glucose 150 (H) mg/dL      BUN 12 mg/dL      Creatinine 1.10 mg/dL      Sodium 143 mmol/L      Potassium 3.9 mmol/L      Chloride 98 mmol/L      CO2 38.0 (H) mmol/L      Calcium 9.7 mg/dL      eGFR Non African Amer 53 (L) mL/min/1.73      BUN/Creatinine Ratio 10.9     Anion Gap 7.0 mmol/L     Narrative:       GFR Normal >60  Chronic Kidney Disease <60  Kidney Failure <15    C-reactive Protein [[id number]]  (Abnormal) Collected:  01/18/18 0630    Specimen:  Blood Updated:  01/18/18 0709     C-Reactive Protein 3.78 (H) mg/dL     POC Glucose Once [[id number]]  (Abnormal) Collected:  01/18/18 1200    Specimen:  Blood Updated:  01/18/18 1216     Glucose 171 (H) mg/dL       : RYLEY Mary EllenMeter ID: [id number]             Physical Examination:        Physical Exam   Constitutional: Vital signs are normal. She appears well-developed and well-nourished. She is cooperative.   HENT:   Head: Normocephalic and atraumatic.   Nose: Nose normal.   Mouth/Throat: Oropharynx is clear and moist.   Eyes: Conjunctivae, EOM and lids are normal. Pupils are equal, round, and reactive to light.   Neck: Trachea normal and normal range of motion. Neck supple.   Cardiovascular: Normal rate, regular rhythm and normal heart sounds.    Abdominal: Soft. Normal appearance and bowel sounds are normal.   Obese   Musculoskeletal: Normal range of motion. Edema: 1+ ble.   Wound VAC to left groin intact   Lymphadenopathy:     She has no cervical adenopathy.     She has no axillary adenopathy.        Left: No supraclavicular adenopathy present.   Neurological: She is alert. She has normal strength. No cranial nerve deficit or sensory deficit.   Skin: Skin is warm, dry and intact. No  petechiae and no rash noted. No cyanosis or erythema. Nails show no clubbing.   Wound vac c/d/i - no surrounding erythema   Psychiatric: She has a normal mood and affect. Her speech is normal and behavior is normal. Judgment and thought content normal. Cognition and memory are normal.   Nursing note and vitals reviewed.      Assessment:          Past Medical History:   Diagnosis Date   • Arthritis    • Diabetes mellitus    • Hyperlipidemia    • Hypertension    • Migraine         Plan:         1. Left femoral artery bleed with hematoma  2.   Coronary artery disease  3.   Recent MI  4.   Diabetes type II with complication  5.   History of hypertension assisting with Hypotension  6.   Multifactorial Anemia to include acute blood loss and iron deficiency  7.   Hypokalemia - resolved  8.   Leukocytosis - improving  9.   Chronic anticoagulation  10.  Neuropathy     Continue current treatment. Monitor counts. Increase activity. Continue wound care. Continue pain control efforts. Encouraged compliance.    Electronically signed by Rashi Traore MD on 1/18/2018 at 2:25 PM

## 2018-01-19 LAB
GLUCOSE BLDC GLUCOMTR-MCNC: 124 MG/DL (ref 70–130)
GLUCOSE BLDC GLUCOMTR-MCNC: 128 MG/DL (ref 70–130)
GLUCOSE BLDC GLUCOMTR-MCNC: 159 MG/DL (ref 70–130)
GLUCOSE BLDC GLUCOMTR-MCNC: 173 MG/DL (ref 70–130)

## 2018-01-19 PROCEDURE — 63710000001 INSULIN LISPRO (HUMAN) PER 5 UNITS: Performed by: INTERNAL MEDICINE

## 2018-01-19 PROCEDURE — 97164 PT RE-EVAL EST PLAN CARE: CPT

## 2018-01-19 PROCEDURE — 82962 GLUCOSE BLOOD TEST: CPT

## 2018-01-19 PROCEDURE — 97605 NEG PRS WND THER DME<=50SQCM: CPT

## 2018-01-19 RX ORDER — ALPRAZOLAM 0.5 MG/1
0.5 TABLET ORAL 3 TIMES DAILY PRN
Status: DISCONTINUED | OUTPATIENT
Start: 2018-01-19 | End: 2018-01-22 | Stop reason: HOSPADM

## 2018-01-19 NOTE — PROGRESS NOTES
Adult Nutrition  Assessment/PES    Patient Name:  Radha Boswell  YOB: 1970  MRN: [id number]  Admit Date:  1/5/2018    Assessment Date:  1/19/2018    Comments:  Pts PO intake remains good, 98% avg over 5 meals.           Reason for Assessment       01/19/18 1529    Reason for Assessment    Reason For Assessment/Visit (P)  follow up protocol              Nutrition/Diet History       01/19/18 1529    Nutrition/Diet History    Other (P)  PT and OT working with pt during time of visit.             Anthropometrics       01/19/18 1552    Anthropometrics    RD Documented Current Weight  (P)  160 kg (353 lb)    Body Mass Index (BMI)    BMI Grade (P)  greater than 40 - obesity grade III            Labs/Tests/Procedures/Meds       01/19/18 1552    Labs/Tests/Procedures/Meds    Labs/Tests Review (P)  Reviewed;Glucose;C-React PRO;Platlets;GFR;Hgb Hct   GFR 53    Medication Review (P)  Reviewed, pertinent;Anticoag;Diuretic;Insulin;Diabetic Oral Agent;Pressors;Prokinetic Agent    Significant Vitals (P)  reviewed            Physical Findings       01/19/18 1554    Physical Appearance    Overall Physical Appearance (P)  obese    Gastrointestinal (P)  --   BM 1/18    Skin (P)  edema;surgical wound;other (see comments)   edema to L leg; wound vac to R thigh; rani score 23/22              Nutrition Prescription Ordered       01/19/18 1556    Nutrition Prescription PO    Current PO Diet (P)  Regular    Common Modifiers (P)  Cardiac;Low Sodium    Low Sodium Details (P)  2,000 mg Sodium            Evaluation of Received Nutrient/Fluid Intake       01/19/18 1556    Evaluation of Received Nutrient/Fluid Intake    Number of Days Evaluated (P)  2 days    Nutrition Delivered (P)  Fluid Evaluation    Fluid Intake Evaluation    Oral Fluid (mL) (P)  1570    Total Fluid Intake (mL) (P)  1570    PO Evaluation    Number of Days PO Intake Evaluated (P)  2 days    Number of Meals (P)  5    % PO Intake (P)  98%             Problem/Interventions:        Problem 1       01/19/18 1557    Nutrition Diagnoses Problem 1    Problem 1 (P)  Increased Nutrient Needs    Macronutrient (P)  Kcal;Protein    Etiology (related to) (P)  Medical Diagnosis    Cardiac (P)  CAD   L central femoral artery bleed c groin hematoma    Endocrine (P)  DM2    Signs/Symptoms (evidenced by) (P)  PO Intake    Percent (%) intake recorded (P)  98 %    Over number of meals (P)  5                    Intervention Goal       01/19/18 6337    Intervention Goal    General (P)  Maintain nutrition;Meet nutritional needs for age/condition;Reduce/improve symptoms;Disease management/therapy;Improved nutrition related lab(s)    PO (P)  Maintain intake;Meet estimated needs    Weight (P)  Appropriate weight loss            Nutrition Intervention       01/19/18 7577    Nutrition Intervention    RD/Tech Action (P)  Follow Tx progress;Care plan reviewd              Education/Evaluation       01/19/18 1558    Monitor/Evaluation    Monitor (P)  Per protocol;PO intake;Pertinent labs;Skin status   no d/c needs noted at this time, will continue to follow.         Electronically signed by:  Maia Webster  01/19/18 3:59 PM

## 2018-01-19 NOTE — PROGRESS NOTES
"[facility]  ALONZO Akers M.D.  BRAYDEN Sheridan      Internal Medicine Progress Note    1/19/2018   9:51 AM    Name:  Radha Boswell  MRN:    [id number]     Acct:     [id number]   Room:  [address] [place] [address] Day: 0     Admit Date: 1/5/2018 11:08 PM  PCP: BRAYDEN Edmond    Subjective:     C/C: \"Gas\" and left groin pain    Interval History: Status: improved. Sleeping in chair. No family at bedside. Woke from sleep. Tolerating treatment thus far. Planning possible discharge to home Monday to continue outpatient wound care.     Review of Systems   Constitution: Positive for weakness and malaise/fatigue. Negative for chills, decreased appetite and fever.   HENT: Negative for congestion, hoarse voice, nosebleeds and sore throat.    Eyes: Negative for blurred vision, discharge, pain and visual disturbance.   Cardiovascular: Negative for chest pain, dyspnea on exertion, irregular heartbeat, leg swelling, orthopnea and palpitations.   Respiratory: Negative for cough, shortness of breath, sputum production and wheezing.    Hematologic/Lymphatic: Negative for bleeding problem. Does not bruise/bleed easily.   Skin: Positive for poor wound healing. Negative for dry skin, flushing, itching, rash and suspicious lesions.   Musculoskeletal: Positive for muscle weakness. Negative for arthritis, back pain, falls, joint pain, joint swelling and myalgias.   Gastrointestinal: Positive for heartburn. Negative for bloating, abdominal pain, change in bowel habit, diarrhea, flatus, melena and nausea.   Genitourinary: Negative for frequency, hesitancy, incomplete emptying, pelvic pain and urgency.   Neurological: Negative for difficulty with concentration, disturbances in coordination, dizziness, headaches, numbness, paresthesias and tremors.   Psychiatric/Behavioral: Negative for altered mental status, hallucinations and memory loss. The patient is not nervous/anxious.      Medications:     Allergies: No " Known Allergies    Current Meds:   Current Facility-Administered Medications:   •  allopurinol (ZYLOPRIM) tablet 100 mg, 100 mg, Oral, Daily, Rashi Traore MD  •  [START ON 1/30/2018] aspirin EC tablet 81 mg, 81 mg, Oral, Daily, Rashi Traore MD  •  calcitriol (ROCALTROL) capsule 0.25 mcg, 0.25 mcg, Oral, Daily, Rashi Traore MD  •  calcium carbonate (TUMS) chewable tablet 500 mg (200 mg elemental), 2 tablet, Oral, Q4H PRN, Rashi Traore MD  •  clopidogrel (PLAVIX) tablet 75 mg, 75 mg, Oral, Daily, Rashi Traore MD  •  docusate sodium (COLACE) capsule 100 mg, 100 mg, Oral, BID PRN, Rashi Traore MD  •  DULoxetine (CYMBALTA) DR capsule 60 mg, 60 mg, Oral, Q12H, Rashi Traore MD  •  ferrous sulfate tablet 325 mg, 325 mg, Oral, TID With Meals, Rashi Traore MD  •  fluticasone (FLONASE) 50 MCG/ACT nasal spray 2 spray, 2 spray, Each Nare, Daily, Rashi Traore MD  •  furosemide (LASIX) tablet 20 mg, 20 mg, Oral, Daily, Rashi Traore MD  •  Influenza Vac Subunit Quad (FLUCELVAX) injection 0.5 mL, 0.5 mL, Intramuscular, Once, Rashi Traore MD  •  insulin lispro (humaLOG) injection 2-7 Units, 2-7 Units, Subcutaneous, 4x Daily AC & at Bedtime, Rashi Traore MD  •  isosorbide mononitrate (IMDUR) 24 hr tablet 30 mg, 30 mg, Oral, Q24H, Rashi Traore MD  •  levothyroxine (SYNTHROID, LEVOTHROID) tablet 100 mcg, 100 mcg, Oral, Q AM, Rashi Traore MD  •  lisinopril (PRINIVIL,ZESTRIL) tablet 2.5 mg, 2.5 mg, Oral, Q24H, Rashi Traore MD  •  metFORMIN (GLUCOPHAGE) tablet 1,000 mg, 1,000 mg, Oral, BID With Meals, Rashi Traore MD  •  metoprolol tartrate (LOPRESSOR) tablet 12.5 mg, 12.5 mg, Oral, Q12H, Rashi Traore MD  •  mirtazapine (REMERON) tablet 30 mg, 30 mg, Oral, Nightly, Rashi Traore MD  •  nicotine (NICODERM CQ) 14 MG/24HR patch 1 patch, 1 patch, Transdermal, Daily, Rashi Steen  "MD León  •  oxyCODONE-acetaminophen (PERCOCET)  MG per tablet 1 tablet, 1 tablet, Oral, Q4H PRN, Rashi Traore MD  •  pantoprazole (PROTONIX) EC tablet 40 mg, 40 mg, Oral, Daily, Rashi Traore MD  •  pneumococcal polysaccharide 23-valent (PNEUMOVAX-23) vaccine 0.5 mL, 0.5 mL, Intramuscular, Once, Rashi Traore MD  •  potassium chloride (MICRO-K) CR capsule 20 mEq, 20 mEq, Oral, Once, Rashi Traore MD  •  potassium chloride (MICRO-K) CR capsule 20 mEq, 20 mEq, Oral, Daily, Rashi Traore MD  •  pregabalin (LYRICA) capsule 200 mg, 200 mg, Oral, Q8H, Rashi Traore MD  •  rivaroxaban (XARELTO) tablet 15 mg, 15 mg, Oral, Daily With Dinner **FOLLOWED BY** [START ON 1/30/2018] rivaroxaban (XARELTO) tablet 20 mg, 20 mg, Oral, Daily With Dinner, Rashi Traore MD  •  tiZANidine (ZANAFLEX) tablet 8 mg, 8 mg, Oral, Q8H, Rashi Traore MD  •  varenicline (CHANTIX) tablet 0.5 mg, 0.5 mg, Oral, Daily, Rashi Traore MD    Data:     Code Status:  No Order    No family history on file.    Social History     Social History   • Marital status: Legally      Spouse name: N/A   • Number of children: N/A   • Years of education: N/A     Occupational History   • Not on file.     Social History Main Topics   • Smoking status: Current Every Day Smoker     Packs/day: 0.50     Types: Cigarettes   • Smokeless tobacco: Not on file   • Alcohol use No   • Drug use: No   • Sexual activity: Not on file     Other Topics Concern   • Not on file     Social History Narrative   • No narrative on file       Vitals:  Ht 175.3 cm (69\")  Wt (!) 160 kg (353 lb 6.4 oz)  BMI 52.19 kg/m2    T 96.5, P 68, RR 16, /55, Sp02 97% (room air)    I/O (24Hr):  No intake or output data in the 24 hours ending 01/19/18 0951    Labs and imaging:      Lab Results (last 24 hours)     Procedure Component Value Units Date/Time    POC Glucose Once [[id number]]  (Abnormal) Collected:  " 01/18/18 1200    Specimen:  Blood Updated:  01/18/18 1216     Glucose 171 (H) mg/dL       : BPSTEPHEN Mary EllenMeter ID: [id number]       POC Glucose Once [[id number]]  (Abnormal) Collected:  01/18/18 1743    Specimen:  Blood Updated:  01/18/18 1755     Glucose 168 (H) mg/dL       : [id number] Lamb KristaMeter ID: [id number]       POC Glucose Once [[id number]]  (Abnormal) Collected:  01/18/18 1951    Specimen:  Blood Updated:  01/18/18 2007     Glucose 146 (H) mg/dL       : [email or web address] brenden CarlaMeter ID: [id number]       POC Glucose Once [[id number]]  (Abnormal) Collected:  01/19/18 0556    Specimen:  Blood Updated:  01/19/18 0628     Glucose 159 (H) mg/dL       : [email or web address] brenden CarlaMeter ID: [id number]             Physical Examination:        Physical Exam   Constitutional: Vital signs are normal. She appears well-developed and well-nourished. She is cooperative.   HENT:   Head: Normocephalic and atraumatic.   Nose: Nose normal.   Mouth/Throat: Oropharynx is clear and moist.   Eyes: Conjunctivae, EOM and lids are normal. Pupils are equal, round, and reactive to light.   Neck: Trachea normal and normal range of motion. Neck supple.   Cardiovascular: Normal rate, regular rhythm and normal heart sounds.    Abdominal: Soft. Normal appearance and bowel sounds are normal.   Obese   Musculoskeletal: Normal range of motion. Edema: 1+ ble.   Wound VAC to left groin intact   Lymphadenopathy:     She has no cervical adenopathy.     She has no axillary adenopathy.        Left: No supraclavicular adenopathy present.   Neurological: She is alert. She has normal strength. No cranial nerve deficit or sensory deficit.   Skin: Skin is warm, dry and intact. No petechiae and no rash noted. No cyanosis or erythema. Nails show no clubbing.   Wound vac c/d/i - no surrounding erythema   Psychiatric: She has a normal mood and affect. Her speech is normal and behavior is normal. Judgment and thought  content normal. Cognition and memory are normal.   Nursing note and vitals reviewed.      Assessment:          Past Medical History:   Diagnosis Date   • Arthritis    • Diabetes mellitus    • Hyperlipidemia    • Hypertension    • Migraine         Plan:         1. Left femoral artery bleed with hematoma  2.   Coronary artery disease  3.   Recent MI  4.   Diabetes type II with complication  5.   History of hypertension assisting with Hypotension  6.   Multifactorial Anemia to include acute blood loss and iron deficiency  7.   Hypokalemia - resolved  8.   Leukocytosis - improving  9.   Chronic anticoagulation  10.  Neuropathy     Continue current treatment. Monitor counts. Increase activity. Continue wound care. Continue pain control efforts. Encouraged compliance. Labs Monday. Probable discharge next week.     Electronically signed by BRAYDEN Sethi on 1/19/2018 at 9:51 AM

## 2018-01-20 LAB
GLUCOSE BLDC GLUCOMTR-MCNC: 124 MG/DL (ref 70–130)
GLUCOSE BLDC GLUCOMTR-MCNC: 140 MG/DL (ref 70–130)
GLUCOSE BLDC GLUCOMTR-MCNC: 161 MG/DL (ref 70–130)
GLUCOSE BLDC GLUCOMTR-MCNC: 181 MG/DL (ref 70–130)

## 2018-01-20 PROCEDURE — 97605 NEG PRS WND THER DME<=50SQCM: CPT

## 2018-01-20 PROCEDURE — 82962 GLUCOSE BLOOD TEST: CPT

## 2018-01-20 PROCEDURE — 63710000001 INSULIN LISPRO (HUMAN) PER 5 UNITS: Performed by: INTERNAL MEDICINE

## 2018-01-20 NOTE — PROGRESS NOTES
[facility]  ALONZO Akers M.D.  BRAYDEN Sheridan      Internal Medicine Progress Note    1/20/2018   7:31 AM    Name:  Radha Boswell  MRN:    [id number]     Acct:     [id number]   Room:  [address] [place] [address] Day: 0     Admit Date: 1/5/2018 11:08 PM  PCP: BRAYDEN Edmond    Subjective:     C/C: Left groin pain    Interval History: Status: improved. Resting in chair. No family at bedside. Tolerating treatment thus far. Planning possible discharge to home Monday to continue outpatient wound care.     Review of Systems   Constitution: Positive for weakness and malaise/fatigue. Negative for chills, decreased appetite and fever.   HENT: Negative for congestion, hoarse voice, nosebleeds and sore throat.    Eyes: Negative for blurred vision, discharge, pain and visual disturbance.   Cardiovascular: Negative for chest pain, dyspnea on exertion, irregular heartbeat, leg swelling, orthopnea and palpitations.   Respiratory: Negative for cough, shortness of breath, sputum production and wheezing.    Hematologic/Lymphatic: Negative for bleeding problem. Does not bruise/bleed easily.   Skin: Positive for poor wound healing. Negative for dry skin, flushing, itching, rash and suspicious lesions.   Musculoskeletal: Positive for muscle weakness. Negative for arthritis, back pain, falls, joint pain, joint swelling and myalgias.   Gastrointestinal: Positive for heartburn. Negative for bloating, abdominal pain, change in bowel habit, diarrhea, flatus, melena and nausea.   Genitourinary: Negative for frequency, hesitancy, incomplete emptying, pelvic pain and urgency.   Neurological: Negative for difficulty with concentration, disturbances in coordination, dizziness, headaches, numbness, paresthesias and tremors.   Psychiatric/Behavioral: Negative for altered mental status, hallucinations and memory loss. The patient is not nervous/anxious.      Medications:     Allergies: No Known Allergies    Current  Meds:   Current Facility-Administered Medications:   •  allopurinol (ZYLOPRIM) tablet 100 mg, 100 mg, Oral, Daily, Rashi Traore MD  •  ALPRAZolam (XANAX) tablet 0.5 mg, 0.5 mg, Oral, TID PRN, Rashi Traore MD  •  [START ON 1/30/2018] aspirin EC tablet 81 mg, 81 mg, Oral, Daily, Rashi Traore MD  •  calcitriol (ROCALTROL) capsule 0.25 mcg, 0.25 mcg, Oral, Daily, Rashi Traore MD  •  calcium carbonate (TUMS) chewable tablet 500 mg (200 mg elemental), 2 tablet, Oral, Q4H PRN, Rashi Traore MD  •  clopidogrel (PLAVIX) tablet 75 mg, 75 mg, Oral, Daily, Rashi Traore MD  •  docusate sodium (COLACE) capsule 100 mg, 100 mg, Oral, BID PRN, Rashi Traore MD  •  DULoxetine (CYMBALTA) DR capsule 60 mg, 60 mg, Oral, Q12H, Rashi Traore MD  •  ferrous sulfate tablet 325 mg, 325 mg, Oral, TID With Meals, Rashi Traore MD  •  fluticasone (FLONASE) 50 MCG/ACT nasal spray 2 spray, 2 spray, Each Nare, Daily, Rashi Traore MD  •  furosemide (LASIX) tablet 20 mg, 20 mg, Oral, Daily, Rashi Traore MD  •  Influenza Vac Subunit Quad (FLUCELVAX) injection 0.5 mL, 0.5 mL, Intramuscular, Once, Rashi Traore MD  •  insulin lispro (humaLOG) injection 2-7 Units, 2-7 Units, Subcutaneous, 4x Daily AC & at Bedtime, Rashi Traore MD  •  isosorbide mononitrate (IMDUR) 24 hr tablet 30 mg, 30 mg, Oral, Q24H, Rashi Traore MD  •  levothyroxine (SYNTHROID, LEVOTHROID) tablet 100 mcg, 100 mcg, Oral, Q AM, Rashi Traore MD  •  lisinopril (PRINIVIL,ZESTRIL) tablet 2.5 mg, 2.5 mg, Oral, Q24H, Rashi Traore MD  •  metFORMIN (GLUCOPHAGE) tablet 1,000 mg, 1,000 mg, Oral, BID With Meals, Rashi Traore MD  •  metoprolol tartrate (LOPRESSOR) tablet 12.5 mg, 12.5 mg, Oral, Q12H, Rashi Traore MD  •  mirtazapine (REMERON) tablet 30 mg, 30 mg, Oral, Nightly, Rashi Traore MD  •  nicotine (NICODERM CQ) 14 MG/24HR  "patch 1 patch, 1 patch, Transdermal, Daily, Rashi Traore MD  •  oxyCODONE-acetaminophen (PERCOCET)  MG per tablet 1 tablet, 1 tablet, Oral, Q4H PRN, Rashi Traore MD  •  pantoprazole (PROTONIX) EC tablet 40 mg, 40 mg, Oral, Daily, Rashi Traore MD  •  pneumococcal polysaccharide 23-valent (PNEUMOVAX-23) vaccine 0.5 mL, 0.5 mL, Intramuscular, Once, Rashi Traore MD  •  potassium chloride (MICRO-K) CR capsule 20 mEq, 20 mEq, Oral, Once, Rashi Traore MD  •  potassium chloride (MICRO-K) CR capsule 20 mEq, 20 mEq, Oral, Daily, Rashi Traore MD  •  pregabalin (LYRICA) capsule 200 mg, 200 mg, Oral, Q8H, Rashi Traore MD  •  rivaroxaban (XARELTO) tablet 15 mg, 15 mg, Oral, Daily With Dinner **FOLLOWED BY** [START ON 1/30/2018] rivaroxaban (XARELTO) tablet 20 mg, 20 mg, Oral, Daily With Dinner, Rashi Traore MD  •  tiZANidine (ZANAFLEX) tablet 8 mg, 8 mg, Oral, Q8H, Rashi Traore MD  •  varenicline (CHANTIX) tablet 0.5 mg, 0.5 mg, Oral, Daily, Rashi Traore MD    Data:     Code Status:  No Order    No family history on file.    Social History     Social History   • Marital status: Legally      Spouse name: N/A   • Number of children: N/A   • Years of education: N/A     Occupational History   • Not on file.     Social History Main Topics   • Smoking status: Current Every Day Smoker     Packs/day: 0.50     Types: Cigarettes   • Smokeless tobacco: Not on file   • Alcohol use No   • Drug use: No   • Sexual activity: Not on file     Other Topics Concern   • Not on file     Social History Narrative   • No narrative on file       Vitals:  Ht 175.3 cm (69\")  Wt (!) 160 kg (353 lb 6.4 oz)  BMI 52.19 kg/m2    T 96.5, P 68, RR 16, /55, Sp02 97% (room air)    I/O (24Hr):  No intake or output data in the 24 hours ending 01/20/18 0731    Labs and imaging:      Lab Results (last 24 hours)     Procedure Component Value Units Date/Time    " POC Glucose Once [[id number]]  (Normal) Collected:  01/19/18 1141    Specimen:  Blood Updated:  01/19/18 1153     Glucose 124 mg/dL       : [id number] Wilson PatMeter ID: [id number]       POC Glucose Once [[id number]]  (Abnormal) Collected:  01/19/18 1635    Specimen:  Blood Updated:  01/19/18 1652     Glucose 173 (H) mg/dL       : [email or web address] Mcdermott KimberlyMeter ID: [id number]       POC Glucose Once [[id number]]  (Normal) Collected:  01/19/18 2015    Specimen:  Blood Updated:  01/19/18 2037     Glucose 128 mg/dL       : CDCAROL Barros CeyleighMeter ID: [id number]       POC Glucose Once [[id number]]  (Normal) Collected:  01/20/18 0458    Specimen:  Blood Updated:  01/20/18 0511     Glucose 124 mg/dL       : CDCAROL Barros CeyleighMeter ID: [id number]             Physical Examination:        Physical Exam   Constitutional: Vital signs are normal. She appears well-developed and well-nourished. She is cooperative.   HENT:   Head: Normocephalic and atraumatic.   Nose: Nose normal.   Mouth/Throat: Oropharynx is clear and moist.   Eyes: Conjunctivae, EOM and lids are normal. Pupils are equal, round, and reactive to light.   Neck: Trachea normal and normal range of motion. Neck supple.   Cardiovascular: Normal rate, regular rhythm and normal heart sounds.    Abdominal: Soft. Normal appearance and bowel sounds are normal.   Obese   Musculoskeletal: Normal range of motion. Edema: 1+ ble.   Wound VAC to left groin intact   Lymphadenopathy:     She has no cervical adenopathy.     She has no axillary adenopathy.        Left: No supraclavicular adenopathy present.   Neurological: She is alert. She has normal strength. No cranial nerve deficit or sensory deficit.   Skin: Skin is warm, dry and intact. No petechiae and no rash noted. No cyanosis or erythema. Nails show no clubbing.   Wound vac c/d/i - no surrounding erythema   Psychiatric: She has a normal mood and affect. Her speech is normal and behavior is  normal. Judgment and thought content normal. Cognition and memory are normal.   Nursing note and vitals reviewed.      Assessment:          Past Medical History:   Diagnosis Date   • Arthritis    • Diabetes mellitus    • Hyperlipidemia    • Hypertension    • Migraine         Plan:         1. Left femoral artery bleed with hematoma  2.   Coronary artery disease  3.   Recent MI  4.   Diabetes type II with complication  5.   History of hypertension assisting with Hypotension  6.   Multifactorial Anemia to include acute blood loss and iron deficiency  7.   Hypokalemia - resolved  8.   Leukocytosis - improving  9.   Chronic anticoagulation  10.  Neuropathy     Continue current treatment. Monitor counts. Increase activity. Continue wound care. Continue pain control efforts. Encouraged compliance. Labs Monday. Probable discharge next week.     Electronically signed by BRAYDEN Cleveland on 1/20/2018 at 7:31 AM

## 2018-01-21 LAB
GLUCOSE BLDC GLUCOMTR-MCNC: 165 MG/DL (ref 70–130)
GLUCOSE BLDC GLUCOMTR-MCNC: 177 MG/DL (ref 70–130)
GLUCOSE BLDC GLUCOMTR-MCNC: 203 MG/DL (ref 70–130)
GLUCOSE BLDC GLUCOMTR-MCNC: 376 MG/DL (ref 70–130)

## 2018-01-21 PROCEDURE — 82962 GLUCOSE BLOOD TEST: CPT

## 2018-01-21 PROCEDURE — 63710000001 INSULIN LISPRO (HUMAN) PER 5 UNITS: Performed by: INTERNAL MEDICINE

## 2018-01-21 NOTE — PROGRESS NOTES
[facility]  ALONZO Akers M.D.  BRAYDEN Sheridan      Internal Medicine Progress Note    1/21/2018   8:36 AM    Name:  Radha Boswell  MRN:    [id number]     Acct:     [id number]   Room:  [address] [place] [address] Day: 0     Admit Date: 1/5/2018 11:08 PM  PCP: BRAYDEN Edmond    Subjective:     C/C: Left groin pain    Interval History: Status: improved. Sleeping in chair, arouses easily. No family at bedside. Tolerating treatment thus far. Planning possible discharge to home Monday to continue outpatient wound care.  No new complaints.  Pain is controlled.    Review of Systems   Constitution: Positive for weakness and malaise/fatigue. Negative for chills, decreased appetite and fever.   HENT: Negative for congestion, hoarse voice, nosebleeds and sore throat.    Eyes: Negative for blurred vision, discharge, pain and visual disturbance.   Cardiovascular: Negative for chest pain, dyspnea on exertion, irregular heartbeat, leg swelling, orthopnea and palpitations.   Respiratory: Negative for cough, shortness of breath, sputum production and wheezing.    Hematologic/Lymphatic: Negative for bleeding problem. Does not bruise/bleed easily.   Skin: Positive for poor wound healing. Negative for dry skin, flushing, itching, rash and suspicious lesions.   Musculoskeletal: Positive for muscle weakness. Negative for arthritis, back pain, falls, joint pain, joint swelling and myalgias.   Gastrointestinal: Positive for heartburn. Negative for bloating, abdominal pain, change in bowel habit, diarrhea, flatus, melena and nausea.   Genitourinary: Negative for frequency, hesitancy, incomplete emptying, pelvic pain and urgency.   Neurological: Negative for difficulty with concentration, disturbances in coordination, dizziness, headaches, numbness, paresthesias and tremors.   Psychiatric/Behavioral: Negative for altered mental status, hallucinations and memory loss. The patient is not nervous/anxious.       Medications:     Allergies: No Known Allergies    Current Meds:   Current Facility-Administered Medications:   •  allopurinol (ZYLOPRIM) tablet 100 mg, 100 mg, Oral, Daily, Rashi Traore MD  •  ALPRAZolam (XANAX) tablet 0.5 mg, 0.5 mg, Oral, TID PRN, Rashi Traore MD  •  [START ON 1/30/2018] aspirin EC tablet 81 mg, 81 mg, Oral, Daily, Rashi Traore MD  •  calcitriol (ROCALTROL) capsule 0.25 mcg, 0.25 mcg, Oral, Daily, Rashi Traore MD  •  calcium carbonate (TUMS) chewable tablet 500 mg (200 mg elemental), 2 tablet, Oral, Q4H PRN, Rashi Traore MD  •  clopidogrel (PLAVIX) tablet 75 mg, 75 mg, Oral, Daily, Rashi Traore MD  •  docusate sodium (COLACE) capsule 100 mg, 100 mg, Oral, BID PRN, Rashi Traore MD  •  DULoxetine (CYMBALTA) DR capsule 60 mg, 60 mg, Oral, Q12H, Rashi Traore MD  •  ferrous sulfate tablet 325 mg, 325 mg, Oral, TID With Meals, Rashi Traore MD  •  fluticasone (FLONASE) 50 MCG/ACT nasal spray 2 spray, 2 spray, Each Nare, Daily, Rashi Traore MD  •  furosemide (LASIX) tablet 20 mg, 20 mg, Oral, Daily, Rashi Traore MD  •  Influenza Vac Subunit Quad (FLUCELVAX) injection 0.5 mL, 0.5 mL, Intramuscular, Once, Rashi Traore MD  •  insulin lispro (humaLOG) injection 2-7 Units, 2-7 Units, Subcutaneous, 4x Daily AC & at Bedtime, Rashi Traore MD  •  isosorbide mononitrate (IMDUR) 24 hr tablet 30 mg, 30 mg, Oral, Q24H, Rashi Traore MD  •  levothyroxine (SYNTHROID, LEVOTHROID) tablet 100 mcg, 100 mcg, Oral, Q AM, Rashi Traore MD  •  lisinopril (PRINIVIL,ZESTRIL) tablet 2.5 mg, 2.5 mg, Oral, Q24H, Rashi Traore MD  •  metFORMIN (GLUCOPHAGE) tablet 1,000 mg, 1,000 mg, Oral, BID With Meals, Rashi Traore MD  •  metoprolol tartrate (LOPRESSOR) tablet 12.5 mg, 12.5 mg, Oral, Q12H, Rashi Traore MD  •  mirtazapine (REMERON) tablet 30 mg, 30 mg, Oral, Nightly,  "Rashi Traore MD  •  nicotine (NICODERM CQ) 14 MG/24HR patch 1 patch, 1 patch, Transdermal, Daily, Rashi Traore MD  •  oxyCODONE-acetaminophen (PERCOCET)  MG per tablet 1 tablet, 1 tablet, Oral, Q4H PRN, Rashi Traore MD  •  pantoprazole (PROTONIX) EC tablet 40 mg, 40 mg, Oral, Daily, Rashi Traore MD  •  pneumococcal polysaccharide 23-valent (PNEUMOVAX-23) vaccine 0.5 mL, 0.5 mL, Intramuscular, Once, Rashi Traore MD  •  potassium chloride (MICRO-K) CR capsule 20 mEq, 20 mEq, Oral, Once, Rashi Traore MD  •  potassium chloride (MICRO-K) CR capsule 20 mEq, 20 mEq, Oral, Daily, Rashi Traore MD  •  pregabalin (LYRICA) capsule 200 mg, 200 mg, Oral, Q8H, Rashi Traore MD  •  rivaroxaban (XARELTO) tablet 15 mg, 15 mg, Oral, Daily With Dinner **FOLLOWED BY** [START ON 1/30/2018] rivaroxaban (XARELTO) tablet 20 mg, 20 mg, Oral, Daily With Dinner, Rashi Traore MD  •  tiZANidine (ZANAFLEX) tablet 8 mg, 8 mg, Oral, Q8H, Rashi Traore MD  •  varenicline (CHANTIX) tablet 0.5 mg, 0.5 mg, Oral, Daily, Rashi Traore MD    Data:     Code Status:  No Order    No family history on file.    Social History     Social History   • Marital status: Legally      Spouse name: N/A   • Number of children: N/A   • Years of education: N/A     Occupational History   • Not on file.     Social History Main Topics   • Smoking status: Current Every Day Smoker     Packs/day: 0.50     Types: Cigarettes   • Smokeless tobacco: Not on file   • Alcohol use No   • Drug use: No   • Sexual activity: Not on file     Other Topics Concern   • Not on file     Social History Narrative   • No narrative on file       Vitals:  Ht 175.3 cm (69\")  Wt (!) 160 kg (353 lb 6.4 oz)  BMI 52.19 kg/m2    T 96.7, P 68, RR 18, BP 88/49 (asymptomatic), Sp02 95% (room air)    I/O (24Hr):  No intake or output data in the 24 hours ending 01/21/18 0836    Labs and " imaging:      Lab Results (last 24 hours)     Procedure Component Value Units Date/Time    POC Glucose Once [[id number]]  (Abnormal) Collected:  01/20/18 1216    Specimen:  Blood Updated:  01/20/18 1228     Glucose 181 (H) mg/dL       : ANDRES Levine CarolynMeter ID: [id number]       POC Glucose Once [[id number]]  (Abnormal) Collected:  01/20/18 1604    Specimen:  Blood Updated:  01/20/18 1634     Glucose 161 (H) mg/dL       : ANDRES Levine CarolynMeter ID: [id number]       POC Glucose Once [[id number]]  (Abnormal) Collected:  01/20/18 2011    Specimen:  Blood Updated:  01/20/18 2023     Glucose 140 (H) mg/dL       : JUAN Barros CeyleighMeter ID: [id number]       POC Glucose Once [[id number]]  (Abnormal) Collected:  01/21/18 0611    Specimen:  Blood Updated:  01/21/18 0622     Glucose 376 (H) mg/dL       : JUAN Barros CeyleighMeter ID: [id number]             Physical Examination:        Physical Exam   Constitutional: Vital signs are normal. She appears well-developed and well-nourished. She is cooperative.   HENT:   Head: Normocephalic and atraumatic.   Nose: Nose normal.   Mouth/Throat: Oropharynx is clear and moist.   Eyes: Conjunctivae, EOM and lids are normal. Pupils are equal, round, and reactive to light.   Neck: Trachea normal and normal range of motion. Neck supple.   Cardiovascular: Normal rate, regular rhythm and normal heart sounds.    Abdominal: Soft. Normal appearance and bowel sounds are normal.   Obese   Musculoskeletal: Normal range of motion. Edema: 1+ ble.   Wound VAC to left groin intact   Lymphadenopathy:     She has no cervical adenopathy.     She has no axillary adenopathy.        Left: No supraclavicular adenopathy present.   Neurological: She is alert. She has normal strength. No cranial nerve deficit or sensory deficit.   Skin: Skin is warm, dry and intact. No petechiae and no rash noted. No cyanosis or erythema. Nails show no clubbing.   Wound vac c/d/i - no  surrounding erythema   Psychiatric: She has a normal mood and affect. Her speech is normal and behavior is normal. Judgment and thought content normal. Cognition and memory are normal.   Nursing note and vitals reviewed.      Assessment:          Past Medical History:   Diagnosis Date   • Arthritis    • Diabetes mellitus    • Hyperlipidemia    • Hypertension    • Migraine         Plan:         1. Left femoral artery bleed with hematoma  2.   Coronary artery disease  3.   Recent MI  4.   Diabetes type II with complication  5.   History of hypertension assisting with Hypotension  6.   Multifactorial Anemia to include acute blood loss and iron deficiency  7.   Hypokalemia - resolved  8.   Leukocytosis - improving  9.   Chronic anticoagulation  10.  Neuropathy     Continue current treatment. Monitor counts. Increase activity. Continue wound care. Continue pain control efforts. Encouraged compliance. Labs Monday. Anticipating discharge tomorrow.     Electronically signed by BRAYDEN Cleveland on 1/21/2018 at 8:36 AM

## 2018-01-22 VITALS — BODY MASS INDEX: 43.4 KG/M2 | WEIGHT: 293 LBS | HEIGHT: 69 IN

## 2018-01-22 LAB — GLUCOSE BLDC GLUCOMTR-MCNC: 200 MG/DL (ref 70–130)

## 2018-01-22 PROCEDURE — 63710000001 INSULIN LISPRO (HUMAN) PER 5 UNITS: Performed by: INTERNAL MEDICINE

## 2018-01-22 PROCEDURE — 82962 GLUCOSE BLOOD TEST: CPT

## 2018-01-22 PROCEDURE — 97606 NEG PRS WND THER DME>50 SQCM: CPT

## 2018-01-22 RX ORDER — LISINOPRIL 2.5 MG/1
2.5 TABLET ORAL
Qty: 30 TABLET | Refills: 0 | Status: SHIPPED | OUTPATIENT
Start: 2018-01-22

## 2018-01-22 RX ORDER — TIZANIDINE 4 MG/1
8 TABLET ORAL EVERY 8 HOURS SCHEDULED
Qty: 30 TABLET | Refills: 0 | Status: SHIPPED | OUTPATIENT
Start: 2018-01-22 | End: 2021-05-28

## 2018-01-22 RX ORDER — PREGABALIN 200 MG/1
200 CAPSULE ORAL EVERY 8 HOURS SCHEDULED
Qty: 30 CAPSULE | Refills: 0 | OUTPATIENT
Start: 2018-01-22

## 2018-01-22 RX ORDER — CLOPIDOGREL BISULFATE 75 MG/1
75 TABLET ORAL DAILY
Qty: 30 TABLET | Refills: 0 | Status: SHIPPED | OUTPATIENT
Start: 2018-01-22 | End: 2019-06-18

## 2018-01-22 RX ORDER — PANTOPRAZOLE SODIUM 40 MG/1
40 TABLET, DELAYED RELEASE ORAL DAILY
Qty: 30 TABLET | Refills: 0 | Status: SHIPPED | OUTPATIENT
Start: 2018-01-22

## 2018-01-22 RX ORDER — POTASSIUM CHLORIDE 750 MG/1
20 CAPSULE, EXTENDED RELEASE ORAL DAILY
Qty: 30 CAPSULE | Refills: 0 | Status: SHIPPED | OUTPATIENT
Start: 2018-01-22 | End: 2019-06-18

## 2018-01-22 RX ORDER — PSEUDOEPHEDRINE HCL 30 MG
100 TABLET ORAL 2 TIMES DAILY PRN
Qty: 30 CAPSULE | Refills: 0 | Status: SHIPPED | OUTPATIENT
Start: 2018-01-22

## 2018-01-22 RX ORDER — CALCITRIOL 0.25 UG/1
0.25 CAPSULE, LIQUID FILLED ORAL DAILY
Qty: 30 CAPSULE | Refills: 0 | Status: ON HOLD | OUTPATIENT
Start: 2018-01-22 | End: 2019-06-25

## 2018-01-22 RX ORDER — OXYCODONE AND ACETAMINOPHEN 10; 325 MG/1; MG/1
1 TABLET ORAL EVERY 4 HOURS PRN
Qty: 18 TABLET | Refills: 0 | OUTPATIENT
Start: 2018-01-22 | End: 2018-01-26

## 2018-01-22 RX ORDER — DULOXETIN HYDROCHLORIDE 60 MG/1
60 CAPSULE, DELAYED RELEASE ORAL EVERY 12 HOURS SCHEDULED
Qty: 60 CAPSULE | Refills: 0 | Status: SHIPPED | OUTPATIENT
Start: 2018-01-22 | End: 2021-05-28

## 2018-01-22 RX ORDER — FERROUS SULFATE 325(65) MG
325 TABLET ORAL
Qty: 90 TABLET | Refills: 0 | Status: SHIPPED | OUTPATIENT
Start: 2018-01-22 | End: 2019-06-18

## 2018-01-22 RX ORDER — MIRTAZAPINE 30 MG/1
30 TABLET, FILM COATED ORAL NIGHTLY
Qty: 10 TABLET | Refills: 0 | Status: SHIPPED | OUTPATIENT
Start: 2018-01-22 | End: 2021-05-28

## 2018-01-22 RX ORDER — CALCIUM CARBONATE 200(500)MG
2 TABLET,CHEWABLE ORAL EVERY 4 HOURS PRN
Qty: 30 TABLET | Refills: 0 | Status: SHIPPED | OUTPATIENT
Start: 2018-01-22 | End: 2019-06-18

## 2018-01-22 RX ORDER — LEVOTHYROXINE SODIUM 0.1 MG/1
100 TABLET ORAL DAILY
Qty: 30 TABLET | Refills: 0 | Status: SHIPPED | OUTPATIENT
Start: 2018-01-22

## 2018-01-22 RX ORDER — ASPIRIN 81 MG/1
81 TABLET ORAL DAILY
Qty: 30 TABLET | Refills: 0 | Status: SHIPPED | OUTPATIENT
Start: 2018-01-30 | End: 2019-06-18

## 2018-01-22 RX ORDER — FLUTICASONE PROPIONATE 50 MCG
2 SPRAY, SUSPENSION (ML) NASAL DAILY
Qty: 1 BOTTLE | Refills: 0 | Status: SHIPPED | OUTPATIENT
Start: 2018-01-22 | End: 2018-01-29 | Stop reason: HOSPADM

## 2018-01-22 RX ORDER — ISOSORBIDE MONONITRATE 30 MG/1
30 TABLET, EXTENDED RELEASE ORAL
Qty: 30 TABLET | Refills: 0 | Status: SHIPPED | OUTPATIENT
Start: 2018-01-22 | End: 2019-06-18

## 2018-01-22 RX ORDER — FUROSEMIDE 20 MG/1
20 TABLET ORAL DAILY
Qty: 30 TABLET | Refills: 0 | Status: SHIPPED | OUTPATIENT
Start: 2018-01-22

## 2018-01-22 RX ORDER — ALLOPURINOL 100 MG/1
100 TABLET ORAL DAILY
Qty: 30 TABLET | Refills: 0 | Status: SHIPPED | OUTPATIENT
Start: 2018-01-22 | End: 2019-06-18

## 2018-01-22 NOTE — DISCHARGE SUMMARY
[facility]  Gil Traore M.D.  KIRSTY Traore M.D.  BRAYDEN Sheridan    Internal Medicine Discharge Summary    Patient ID: Radha Boswell  MRN: [id number]     Acct:  [id number]       Patient's PCP: BRAYDEN Edmond    Admit Date: 1/5/2018     Discharge Date:   1/22/18    Admitting Physician: Rashi Traore MD    Discharge Physician: BRAYDEN Sethi     Active Discharge Diagnoses:  1. Left femoral artery bleed with hematoma  2.   Coronary artery disease  3.   Recent MI  4.   Diabetes type II with complication  5.   History of hypertension assisting with Hypotension  6.   Multifactorial Anemia to include acute blood loss and iron deficiency  7.   Hypokalemia - resolved  8.   Leukocytosis - improving  9.   Chronic anticoagulation  10.  Neuropathy        Hospital Problems  Active Problems:    * No active hospital problems. *     Past Medical History:   Diagnosis Date   • Arthritis    • Diabetes mellitus    • Hyperlipidemia    • Hypertension    • Migraine        The patient was seen and examined on the day of discharge and this discharge summary is in conjunction with any daily progress note from day of discharge.    Code Status:  No Order    Hospital Course: The patient had been in her usual state of health when she suffered an MI. She was hospitalized and treated by cardiology. She developed thromboembolic disease of her right leg which was treated with lysis. She was discharged to home and returned when she noticed increased edema, bleeding and bruising to the left thigh. The patient was taken to surgery per Dr. Romero and a puncture in the left femoral artery was repaired. Post-operatively, the patient had some continued oozing and resulting anemia requiring transfusions. The patient was not felt appropriate for discharge to home as she has some issues with mobility and with wound care due to the location of the wound and the patient's body habitus. As such, the patient was  transferred to our facility for further treatment. While here, the patient has tolerated treatment. Wound vac has been changed three times weekly and has shown improvement. At this point, the patient is felt stable for discharge to home with home health for continue wound care.     Consults:  None    Disposition: home health    Discharged Condition: Stable    Follow Up: Robe Estefanycharlotte Mcconnell, APRN  [address] [facility]  [place] [address]  [phone]      1 week      Diet: Diet Regular; Cardiac, Low Sodium; 2,000 mg Na    Discharge Medications:    Radha Boswell   Home Medication Instructions SANDIP:[id number]    Printed on:01/22/18 1007   Medication Information                      allopurinol (ZYLOPRIM) 100 MG tablet  Take 1 tablet by mouth Daily.             aspirin 81 MG EC tablet  Take 1 tablet by mouth Daily.             calcitriol (ROCALTROL) 0.25 MCG capsule  Take 1 capsule by mouth Daily.             calcium carbonate (TUMS) 500 MG chewable tablet  Chew 1,000 mg Every 4 (Four) Hours As Needed for Indigestion or Heartburn.             clopidogrel (PLAVIX) 75 MG tablet  Take 1 tablet by mouth Daily.             docusate sodium 100 MG capsule  Take 100 mg by mouth 2 (Two) Times a Day As Needed for Constipation.             DULoxetine (CYMBALTA) 60 MG capsule  Take 1 capsule by mouth Every 12 (Twelve) Hours.             ferrous sulfate 325 (65 FE) MG tablet  Take 1 tablet by mouth 3 (Three) Times a Day With Meals.             fluticasone (FLONASE) 50 MCG/ACT nasal spray  2 sprays by Each Nare route Daily.             furosemide (LASIX) 20 MG tablet  Take 1 tablet by mouth Daily.             isosorbide mononitrate (IMDUR) 30 MG 24 hr tablet  Take 1 tablet by mouth Daily.             levothyroxine (SYNTHROID, LEVOTHROID) 100 MCG tablet  Take 1 tablet by mouth Daily.             lisinopril (PRINIVIL,ZESTRIL) 2.5 MG tablet  Take 1 tablet by mouth Daily.             metFORMIN (GLUCOPHAGE) 1000 MG tablet  Take 1 tablet by  mouth 2 (Two) Times a Day With Meals.             metoprolol tartrate (LOPRESSOR) 25 MG tablet  Take 0.5 tablets by mouth Every 12 (Twelve) Hours.             mirtazapine (REMERON) 30 MG tablet  Take 1 tablet by mouth Every Night.             oxyCODONE-acetaminophen (PERCOCET)  MG per tablet  Take 1 tablet by mouth Every 4 (Four) Hours As Needed for Severe Pain  for up to 4 days.             pantoprazole (PROTONIX) 40 MG EC tablet  Take 1 tablet by mouth Daily.             potassium chloride (MICRO-K) 10 MEQ CR capsule  Take 2 capsules by mouth Daily.             pregabalin (LYRICA) 200 MG capsule  Take 1 capsule by mouth Every 8 (Eight) Hours.             rivaroxaban (XARELTO) 15 MG tablet  Take 1 tablet by mouth Daily With Dinner for 24 doses.             rivaroxaban (XARELTO) 20 MG tablet  Take 1 tablet by mouth Daily With Dinner.             tiZANidine (ZANAFLEX) 4 MG tablet  Take 2 tablets by mouth Every 8 (Eight) Hours.                 Time Spent on discharge is  32 minutes in patient examination, evaluation, patient/family counseling as well as medication reconciliation, prescriptions for required medications, discharge plan and follow up.     Electronically signed by BRAYDEN Sethi on 1/22/2018 at 10:07 AM

## 2018-01-28 ENCOUNTER — APPOINTMENT (OUTPATIENT)
Dept: CT IMAGING | Facility: HOSPITAL | Age: 48
End: 2018-01-28

## 2018-01-28 ENCOUNTER — APPOINTMENT (OUTPATIENT)
Dept: GENERAL RADIOLOGY | Facility: HOSPITAL | Age: 48
End: 2018-01-28

## 2018-01-28 ENCOUNTER — HOSPITAL ENCOUNTER (INPATIENT)
Facility: HOSPITAL | Age: 48
LOS: 1 days | Discharge: HOME | End: 2018-01-29
Attending: EMERGENCY MEDICINE | Admitting: FAMILY MEDICINE

## 2018-01-28 DIAGNOSIS — R07.9 CHEST PAIN, UNSPECIFIED TYPE: Primary | ICD-10-CM

## 2018-01-28 LAB
ALBUMIN SERPL-MCNC: 4 G/DL (ref 3.5–5)
ALBUMIN/GLOB SERPL: 1.1 G/DL (ref 1.1–2.5)
ALP SERPL-CCNC: 85 U/L (ref 24–120)
ALT SERPL W P-5'-P-CCNC: 28 U/L (ref 0–54)
ANION GAP SERPL CALCULATED.3IONS-SCNC: 13 MMOL/L (ref 4–13)
AST SERPL-CCNC: 34 U/L (ref 7–45)
BASOPHILS # BLD AUTO: 0.05 10*3/MM3 (ref 0–0.2)
BASOPHILS NFR BLD AUTO: 0.3 % (ref 0–2)
BILIRUB SERPL-MCNC: 0.2 MG/DL (ref 0.1–1)
BUN BLD-MCNC: 11 MG/DL (ref 5–21)
BUN/CREAT SERPL: 10.9 (ref 7–25)
CALCIUM SPEC-SCNC: 9.7 MG/DL (ref 8.4–10.4)
CHLORIDE SERPL-SCNC: 103 MMOL/L (ref 98–110)
CO2 SERPL-SCNC: 31 MMOL/L (ref 24–31)
CREAT BLD-MCNC: 1.01 MG/DL (ref 0.5–1.4)
DEPRECATED RDW RBC AUTO: 52.8 FL (ref 40–54)
EOSINOPHIL # BLD AUTO: 0.34 10*3/MM3 (ref 0–0.7)
EOSINOPHIL NFR BLD AUTO: 2.3 % (ref 0–4)
ERYTHROCYTE [DISTWIDTH] IN BLOOD BY AUTOMATED COUNT: 16.6 % (ref 12–15)
GFR SERPL CREATININE-BSD FRML MDRD: 59 ML/MIN/1.73
GLOBULIN UR ELPH-MCNC: 3.8 GM/DL
GLUCOSE BLD-MCNC: 141 MG/DL (ref 70–100)
HCT VFR BLD AUTO: 38.9 % (ref 37–47)
HGB BLD-MCNC: 12.5 G/DL (ref 12–16)
HOLD SPECIMEN: NORMAL
HOLD SPECIMEN: NORMAL
IMM GRANULOCYTES # BLD: 0.05 10*3/MM3 (ref 0–0.03)
IMM GRANULOCYTES NFR BLD: 0.3 % (ref 0–5)
INR PPP: 1.07 (ref 0.91–1.09)
LYMPHOCYTES # BLD AUTO: 3.19 10*3/MM3 (ref 0.72–4.86)
LYMPHOCYTES NFR BLD AUTO: 22 % (ref 15–45)
MCH RBC QN AUTO: 28.3 PG (ref 28–32)
MCHC RBC AUTO-ENTMCNC: 32.1 G/DL (ref 33–36)
MCV RBC AUTO: 88 FL (ref 82–98)
MONOCYTES # BLD AUTO: 0.99 10*3/MM3 (ref 0.19–1.3)
MONOCYTES NFR BLD AUTO: 6.8 % (ref 4–12)
NEUTROPHILS # BLD AUTO: 9.88 10*3/MM3 (ref 1.87–8.4)
NEUTROPHILS NFR BLD AUTO: 68.3 % (ref 39–78)
NRBC BLD MANUAL-RTO: 0 /100 WBC (ref 0–0)
PLATELET # BLD AUTO: 419 10*3/MM3 (ref 130–400)
PMV BLD AUTO: 10.5 FL (ref 6–12)
POTASSIUM BLD-SCNC: 3.6 MMOL/L (ref 3.5–5.3)
PROT SERPL-MCNC: 7.8 G/DL (ref 6.3–8.7)
PROTHROMBIN TIME: 14.2 SECONDS (ref 11.9–14.6)
RBC # BLD AUTO: 4.42 10*6/MM3 (ref 4.2–5.4)
SODIUM BLD-SCNC: 147 MMOL/L (ref 135–145)
TROPONIN I SERPL-MCNC: 0.04 NG/ML (ref 0–0.03)
WBC NRBC COR # BLD: 14.5 10*3/MM3 (ref 4.8–10.8)
WHOLE BLOOD HOLD SPECIMEN: NORMAL
WHOLE BLOOD HOLD SPECIMEN: NORMAL

## 2018-01-28 PROCEDURE — 85025 COMPLETE CBC W/AUTO DIFF WBC: CPT | Performed by: EMERGENCY MEDICINE

## 2018-01-28 PROCEDURE — 80053 COMPREHEN METABOLIC PANEL: CPT | Performed by: EMERGENCY MEDICINE

## 2018-01-28 PROCEDURE — 71045 X-RAY EXAM CHEST 1 VIEW: CPT

## 2018-01-28 PROCEDURE — 85610 PROTHROMBIN TIME: CPT | Performed by: EMERGENCY MEDICINE

## 2018-01-28 PROCEDURE — 71275 CT ANGIOGRAPHY CHEST: CPT

## 2018-01-28 PROCEDURE — 99285 EMERGENCY DEPT VISIT HI MDM: CPT

## 2018-01-28 PROCEDURE — 93005 ELECTROCARDIOGRAM TRACING: CPT

## 2018-01-28 PROCEDURE — 93005 ELECTROCARDIOGRAM TRACING: CPT | Performed by: EMERGENCY MEDICINE

## 2018-01-28 PROCEDURE — 93010 ELECTROCARDIOGRAM REPORT: CPT | Performed by: INTERNAL MEDICINE

## 2018-01-28 PROCEDURE — 0 IOPAMIDOL PER 1 ML: Performed by: EMERGENCY MEDICINE

## 2018-01-28 PROCEDURE — 84484 ASSAY OF TROPONIN QUANT: CPT | Performed by: EMERGENCY MEDICINE

## 2018-01-28 RX ORDER — ASPIRIN 81 MG/1
324 TABLET, CHEWABLE ORAL ONCE
Status: DISCONTINUED | OUTPATIENT
Start: 2018-01-28 | End: 2018-01-29 | Stop reason: SDUPTHER

## 2018-01-28 RX ORDER — SODIUM CHLORIDE 0.9 % (FLUSH) 0.9 %
10 SYRINGE (ML) INJECTION AS NEEDED
Status: DISCONTINUED | OUTPATIENT
Start: 2018-01-28 | End: 2018-01-29 | Stop reason: HOSPADM

## 2018-01-28 RX ADMIN — IOPAMIDOL 150 ML: 755 INJECTION, SOLUTION INTRAVENOUS at 23:09

## 2018-01-29 VITALS
RESPIRATION RATE: 16 BRPM | HEART RATE: 95 BPM | HEIGHT: 69 IN | BODY MASS INDEX: 43.4 KG/M2 | DIASTOLIC BLOOD PRESSURE: 64 MMHG | WEIGHT: 293 LBS | OXYGEN SATURATION: 93 % | TEMPERATURE: 96.1 F | SYSTOLIC BLOOD PRESSURE: 122 MMHG

## 2018-01-29 PROBLEM — R07.9 CHEST PAIN: Status: ACTIVE | Noted: 2018-01-29

## 2018-01-29 LAB
ARTICHOKE IGE QN: 111 MG/DL (ref 0–99)
CHOLEST SERPL-MCNC: 146 MG/DL (ref 130–200)
GLUCOSE BLDC GLUCOMTR-MCNC: 119 MG/DL (ref 70–130)
GLUCOSE BLDC GLUCOMTR-MCNC: 165 MG/DL (ref 70–130)
HDLC SERPL-MCNC: 27 MG/DL
LDLC/HDLC SERPL: 3.67 {RATIO}
MAGNESIUM SERPL-MCNC: 1.8 MG/DL (ref 1.4–2.2)
TRIGL SERPL-MCNC: 100 MG/DL (ref 0–149)
TROPONIN I SERPL-MCNC: 0.03 NG/ML (ref 0–0.03)
TROPONIN I SERPL-MCNC: 0.03 NG/ML (ref 0–0.03)
TROPONIN I SERPL-MCNC: 0.04 NG/ML (ref 0–0.03)

## 2018-01-29 PROCEDURE — 99222 1ST HOSP IP/OBS MODERATE 55: CPT | Performed by: INTERNAL MEDICINE

## 2018-01-29 PROCEDURE — 84484 ASSAY OF TROPONIN QUANT: CPT | Performed by: INTERNAL MEDICINE

## 2018-01-29 PROCEDURE — 80061 LIPID PANEL: CPT | Performed by: INTERNAL MEDICINE

## 2018-01-29 PROCEDURE — 93005 ELECTROCARDIOGRAM TRACING: CPT | Performed by: INTERNAL MEDICINE

## 2018-01-29 PROCEDURE — G8990 OTHER PT/OT CURRENT STATUS: HCPCS

## 2018-01-29 PROCEDURE — 93010 ELECTROCARDIOGRAM REPORT: CPT | Performed by: INTERNAL MEDICINE

## 2018-01-29 PROCEDURE — 83735 ASSAY OF MAGNESIUM: CPT | Performed by: INTERNAL MEDICINE

## 2018-01-29 PROCEDURE — 82962 GLUCOSE BLOOD TEST: CPT

## 2018-01-29 PROCEDURE — 97605 NEG PRS WND THER DME<=50SQCM: CPT

## 2018-01-29 PROCEDURE — G8991 OTHER PT/OT GOAL STATUS: HCPCS

## 2018-01-29 PROCEDURE — 97161 PT EVAL LOW COMPLEX 20 MIN: CPT

## 2018-01-29 RX ORDER — ASPIRIN 81 MG/1
81 TABLET ORAL DAILY
Status: DISCONTINUED | OUTPATIENT
Start: 2018-01-29 | End: 2018-01-29 | Stop reason: SDUPTHER

## 2018-01-29 RX ORDER — PANTOPRAZOLE SODIUM 40 MG/1
40 TABLET, DELAYED RELEASE ORAL DAILY
Status: DISCONTINUED | OUTPATIENT
Start: 2018-01-29 | End: 2018-01-29 | Stop reason: HOSPADM

## 2018-01-29 RX ORDER — NICOTINE 21 MG/24HR
1 PATCH, TRANSDERMAL 24 HOURS TRANSDERMAL EVERY 24 HOURS
Status: DISCONTINUED | OUTPATIENT
Start: 2018-01-29 | End: 2018-01-29 | Stop reason: HOSPADM

## 2018-01-29 RX ORDER — ACETAMINOPHEN 325 MG/1
650 TABLET ORAL EVERY 4 HOURS PRN
Status: DISCONTINUED | OUTPATIENT
Start: 2018-01-29 | End: 2018-01-29 | Stop reason: HOSPADM

## 2018-01-29 RX ORDER — SODIUM CHLORIDE 0.9 % (FLUSH) 0.9 %
1-10 SYRINGE (ML) INJECTION AS NEEDED
Status: DISCONTINUED | OUTPATIENT
Start: 2018-01-29 | End: 2018-01-29 | Stop reason: HOSPADM

## 2018-01-29 RX ORDER — LISINOPRIL 2.5 MG/1
2.5 TABLET ORAL
Status: DISCONTINUED | OUTPATIENT
Start: 2018-01-29 | End: 2018-01-29 | Stop reason: HOSPADM

## 2018-01-29 RX ORDER — ASPIRIN 81 MG/1
324 TABLET, CHEWABLE ORAL ONCE
Status: DISCONTINUED | OUTPATIENT
Start: 2018-01-29 | End: 2018-01-29

## 2018-01-29 RX ORDER — TIZANIDINE 4 MG/1
8 TABLET ORAL EVERY 8 HOURS SCHEDULED
Status: DISCONTINUED | OUTPATIENT
Start: 2018-01-29 | End: 2018-01-29 | Stop reason: HOSPADM

## 2018-01-29 RX ORDER — FERROUS SULFATE 325(65) MG
325 TABLET ORAL
Status: DISCONTINUED | OUTPATIENT
Start: 2018-01-29 | End: 2018-01-29 | Stop reason: HOSPADM

## 2018-01-29 RX ORDER — FUROSEMIDE 20 MG/1
20 TABLET ORAL DAILY
Status: DISCONTINUED | OUTPATIENT
Start: 2018-01-29 | End: 2018-01-29 | Stop reason: HOSPADM

## 2018-01-29 RX ORDER — ALLOPURINOL 100 MG/1
100 TABLET ORAL DAILY
Status: DISCONTINUED | OUTPATIENT
Start: 2018-01-29 | End: 2018-01-29 | Stop reason: HOSPADM

## 2018-01-29 RX ORDER — MIRTAZAPINE 30 MG/1
30 TABLET, FILM COATED ORAL NIGHTLY
Status: DISCONTINUED | OUTPATIENT
Start: 2018-01-29 | End: 2018-01-29 | Stop reason: HOSPADM

## 2018-01-29 RX ORDER — ONDANSETRON 2 MG/ML
4 INJECTION INTRAMUSCULAR; INTRAVENOUS EVERY 6 HOURS PRN
Status: DISCONTINUED | OUTPATIENT
Start: 2018-01-29 | End: 2018-01-29 | Stop reason: HOSPADM

## 2018-01-29 RX ORDER — CLOPIDOGREL BISULFATE 75 MG/1
75 TABLET ORAL DAILY
Status: DISCONTINUED | OUTPATIENT
Start: 2018-01-29 | End: 2018-01-29

## 2018-01-29 RX ORDER — BISACODYL 5 MG/1
5 TABLET, DELAYED RELEASE ORAL DAILY PRN
Status: DISCONTINUED | OUTPATIENT
Start: 2018-01-29 | End: 2018-01-29 | Stop reason: HOSPADM

## 2018-01-29 RX ORDER — HYDROCODONE BITARTRATE AND ACETAMINOPHEN 5; 325 MG/1; MG/1
1 TABLET ORAL EVERY 4 HOURS PRN
Status: DISCONTINUED | OUTPATIENT
Start: 2018-01-29 | End: 2018-01-29 | Stop reason: HOSPADM

## 2018-01-29 RX ORDER — POTASSIUM CHLORIDE 750 MG/1
20 CAPSULE, EXTENDED RELEASE ORAL DAILY
Status: DISCONTINUED | OUTPATIENT
Start: 2018-01-29 | End: 2018-01-29 | Stop reason: HOSPADM

## 2018-01-29 RX ORDER — CLOPIDOGREL BISULFATE 75 MG/1
600 TABLET ORAL ONCE
Status: DISCONTINUED | OUTPATIENT
Start: 2018-01-29 | End: 2018-01-29 | Stop reason: HOSPADM

## 2018-01-29 RX ORDER — DOCUSATE SODIUM 100 MG/1
100 CAPSULE, LIQUID FILLED ORAL 2 TIMES DAILY PRN
Status: DISCONTINUED | OUTPATIENT
Start: 2018-01-29 | End: 2018-01-29 | Stop reason: HOSPADM

## 2018-01-29 RX ORDER — CLOPIDOGREL BISULFATE 75 MG/1
75 TABLET ORAL DAILY
Status: DISCONTINUED | OUTPATIENT
Start: 2018-01-30 | End: 2018-01-29 | Stop reason: HOSPADM

## 2018-01-29 RX ORDER — PREGABALIN 100 MG/1
200 CAPSULE ORAL EVERY 8 HOURS SCHEDULED
Status: DISCONTINUED | OUTPATIENT
Start: 2018-01-29 | End: 2018-01-29 | Stop reason: HOSPADM

## 2018-01-29 RX ORDER — CLOPIDOGREL BISULFATE 75 MG/1
75 TABLET ORAL DAILY
Status: DISCONTINUED | OUTPATIENT
Start: 2018-01-30 | End: 2018-01-29

## 2018-01-29 RX ORDER — HYDROCODONE BITARTRATE AND ACETAMINOPHEN 10; 325 MG/1; MG/1
1 TABLET ORAL EVERY 4 HOURS PRN
Status: DISCONTINUED | OUTPATIENT
Start: 2018-01-29 | End: 2018-01-29 | Stop reason: HOSPADM

## 2018-01-29 RX ORDER — ISOSORBIDE MONONITRATE 30 MG/1
30 TABLET, EXTENDED RELEASE ORAL
Status: DISCONTINUED | OUTPATIENT
Start: 2018-01-29 | End: 2018-01-29 | Stop reason: HOSPADM

## 2018-01-29 RX ORDER — FLUTICASONE PROPIONATE 50 MCG
2 SPRAY, SUSPENSION (ML) NASAL DAILY
Status: DISCONTINUED | OUTPATIENT
Start: 2018-01-29 | End: 2018-01-29 | Stop reason: HOSPADM

## 2018-01-29 RX ORDER — MORPHINE SULFATE 2 MG/ML
1 INJECTION, SOLUTION INTRAMUSCULAR; INTRAVENOUS EVERY 4 HOURS PRN
Status: DISCONTINUED | OUTPATIENT
Start: 2018-01-29 | End: 2018-01-29 | Stop reason: HOSPADM

## 2018-01-29 RX ORDER — NALOXONE HCL 0.4 MG/ML
0.4 VIAL (ML) INJECTION
Status: DISCONTINUED | OUTPATIENT
Start: 2018-01-29 | End: 2018-01-29 | Stop reason: HOSPADM

## 2018-01-29 RX ORDER — LORAZEPAM 1 MG/1
1 TABLET ORAL EVERY 6 HOURS PRN
Status: DISCONTINUED | OUTPATIENT
Start: 2018-01-29 | End: 2018-01-29 | Stop reason: HOSPADM

## 2018-01-29 RX ORDER — CALCITRIOL 0.25 UG/1
0.25 CAPSULE, LIQUID FILLED ORAL DAILY
Status: DISCONTINUED | OUTPATIENT
Start: 2018-01-29 | End: 2018-01-29 | Stop reason: HOSPADM

## 2018-01-29 RX ORDER — ROSUVASTATIN CALCIUM 20 MG/1
20 TABLET, COATED ORAL NIGHTLY
Status: DISCONTINUED | OUTPATIENT
Start: 2018-01-29 | End: 2018-01-29 | Stop reason: HOSPADM

## 2018-01-29 RX ORDER — OXYCODONE AND ACETAMINOPHEN 10; 325 MG/1; MG/1
1 TABLET ORAL EVERY 4 HOURS PRN
COMMUNITY
End: 2019-06-18

## 2018-01-29 RX ORDER — DULOXETIN HYDROCHLORIDE 30 MG/1
60 CAPSULE, DELAYED RELEASE ORAL EVERY 12 HOURS SCHEDULED
Status: DISCONTINUED | OUTPATIENT
Start: 2018-01-29 | End: 2018-01-29 | Stop reason: HOSPADM

## 2018-01-29 RX ORDER — CALCIUM CARBONATE 200(500)MG
2 TABLET,CHEWABLE ORAL EVERY 4 HOURS PRN
Status: DISCONTINUED | OUTPATIENT
Start: 2018-01-29 | End: 2018-01-29 | Stop reason: HOSPADM

## 2018-01-29 RX ORDER — ASPIRIN 81 MG/1
81 TABLET ORAL DAILY
Status: DISCONTINUED | OUTPATIENT
Start: 2018-01-30 | End: 2018-01-29

## 2018-01-29 RX ORDER — LEVOTHYROXINE SODIUM 0.1 MG/1
100 TABLET ORAL DAILY
Status: DISCONTINUED | OUTPATIENT
Start: 2018-01-29 | End: 2018-01-29 | Stop reason: HOSPADM

## 2018-01-29 RX ADMIN — FUROSEMIDE 20 MG: 20 TABLET ORAL at 08:53

## 2018-01-29 RX ADMIN — RIVAROXABAN 20 MG: 20 TABLET, FILM COATED ORAL at 17:03

## 2018-01-29 RX ADMIN — TIZANIDINE HYDROCHLORIDE 8 MG: 4 TABLET ORAL at 08:53

## 2018-01-29 RX ADMIN — DULOXETINE HYDROCHLORIDE 60 MG: 30 CAPSULE, DELAYED RELEASE ORAL at 08:53

## 2018-01-29 RX ADMIN — FERROUS SULFATE TAB 325 MG (65 MG ELEMENTAL FE) 325 MG: 325 (65 FE) TAB at 17:03

## 2018-01-29 RX ADMIN — HYDROCODONE BITARTRATE AND ACETAMINOPHEN 1 TABLET: 10; 325 TABLET ORAL at 16:04

## 2018-01-29 RX ADMIN — POTASSIUM CHLORIDE 20 MEQ: 750 CAPSULE, EXTENDED RELEASE ORAL at 08:53

## 2018-01-29 RX ADMIN — CALCITRIOL 0.25 MCG: 0.25 CAPSULE ORAL at 08:53

## 2018-01-29 RX ADMIN — PREGABALIN 200 MG: 100 CAPSULE ORAL at 13:39

## 2018-01-29 RX ADMIN — HYDROCODONE BITARTRATE AND ACETAMINOPHEN 1 TABLET: 10; 325 TABLET ORAL at 12:52

## 2018-01-29 RX ADMIN — FERROUS SULFATE TAB 325 MG (65 MG ELEMENTAL FE) 325 MG: 325 (65 FE) TAB at 12:10

## 2018-01-29 RX ADMIN — TIZANIDINE HYDROCHLORIDE 8 MG: 4 TABLET ORAL at 13:39

## 2018-01-29 RX ADMIN — LEVOTHYROXINE SODIUM 100 MCG: 100 TABLET ORAL at 08:52

## 2018-01-29 RX ADMIN — PREGABALIN 200 MG: 100 CAPSULE ORAL at 08:53

## 2018-01-29 RX ADMIN — PANTOPRAZOLE SODIUM 40 MG: 40 TABLET, DELAYED RELEASE ORAL at 08:53

## 2018-01-29 RX ADMIN — FLUTICASONE PROPIONATE 2 SPRAY: 50 SPRAY, METERED NASAL at 08:54

## 2018-01-29 RX ADMIN — HYDROCODONE BITARTRATE AND ACETAMINOPHEN 1 TABLET: 10; 325 TABLET ORAL at 08:53

## 2018-01-29 RX ADMIN — ISOSORBIDE MONONITRATE 30 MG: 30 TABLET, EXTENDED RELEASE ORAL at 08:53

## 2018-01-29 RX ADMIN — FERROUS SULFATE TAB 325 MG (65 MG ELEMENTAL FE) 325 MG: 325 (65 FE) TAB at 08:53

## 2018-01-29 RX ADMIN — NICOTINE 1 PATCH: 21 PATCH, EXTENDED RELEASE TRANSDERMAL at 08:54

## 2018-01-29 RX ADMIN — ALLOPURINOL 100 MG: 100 TABLET ORAL at 08:53

## 2018-01-29 RX ADMIN — METOPROLOL TARTRATE 12.5 MG: 25 TABLET, FILM COATED ORAL at 08:54

## 2018-01-29 RX ADMIN — LISINOPRIL 2.5 MG: 2.5 TABLET ORAL at 08:53

## 2018-01-31 ENCOUNTER — HOSPITAL ENCOUNTER (OUTPATIENT)
Dept: WOUND CARE | Age: 48
Discharge: HOME | End: 2018-01-31
Payer: MEDICARE

## 2018-01-31 VITALS
SYSTOLIC BLOOD PRESSURE: 98 MMHG | DIASTOLIC BLOOD PRESSURE: 62 MMHG | TEMPERATURE: 97.1 F | HEIGHT: 69 IN | BODY MASS INDEX: 43.4 KG/M2 | HEART RATE: 88 BPM | RESPIRATION RATE: 18 BRPM | WEIGHT: 293 LBS

## 2018-01-31 DIAGNOSIS — T81.89XA NON-HEALING SURGICAL WOUND OF LEFT GROIN, INITIAL ENCOUNTER: ICD-10-CM

## 2018-01-31 PROCEDURE — 97597 DBRDMT OPN WND 1ST 20 CM/<: CPT | Performed by: NURSE PRACTITIONER

## 2018-01-31 PROCEDURE — 99214 OFFICE O/P EST MOD 30 MIN: CPT | Performed by: NURSE PRACTITIONER

## 2018-01-31 PROCEDURE — 97597 DBRDMT OPN WND 1ST 20 CM/<: CPT

## 2018-01-31 PROCEDURE — 99213 OFFICE O/P EST LOW 20 MIN: CPT

## 2018-01-31 RX ORDER — FERROUS SULFATE 325(65) MG
325 TABLET ORAL 3 TIMES DAILY
COMMUNITY
Start: 2018-01-22

## 2018-01-31 RX ORDER — PANTOPRAZOLE SODIUM 40 MG/1
40 TABLET, DELAYED RELEASE ORAL DAILY
COMMUNITY
Start: 2018-01-22

## 2018-01-31 RX ORDER — FUROSEMIDE 20 MG/1
20 TABLET ORAL DAILY
COMMUNITY
Start: 2018-01-22

## 2018-01-31 RX ORDER — ACETAMINOPHEN 500 MG
1000 TABLET ORAL EVERY 6 HOURS PRN
COMMUNITY

## 2018-01-31 RX ORDER — POTASSIUM CHLORIDE 750 MG/1
20 CAPSULE, EXTENDED RELEASE ORAL DAILY
COMMUNITY
Start: 2018-01-22

## 2018-01-31 ASSESSMENT — PAIN DESCRIPTION - PAIN TYPE: TYPE: ACUTE PAIN

## 2018-01-31 ASSESSMENT — PAIN DESCRIPTION - FREQUENCY: FREQUENCY: CONTINUOUS

## 2018-01-31 ASSESSMENT — PAIN SCALES - GENERAL: PAINLEVEL_OUTOF10: 6

## 2018-01-31 ASSESSMENT — PAIN DESCRIPTION - ORIENTATION: ORIENTATION: RIGHT

## 2018-01-31 ASSESSMENT — PAIN DESCRIPTION - DESCRIPTORS: DESCRIPTORS: ACHING;CRAMPING;TENDER

## 2018-01-31 ASSESSMENT — PAIN DESCRIPTION - LOCATION: LOCATION: LEG

## 2018-01-31 NOTE — PROGRESS NOTES
heart without angina pectoris     Type 2 diabetes mellitus with complication (HCC)     Gastroesophageal reflux disease     Essential hypertension     Hypotension     Hypothyroidism 12/30/2017    Hypertension 12/30/2017    GERD (gastroesophageal reflux disease) 12/30/2017    Diabetes ([facility] [address] 12/30/2017    Coronary artery disease 12/30/2017    Leukocytopenia 12/30/2017    Acute blood loss anemia 12/30/2017    Leukocytosis     Hematoma of left lower extremity 12/29/2017    Postoperative hemorrhage involving circulatory system following cardiac catheterization      Current Outpatient Prescriptions   Medication Sig Dispense Refill    isosorbide mononitrate (IMDUR) 30 MG extended release tablet Take 1 tablet by mouth daily 30 tablet 3    nicotine (NICODERM CQ) 21 MG/24HR Place 1 patch onto the skin daily 30 patch 3    varenicline (CHANTIX) 0.5 MG tablet Take 1 tablet by mouth daily 60 tablet 3    metoprolol tartrate (LOPRESSOR) 12.5 mg TABS Take 12.5 mg by mouth 2 times daily      rivaroxaban (XARELTO) 15 MG TABS tablet Take 15 mg by mouth      clopidogrel (PLAVIX) 75 MG tablet Take 75 mg by mouth daily      lisinopril (PRINIVIL;ZESTRIL) 2.5 MG tablet Take 2.5 mg by mouth daily      allopurinol (ZYLOPRIM) 100 MG tablet Take 100 mg by mouth daily      calcitRIOL (ROCALTROL) 0.25 MCG capsule Take 0.25 mcg by mouth daily      oxyCODONE-acetaminophen (PERCOCET)  MG per tablet Take 1 tablet by mouth every 4 hours as needed for Pain.  fluticasone (VERAMYST) 27.5 MCG/SPRAY nasal spray 2 sprays by Nasal route daily      Blood Glucose Monitoring Suppl ERASTO Use to check blood sugar.  1 Device 0    tiZANidine (ZANAFLEX) 4 MG tablet Take 2 tablets by mouth 3 times daily      LYRICA 200 MG capsule Take 1 capsule by mouth 3 times daily      DULoxetine (CYMBALTA) 60 MG capsule Take 60 mg by mouth 2 times daily      mirtazapine (REMERON) 30 MG tablet Take 30 mg by mouth daily      metFORMIN

## 2018-02-13 ENCOUNTER — TELEPHONE (OUTPATIENT)
Dept: CARDIOLOGY | Age: 48
End: 2018-02-13

## 2018-02-14 ENCOUNTER — HOSPITAL ENCOUNTER (OUTPATIENT)
Dept: WOUND CARE | Age: 48
Discharge: HOME | End: 2018-02-14
Payer: MEDICARE

## 2018-02-14 VITALS
RESPIRATION RATE: 20 BRPM | HEART RATE: 84 BPM | BODY MASS INDEX: 43.4 KG/M2 | HEIGHT: 69 IN | TEMPERATURE: 97 F | WEIGHT: 293 LBS

## 2018-02-14 DIAGNOSIS — T81.89XD NON-HEALING SURGICAL WOUND OF LEFT GROIN, SUBSEQUENT ENCOUNTER: ICD-10-CM

## 2018-02-14 PROCEDURE — 97597 DBRDMT OPN WND 1ST 20 CM/<: CPT

## 2018-02-14 PROCEDURE — 97597 DBRDMT OPN WND 1ST 20 CM/<: CPT | Performed by: SURGERY

## 2018-02-14 ASSESSMENT — PAIN SCALES - GENERAL: PAINLEVEL_OUTOF10: 3

## 2018-02-14 ASSESSMENT — PAIN DESCRIPTION - ORIENTATION: ORIENTATION: RIGHT

## 2018-02-14 ASSESSMENT — PAIN DESCRIPTION - LOCATION: LOCATION: LEG

## 2018-02-14 ASSESSMENT — PAIN DESCRIPTION - PAIN TYPE: TYPE: ACUTE PAIN

## 2018-02-14 NOTE — PROGRESS NOTES
[facility]   Progress Note and Procedure Note      Suzy Jackson  AGE: 52 y.o. GENDER: female  : 1970  TODAY'S DATE:  2018    Subjective:      CC- left groin wound    HISTORY of PRESENT ILLNESS HPI   Suzy Jackson is a 52 y.o. female who presents today for wound evaluation. Wound Type:non-healing surgical  Wound Location:left groin  Modifying factors:edema, diabetes, poor glucose control, chronic pressure, shear force and obesity    Patient Active Problem List   Diagnosis Code    Postoperative hemorrhage involving circulatory system following cardiac catheterization I97.610    Hematoma of left lower extremity S80.12XA    Hypothyroidism E03.9    Hypertension I10    GERD (gastroesophageal reflux disease) K21.9    Diabetes (HCC) E11.9    Coronary artery disease I25.10    Leukocytopenia D72.819    Acute blood loss anemia D62    Leukocytosis D72.829    Coronary artery disease involving native coronary artery of native heart without angina pectoris I25.10    Type 2 diabetes mellitus with complication (HCC) O26.6    Gastroesophageal reflux disease K21.9    Essential hypertension I10    Hypotension I95.9    Hypokalemia E87.6    Hypomagnesemia E83.42    Non-healing surgical wound of left groin T81.89XA       ALLERGIES    No Known Allergies        Objective:      Pulse 84   Temp 97 °F (36.1 °C) (Temporal)   Resp 20   Ht 5' 9\" (1.753 m)   Wt (!) 327 lb (148.3 kg)   BMI 48.29 kg/m²     Post Debridement Measurements and Assessment:  Wound 18  Left Wound 1, Surgical,  L. Groin (Active)   Wound Image   2018 11:06 AM   Wound Type Wound 2018 11:06 AM   Dressing Status Old drainage 2018 11:06 AM   Dressing Changed Changed/New 2018  9:45 AM   Wound Cleansed Rinsed/Irrigated with saline 2018 11:06 AM   Wound Length (cm) 6 cm 2018 11:30 AM   Wound Width (cm) 1.5 cm 2018 11:30 AM   Wound Depth (cm)  0.5 2018 11:30 AM   Calculated Wound Size circulatory system following cardiac catheterization    Hematoma of left lower extremity    Hypothyroidism    Hypertension    GERD (gastroesophageal reflux disease)    Diabetes (HCC)    Coronary artery disease    Leukocytopenia    Acute blood loss anemia    Leukocytosis    Coronary artery disease involving native coronary artery of native heart without angina pectoris    Type 2 diabetes mellitus with complication (HCC)    Gastroesophageal reflux disease    Essential hypertension    Hypotension    Hypokalemia    Hypomagnesemia    Non-healing surgical wound of left groin      Assessment- wound looks great, can stop VAC and go to saline dressing changes    Plan:          Plan for wound - Dress per physician order  Treatment:     Compression : No   Offloading : No   Dressing : see avs   Additional Therapy :      1. Discussed appropriate home care of this wound. Wound redressed. 2. Patient instructions were given. 3. Follow up: 2 week(s).                            Electronically signed by Joyce Portillo MD on 2/14/2018 at 11:59 AM

## 2018-02-14 NOTE — PLAN OF CARE
Problem: Pain:  Goal: Pain level will decrease  Pain level will decrease   Outcome: Ongoing    Goal: Control of acute pain  Control of acute pain   Outcome: Ongoing    Goal: Control of chronic pain  Control of chronic pain   Outcome: Ongoing      Problem: Wound:  Goal: Will show signs of wound healing; wound closure and no evidence of infection  Will show signs of wound healing; wound closure and no evidence of infection   Outcome: Ongoing      Problem: Blood Glucose:  Goal: Ability to maintain appropriate glucose levels will improve  Ability to maintain appropriate glucose levels will improve   Outcome: Ongoing

## 2018-09-08 ENCOUNTER — APPOINTMENT (OUTPATIENT)
Dept: GENERAL RADIOLOGY | Age: 48
End: 2018-09-08
Payer: MEDICARE

## 2018-09-08 ENCOUNTER — HOSPITAL ENCOUNTER (EMERGENCY)
Age: 48
Discharge: HOME | End: 2018-09-08
Payer: MEDICARE

## 2018-09-08 VITALS
SYSTOLIC BLOOD PRESSURE: 134 MMHG | HEIGHT: 69 IN | OXYGEN SATURATION: 94 % | DIASTOLIC BLOOD PRESSURE: 90 MMHG | TEMPERATURE: 97.4 F | HEART RATE: 100 BPM | RESPIRATION RATE: 20 BRPM | BODY MASS INDEX: 43.4 KG/M2 | WEIGHT: 293 LBS

## 2018-09-08 DIAGNOSIS — M79.605 LEFT LEG PAIN: Primary | ICD-10-CM

## 2018-09-08 DIAGNOSIS — D72.829 LEUKOCYTOSIS, UNSPECIFIED TYPE: ICD-10-CM

## 2018-09-08 LAB
ALBUMIN SERPL-MCNC: 4.1 G/DL (ref 3.5–5.2)
ALP BLD-CCNC: 90 U/L (ref 35–104)
ALT SERPL-CCNC: 15 U/L (ref 5–33)
ANION GAP SERPL CALCULATED.3IONS-SCNC: 14 MMOL/L (ref 7–19)
APTT: 40.6 SEC (ref 26–36.2)
AST SERPL-CCNC: 21 U/L (ref 5–32)
BASOPHILS ABSOLUTE: 0.1 K/UL (ref 0–0.2)
BASOPHILS RELATIVE PERCENT: 0.3 % (ref 0–1)
BILIRUB SERPL-MCNC: 0.5 MG/DL (ref 0.2–1.2)
BILIRUBIN URINE: NEGATIVE
BLOOD, URINE: NEGATIVE
BUN BLDV-MCNC: 18 MG/DL (ref 6–20)
CALCIUM SERPL-MCNC: 10 MG/DL (ref 8.6–10)
CHLORIDE BLD-SCNC: 95 MMOL/L (ref 98–111)
CLARITY: CLEAR
CO2: 26 MMOL/L (ref 22–29)
COLOR: YELLOW
CREAT SERPL-MCNC: 0.9 MG/DL (ref 0.5–0.9)
EOSINOPHILS ABSOLUTE: 0.2 K/UL (ref 0–0.6)
EOSINOPHILS RELATIVE PERCENT: 1.3 % (ref 0–5)
GFR NON-AFRICAN AMERICAN: >60
GLUCOSE BLD-MCNC: 137 MG/DL (ref 74–109)
GLUCOSE URINE: NEGATIVE MG/DL
HCT VFR BLD CALC: 42.3 % (ref 37–47)
HEMOGLOBIN: 14 G/DL (ref 12–16)
INR BLD: 1.8 (ref 0.88–1.18)
KETONES, URINE: NEGATIVE MG/DL
LEUKOCYTE ESTERASE, URINE: NEGATIVE
LYMPHOCYTES ABSOLUTE: 4.6 K/UL (ref 1.1–4.5)
LYMPHOCYTES RELATIVE PERCENT: 24.1 % (ref 20–40)
MCH RBC QN AUTO: 30.8 PG (ref 27–31)
MCHC RBC AUTO-ENTMCNC: 33.1 G/DL (ref 33–37)
MCV RBC AUTO: 93 FL (ref 81–99)
MONOCYTES ABSOLUTE: 1.4 K/UL (ref 0–0.9)
MONOCYTES RELATIVE PERCENT: 7.3 % (ref 0–10)
NEUTROPHILS ABSOLUTE: 12.7 K/UL (ref 1.5–7.5)
NEUTROPHILS RELATIVE PERCENT: 66.6 % (ref 50–65)
NITRITE, URINE: NEGATIVE
PDW BLD-RTO: 13.3 % (ref 11.5–14.5)
PH UA: 6
PLATELET # BLD: 374 K/UL (ref 130–400)
PMV BLD AUTO: 11 FL (ref 9.4–12.3)
POTASSIUM SERPL-SCNC: 3.9 MMOL/L (ref 3.5–5)
PRO-BNP: 605 PG/ML (ref 0–450)
PROTEIN UA: NEGATIVE MG/DL
PROTHROMBIN TIME: 20.9 SEC (ref 12–14.6)
RBC # BLD: 4.55 M/UL (ref 4.2–5.4)
SODIUM BLD-SCNC: 135 MMOL/L (ref 136–145)
SPECIFIC GRAVITY UA: 1.01
TOTAL PROTEIN: 7.7 G/DL (ref 6.6–8.7)
TROPONIN: <0.01 NG/ML (ref 0–0.03)
TROPONIN: <0.01 NG/ML (ref 0–0.03)
URINE REFLEX TO CULTURE: NORMAL
UROBILINOGEN, URINE: 0.2 E.U./DL
WBC # BLD: 19 K/UL (ref 4.8–10.8)

## 2018-09-08 PROCEDURE — 80053 COMPREHEN METABOLIC PANEL: CPT

## 2018-09-08 PROCEDURE — 36415 COLL VENOUS BLD VENIPUNCTURE: CPT

## 2018-09-08 PROCEDURE — 71045 X-RAY EXAM CHEST 1 VIEW: CPT

## 2018-09-08 PROCEDURE — 99284 EMERGENCY DEPT VISIT MOD MDM: CPT

## 2018-09-08 PROCEDURE — 83880 ASSAY OF NATRIURETIC PEPTIDE: CPT

## 2018-09-08 PROCEDURE — 85025 COMPLETE CBC W/AUTO DIFF WBC: CPT

## 2018-09-08 PROCEDURE — 93005 ELECTROCARDIOGRAM TRACING: CPT

## 2018-09-08 PROCEDURE — 84484 ASSAY OF TROPONIN QUANT: CPT

## 2018-09-08 PROCEDURE — 81003 URINALYSIS AUTO W/O SCOPE: CPT

## 2018-09-08 PROCEDURE — 93971 EXTREMITY STUDY: CPT

## 2018-09-08 PROCEDURE — 85610 PROTHROMBIN TIME: CPT

## 2018-09-08 PROCEDURE — 99285 EMERGENCY DEPT VISIT HI MDM: CPT | Performed by: PHYSICIAN ASSISTANT

## 2018-09-08 PROCEDURE — 85730 THROMBOPLASTIN TIME PARTIAL: CPT

## 2018-09-08 RX ORDER — RIZATRIPTAN BENZOATE 5 MG/1
10 TABLET ORAL
COMMUNITY

## 2018-09-08 ASSESSMENT — ENCOUNTER SYMPTOMS
RHINORRHEA: 0
EYE PAIN: 0
BACK PAIN: 1
APNEA: 0
NAUSEA: 0
ABDOMINAL PAIN: 0
SORE THROAT: 0
ABDOMINAL DISTENTION: 0
VOMITING: 0
SINUS PRESSURE: 0
WHEEZING: 0
COUGH: 0
DIARRHEA: 0
CONSTIPATION: 0
CHEST TIGHTNESS: 0
SHORTNESS OF BREATH: 0

## 2018-09-08 ASSESSMENT — PAIN DESCRIPTION - PAIN TYPE: TYPE: ACUTE PAIN

## 2018-09-08 ASSESSMENT — PAIN SCALES - GENERAL: PAINLEVEL_OUTOF10: 6

## 2018-09-08 NOTE — ED PROVIDER NOTES
Psychiatric/Behavioral: Negative for agitation and confusion. Except as noted above the remainder of the review of systems was reviewed and negative.        PAST MEDICAL HISTORY     Past Medical History:   Diagnosis Date    Chronic back pain     Depression     [facility]     Diabetes mellitus [facility]     GERD (gastroesophageal reflux disease)     Heart attack ([facility]     HTN (hypertension)     Hypertension     Hypothyroidism     Insomnia     Ischemic leg 12/23/2017    Morbid obesity with body mass index (BMI) of 45.0 to 49.9 in adult ([facility]     PVD (peripheral vascular disease) (HCC)     RA (rheumatoid arthritis) ([facility]     Rheumatoid arthritis ([facility]     Tobacco use          SURGICAL HISTORY       Past Surgical History:   Procedure Laterality Date    CARDIAC CATHETERIZATION      CHOLECYSTECTOMY      FOOT SURGERY Right     KNEE SURGERY Bilateral     NV REPR ART RUPTURE,FEMORAL N/A 12/29/2017    LEFT FEMORAL ARTERY REPAIR performed by Crystal English MD at [facility] Right 12/23/2017    RIGHT LOWER EXTREMITY RUNOFF WITH LYSIS OF THROMBUS performed by Hammad Arias MD at [address] [place] [address] Rd      x2         CURRENT MEDICATIONS       Discharge Medication List as of 9/8/2018 12:51 PM      CONTINUE these medications which have NOT CHANGED    Details   rizatriptan (MAXALT) 5 MG tablet Take 10 mg by mouth once as needed for Migraine May repeat in 2 hours if neededHistorical Med      acetaminophen (TYLENOL) 500 MG tablet Take 1,000 mg by mouth every 6 hours as needed for PainHistorical Med      furosemide (LASIX) 20 MG tablet Take 20 mg by mouth dailyHistorical Med      pantoprazole (PROTONIX) 40 MG tablet Take 40 mg by mouth dailyHistorical Med      potassium chloride (MICRO-K) 10 MEQ extended release capsule Take 20 mEq by mouth dailyHistorical Med      clopidogrel (PLAVIX) 75 MG tablet Take 75 mg by mouth dailyHistorical Med      !! lisinopril No rash noted. She is not diaphoretic. Psychiatric: She has a normal mood and affect. Nursing note and vitals reviewed. DIAGNOSTIC RESULTS     RADIOLOGY:   Non-plain film images such as CT, Ultrasound and MRI are read by the radiologist.   Interpretation per the Radiologist below, if available at the time of this note:    VL Extremity Venous Left         XR CHEST PORTABLE   Final Result   1. No radiographic evidence of acute cardiopulmonary process. Signed by Dr Kevin Burton on 9/8/2018 10:19 AM          LABS:  Labs Reviewed   CBC WITH AUTO DIFFERENTIAL - Abnormal; Notable for the following:        Result Value    WBC 19.0 (*)     Neutrophils % 66.6 (*)     Neutrophils # 12.7 (*)     Lymphocytes # 4.6 (*)     Monocytes # 1.40 (*)     All other components within normal limits   COMPREHENSIVE METABOLIC PANEL - Abnormal; Notable for the following:     Sodium 135 (*)     Chloride 95 (*)     Glucose 137 (*)     All other components within normal limits   PROTIME-INR - Abnormal; Notable for the following:     Protime 20.9 (*)     INR 1.80 (*)     All other components within normal limits   APTT - Abnormal; Notable for the following:     aPTT 40.6 (*)     All other components within normal limits   BRAIN NATRIURETIC PEPTIDE - Abnormal; Notable for the following:     Pro- (*)     All other components within normal limits   TROPONIN   URINE RT REFLEX TO CULTURE   TROPONIN       All other labs were within normal range or not returned as of this dictation.     EMERGENCY DEPARTMENT COURSE and DIFFERENTIAL DIAGNOSIS/MDM:   Vitals:    Vitals:    09/08/18 0935 09/08/18 1157 09/08/18 1225 09/08/18 1325   BP: 109/65 94/80 94/80 (!) 134/90   Pulse: 73 88 64 100   Resp:   18 20   Temp: 98.7 °F (37.1 °C) 98.6 °F (37 °C) 97.1 °F (36.2 °C) 97.4 °F (36.3 °C)   TempSrc: Oral   Oral   SpO2: 94% 90% 93% 94%   Weight: (!) 303 lb (137.4 kg)      Height: 5' 9\" (1.753 m)              MDM  Number of Diagnoses or

## 2018-09-10 LAB
EKG P AXIS: 60 DEGREES
EKG P AXIS: 63 DEGREES
EKG P-R INTERVAL: 176 MS
EKG P-R INTERVAL: 184 MS
EKG Q-T INTERVAL: 446 MS
EKG Q-T INTERVAL: 466 MS
EKG QRS DURATION: 122 MS
EKG QRS DURATION: 124 MS
EKG QTC CALCULATION (BAZETT): 463 MS
EKG QTC CALCULATION (BAZETT): 471 MS
EKG T AXIS: 45 DEGREES
EKG T AXIS: 61 DEGREES

## 2018-09-18 ENCOUNTER — HOSPITAL ENCOUNTER (EMERGENCY)
Age: 48
Discharge: HOME | End: 2018-09-18
Attending: EMERGENCY MEDICINE
Payer: MEDICARE

## 2018-09-18 VITALS
HEIGHT: 69 IN | WEIGHT: 285 LBS | OXYGEN SATURATION: 92 % | BODY MASS INDEX: 42.21 KG/M2 | DIASTOLIC BLOOD PRESSURE: 82 MMHG | TEMPERATURE: 98.6 F | RESPIRATION RATE: 18 BRPM | SYSTOLIC BLOOD PRESSURE: 128 MMHG | HEART RATE: 70 BPM

## 2018-09-18 DIAGNOSIS — N93.8 DUB (DYSFUNCTIONAL UTERINE BLEEDING): Primary | ICD-10-CM

## 2018-09-18 LAB
BACTERIA: ABNORMAL /HPF
BASOPHILS ABSOLUTE: 0.1 K/UL (ref 0–0.2)
BASOPHILS RELATIVE PERCENT: 0.3 % (ref 0–1)
BILIRUBIN URINE: NEGATIVE
BLOOD, URINE: ABNORMAL
CLARITY: ABNORMAL
COLOR: ABNORMAL
EOSINOPHILS ABSOLUTE: 0.2 K/UL (ref 0–0.6)
EOSINOPHILS RELATIVE PERCENT: 1.4 % (ref 0–5)
EPITHELIAL CELLS, UA: 5 /HPF (ref 0–5)
GLUCOSE URINE: NEGATIVE MG/DL
HCG(URINE) PREGNANCY TEST: NEGATIVE
HCT VFR BLD CALC: 43.5 % (ref 37–47)
HEMOGLOBIN: 14.3 G/DL (ref 12–16)
HYALINE CASTS: 8 /HPF (ref 0–8)
KETONES, URINE: ABNORMAL MG/DL
LEUKOCYTE ESTERASE, URINE: ABNORMAL
LYMPHOCYTES ABSOLUTE: 4 K/UL (ref 1.1–4.5)
LYMPHOCYTES RELATIVE PERCENT: 27.2 % (ref 20–40)
MCH RBC QN AUTO: 29.9 PG (ref 27–31)
MCHC RBC AUTO-ENTMCNC: 32.9 G/DL (ref 33–37)
MCV RBC AUTO: 91 FL (ref 81–99)
MONOCYTES ABSOLUTE: 0.9 K/UL (ref 0–0.9)
MONOCYTES RELATIVE PERCENT: 6.2 % (ref 0–10)
NEUTROPHILS ABSOLUTE: 9.4 K/UL (ref 1.5–7.5)
NEUTROPHILS RELATIVE PERCENT: 64.6 % (ref 50–65)
NITRITE, URINE: NEGATIVE
PDW BLD-RTO: 13.1 % (ref 11.5–14.5)
PH UA: 6
PLATELET # BLD: 419 K/UL (ref 130–400)
PMV BLD AUTO: 10.3 FL (ref 9.4–12.3)
PROTEIN UA: 30 MG/DL
RBC # BLD: 4.78 M/UL (ref 4.2–5.4)
RBC UA: >900 /HPF (ref 0–4)
SPECIFIC GRAVITY UA: 1.02
URINE REFLEX TO CULTURE: YES
UROBILINOGEN, URINE: 0.2 E.U./DL
WBC # BLD: 14.6 K/UL (ref 4.8–10.8)
WBC UA: 7 /HPF (ref 0–5)

## 2018-09-18 PROCEDURE — 99283 EMERGENCY DEPT VISIT LOW MDM: CPT | Performed by: EMERGENCY MEDICINE

## 2018-09-18 PROCEDURE — 81025 URINE PREGNANCY TEST: CPT

## 2018-09-18 PROCEDURE — 81001 URINALYSIS AUTO W/SCOPE: CPT

## 2018-09-18 PROCEDURE — 99284 EMERGENCY DEPT VISIT MOD MDM: CPT

## 2018-09-18 PROCEDURE — 87086 URINE CULTURE/COLONY COUNT: CPT

## 2018-09-18 PROCEDURE — 87186 SC STD MICRODIL/AGAR DIL: CPT

## 2018-09-18 PROCEDURE — 36415 COLL VENOUS BLD VENIPUNCTURE: CPT

## 2018-09-18 PROCEDURE — 85025 COMPLETE CBC W/AUTO DIFF WBC: CPT

## 2018-09-18 PROCEDURE — 6370000000 HC RX 637 (ALT 250 FOR IP): Performed by: EMERGENCY MEDICINE

## 2018-09-18 RX ADMIN — HYOSCYAMINE SULFATE 125 MCG: 0.12 TABLET ORAL at 21:45

## 2018-09-18 ASSESSMENT — ENCOUNTER SYMPTOMS
ABDOMINAL PAIN: 1
COUGH: 0
WHEEZING: 0
NAUSEA: 0
CHEST TIGHTNESS: 0
SHORTNESS OF BREATH: 0
RHINORRHEA: 0
EYE PAIN: 0
ABDOMINAL DISTENTION: 0
DIARRHEA: 0
BACK PAIN: 0
COLOR CHANGE: 0
PHOTOPHOBIA: 0
VOMITING: 0
SORE THROAT: 0
TROUBLE SWALLOWING: 0
CONSTIPATION: 0

## 2018-09-18 ASSESSMENT — PAIN SCALES - GENERAL: PAINLEVEL_OUTOF10: 5

## 2018-09-19 NOTE — ED NOTES
Pt reports that she has not menstruated in 15 years and began bleeding this morning, pt report she has had to change her large pad 5 times since 1100 this morning.       Susy Rahman RN  09/18/18 1922
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
## 2018-09-21 LAB
ORGANISM: ABNORMAL
URINE CULTURE, ROUTINE: ABNORMAL
URINE CULTURE, ROUTINE: ABNORMAL

## 2019-06-04 ENCOUNTER — APPOINTMENT (OUTPATIENT)
Dept: CT IMAGING | Facility: HOSPITAL | Age: 49
End: 2019-06-04

## 2019-06-04 ENCOUNTER — APPOINTMENT (OUTPATIENT)
Dept: GENERAL RADIOLOGY | Facility: HOSPITAL | Age: 49
End: 2019-06-04

## 2019-06-04 ENCOUNTER — HOSPITAL ENCOUNTER (EMERGENCY)
Facility: HOSPITAL | Age: 49
Discharge: HOME | End: 2019-06-04
Attending: EMERGENCY MEDICINE | Admitting: EMERGENCY MEDICINE

## 2019-06-04 VITALS
RESPIRATION RATE: 23 BRPM | BODY MASS INDEX: 44.41 KG/M2 | SYSTOLIC BLOOD PRESSURE: 109 MMHG | HEART RATE: 89 BPM | WEIGHT: 293 LBS | DIASTOLIC BLOOD PRESSURE: 83 MMHG | HEIGHT: 68 IN | TEMPERATURE: 98.6 F | OXYGEN SATURATION: 95 %

## 2019-06-04 DIAGNOSIS — S92.311A CLOSED DISPLACED FRACTURE OF FIRST METATARSAL BONE OF RIGHT FOOT, INITIAL ENCOUNTER: ICD-10-CM

## 2019-06-04 DIAGNOSIS — S20.211A RIB CONTUSION, RIGHT, INITIAL ENCOUNTER: ICD-10-CM
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
## 2019-06-04 PROCEDURE — 96374 THER/PROPH/DIAG INJ IV PUSH: CPT

## 2019-06-04 PROCEDURE — 73630 X-RAY EXAM OF FOOT: CPT

## 2019-06-04 PROCEDURE — 25010000002 MORPHINE PER 10 MG: Performed by: EMERGENCY MEDICINE

## 2019-06-04 PROCEDURE — 73560 X-RAY EXAM OF KNEE 1 OR 2: CPT

## 2019-06-04 PROCEDURE — 96376 TX/PRO/DX INJ SAME DRUG ADON: CPT

## 2019-06-04 PROCEDURE — 99284 EMERGENCY DEPT VISIT MOD MDM: CPT

## 2019-06-04 PROCEDURE — 72170 X-RAY EXAM OF PELVIS: CPT

## 2019-06-04 PROCEDURE — 96375 TX/PRO/DX INJ NEW DRUG ADDON: CPT

## 2019-06-04 PROCEDURE — 71101 X-RAY EXAM UNILAT RIBS/CHEST: CPT

## 2019-06-04 PROCEDURE — 70450 CT HEAD/BRAIN W/O DYE: CPT

## 2019-06-04 PROCEDURE — 25010000002 ONDANSETRON PER 1 MG: Performed by: EMERGENCY MEDICINE

## 2019-06-04 RX ORDER — ONDANSETRON 2 MG/ML
4 INJECTION INTRAMUSCULAR; INTRAVENOUS ONCE
Status: COMPLETED | OUTPATIENT
Start: 2019-06-04 | End: 2019-06-04

## 2019-06-04 RX ORDER — SODIUM CHLORIDE 0.9 % (FLUSH) 0.9 %
10 SYRINGE (ML) INJECTION AS NEEDED
Status: DISCONTINUED | OUTPATIENT
Start: 2019-06-04 | End: 2019-06-04 | Stop reason: HOSPADM

## 2019-06-04 RX ORDER — OXYCODONE AND ACETAMINOPHEN 7.5; 325 MG/1; MG/1
1 TABLET ORAL EVERY 6 HOURS PRN
Qty: 12 TABLET | Refills: 0 | Status: SHIPPED | OUTPATIENT
Start: 2019-06-04 | End: 2019-06-18

## 2019-06-04 RX ADMIN — MORPHINE SULFATE 4 MG: 4 INJECTION INTRAVENOUS at 19:20

## 2019-06-04 RX ADMIN — ONDANSETRON 4 MG: 2 INJECTION INTRAMUSCULAR; INTRAVENOUS at 19:20

## 2019-06-04 RX ADMIN — MORPHINE SULFATE 4 MG: 4 INJECTION INTRAVENOUS at 20:34
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
## 2019-06-18 ENCOUNTER — APPOINTMENT (OUTPATIENT)
Dept: PREADMISSION TESTING | Facility: HOSPITAL | Age: 49
End: 2019-06-18

## 2019-06-18 VITALS
DIASTOLIC BLOOD PRESSURE: 81 MMHG | BODY MASS INDEX: 43.4 KG/M2 | SYSTOLIC BLOOD PRESSURE: 153 MMHG | OXYGEN SATURATION: 95 % | HEIGHT: 69 IN | RESPIRATION RATE: 20 BRPM | HEART RATE: 79 BPM | WEIGHT: 293 LBS

## 2019-06-18 LAB
ANION GAP SERPL CALCULATED.3IONS-SCNC: 8 MMOL/L (ref 4–13)
BUN BLD-MCNC: 11 MG/DL (ref 5–21)
BUN/CREAT SERPL: 12 (ref 7–25)
CALCIUM SPEC-SCNC: 9.7 MG/DL (ref 8.4–10.4)
CHLORIDE SERPL-SCNC: 103 MMOL/L (ref 98–110)
CO2 SERPL-SCNC: 30 MMOL/L (ref 24–31)
CREAT BLD-MCNC: 0.92 MG/DL (ref 0.5–1.4)
DEPRECATED RDW RBC AUTO: 46.3 FL (ref 40–54)
ERYTHROCYTE [DISTWIDTH] IN BLOOD BY AUTOMATED COUNT: 13.9 % (ref 12–15)
GFR SERPL CREATININE-BSD FRML MDRD: 65 ML/MIN/1.73
GLUCOSE BLD-MCNC: 111 MG/DL (ref 70–100)
HCT VFR BLD AUTO: 37.6 % (ref 37–47)
HGB BLD-MCNC: 12.5 G/DL (ref 12–16)
MCH RBC QN AUTO: 30.6 PG (ref 28–32)
MCHC RBC AUTO-ENTMCNC: 33.2 G/DL (ref 33–36)
MCV RBC AUTO: 92.2 FL (ref 82–98)
PLATELET # BLD AUTO: 433 10*3/MM3 (ref 130–400)
PMV BLD AUTO: 10.1 FL (ref 6–12)
POTASSIUM BLD-SCNC: 3.9 MMOL/L (ref 3.5–5.3)
RBC # BLD AUTO: 4.08 10*6/MM3 (ref 4.2–5.4)
SODIUM BLD-SCNC: 141 MMOL/L (ref 135–145)
WBC NRBC COR # BLD: 11.34 10*3/MM3 (ref 4.8–10.8)

## 2019-06-18 PROCEDURE — 80048 BASIC METABOLIC PNL TOTAL CA: CPT | Performed by: PODIATRIST

## 2019-06-18 PROCEDURE — 93005 ELECTROCARDIOGRAM TRACING: CPT

## 2019-06-18 PROCEDURE — 85027 COMPLETE CBC AUTOMATED: CPT | Performed by: PODIATRIST

## 2019-06-18 PROCEDURE — 93010 ELECTROCARDIOGRAM REPORT: CPT | Performed by: INTERNAL MEDICINE

## 2019-06-18 PROCEDURE — 36415 COLL VENOUS BLD VENIPUNCTURE: CPT

## 2019-06-18 RX ORDER — OXYCODONE HYDROCHLORIDE AND ACETAMINOPHEN 5; 325 MG/1; MG/1
1 TABLET ORAL EVERY 4 HOURS PRN
COMMUNITY
End: 2019-06-25 | Stop reason: HOSPADM

## 2019-06-18 RX ORDER — ALBUTEROL SULFATE 90 UG/1
2 AEROSOL, METERED RESPIRATORY (INHALATION) EVERY 4 HOURS PRN
COMMUNITY

## 2019-06-18 NOTE — DISCHARGE INSTRUCTIONS
DAY OF SURGERY INSTRUCTIONS        YOUR SURGEON: Dr. Dennis    PROCEDURE: OPEN REDUCTION INTERNAL FIXATION FIRST METATARSAL WITH REPAIR, DISPLACED FIRST METATARSAL CUNEIFORM JOINT, POSSIBLE PRIMARY ARTHRODESIS FIRST METATARSAL CUNEIFORM JOINT - RIGHT FOOT    DATE OF SURGERY: 6/25/19    ARRIVAL TIME: AS DIRECTED BY OFFICE    YOU MAY TAKE THE FOLLOWING MEDICATION(S) THE MORNING OF SURGERY WITH A SIP OF WATER: albuterol sulfate, metoprolol tartrate (LOPRESSOR), oxyCODONE-acetaminophen (PERCOCET),  pregabalin (LYRICA)      ALL OTHER HOME MEDICATION CHECK WITH YOUR PHYSICIAN                MANAGING PAIN AFTER SURGERY    We know you are probably wondering what your pain will be like after surgery.  Following surgery it is unrealistic to expect you will not have pain.   Pain is how our bodies let us know that something is wrong or cautions us to be careful.  That said, our goal is to make your pain tolerable.    Methods we may use to treat your pain include (oral or IV medications, PCAs, epidurals, nerve blocks, etc.)   While some procedures require IV pain medications for a short time after surgery, transitioning to pain medications by mouth allows for better management of pain.   Your nurse will encourage you to take oral pain medications whenever possible.  IV medications work almost immediately, but only last a short while.  Taking medications by mouth allows for a more constant level of medication in your blood stream for a longer period of time.      Once your pain is out of control it is harder to get back under control.  It is important you are aware when your next dose of pain medication is due.  If you are admitted, your nurse may write the time of your next dose on the white board in your room to help you remember.      We are interested in your pain and encourage you to inform us about aggravating factors during your visit.   Many times a simple repositioning every few hours can make a big difference.    If  your physician says it is okay, do not let your pain prevent you from getting out of bed. Be sure to call your nurse for assistance prior to getting up so you do not fall.      Before surgery, please decide your tolerable pain goal.  These faces help describe the pain ratings we use on a 0-10 scale.   Be prepared to tell us your goal and whether or not you take pain or anxiety medications at home.          BEFORE YOU COME TO THE HOSPITAL  (Pre-op instructions)  • Do not eat, drink, smoke or chew gum after midnight the night before surgery.  This also includes no mints.  • Morning of surgery take only the medicines you have been instructed with a sip of water unless otherwise instructed  by your physician.  • Do not shave, wear makeup or dark nail polish.  • Remove all jewelry including rings.  • Leave anything you consider valuable at home.  • Leave your suitcase in the car until after your surgery.  • Bring the following with you if applicable:  o Picture ID and insurance, Medicare or Medicaid cards  o Co-pay/deductible required by insurance (cash, check, credit card)  o Copy of advance directive, living will or power-of- documents if not brought to PAT  o CPAP or BIPAP mask and tubing  o Relaxation aids (MP3 player, book, magazine)  • On the day of surgery check in at registration located at the main entrance of the hospital.       Outpatient Surgery Guidelines, Adult  Outpatient procedures are those for which the person having the procedure is allowed to go home the same day as the procedure. Various procedures are done on an outpatient basis. You should follow some general guidelines if you will be having an outpatient procedure.  LET YOUR HEALTH CARE PROVIDER KNOW ABOUT:  · Any allergies you have.  · All medicines you are taking, including vitamins, herbs, eye drops, creams, and over-the-counter medicines.  · Previous problems you or members of your family have had with the use of anesthetics.  · Any  blood disorders you have.  · Previous surgeries you have had.  · Medical conditions you have.  RISKS AND COMPLICATIONS  Your health care provider will discuss possible risks and complications with you before surgery. Common risks and complications include:    · Problems due to the use of anesthetics.  · Blood loss and replacement (does not apply to minor surgical procedures).  · Temporary increase in pain due to surgery.  · Uncorrected pain or problems that the surgery was meant to correct.  · Infection.  · New damage.  BEFORE THE PROCEDURE  · Ask your health care provider about changing or stopping your regular medicines. You may need to stop taking certain medicines in the days or weeks before the procedure.  · Stop smoking at least 2 weeks before surgery. This lowers your risk for complications during and after surgery. Ask your health care provider for help with this if needed.  · Eat your usual meals and a light supper the day before surgery. Continue fluid intake. Do not drink alcohol.  · Do not eat or drink after midnight the night before your surgery.   · Arrange for someone to take you home and to stay with you for 24 hours after the procedure. Medicine given for your procedure may affect your ability to drive or to care for yourself.  · Call your health care provider's office if you develop an illness or problem that may prevent you from safely having your procedure.  AFTER THE PROCEDURE  After surgery, you will be taken to a recovery area, where your progress will be monitored. If there are no complications, you will be allowed to go home when you are awake, stable, and taking fluids well. You may have numbness around the surgical site. Healing will take some time. You will have tenderness at the surgical site and may have some swelling and bruising. You may also have some nausea.  HOME CARE INSTRUCTIONS  · Do not drive for 24 hours, or as directed by your health care provider. Do not drive while taking  prescription pain medicines.  · Do not drink alcohol for 24 hours.  · Do not make important decisions or sign legal documents for 24 hours.  · You may resume a normal diet and activities as directed.  · Do not lift anything heavier than 10 pounds (4.5 kg) or play contact sports until your health care provider says it is okay.  · Change your bandages (dressings) as directed.  · Only take over-the-counter or prescription medicines as directed by your health care provider.  · Follow up with your health care provider as directed.  SEEK MEDICAL CARE IF:  · You have increased bleeding (more than a small spot) from the surgical site.  · You have redness, swelling, or increasing pain in the wound.  · You see pus coming from the wound.  · You have a fever.  · You notice a bad smell coming from the wound or dressing.  · You feel lightheaded or faint.  · You develop a rash.  · You have trouble breathing.  · You develop allergies.  MAKE SURE YOU:  · Understand these instructions.  · Will watch your condition.  · Will get help right away if you are not doing well or get worse.     This information is not intended to replace advice given to you by your health care provider. Make sure you discuss any questions you have with your health care provider.     Document Released: 09/12/2002 Document Revised: 05/03/2016 Document Reviewed: 05/22/2014  [employer] Interactive Patient Education ©2016 [employer]       Fall Prevention in Hospitals, Adult  As a hospital patient, your condition and the treatments you receive can increase your risk for falls. Some additional risk factors for falls in a hospital include:  · Being in an unfamiliar environment.  · Being on bed rest.  · Your surgery.  · Taking certain medicines.  · Your tubing requirements, such as intravenous (IV) therapy or catheters.  It is important that you learn how to decrease fall risks while at the hospital. Below are important tips that can help prevent falls.  SAFETY TIPS  FOR PREVENTING FALLS  Talk about your risk of falling.  · Ask your health care provider why you are at risk for falling. Is it your medicine, illness, tubing placement, or something else?  · Make a plan with your health care provider to keep you safe from falls.  · Ask your health care provider or pharmacist about side effects of your medicines. Some medicines can make you dizzy or affect your coordination.  Ask for help.  · Ask for help before getting out of bed. You may need to press your call button.  · Ask for assistance in getting safely to the toilet.  · Ask for a walker or cane to be put at your bedside. Ask that most of the side rails on your bed be placed up before your health care provider leaves the room.  · Ask family or friends to sit with you.  · Ask for things that are out of your reach, such as your glasses, hearing aids, telephone, bedside table, or call button.  Follow these tips to avoid falling:  · Stay lying or seated, rather than standing, while waiting for help.  · Wear rubber-soled slippers or shoes whenever you walk in the hospital.  · Avoid quick, sudden movements.  ¨ Change positions slowly.  ¨ Sit on the side of your bed before standing.  ¨ Stand up slowly and wait before you start to walk.  · Let your health care provider know if there is a spill on the floor.  · Pay careful attention to the medical equipment, electrical cords, and tubes around you.  · When you need help, use your call button by your bed or in the bathroom. Wait for one of your health care providers to help you.  · If you feel dizzy or unsure of your footing, return to bed and wait for assistance.  · Avoid being distracted by the TV, telephone, or another person in your room.  · Do not lean or support yourself on rolling objects, such as IV poles or bedside tables.     This information is not intended to replace advice given to you by your health care provider. Make sure you discuss any questions you have with your  health care provider.     Document Released: 12/15/2001 Document Revised: 01/08/2016 Document Reviewed: 08/25/2013  [employer] Interactive Patient Education ©2016 [employer]       Surgical Site Infections FAQs  What is a Surgical Site Infection (SSI)?  A surgical site infection is an infection that occurs after surgery in the part of the body where the surgery took place. Most patients who have surgery do not develop an infection. However, infections develop in about 1 to 3 out of every 100 patients who have surgery.  Some of the common symptoms of a surgical site infection are:  · Redness and pain around the area where you had surgery  · Drainage of cloudy fluid from your surgical wound  · Fever  Can SSIs be treated?  Yes. Most surgical site infections can be treated with antibiotics. The antibiotic given to you depends on the bacteria (germs) causing the infection. Sometimes patients with SSIs also need another surgery to treat the infection.  What are some of the things that hospitals are doing to prevent SSIs?  To prevent SSIs, doctors, nurses, and other healthcare providers:  · Clean their hands and arms up to their elbows with an antiseptic agent just before the surgery.  · Clean their hands with soap and water or an alcohol-based hand rub before and after caring for each patient.  · May remove some of your hair immediately before your surgery using electric clippers if the hair is in the same area where the procedure will occur. They should not shave you with a razor.  · Wear special hair covers, masks, gowns, and gloves during surgery to keep the surgery area clean.  · Give you antibiotics before your surgery starts. In most cases, you should get antibiotics within 60 minutes before the surgery starts and the antibiotics should be stopped within 24 hours after surgery.  · Clean the skin at the site of your surgery with a special soap that kills germs.  What can I do to help prevent SSIs?  Before your  surgery:  · Tell your doctor about other medical problems you may have. Health problems such as allergies, diabetes, and obesity could affect your surgery and your treatment.  · Quit smoking. Patients who smoke get more infections. Talk to your doctor about how you can quit before your surgery.  · Do not shave near where you will have surgery. Shaving with a razor can irritate your skin and make it easier to develop an infection.  At the time of your surgery:  · Speak up if someone tries to shave you with a razor before surgery. Ask why you need to be shaved and talk with your surgeon if you have any concerns.  · Ask if you will get antibiotics before surgery.  After your surgery:  · Make sure that your healthcare providers clean their hands before examining you, either with soap and water or an alcohol-based hand rub.  · If you do not see your providers clean their hands, please ask them to do so.  · Family and friends who visit you should not touch the surgical wound or dressings.  · Family and friends should clean their hands with soap and water or an alcohol-based hand rub before and after visiting you. If you do not see them clean their hands, ask them to clean their hands.  What do I need to do when I go home from the hospital?  · Before you go home, your doctor or nurse should explain everything you need to know about taking care of your wound. Make sure you understand how to care for your wound before you leave the hospital.  · Always clean your hands before and after caring for your wound.  · Before you go home, make sure you know who to contact if you have questions or problems after you get home.  · If you have any symptoms of an infection, such as redness and pain at the surgery site, drainage, or fever, call your doctor immediately.  If you have additional questions, please ask your doctor or nurse.  Developed and co-sponsored by The Society for Healthcare Epidemiology of [place] (SHEA); Infectious  Diseases Society of [place] (IDSA); American Hospital Association; Association for Professionals in Infection Control and Epidemiology (APIC); Centers for Disease Control and Prevention (CDC); and The Joint Commission.     This information is not intended to replace advice given to you by your health care provider. Make sure you discuss any questions you have with your health care provider.     Document Released: 12/23/2014 Document Revised: 01/08/2016 Document Reviewed: 03/02/2016  [employer] Interactive Patient Education ©2016 [employer]       [facility]  CHG 4% Patient Instruction Sheet    Preparing the Skin Before Surgery  Preparing or “prepping” skin before surgery can reduce the risk of infection at the surgical site. To make the process easier,[facility] has chosen 4% Chlorhexidine Gluconate (CHG) antiseptic solution.   The steps below outline the prepping process and should be carefully followed.                                                                                                                                                      Prep the skin at the following time(s):                                                      We recommend you shower the night before surgery, and again the morning of surgery with the 4% CHG antiseptic solution using half of the bottle and a cloth each time.  Dress in clean clothes/sleepwear after showering.  See instructions below for application.          Do not apply any lotions or moisturizers.       Do not shave the area to be prepped for at least 2 days prior to surgery.    Clipping the hair may be done immediately prior to your surgery at the hospital    if needed.    Directions:  Thoroughly rinse your body with water.  Apply 4% CHG to a cloth and wash skin gently, paying special attention to the operative site.  Rinse again thoroughly.  Once you have begun using this product do not apply anything else to your skin. If itching or redness persists, rinse  affected areas and discontinue use.    When using this product:  • Keep out of eyes, ears, and mouth.  • If solution should contact these areas, rinse out promptly and thoroughly with water.  • For external use only.  • Do not use in genital area, on your face or head.      PATIENT/FAMILY/RESPONSIBLE PARTY VERBALIZES UNDERSTANDING OF ABOVE EDUCATION.  COPY OF PAIN SCALE GIVEN AND REVIEWED WITH VERBALIZED UNDERSTANDING.

## 2019-06-25 ENCOUNTER — ANESTHESIA (OUTPATIENT)
Dept: PERIOP | Facility: HOSPITAL | Age: 49
End: 2019-06-25

## 2019-06-25 ENCOUNTER — ANESTHESIA EVENT (OUTPATIENT)
Dept: PERIOP | Facility: HOSPITAL | Age: 49
End: 2019-06-25

## 2019-06-25 ENCOUNTER — HOSPITAL ENCOUNTER (OUTPATIENT)
Facility: HOSPITAL | Age: 49
Setting detail: HOSPITAL OUTPATIENT SURGERY
Discharge: HOME | End: 2019-06-25
Attending: PODIATRIST | Admitting: PODIATRIST

## 2019-06-25 ENCOUNTER — APPOINTMENT (OUTPATIENT)
Dept: GENERAL RADIOLOGY | Facility: HOSPITAL | Age: 49
End: 2019-06-25

## 2019-06-25 VITALS
TEMPERATURE: 97.8 F | DIASTOLIC BLOOD PRESSURE: 77 MMHG | SYSTOLIC BLOOD PRESSURE: 132 MMHG | HEART RATE: 94 BPM | OXYGEN SATURATION: 94 % | RESPIRATION RATE: 16 BRPM

## 2019-06-25 LAB
B-HCG UR QL: NEGATIVE
GLUCOSE BLDC GLUCOMTR-MCNC: 115 MG/DL (ref 70–130)
GLUCOSE BLDC GLUCOMTR-MCNC: 133 MG/DL (ref 70–130)

## 2019-06-25 PROCEDURE — C1713 ANCHOR/SCREW BN/BN,TIS/BN: HCPCS | Performed by: PODIATRIST

## 2019-06-25 PROCEDURE — 82962 GLUCOSE BLOOD TEST: CPT

## 2019-06-25 PROCEDURE — 25010000002 ROPIVACAINE PER 1 MG: Performed by: PODIATRIST

## 2019-06-25 PROCEDURE — 73620 X-RAY EXAM OF FOOT: CPT

## 2019-06-25 PROCEDURE — 25010000002 ONDANSETRON PER 1 MG: Performed by: ANESTHESIOLOGY

## 2019-06-25 PROCEDURE — 25010000002 FENTANYL CITRATE (PF) 100 MCG/2ML SOLUTION: Performed by: NURSE ANESTHETIST, CERTIFIED REGISTERED

## 2019-06-25 PROCEDURE — 25010000003 CEFAZOLIN PER 500 MG: Performed by: PODIATRIST

## 2019-06-25 PROCEDURE — 25010000002 DEXAMETHASONE PER 1 MG: Performed by: ANESTHESIOLOGY

## 2019-06-25 PROCEDURE — 76000 FLUOROSCOPY <1 HR PHYS/QHP: CPT

## 2019-06-25 PROCEDURE — 25010000002 PROPOFOL 10 MG/ML EMULSION: Performed by: NURSE ANESTHETIST, CERTIFIED REGISTERED

## 2019-06-25 PROCEDURE — 25010000002 NEOSTIGMINE PER 0.5 MG: Performed by: NURSE ANESTHETIST, CERTIFIED REGISTERED

## 2019-06-25 PROCEDURE — 25010000002 ONDANSETRON PER 1 MG: Performed by: NURSE ANESTHETIST, CERTIFIED REGISTERED

## 2019-06-25 PROCEDURE — 81025 URINE PREGNANCY TEST: CPT | Performed by: PODIATRIST

## 2019-06-25 DEVICE — 2.7 X 22 MM R3CON LOCKING PLATE SCREW
Type: IMPLANTABLE DEVICE | Status: FUNCTIONAL
Brand: GORILLA PLATING SYSTEM

## 2019-06-25 DEVICE — 2.7 X 14 MM R3CON LOCKING PLATE SCREW
Type: IMPLANTABLE DEVICE | Status: FUNCTIONAL
Brand: GORILLA PLATING SYSTEM

## 2019-06-25 DEVICE — 2.7 X 12 MM R3CON LOCKING PLATE SCREW
Type: IMPLANTABLE DEVICE | Status: FUNCTIONAL
Brand: GORILLA PLATING SYSTEM

## 2019-06-25 DEVICE — CLOVER PLATE: 5-HOLE W/ COMPRESSION, RIGHT
Type: IMPLANTABLE DEVICE | Status: FUNCTIONAL
Brand: GORILLA PLATING SYSTEM

## 2019-06-25 DEVICE — 3.5 X 20 MM R3CON NON-LOCKING PLATE SCREW
Type: IMPLANTABLE DEVICE | Status: FUNCTIONAL
Brand: GORILLA PLATING SYSTEM

## 2019-06-25 RX ORDER — SODIUM CHLORIDE, SODIUM LACTATE, POTASSIUM CHLORIDE, CALCIUM CHLORIDE 600; 310; 30; 20 MG/100ML; MG/100ML; MG/100ML; MG/100ML
1000 INJECTION, SOLUTION INTRAVENOUS CONTINUOUS
Status: DISCONTINUED | OUTPATIENT
Start: 2019-06-25 | End: 2019-06-25 | Stop reason: HOSPADM

## 2019-06-25 RX ORDER — ONDANSETRON 2 MG/ML
4 INJECTION INTRAMUSCULAR; INTRAVENOUS ONCE AS NEEDED
Status: COMPLETED | OUTPATIENT
Start: 2019-06-25 | End: 2019-06-25

## 2019-06-25 RX ORDER — FENTANYL CITRATE 50 UG/ML
25 INJECTION, SOLUTION INTRAMUSCULAR; INTRAVENOUS AS NEEDED
Status: DISCONTINUED | OUTPATIENT
Start: 2019-06-25 | End: 2019-06-25 | Stop reason: HOSPADM

## 2019-06-25 RX ORDER — MIDAZOLAM HYDROCHLORIDE 1 MG/ML
2 INJECTION INTRAMUSCULAR; INTRAVENOUS
Status: DISCONTINUED | OUTPATIENT
Start: 2019-06-25 | End: 2019-06-25 | Stop reason: HOSPADM

## 2019-06-25 RX ORDER — MIDAZOLAM HYDROCHLORIDE 1 MG/ML
1 INJECTION INTRAMUSCULAR; INTRAVENOUS
Status: DISCONTINUED | OUTPATIENT
Start: 2019-06-25 | End: 2019-06-25 | Stop reason: HOSPADM

## 2019-06-25 RX ORDER — GLYCOPYRROLATE 0.2 MG/ML
INJECTION INTRAMUSCULAR; INTRAVENOUS AS NEEDED
Status: DISCONTINUED | OUTPATIENT
Start: 2019-06-25 | End: 2019-06-25 | Stop reason: SURG

## 2019-06-25 RX ORDER — ROCURONIUM BROMIDE 10 MG/ML
INJECTION, SOLUTION INTRAVENOUS AS NEEDED
Status: DISCONTINUED | OUTPATIENT
Start: 2019-06-25 | End: 2019-06-25 | Stop reason: SURG

## 2019-06-25 RX ORDER — ROPIVACAINE HYDROCHLORIDE 5 MG/ML
INJECTION, SOLUTION EPIDURAL; INFILTRATION; PERINEURAL AS NEEDED
Status: DISCONTINUED | OUTPATIENT
Start: 2019-06-25 | End: 2019-06-25 | Stop reason: HOSPADM

## 2019-06-25 RX ORDER — SODIUM CHLORIDE, SODIUM LACTATE, POTASSIUM CHLORIDE, CALCIUM CHLORIDE 600; 310; 30; 20 MG/100ML; MG/100ML; MG/100ML; MG/100ML
100 INJECTION, SOLUTION INTRAVENOUS CONTINUOUS
Status: DISCONTINUED | OUTPATIENT
Start: 2019-06-25 | End: 2019-06-25 | Stop reason: HOSPADM

## 2019-06-25 RX ORDER — NALOXONE HCL 0.4 MG/ML
0.4 VIAL (ML) INJECTION AS NEEDED
Status: DISCONTINUED | OUTPATIENT
Start: 2019-06-25 | End: 2019-06-25 | Stop reason: HOSPADM

## 2019-06-25 RX ORDER — LABETALOL HYDROCHLORIDE 5 MG/ML
5 INJECTION, SOLUTION INTRAVENOUS
Status: DISCONTINUED | OUTPATIENT
Start: 2019-06-25 | End: 2019-06-25 | Stop reason: HOSPADM

## 2019-06-25 RX ORDER — FENTANYL CITRATE 50 UG/ML
INJECTION, SOLUTION INTRAMUSCULAR; INTRAVENOUS AS NEEDED
Status: DISCONTINUED | OUTPATIENT
Start: 2019-06-25 | End: 2019-06-25 | Stop reason: SURG

## 2019-06-25 RX ORDER — LIDOCAINE HYDROCHLORIDE 40 MG/ML
SOLUTION TOPICAL AS NEEDED
Status: DISCONTINUED | OUTPATIENT
Start: 2019-06-25 | End: 2019-06-25 | Stop reason: SURG

## 2019-06-25 RX ORDER — ACETAMINOPHEN 500 MG
1000 TABLET ORAL ONCE
Status: COMPLETED | OUTPATIENT
Start: 2019-06-25 | End: 2019-06-25

## 2019-06-25 RX ORDER — SODIUM CHLORIDE, SODIUM LACTATE, POTASSIUM CHLORIDE, CALCIUM CHLORIDE 600; 310; 30; 20 MG/100ML; MG/100ML; MG/100ML; MG/100ML
100 INJECTION, SOLUTION INTRAVENOUS CONTINUOUS PRN
Status: DISCONTINUED | OUTPATIENT
Start: 2019-06-25 | End: 2019-06-25 | Stop reason: HOSPADM

## 2019-06-25 RX ORDER — PROPOFOL 10 MG/ML
VIAL (ML) INTRAVENOUS AS NEEDED
Status: DISCONTINUED | OUTPATIENT
Start: 2019-06-25 | End: 2019-06-25 | Stop reason: SURG

## 2019-06-25 RX ORDER — EPHEDRINE SULFATE 50 MG/ML
INJECTION INTRAVENOUS AS NEEDED
Status: DISCONTINUED | OUTPATIENT
Start: 2019-06-25 | End: 2019-06-25 | Stop reason: SURG

## 2019-06-25 RX ORDER — OXYCODONE AND ACETAMINOPHEN 10; 325 MG/1; MG/1
1 TABLET ORAL ONCE AS NEEDED
Status: DISCONTINUED | OUTPATIENT
Start: 2019-06-25 | End: 2019-06-25 | Stop reason: HOSPADM

## 2019-06-25 RX ORDER — IBUPROFEN 600 MG/1
600 TABLET ORAL ONCE AS NEEDED
Status: DISCONTINUED | OUTPATIENT
Start: 2019-06-25 | End: 2019-06-25 | Stop reason: HOSPADM

## 2019-06-25 RX ORDER — SODIUM CHLORIDE 0.9 % (FLUSH) 0.9 %
1-10 SYRINGE (ML) INJECTION AS NEEDED
Status: DISCONTINUED | OUTPATIENT
Start: 2019-06-25 | End: 2019-06-25 | Stop reason: HOSPADM

## 2019-06-25 RX ORDER — LIDOCAINE HYDROCHLORIDE 20 MG/ML
INJECTION, SOLUTION INFILTRATION; PERINEURAL AS NEEDED
Status: DISCONTINUED | OUTPATIENT
Start: 2019-06-25 | End: 2019-06-25 | Stop reason: SURG

## 2019-06-25 RX ORDER — IPRATROPIUM BROMIDE AND ALBUTEROL SULFATE 2.5; .5 MG/3ML; MG/3ML
3 SOLUTION RESPIRATORY (INHALATION) ONCE AS NEEDED
Status: DISCONTINUED | OUTPATIENT
Start: 2019-06-25 | End: 2019-06-25 | Stop reason: HOSPADM

## 2019-06-25 RX ORDER — PHENYLEPHRINE HCL IN 0.9% NACL 0.8MG/10ML
SYRINGE (ML) INTRAVENOUS AS NEEDED
Status: DISCONTINUED | OUTPATIENT
Start: 2019-06-25 | End: 2019-06-25 | Stop reason: SURG

## 2019-06-25 RX ORDER — OXYCODONE AND ACETAMINOPHEN 7.5; 325 MG/1; MG/1
2 TABLET ORAL EVERY 4 HOURS PRN
Status: DISCONTINUED | OUTPATIENT
Start: 2019-06-25 | End: 2019-06-25 | Stop reason: HOSPADM

## 2019-06-25 RX ORDER — SODIUM CHLORIDE 0.9 % (FLUSH) 0.9 %
3 SYRINGE (ML) INJECTION AS NEEDED
Status: DISCONTINUED | OUTPATIENT
Start: 2019-06-25 | End: 2019-06-25 | Stop reason: HOSPADM

## 2019-06-25 RX ORDER — MAGNESIUM HYDROXIDE 1200 MG/15ML
LIQUID ORAL AS NEEDED
Status: DISCONTINUED | OUTPATIENT
Start: 2019-06-25 | End: 2019-06-25 | Stop reason: HOSPADM

## 2019-06-25 RX ORDER — SODIUM CHLORIDE 0.9 % (FLUSH) 0.9 %
3-10 SYRINGE (ML) INJECTION AS NEEDED
Status: DISCONTINUED | OUTPATIENT
Start: 2019-06-25 | End: 2019-06-25 | Stop reason: HOSPADM

## 2019-06-25 RX ORDER — OXYCODONE AND ACETAMINOPHEN 10; 325 MG/1; MG/1
1 TABLET ORAL EVERY 4 HOURS PRN
Qty: 42 TABLET | Refills: 0 | Status: SHIPPED | OUTPATIENT
Start: 2019-06-25

## 2019-06-25 RX ORDER — SODIUM CHLORIDE 0.9 % (FLUSH) 0.9 %
3 SYRINGE (ML) INJECTION EVERY 12 HOURS SCHEDULED
Status: DISCONTINUED | OUTPATIENT
Start: 2019-06-25 | End: 2019-06-25 | Stop reason: HOSPADM

## 2019-06-25 RX ORDER — ONDANSETRON 2 MG/ML
INJECTION INTRAMUSCULAR; INTRAVENOUS AS NEEDED
Status: DISCONTINUED | OUTPATIENT
Start: 2019-06-25 | End: 2019-06-25 | Stop reason: SURG

## 2019-06-25 RX ORDER — METOCLOPRAMIDE HYDROCHLORIDE 5 MG/ML
5 INJECTION INTRAMUSCULAR; INTRAVENOUS
Status: DISCONTINUED | OUTPATIENT
Start: 2019-06-25 | End: 2019-06-25 | Stop reason: HOSPADM

## 2019-06-25 RX ORDER — PROMETHAZINE HYDROCHLORIDE 12.5 MG/1
12.5 TABLET ORAL EVERY 4 HOURS PRN
Qty: 42 TABLET | Refills: 0 | Status: SHIPPED | OUTPATIENT
Start: 2019-06-25

## 2019-06-25 RX ORDER — DEXAMETHASONE SODIUM PHOSPHATE 4 MG/ML
4 INJECTION, SOLUTION INTRA-ARTICULAR; INTRALESIONAL; INTRAMUSCULAR; INTRAVENOUS; SOFT TISSUE ONCE AS NEEDED
Status: COMPLETED | OUTPATIENT
Start: 2019-06-25 | End: 2019-06-25

## 2019-06-25 RX ADMIN — Medication 160 MCG: at 10:02

## 2019-06-25 RX ADMIN — GLYCOPYRROLATE 0.4 MG: 0.2 INJECTION, SOLUTION INTRAMUSCULAR; INTRAVENOUS at 10:21

## 2019-06-25 RX ADMIN — FENTANYL CITRATE 50 MCG: 50 INJECTION, SOLUTION INTRAMUSCULAR; INTRAVENOUS at 09:20

## 2019-06-25 RX ADMIN — OXYCODONE HYDROCHLORIDE AND ACETAMINOPHEN 2 TABLET: 7.5; 325 TABLET ORAL at 11:05

## 2019-06-25 RX ADMIN — ONDANSETRON HYDROCHLORIDE 4 MG: 2 SOLUTION INTRAMUSCULAR; INTRAVENOUS at 10:17

## 2019-06-25 RX ADMIN — Medication 4 MG: at 10:21

## 2019-06-25 RX ADMIN — PROPOFOL 200 MG: 10 INJECTION, EMULSION INTRAVENOUS at 09:22

## 2019-06-25 RX ADMIN — Medication 160 MCG: at 09:55

## 2019-06-25 RX ADMIN — EPHEDRINE SULFATE 10 MG: 50 INJECTION INTRAVENOUS at 10:09

## 2019-06-25 RX ADMIN — ROCURONIUM BROMIDE 30 MG: 10 INJECTION INTRAVENOUS at 09:22

## 2019-06-25 RX ADMIN — FENTANYL CITRATE 100 MCG: 50 INJECTION, SOLUTION INTRAMUSCULAR; INTRAVENOUS at 09:42

## 2019-06-25 RX ADMIN — ONDANSETRON 4 MG: 2 INJECTION INTRAMUSCULAR; INTRAVENOUS at 10:56

## 2019-06-25 RX ADMIN — LIDOCAINE HYDROCHLORIDE 100 MG: 20 INJECTION, SOLUTION INFILTRATION; PERINEURAL at 09:22

## 2019-06-25 RX ADMIN — EPHEDRINE SULFATE 10 MG: 50 INJECTION INTRAVENOUS at 10:04

## 2019-06-25 RX ADMIN — FENTANYL CITRATE 50 MCG: 50 INJECTION, SOLUTION INTRAMUSCULAR; INTRAVENOUS at 09:36

## 2019-06-25 RX ADMIN — DEXAMETHASONE SODIUM PHOSPHATE 4 MG: 4 INJECTION, SOLUTION INTRAMUSCULAR; INTRAVENOUS at 08:41

## 2019-06-25 RX ADMIN — ACETAMINOPHEN 1000 MG: 500 TABLET, FILM COATED ORAL at 08:40

## 2019-06-25 RX ADMIN — CEFAZOLIN 3 G: 1 INJECTION, POWDER, FOR SOLUTION INTRAMUSCULAR; INTRAVENOUS at 09:28

## 2019-06-25 RX ADMIN — ROCURONIUM BROMIDE 20 MG: 10 INJECTION INTRAVENOUS at 09:36

## 2019-06-25 RX ADMIN — EPHEDRINE SULFATE 10 MG: 50 INJECTION INTRAVENOUS at 10:14

## 2019-06-25 RX ADMIN — LIDOCAINE HYDROCHLORIDE 1 EACH: 40 SOLUTION TOPICAL at 09:24

## 2019-06-25 RX ADMIN — SODIUM CHLORIDE, POTASSIUM CHLORIDE, SODIUM LACTATE AND CALCIUM CHLORIDE 100 ML/HR: 600; 310; 30; 20 INJECTION, SOLUTION INTRAVENOUS at 06:52

## 2019-06-25 NOTE — DISCHARGE INSTRUCTIONS

## 2019-06-25 NOTE — ANESTHESIA PROCEDURE NOTES
Airway  Urgency: elective    Airway not difficult    General Information and Staff    Patient location during procedure: OR  CRNA: Brennan Gonzales CRNA    Indications and Patient Condition  Indications for airway management: airway protection    Preoxygenated: yes  MILS maintained throughout  Mask difficulty assessment: 2 - vent by mask + OA or adjuvant +/- NMBA    Final Airway Details  Final airway type: endotracheal airway      Successful airway: ETT  Cuffed: yes   Successful intubation technique: direct laryngoscopy  Endotracheal tube insertion site: oral  Blade: Ayala  Blade size: 2  ETT size (mm): 7.0  Cormack-Lehane Classification: grade I - full view of glottis  Placement verified by: chest auscultation and capnometry   Cuff volume (mL): 8  Measured from: lips  ETT to lips (cm): 21  Number of attempts at approach: 1

## 2019-06-25 NOTE — OP NOTE
METATARSAL OPEN REDUCTION INTERNAL FIXATION  Procedure Note    Radha Boswell  6/25/2019    Pre-op Diagnosis:   S92.311A    Post-op Diagnosis:     Post-Op Diagnosis Codes:     * Displaced fracture of first metatarsal bone of right foot [S92.311A]     * Dislocation of tarsometatarsal joint of right foot [S93.324]    Procedure/CPT® Codes:      Procedure(s):  1)  OPEN REDUCTION INTERNAL FIXATION FIRST METATARSAL,- RIGHT FOOT  2) open reduction internal fixation dislocation first metatarsal cuneiform joint right foot      Surgeon(s):  Abram Dennis DPM    Anesthesia: General    Staff:   Circulator: Kristin Coleman RN; Agata Stack RN  Scrub Person: Arianne Wilkinson; Kj Traore  Assistant: Kimberly Marr    Indications for procedure:  Dislocated first metatarsal cuneiform joint with the lateral aspect of first metatarsal base    Procedure details:  The patient brought in the operating room placed under general anesthesia.  An ankle block was performed with 30 cc 0.5% Naropin plain.  Right leg prepped and draped in usual sterile fashion.    Procedure #1 open reduction internal fixation first metatarsal base fracture with open reduction internal fixation first metatarsal Joint dislocation    Attention was directed to the dorsal aspect of first metatarsal cuneiform joint.  Linear incision was made.  Utilizing sharp blunt dissection technique.  A linear periosteal capsular incision was made.  First metatarsal base and medial cuneiform were exposed.  Nonviable tissue was resected from the fracture site.  The majority the articular cartilage was intact at this point rather than primary arthrodesis open reduction of the dislocation as well as the fracture was preferred.  The fracture dislocation was exaggerated on then reduced.  Was temporally fixated.  A 6 hole plate was then placed laterally.  Was temporally fixated.  3 locking screws were placed into the medial cuneiform.  A locking screw was placed in the base  the first metatarsal crossing the fracture site.  2 additional views were placed in the first metatarsal.  Fluoroscopic examination was performed showing adequate alignment.  Wound was irrigated.  Closure performed in layers with a well-padded compression bandage with posterior splint was applied.    Estimated Blood Loss: none    Specimens:                None      Drains: None    Implants:   Implant Name Type Inv. Item Serial No.  Lot No. LRB No. Used   PLT CLOVER COMPR LISFRANC 5H RT - [id number] Implant PLT CLOVER COMPR LISFRANC 5H RT  [employer] 28  Right 1   SCRW PLT RECON LK 2.7X12MM - [id number] Implant SCRW PLT RECON LK 2.7X12MM  [employer] 28  Right 1   SCRW PLT R3CON LK 2.7X14MM - [id number] Implant SCRW PLT R3CON LK 2.7X14MM  [employer] 28  Right 3   SCRW PLT R3CON LK 2.7X22MM - [id number] Implant SCRW PLT R3CON LK 2.7X22MM  [employer] 28  Right 1   SCRW PLT R3CON NL 3.5X20MM - [id number] Implant SCRW PLT R3CON NL 3.5X20MM  [employer]ON 28  Right 1        Complications: none    Follow up:   Nonweightbearing.  3 to 5 days for follow-up.  Prescriptions for Percocet Phenergan and Xarelto were given.    Abram Dennis DPM     Date: 6/25/2019  Time: 10:30 AM

## 2019-06-25 NOTE — ANESTHESIA PREPROCEDURE EVALUATION
Anesthesia Evaluation     Patient summary reviewed   no history of anesthetic complications:  NPO Solid Status: > 8 hours  NPO Liquid Status: > 8 hours           Airway   Mallampati: II  TM distance: <3 FB  No difficulty expected  Dental    (+) edentulous    Pulmonary    (+) a smoker Current,   Cardiovascular   Exercise tolerance: good (4-7 METS)    ECG reviewed  PT is on anticoagulation therapy  Patient on routine beta blocker and Beta blocker given within 24 hours of surgery    (+) hypertension, CAD, hyperlipidemia,     ROS comment: Off xarelto x 1 week--> bridge with lovenox    Neuro/Psych  GI/Hepatic/Renal/Endo    (+) morbid obesity,  diabetes mellitus,   (-) liver disease, no renal disease    Musculoskeletal     Abdominal    Substance History      OB/GYN          Other                      Anesthesia Plan    ASA 3     general     intravenous induction   Anesthetic plan, all risks, benefits, and alternatives have been provided, discussed and informed consent has been obtained with: patient.

## 2019-06-25 NOTE — ANESTHESIA POSTPROCEDURE EVALUATION
Patient: Radha Boswell    Procedure Summary     Date:  06/25/19 Room / Location:  [facility] OR  /  [facility] OR    Anesthesia Start:  0917 Anesthesia Stop:  1036    Procedure:  OPEN REDUCTION INTERNAL FIXATION FIRST METATARSAL WITH REPAIR,- RIGHT FOOT (Right Foot) Diagnosis:       Displaced fracture of first metatarsal bone of right foot      Dislocation of tarsometatarsal joint of right foot      (S92.311A)    Surgeon:  Abram Dennis DPM Provider:  Marcus Charles CRNA    Anesthesia Type:  general ASA Status:  3          Anesthesia Type: general  Last vitals  BP   132/77 (06/25/19 1200)   Temp   97.8 °F (36.6 °C) (06/25/19 1115)   Pulse   94 (06/25/19 1200)   Resp   16 (06/25/19 1200)     SpO2   94 % (06/25/19 1200)     Post Anesthesia Care and Evaluation    Patient location during evaluation: PACU  Patient participation: complete - patient participated  Level of consciousness: awake and alert  Pain management: adequate  Airway patency: patent  Anesthetic complications: No anesthetic complications    Cardiovascular status: acceptable  Respiratory status: acceptable  Hydration status: acceptable    Comments: Blood pressure 132/77, pulse 94, temperature 97.8 °F (36.6 °C), temperature source Temporal, resp. rate 16, SpO2 94 %, not currently breastfeeding.    Pt discharged from PACU based on chen score >8

## 2020-11-03 PROBLEM — I25.10 CORONARY ARTERY DISEASE: Status: RESOLVED | Noted: 2017-12-30 | Resolved: 2020-11-03

## 2020-11-03 PROBLEM — K21.9 GERD (GASTROESOPHAGEAL REFLUX DISEASE): Status: RESOLVED | Noted: 2017-12-30 | Resolved: 2020-11-03

## 2020-11-03 PROBLEM — K21.9 GASTROESOPHAGEAL REFLUX DISEASE: Status: RESOLVED | Noted: 2020-11-03 | Resolved: 2020-11-03

## 2020-11-03 PROBLEM — I10 HYPERTENSION: Status: RESOLVED | Noted: 2017-12-30 | Resolved: 2020-11-03

## 2020-11-03 PROBLEM — E03.9 HYPOTHYROIDISM: Status: RESOLVED | Noted: 2020-11-03 | Resolved: 2020-11-03

## 2020-11-03 PROBLEM — I10 ESSENTIAL HYPERTENSION: Status: RESOLVED | Noted: 2020-11-03 | Resolved: 2020-11-03

## 2021-04-16 ENCOUNTER — APPOINTMENT (OUTPATIENT)
Dept: ULTRASOUND IMAGING | Facility: HOSPITAL | Age: 51
End: 2021-04-16

## 2021-04-16 ENCOUNTER — HOSPITAL ENCOUNTER (EMERGENCY)
Facility: HOSPITAL | Age: 51
Discharge: HOME | End: 2021-04-16
Attending: EMERGENCY MEDICINE | Admitting: EMERGENCY MEDICINE

## 2021-04-16 VITALS
BODY MASS INDEX: 42.95 KG/M2 | DIASTOLIC BLOOD PRESSURE: 80 MMHG | RESPIRATION RATE: 16 BRPM | TEMPERATURE: 97.6 F | HEART RATE: 80 BPM | OXYGEN SATURATION: 100 % | SYSTOLIC BLOOD PRESSURE: 128 MMHG | WEIGHT: 290 LBS | HEIGHT: 69 IN

## 2021-04-16 DIAGNOSIS — Z76.0 MEDICATION REFILL: ICD-10-CM

## 2021-04-16 DIAGNOSIS — Z86.718 HISTORY OF DVT (DEEP VEIN THROMBOSIS): ICD-10-CM

## 2021-04-16 DIAGNOSIS — M79.605 PAIN OF LEFT LOWER EXTREMITY: Primary | ICD-10-CM

## 2021-04-16 PROCEDURE — 93971 EXTREMITY STUDY: CPT | Performed by: SURGERY

## 2021-04-16 PROCEDURE — 93005 ELECTROCARDIOGRAM TRACING: CPT | Performed by: EMERGENCY MEDICINE

## 2021-04-16 PROCEDURE — 99283 EMERGENCY DEPT VISIT LOW MDM: CPT

## 2021-04-16 PROCEDURE — 93971 EXTREMITY STUDY: CPT

## 2021-04-16 PROCEDURE — 93010 ELECTROCARDIOGRAM REPORT: CPT | Performed by: INTERNAL MEDICINE

## 2021-04-16 RX ORDER — OXYCODONE HYDROCHLORIDE AND ACETAMINOPHEN 5; 325 MG/1; MG/1
1 TABLET ORAL ONCE
Status: COMPLETED | OUTPATIENT
Start: 2021-04-16 | End: 2021-04-16

## 2021-04-16 RX ADMIN — OXYCODONE HYDROCHLORIDE AND ACETAMINOPHEN 1 TABLET: 5; 325 TABLET ORAL at 03:41

## 2021-04-16 NOTE — ED PROVIDER NOTES
Subjective   51 y/o female with history of DVT in her left leg (around 3 years ago) arrives for evaluation of one day of left leg pain without swelling. She denies any falls, trauma, numbness, tingling, loss of sensation, flank or back pain, cp, sob, hemptysis, history of PE or other issues. She denies any provoking/alleviting factors to her pain but notes this is the same area of her pain from her prior DVT. She shares she is on xarelto but has missed two days of medication. She arrives in [facility].           Review of Systems   All other systems reviewed and are negative.      Past Medical History:   Diagnosis Date   • Arthritis    • Coronary artery disease    • Diabetes mellitus (CMS/HCC)    • Elevated cholesterol    • Hyperlipidemia    • Hypertension        No Known Allergies    Past Surgical History:   Procedure Laterality Date   • CARDIAC CATHETERIZATION     • CHOLECYSTECTOMY     • ORIF FOOT FRACTURE Right 6/25/2019    Procedure: OPEN REDUCTION INTERNAL FIXATION FIRST METATARSAL WITH REPAIR,- RIGHT FOOT;  Surgeon: Abram Dennis DPM;  Location: [facility] OR;  Service: Podiatry   • TOTAL KNEE ARTHROPLASTY Bilateral        History reviewed. No pertinent family history.    Social History     Socioeconomic History   • Marital status: Legally      Spouse name: Not on file   • Number of children: Not on file   • Years of education: Not on file   • Highest education level: Not on file   Tobacco Use   • Smoking status: Current Every Day Smoker     Packs/day: 0.75     Types: Cigarettes   • Smokeless tobacco: Never Used   Substance and Sexual Activity   • Alcohol use: No   • Drug use: No   • Sexual activity: Defer           Objective   Physical Exam  Vitals and nursing note reviewed.   Constitutional:       Appearance: Normal appearance. She is normal weight.   HENT:      Head: Normocephalic and atraumatic.      Right Ear: External ear normal.      Left Ear: External ear normal.      Nose: Nose normal.       Mouth/Throat:      Mouth: Mucous membranes are moist.      Pharynx: Oropharynx is clear.   Eyes:      Conjunctiva/sclera: Conjunctivae normal.      Pupils: Pupils are equal, round, and reactive to light.   Cardiovascular:      Rate and Rhythm: Normal rate and regular rhythm.      Pulses: Normal pulses.      Heart sounds: Normal heart sounds.   Pulmonary:      Effort: Pulmonary effort is normal.      Breath sounds: Normal breath sounds.   Musculoskeletal:         General: Tenderness present. No swelling, deformity or signs of injury.      Cervical back: Normal range of motion.      Comments: Diffuse tenderness to her left leg. I appreciate no swelling, no signs of trauma or infection. Pedal and dp pulses are 2/4. Sensation is intact.    Skin:     General: Skin is warm and dry.      Capillary Refill: Capillary refill takes less than 2 seconds.   Neurological:      General: No focal deficit present.      Mental Status: She is alert and oriented to person, place, and time. Mental status is at baseline.   Psychiatric:         Mood and Affect: Mood normal.         Behavior: Behavior normal.         Thought Content: Thought content normal.         ECG 12 Lead      Date/Time: 4/16/2021 3:36 AM  Performed by: Vinicius Elise MD  Authorized by: Vinicius Elise MD   Interpreted by physician  Rhythm: sinus rhythm  Rate: normal  BPM: 73  QRS axis: normal                   ED Course  ED Course as of Apr 16 0459 Fri Apr 16, 2021   0454 LLEV: no thrombus vis in LLE.      []   0455 I explained to the patient that her US was okay today. She remains neurovascularly intact. I do not suspect any vascular compromise or injury. I see no overt findings of truama. Given the above we will dc to home. I will refill her xarelto.     []      ED Course User Index  [] Vinicius Elise MD            US Venous Doppler Lower Extremity Left (duplex)    (Results Pending)     Labs Reviewed - No data to display                                     MDM    Final diagnoses:   Pain of left lower extremity   History of DVT (deep vein thrombosis)   Medication refill       ED Disposition  ED Disposition     ED Disposition Condition Comment    Discharge Stable           Robe Mcconnell, APRN  [address] [facility] [id number] [phone]               Medication List      Changed    furosemide 20 MG tablet  Commonly known as: LASIX  Take 1 tablet by mouth Daily.  What changed:   · when to take this  · reasons to take this     lisinopril 2.5 MG tablet  Commonly known as: PRINIVIL,ZESTRIL  Take 1 tablet by mouth Daily.  What changed: how much to take           Where to Get Your Medications      These medications were sent to [facility]/pharmacy #[id number] - [place], [place] - [address] AT ACROSS FROM RACHANA GOMEZ  [phone] [facility] [phone]   [address] [facility] [address]    Hours: 24-hours Phone: [phone]   · rivaroxaban 20 MG tablet          Vinicius Elise MD  04/16/21 0454

## 2021-04-17 LAB
QT INTERVAL: 424 MS
QTC INTERVAL: 467 MS

## 2021-05-27 ENCOUNTER — NURSE TRIAGE (OUTPATIENT)
Dept: CALL CENTER | Facility: HOSPITAL | Age: 51
End: 2021-05-27

## 2021-05-27 NOTE — TELEPHONE ENCOUNTER
"Reviewed guideline with caller, advises she be seen within 4 hours. States her PCP is out of the office today. Advised she go to  for evaluation of bruising on all extremities that happened overnight.      Reason for Disposition  • [1] Not caused by an injury AND [2] 5 or more bruises now    Additional Information  • Negative: Shock suspected (e.g., cold/pale/clammy skin, too weak to stand, low BP, rapid pulse)  • Negative: Sounds like a life-threatening emergency to the triager  • Negative: Bruises with fever  • Negative: Tiny bruises (spots or dots) of unknown cause  • Negative: Bruise(s) of forehead or head  • Negative: Bruise(s) of face or jaw  • Negative: Followed an injury, and triager doesn't know which injury guideline to use first  • Negative: Post-operative bruising  • Negative: Dizziness or lightheadedness  • Negative: [1] Bruise on head/face, chest, or abdomen AND [2]  taking Coumadin (warfarin) or other strong blood thinner, or known bleeding disorder (e.g., thrombocytopenia)  • Negative: Unexplained bleeding from another site (e.g., gums, nose, urine) as well  • Negative: Patient sounds very sick or weak to the triager    Answer Assessment - Initial Assessment Questions  1. APPEARANCE of BRUISE: \"Describe the bruise.\"       On both arms and knees  2. SIZE: \"How large is the bruise?\"       Large as a quarter  3. NUMBER: \"How many bruises are there?\"       4 on left arm, 9 on right arm, large one on her knee   4. LOCATION: \"Where is the bruise located?\"       Both arms and knees  5. ONSET: \"How long ago did the bruise occur?\"       During the night last night   6. CAUSE: \"Tell me how it happened.\"      Just started   7. MEDICAL HISTORY: \"Do you have any medical problems that can cause easy bruising or bleeding?\" (e.g., leukemia, liver disease, recent chemotherapy)      Is on Xarelto  8. MEDICATIONS : \"Do you take any medications which thin the blood such as: aspirin, heparin, ibuprofen (NSAIDS), " "Plavix, or Coumadin?\"      xarelto   9. OTHER SYMPTOMS: \"Do you have any other symptoms?\"  (e.g., weakness, dizziness, pain, fever, nosebleed, blood in urine/stool)      no  10. PREGNANCY: \"Is there any chance you are pregnant?\" \"When was your last menstrual period?\"        no    Protocols used: BRUISES-ADULT-AH      "

## 2021-05-28 ENCOUNTER — APPOINTMENT (OUTPATIENT)
Dept: GENERAL RADIOLOGY | Facility: HOSPITAL | Age: 51
End: 2021-05-28

## 2021-05-28 ENCOUNTER — HOSPITAL ENCOUNTER (EMERGENCY)
Facility: HOSPITAL | Age: 51
Discharge: HOME | End: 2021-05-28
Attending: EMERGENCY MEDICINE | Admitting: EMERGENCY MEDICINE

## 2021-05-28 VITALS
WEIGHT: 290 LBS | HEART RATE: 71 BPM | HEIGHT: 69 IN | DIASTOLIC BLOOD PRESSURE: 71 MMHG | BODY MASS INDEX: 42.95 KG/M2 | SYSTOLIC BLOOD PRESSURE: 131 MMHG | RESPIRATION RATE: 16 BRPM | TEMPERATURE: 98 F | OXYGEN SATURATION: 99 %

## 2021-05-28 DIAGNOSIS — L03.116 CELLULITIS OF LEFT LOWER EXTREMITY: Primary | ICD-10-CM

## 2021-05-28 LAB
ALBUMIN SERPL-MCNC: 3.8 G/DL (ref 3.5–5.2)
ALBUMIN/GLOB SERPL: 1.1 G/DL
ALP SERPL-CCNC: 94 U/L (ref 39–117)
ALT SERPL W P-5'-P-CCNC: 21 U/L (ref 1–33)
ANION GAP SERPL CALCULATED.3IONS-SCNC: 10 MMOL/L (ref 5–15)
APTT PPP: 40.3 SECONDS (ref 24.1–35)
AST SERPL-CCNC: 26 U/L (ref 1–32)
BASOPHILS # BLD AUTO: 0.06 10*3/MM3 (ref 0–0.2)
BASOPHILS NFR BLD AUTO: 0.5 % (ref 0–1.5)
BILIRUB SERPL-MCNC: 0.3 MG/DL (ref 0–1.2)
BUN SERPL-MCNC: 12 MG/DL (ref 6–20)
BUN/CREAT SERPL: 12.6 (ref 7–25)
CALCIUM SPEC-SCNC: 9.8 MG/DL (ref 8.6–10.5)
CHLORIDE SERPL-SCNC: 102 MMOL/L (ref 98–107)
CO2 SERPL-SCNC: 27 MMOL/L (ref 22–29)
CREAT SERPL-MCNC: 0.95 MG/DL (ref 0.57–1)
CRP SERPL-MCNC: 2.37 MG/DL (ref 0–0.5)
DEPRECATED RDW RBC AUTO: 48.6 FL (ref 37–54)
EOSINOPHIL # BLD AUTO: 0.28 10*3/MM3 (ref 0–0.4)
EOSINOPHIL NFR BLD AUTO: 2.2 % (ref 0.3–6.2)
ERYTHROCYTE [DISTWIDTH] IN BLOOD BY AUTOMATED COUNT: 14.6 % (ref 12.3–15.4)
ERYTHROCYTE [SEDIMENTATION RATE] IN BLOOD: 25 MM/HR (ref 0–20)
GFR SERPL CREATININE-BSD FRML MDRD: 62 ML/MIN/1.73
GLOBULIN UR ELPH-MCNC: 3.5 GM/DL
GLUCOSE SERPL-MCNC: 137 MG/DL (ref 65–99)
HCT VFR BLD AUTO: 36.7 % (ref 34–46.6)
HGB BLD-MCNC: 12.4 G/DL (ref 12–15.9)
IMM GRANULOCYTES # BLD AUTO: 0.05 10*3/MM3 (ref 0–0.05)
IMM GRANULOCYTES NFR BLD AUTO: 0.4 % (ref 0–0.5)
INR PPP: 1.31 (ref 0.91–1.09)
LYMPHOCYTES # BLD AUTO: 2.67 10*3/MM3 (ref 0.7–3.1)
LYMPHOCYTES NFR BLD AUTO: 21.3 % (ref 19.6–45.3)
MCH RBC QN AUTO: 31.4 PG (ref 26.6–33)
MCHC RBC AUTO-ENTMCNC: 33.8 G/DL (ref 31.5–35.7)
MCV RBC AUTO: 92.9 FL (ref 79–97)
MONOCYTES # BLD AUTO: 1.11 10*3/MM3 (ref 0.1–0.9)
MONOCYTES NFR BLD AUTO: 8.9 % (ref 5–12)
NEUTROPHILS NFR BLD AUTO: 66.7 % (ref 42.7–76)
NEUTROPHILS NFR BLD AUTO: 8.36 10*3/MM3 (ref 1.7–7)
NRBC BLD AUTO-RTO: 0 /100 WBC (ref 0–0.2)
PLATELET # BLD AUTO: 391 10*3/MM3 (ref 140–450)
PMV BLD AUTO: 9.9 FL (ref 6–12)
POTASSIUM SERPL-SCNC: 3.7 MMOL/L (ref 3.5–5.2)
PROT SERPL-MCNC: 7.3 G/DL (ref 6–8.5)
PROTHROMBIN TIME: 15.3 SECONDS (ref 11.5–13.4)
RBC # BLD AUTO: 3.95 10*6/MM3 (ref 3.77–5.28)
SODIUM SERPL-SCNC: 139 MMOL/L (ref 136–145)
WBC # BLD AUTO: 12.53 10*3/MM3 (ref 3.4–10.8)

## 2021-05-28 PROCEDURE — 85025 COMPLETE CBC W/AUTO DIFF WBC: CPT | Performed by: EMERGENCY MEDICINE

## 2021-05-28 PROCEDURE — 99282 EMERGENCY DEPT VISIT SF MDM: CPT

## 2021-05-28 PROCEDURE — 36415 COLL VENOUS BLD VENIPUNCTURE: CPT

## 2021-05-28 PROCEDURE — 73610 X-RAY EXAM OF ANKLE: CPT

## 2021-05-28 PROCEDURE — 85610 PROTHROMBIN TIME: CPT | Performed by: EMERGENCY MEDICINE

## 2021-05-28 PROCEDURE — 86140 C-REACTIVE PROTEIN: CPT | Performed by: EMERGENCY MEDICINE

## 2021-05-28 PROCEDURE — 80053 COMPREHEN METABOLIC PANEL: CPT | Performed by: EMERGENCY MEDICINE

## 2021-05-28 PROCEDURE — 85730 THROMBOPLASTIN TIME PARTIAL: CPT | Performed by: EMERGENCY MEDICINE

## 2021-05-28 PROCEDURE — 85651 RBC SED RATE NONAUTOMATED: CPT | Performed by: EMERGENCY MEDICINE

## 2021-05-28 RX ORDER — CEFDINIR 300 MG/1
300 CAPSULE ORAL 2 TIMES DAILY
Qty: 10 CAPSULE | Refills: 0 | OUTPATIENT
Start: 2021-05-28 | End: 2023-03-04

## 2021-05-28 RX ORDER — SODIUM CHLORIDE 0.9 % (FLUSH) 0.9 %
10 SYRINGE (ML) INJECTION AS NEEDED
Status: DISCONTINUED | OUTPATIENT
Start: 2021-05-28 | End: 2021-05-28 | Stop reason: HOSPADM

## 2021-05-28 RX ORDER — DULOXETIN HYDROCHLORIDE 60 MG/1
60 CAPSULE, DELAYED RELEASE ORAL 2 TIMES DAILY
Qty: 60 CAPSULE | Refills: 0 | Status: SHIPPED | OUTPATIENT
Start: 2021-05-28

## 2021-05-28 RX ORDER — TIZANIDINE 4 MG/1
4 TABLET ORAL EVERY 8 HOURS PRN
Qty: 21 TABLET | Refills: 0 | OUTPATIENT
Start: 2021-05-28 | End: 2023-03-04

## 2021-08-04 NOTE — CONSULTS
Asad Talbert, RN  08/03/21 [address] [place] [address] [address] Andrews Palomares RN  08/03/21 5972
times daily   Yes Historical Provider, MD   rivaroxaban (XARELTO) 15 MG TABS tablet Take 15 mg by mouth   Yes Historical Provider, MD   clopidogrel (PLAVIX) 75 MG tablet Take 75 mg by mouth daily   Yes Historical Provider, MD   lisinopril (PRINIVIL;ZESTRIL) 2.5 MG tablet Take 2.5 mg by mouth daily   Yes Historical Provider, MD   allopurinol (ZYLOPRIM) 100 MG tablet Take 100 mg by mouth daily   Yes Historical Provider, MD   calcitRIOL (ROCALTROL) 0.25 MCG capsule Take 0.25 mcg by mouth daily   Yes Historical Provider, MD   oxyCODONE-acetaminophen (PERCOCET)  MG per tablet Take 1 tablet by mouth every 4 hours as needed for Pain. Yes Historical Provider, MD   fluticasone (VERAMYST) 27.5 MCG/SPRAY nasal spray 2 sprays by Nasal route daily   Yes Historical Provider, MD   Blood Glucose Monitoring Suppl ERASTO Use to check blood sugar. 10/6/17  Yes ANJANA Hedrick   tiZANidine (ZANAFLEX) 4 MG tablet Take 2 tablets by mouth 3 times daily 2/19/16  Yes Historical Provider, MD   LYRICA 200 MG capsule Take 1 capsule by mouth 3 times daily 12/29/15  Yes Historical Provider, MD   DULoxetine (CYMBALTA) 60 MG capsule Take 60 mg by mouth 2 times daily   Yes Historical Provider, MD   mirtazapine (REMERON) 30 MG tablet Take 30 mg by mouth daily   Yes Historical Provider, MD   metFORMIN (GLUCOPHAGE) 500 MG tablet Take 1 tablet by mouth 2 times daily 2/18/16  Yes Historical Provider, MD   levothyroxine (SYNTHROID) 100 MCG tablet Take 100 mcg by mouth Daily   Yes Historical Provider, MD       Allergies:  Review of patient's allergies indicates no known allergies. Social History:   TOBACCO:   reports that she has been smoking. She has a 28.00 pack-year smoking history. She has never used smokeless tobacco. She stopped smoking at her last admission. ETOH:   reports that she does not drink alcohol.   OCCUPATION:      Family History:       Problem Relation Age of Onset    Arthritis Mother     Heart Disease Mother
Take 100 mcg by mouth Daily   Yes Historical Provider, MD        Facility Administered Medications:    mirtazapine  30 mg Oral Nightly    insulin lispro  0-35 Units Subcutaneous 4x Daily AC & HS    famotidine  20 mg Oral BID    allopurinol  100 mg Oral Daily    calcitRIOL  0.25 mcg Oral Daily    DULoxetine  60 mg Oral BID    fluticasone  2 spray Each Nare Daily    levothyroxine  100 mcg Oral Daily    clopidogrel  75 mg Oral Daily    lisinopril  2.5 mg Oral Daily    pregabalin  200 mg Oral TID    metoprolol tartrate  12.5 mg Oral BID    tiZANidine  8 mg Oral TID       Allergies:  Review of patient's allergies indicates no known allergies. Social History:       Social History     Social History    Marital status: Single     Spouse name: N/A    Number of children: N/A    Years of education: N/A     Occupational History    Not on file.      Social History Main Topics    Smoking status: Current Some Day Smoker     Packs/day: 1.00     Years: 28.00    Smokeless tobacco: Never Used    Alcohol use No      Comment: occ    Drug use: No    Sexual activity: Not on file     Other Topics Concern    Not on file     Social History Narrative    No narrative on file       Family History:     Family History   Problem Relation Age of Onset    Arthritis Mother     Heart Disease Mother          REVIEW OF SYSTEMS:     Except as noted in the HPI, all other systems are negative        PHYSICAL EXAMINATION:     BP (!) 97/46   Pulse 56   Temp 97.3 °F (36.3 °C)   Resp (!) 6   Ht 5' 9\" (1.753 m)   Wt (!) 357 lb (161.9 kg)   SpO2 97%   BMI 52.72 kg/m²     GENERAL - well developed and well nourished, in mild amount of generalized distress, groggy  HEENT   PERRLA, Hearing appears normal, conjunctiva and lids are normal, ears and nose appear normal  NECK - no thyromegaly, no JVD, trachea is in the midline  CARDIOVASCULAR  PMI is in the left mid line clavicular position, Normal S1 and S2 with a grade 1/6 systolic

## 2022-11-23 ENCOUNTER — HOSPITAL ENCOUNTER (EMERGENCY)
Facility: HOSPITAL | Age: 52
Discharge: HOME | End: 2022-11-23
Attending: EMERGENCY MEDICINE | Admitting: EMERGENCY MEDICINE

## 2022-11-23 VITALS
HEIGHT: 69 IN | TEMPERATURE: 98.2 F | RESPIRATION RATE: 20 BRPM | HEART RATE: 91 BPM | WEIGHT: 293 LBS | DIASTOLIC BLOOD PRESSURE: 83 MMHG | SYSTOLIC BLOOD PRESSURE: 139 MMHG | BODY MASS INDEX: 43.4 KG/M2 | OXYGEN SATURATION: 96 %

## 2022-11-23 DIAGNOSIS — G89.4 CHRONIC PAIN SYNDROME: Primary | ICD-10-CM

## 2022-11-23 DIAGNOSIS — M25.551 RIGHT HIP PAIN: ICD-10-CM

## 2022-11-23 PROCEDURE — 96372 THER/PROPH/DIAG INJ SC/IM: CPT

## 2022-11-23 PROCEDURE — 25010000002 METHYLPREDNISOLONE PER 125 MG: Performed by: EMERGENCY MEDICINE

## 2022-11-23 PROCEDURE — 25010000002 ORPHENADRINE CITRATE PER 60 MG: Performed by: EMERGENCY MEDICINE

## 2022-11-23 PROCEDURE — 99282 EMERGENCY DEPT VISIT SF MDM: CPT

## 2022-11-23 RX ORDER — METHYLPREDNISOLONE SODIUM SUCCINATE 125 MG/2ML
80 INJECTION, POWDER, LYOPHILIZED, FOR SOLUTION INTRAMUSCULAR; INTRAVENOUS ONCE
Status: COMPLETED | OUTPATIENT
Start: 2022-11-23 | End: 2022-11-23

## 2022-11-23 RX ORDER — METHYLPREDNISOLONE 4 MG/1
TABLET ORAL
Qty: 21 TABLET | Refills: 0 | OUTPATIENT
Start: 2022-11-23 | End: 2023-03-04

## 2022-11-23 RX ORDER — ORPHENADRINE CITRATE 30 MG/ML
60 INJECTION INTRAMUSCULAR; INTRAVENOUS ONCE
Status: COMPLETED | OUTPATIENT
Start: 2022-11-23 | End: 2022-11-23

## 2022-11-23 RX ADMIN — METHYLPREDNISOLONE SODIUM SUCCINATE 80 MG: 125 INJECTION, POWDER, FOR SOLUTION INTRAMUSCULAR; INTRAVENOUS at 13:03

## 2022-11-23 RX ADMIN — ORPHENADRINE CITRATE 60 MG: 30 INJECTION INTRAMUSCULAR; INTRAVENOUS at 13:06

## 2022-11-23 NOTE — ED PROVIDER NOTES
Subjective   History of Present Illness  52-year-old radiograms a day coming out right hip pain.  Patient states that she has been having this problem on and off for the past many many years.  She is supposed to get a hip surgery.  Recently her son had an accident and developed brain injury out of fear that she has been taking care of him and therefore has not been able to keep her appointments.  Today she came to the ER to see whether we can do something for pain that she has chronically present.  Denies any nausea vomiting any shortness of breath denies any chest pain.  No focal neurological deficits.  No history of trauma no history of fall.    Pain  Location:  Right hip pain  Severity:  Moderate  Onset quality:  Gradual  Timing:  Intermittent  Chronicity:  Chronic  Associated symptoms: no abdominal pain, no chest pain, no congestion, no cough, no diarrhea, no ear pain, no fever, no headaches, no loss of consciousness, no myalgias, no nausea, no rash, no rhinorrhea, no shortness of breath, no sore throat, no vomiting and no wheezing        Review of Systems   Constitutional: Negative.  Negative for fever.   HENT: Negative.  Negative for congestion, ear pain, rhinorrhea and sore throat.    Eyes: Negative.    Respiratory: Negative.  Negative for cough, shortness of breath and wheezing.    Cardiovascular: Negative.  Negative for chest pain.   Gastrointestinal: Negative.  Negative for abdominal pain, diarrhea, nausea and vomiting.   Musculoskeletal: Negative.  Negative for back pain, myalgias and neck pain.   Skin: Negative.  Negative for rash.   Neurological: Negative.  Negative for loss of consciousness and headaches.   All other systems reviewed and are negative.      Past Medical History:   Diagnosis Date   • Arthritis    • Coronary artery disease    • Diabetes mellitus (CMS/HCC)    • Elevated cholesterol    • Hyperlipidemia    • Hypertension        No Known Allergies    Past Surgical History:   Procedure  Laterality Date   • CARDIAC CATHETERIZATION     • CHOLECYSTECTOMY     • ORIF FOOT FRACTURE Right 6/25/2019    Procedure: OPEN REDUCTION INTERNAL FIXATION FIRST METATARSAL WITH REPAIR,- RIGHT FOOT;  Surgeon: Abram Dennis DPM;  Location: [facility] OR;  Service: Podiatry   • TOTAL KNEE ARTHROPLASTY Bilateral        No family history on file.    Social History     Socioeconomic History   • Marital status: Legally    Tobacco Use   • Smoking status: Every Day     Packs/day: 0.75     Types: Cigarettes   • Smokeless tobacco: Never   Substance and Sexual Activity   • Alcohol use: No   • Drug use: No   • Sexual activity: Defer           Objective   Physical Exam  Vitals and nursing note reviewed. Exam conducted with a chaperone present.   Constitutional:       General: She is awake.      Appearance: Normal appearance. She is well-developed. She is not ill-appearing.   HENT:      Head: Normocephalic and atraumatic.   Eyes:      General: Lids are normal.      Conjunctiva/sclera: Conjunctivae normal.      Pupils: Pupils are equal, round, and reactive to light.   Cardiovascular:      Rate and Rhythm: Normal rate and regular rhythm.      Chest Wall: PMI is not displaced.      Pulses: Normal pulses.      Heart sounds: Normal heart sounds.   Pulmonary:      Effort: Pulmonary effort is normal.      Breath sounds: Normal breath sounds. No decreased breath sounds.   Abdominal:      General: Abdomen is flat. Bowel sounds are normal.      Palpations: Abdomen is soft.      Tenderness: There is no abdominal tenderness.   Musculoskeletal:         General: Normal range of motion.      Cervical back: Full passive range of motion without pain, normal range of motion and neck supple.      Comments: Right hip range of motion is intact patient able to ambulate.  Neurovascular examination is negative.  Since examination negative.  Lower back examination unremarkable.   Skin:     General: Skin is warm and dry.      Capillary Refill:  Capillary refill takes less than 2 seconds.   Neurological:      General: No focal deficit present.      Mental Status: She is alert and oriented to person, place, and time.      Cranial Nerves: Cranial nerves are intact.      Motor: Motor function is intact.      Deep Tendon Reflexes: Reflexes are normal and symmetric.   Psychiatric:         Behavior: Behavior is cooperative.         Procedures           ED Course                                           MDM  Number of Diagnoses or Management Options  Diagnosis management comments: Patient with right hip pain there is no history of injections in the spine of the hip.  No history any new trauma no history of falls no history of arthritis she was supposed to get a hip replacement and she is aware of some pain management.    Risk of Complications, Morbidity, and/or Mortality  Presenting problems: low  Diagnostic procedures: low  Management options: low        Final diagnoses:   Chronic pain syndrome   Right hip pain       ED Disposition  ED Disposition     ED Disposition   Discharge    Condition   Stable    Comment   --             Robe Mcconnell, APRN  [address] [facility] [address] [phone]               Medication List      New Prescriptions    methylPREDNISolone 4 MG dose pack  Commonly known as: MEDROL  Take as directed on package instructions.        ASK your doctor about these medications    furosemide 20 MG tablet  Commonly known as: LASIX  Take 1 tablet by mouth Daily.     lisinopril 2.5 MG tablet  Commonly known as: PRINIVIL,ZESTRIL  Take 1 tablet by mouth Daily.           Where to Get Your Medications      These medications were sent to [facility]/pharmacy #[id number] - [place], [place] - [address] AT [address]OSS FROM [address]OME[address] - [phone]  - [phone]   [address] [facility] [address]    Hours: 24-hours Phone: [phone]   · methylPREDNISolone 4 MG dose pack          Mark Avila MD  11/23/22 9663

## 2023-03-04 ENCOUNTER — APPOINTMENT (OUTPATIENT)
Dept: GENERAL RADIOLOGY | Facility: HOSPITAL | Age: 53
End: 2023-03-04
Payer: MEDICARE

## 2023-03-04 ENCOUNTER — HOSPITAL ENCOUNTER (EMERGENCY)
Facility: HOSPITAL | Age: 53
Discharge: HOME | End: 2023-03-04
Admitting: STUDENT IN AN ORGANIZED HEALTH CARE EDUCATION/TRAINING PROGRAM
Payer: MEDICARE

## 2023-03-04 VITALS
HEART RATE: 79 BPM | DIASTOLIC BLOOD PRESSURE: 87 MMHG | WEIGHT: 293 LBS | OXYGEN SATURATION: 97 % | RESPIRATION RATE: 18 BRPM | HEIGHT: 68 IN | SYSTOLIC BLOOD PRESSURE: 146 MMHG | TEMPERATURE: 98.4 F | BODY MASS INDEX: 44.41 KG/M2

## 2023-03-04 DIAGNOSIS — Z86.718 HISTORY OF DVT (DEEP VEIN THROMBOSIS): ICD-10-CM

## 2023-03-04 DIAGNOSIS — R09.89 CHRONIC SINUS COMPLAINTS: ICD-10-CM

## 2023-03-04 DIAGNOSIS — R07.9 CHEST PAIN, UNSPECIFIED TYPE: Primary | ICD-10-CM

## 2023-03-04 DIAGNOSIS — M79.10 MUSCLE PAIN: ICD-10-CM

## 2023-03-04 LAB
ALBUMIN SERPL-MCNC: 4.7 G/DL (ref 3.5–5.2)
ALBUMIN/GLOB SERPL: 1.2 G/DL
ALP SERPL-CCNC: 105 U/L (ref 39–117)
ALT SERPL W P-5'-P-CCNC: 14 U/L (ref 1–33)
ANION GAP SERPL CALCULATED.3IONS-SCNC: 13 MMOL/L (ref 5–15)
AST SERPL-CCNC: 19 U/L (ref 1–32)
BASOPHILS # BLD AUTO: 0.07 10*3/MM3 (ref 0–0.2)
BASOPHILS NFR BLD AUTO: 0.5 % (ref 0–1.5)
BILIRUB SERPL-MCNC: 0.4 MG/DL (ref 0–1.2)
BUN SERPL-MCNC: 14 MG/DL (ref 6–20)
BUN/CREAT SERPL: 14.9 (ref 7–25)
CALCIUM SPEC-SCNC: 10 MG/DL (ref 8.6–10.5)
CHLORIDE SERPL-SCNC: 101 MMOL/L (ref 98–107)
CO2 SERPL-SCNC: 27 MMOL/L (ref 22–29)
CREAT SERPL-MCNC: 0.94 MG/DL (ref 0.57–1)
D DIMER PPP FEU-MCNC: 0.37 MCGFEU/ML (ref 0–0.52)
DEPRECATED RDW RBC AUTO: 46.4 FL (ref 37–54)
EGFRCR SERPLBLD CKD-EPI 2021: 73.2 ML/MIN/1.73
EOSINOPHIL # BLD AUTO: 0.23 10*3/MM3 (ref 0–0.4)
EOSINOPHIL NFR BLD AUTO: 1.5 % (ref 0.3–6.2)
ERYTHROCYTE [DISTWIDTH] IN BLOOD BY AUTOMATED COUNT: 13.9 % (ref 12.3–15.4)
GEN 5 2HR TROPONIN T REFLEX: 13 NG/L
GLOBULIN UR ELPH-MCNC: 3.8 GM/DL
GLUCOSE SERPL-MCNC: 115 MG/DL (ref 65–99)
HCT VFR BLD AUTO: 48.8 % (ref 34–46.6)
HGB BLD-MCNC: 15.9 G/DL (ref 12–15.9)
IMM GRANULOCYTES # BLD AUTO: 0.05 10*3/MM3 (ref 0–0.05)
IMM GRANULOCYTES NFR BLD AUTO: 0.3 % (ref 0–0.5)
LYMPHOCYTES # BLD AUTO: 3.12 10*3/MM3 (ref 0.7–3.1)
LYMPHOCYTES NFR BLD AUTO: 20.3 % (ref 19.6–45.3)
MCH RBC QN AUTO: 29.6 PG (ref 26.6–33)
MCHC RBC AUTO-ENTMCNC: 32.6 G/DL (ref 31.5–35.7)
MCV RBC AUTO: 90.9 FL (ref 79–97)
MONOCYTES # BLD AUTO: 1.15 10*3/MM3 (ref 0.1–0.9)
MONOCYTES NFR BLD AUTO: 7.5 % (ref 5–12)
NEUTROPHILS NFR BLD AUTO: 10.77 10*3/MM3 (ref 1.7–7)
NEUTROPHILS NFR BLD AUTO: 69.9 % (ref 42.7–76)
NRBC BLD AUTO-RTO: 0 /100 WBC (ref 0–0.2)
NT-PROBNP SERPL-MCNC: 581.2 PG/ML (ref 0–900)
PLATELET # BLD AUTO: 399 10*3/MM3 (ref 140–450)
PMV BLD AUTO: 10.5 FL (ref 6–12)
POTASSIUM SERPL-SCNC: 3.5 MMOL/L (ref 3.5–5.2)
PROT SERPL-MCNC: 8.5 G/DL (ref 6–8.5)
RBC # BLD AUTO: 5.37 10*6/MM3 (ref 3.77–5.28)
SODIUM SERPL-SCNC: 141 MMOL/L (ref 136–145)
TROPONIN T DELTA: 0 NG/L
TROPONIN T SERPL HS-MCNC: 13 NG/L
WBC NRBC COR # BLD: 15.39 10*3/MM3 (ref 3.4–10.8)

## 2023-03-04 PROCEDURE — 99284 EMERGENCY DEPT VISIT MOD MDM: CPT

## 2023-03-04 PROCEDURE — 80053 COMPREHEN METABOLIC PANEL: CPT | Performed by: STUDENT IN AN ORGANIZED HEALTH CARE EDUCATION/TRAINING PROGRAM

## 2023-03-04 PROCEDURE — 93005 ELECTROCARDIOGRAM TRACING: CPT | Performed by: STUDENT IN AN ORGANIZED HEALTH CARE EDUCATION/TRAINING PROGRAM

## 2023-03-04 PROCEDURE — 83880 ASSAY OF NATRIURETIC PEPTIDE: CPT | Performed by: STUDENT IN AN ORGANIZED HEALTH CARE EDUCATION/TRAINING PROGRAM

## 2023-03-04 PROCEDURE — 84484 ASSAY OF TROPONIN QUANT: CPT | Performed by: STUDENT IN AN ORGANIZED HEALTH CARE EDUCATION/TRAINING PROGRAM

## 2023-03-04 PROCEDURE — 85379 FIBRIN DEGRADATION QUANT: CPT | Performed by: STUDENT IN AN ORGANIZED HEALTH CARE EDUCATION/TRAINING PROGRAM

## 2023-03-04 PROCEDURE — 71045 X-RAY EXAM CHEST 1 VIEW: CPT

## 2023-03-04 PROCEDURE — 85025 COMPLETE CBC W/AUTO DIFF WBC: CPT | Performed by: STUDENT IN AN ORGANIZED HEALTH CARE EDUCATION/TRAINING PROGRAM

## 2023-03-04 PROCEDURE — 96374 THER/PROPH/DIAG INJ IV PUSH: CPT

## 2023-03-04 PROCEDURE — 25010000002 LORAZEPAM PER 2 MG: Performed by: STUDENT IN AN ORGANIZED HEALTH CARE EDUCATION/TRAINING PROGRAM

## 2023-03-04 PROCEDURE — 93005 ELECTROCARDIOGRAM TRACING: CPT | Performed by: EMERGENCY MEDICINE

## 2023-03-04 PROCEDURE — 93010 ELECTROCARDIOGRAM REPORT: CPT | Performed by: INTERNAL MEDICINE

## 2023-03-04 RX ORDER — METAXALONE 800 MG/1
800 TABLET ORAL 3 TIMES DAILY
Qty: 21 TABLET | Refills: 0 | Status: SHIPPED | OUTPATIENT
Start: 2023-03-04

## 2023-03-04 RX ORDER — HYDROCODONE BITARTRATE AND ACETAMINOPHEN 5; 325 MG/1; MG/1
1 TABLET ORAL ONCE
Status: COMPLETED | OUTPATIENT
Start: 2023-03-04 | End: 2023-03-04

## 2023-03-04 RX ORDER — HYDROXYZINE PAMOATE 50 MG/1
50 CAPSULE ORAL NIGHTLY PRN
Qty: 5 CAPSULE | Refills: 0 | Status: SHIPPED | OUTPATIENT
Start: 2023-03-04

## 2023-03-04 RX ORDER — SODIUM CHLORIDE 0.9 % (FLUSH) 0.9 %
10 SYRINGE (ML) INJECTION AS NEEDED
Status: DISCONTINUED | OUTPATIENT
Start: 2023-03-04 | End: 2023-03-04 | Stop reason: HOSPADM

## 2023-03-04 RX ORDER — FLUTICASONE PROPIONATE 50 MCG
2 SPRAY, SUSPENSION (ML) NASAL DAILY
Qty: 16 G | Refills: 0 | Status: SHIPPED | OUTPATIENT
Start: 2023-03-04

## 2023-03-04 RX ORDER — LORAZEPAM 2 MG/ML
1 INJECTION INTRAMUSCULAR ONCE
Status: COMPLETED | OUTPATIENT
Start: 2023-03-04 | End: 2023-03-04

## 2023-03-04 RX ADMIN — HYDROCODONE BITARTRATE AND ACETAMINOPHEN 1 TABLET: 5; 325 TABLET ORAL at 21:09

## 2023-03-04 RX ADMIN — LORAZEPAM 1 MG: 2 INJECTION INTRAMUSCULAR at 18:52

## 2023-03-05 NOTE — ED PROVIDER NOTES
Subjective   History of Present Illness    Patient is a pleasant 52-year-old female with chief complaint of chest pain.  The patient reports significant medical history of myocardial infarction with stent placement in 2018, left lower extremity DVT secondary to trauma with a history of pulmonary embolism as well, chronic anticoagulation previously on Xarelto, diabetes mellitus, hypertension, hyperlipidemia.    Patient reports that yesterday, she felt this squeezing type sensation in the left side of her chest varying in intensity but lasting only 2 to 3 minutes at a time.  The pain does radiate into her cheek and left upper extremity.  She denies any associated nausea, vomiting, diaphoresis.  She has occasional shortness of breath but none presently.  She reports that sometimes, exertion does make it worse but not always consistently.  She reports that the pain somewhat reminds her of her previous heart attack in 2018 but she has not seen a cardiologist since then due to transportation issues.  She admits that is very hard for her to get to any type of medical care or get her medications due to transportation issues.  She is switching primary care providers because she has not seen her other primary care provider that is too far out for her.  Currently, she does not have a PCP.  She also ran out of her Xarelto about 5 weeks ago and she is hoping to get a refill.    Patient denies any fever or cough.  She denies any other recent illnesses.    Review of Systems   Constitutional: Positive for activity change. Negative for diaphoresis, fatigue and fever.   Respiratory: Positive for shortness of breath. Negative for cough.    Cardiovascular: Positive for chest pain. Negative for leg swelling.   Gastrointestinal: Negative.    Genitourinary: Negative.    Musculoskeletal: Negative.    Skin: Negative.    Neurological: Positive for headaches.   Psychiatric/Behavioral: Negative.    All other systems reviewed and are  negative.      Past Medical History:   Diagnosis Date   • Arthritis    • Coronary artery disease    • Diabetes mellitus (HCC)    • Elevated cholesterol    • Hyperlipidemia    • Hypertension        No Known Allergies    Past Surgical History:   Procedure Laterality Date   • CARDIAC CATHETERIZATION     • CHOLECYSTECTOMY     • ORIF FOOT FRACTURE Right 6/25/2019    Procedure: OPEN REDUCTION INTERNAL FIXATION FIRST METATARSAL WITH REPAIR,- RIGHT FOOT;  Surgeon: Abram Dennis DPM;  Location: [facility] OR;  Service: Podiatry   • TOTAL KNEE ARTHROPLASTY Bilateral        History reviewed. No pertinent family history.    Social History     Socioeconomic History   • Marital status: Legally    Tobacco Use   • Smoking status: Every Day     Packs/day: 0.75     Types: Cigarettes   • Smokeless tobacco: Never   Substance and Sexual Activity   • Alcohol use: No   • Drug use: No   • Sexual activity: Defer       Prior to Admission medications    Medication Sig Start Date End Date Taking? Authorizing Provider   albuterol sulfate  (90 Base) MCG/ACT inhaler Inhale 2 puffs Every 4 (Four) Hours As Needed for Wheezing.    Provider, MD Krauna   cefdinir (OMNICEF) 300 MG capsule Take 1 capsule by mouth 2 (Two) Times a Day. 5/28/21   Jorge A Plummer MD   docusate sodium 100 MG capsule Take 100 mg by mouth 2 (Two) Times a Day As Needed for Constipation. 1/22/18   Rashi Traore MD   DULoxetine (Cymbalta) 60 MG capsule Take 1 capsule by mouth 2 (Two) Times a Day. 5/28/21   Jorge A Plummer MD   furosemide (LASIX) 20 MG tablet Take 1 tablet by mouth Daily.  Patient taking differently: Take 20 mg by mouth Daily As Needed. 1/22/18   Rashi Traore MD   levothyroxine (SYNTHROID, LEVOTHROID) 100 MCG tablet Take 1 tablet by mouth Daily. 1/22/18   Rashi Traore MD   lisinopril (PRINIVIL,ZESTRIL) 2.5 MG tablet Take 1 tablet by mouth Daily.  Patient taking differently: Take 5 mg by mouth Daily.  "1/22/18   Rashi Traore MD   metFORMIN (GLUCOPHAGE) 1000 MG tablet Take 1 tablet by mouth 2 (Two) Times a Day With Meals. 1/22/18   Rashi Traore MD   methylPREDNISolone (MEDROL) 4 MG dose pack Take as directed on package instructions. 11/23/22   Mark Avila MD   metoprolol tartrate (LOPRESSOR) 25 MG tablet Take 0.5 tablets by mouth Every 12 (Twelve) Hours. 1/22/18   Rashi Traore MD   oxyCODONE-acetaminophen (PERCOCET)  MG per tablet Take 1 tablet by mouth Every 4 (Four) Hours As Needed for Moderate Pain . Take one tablet every four hours first 24 hours 6/25/19   Abram Dennis DPM   pantoprazole (PROTONIX) 40 MG EC tablet Take 1 tablet by mouth Daily. 1/22/18   Rashi Traore MD   pregabalin (LYRICA) 200 MG capsule Take 1 capsule by mouth Every 8 (Eight) Hours. 1/22/18   Rashi Traore MD   promethazine (PHENERGAN) 12.5 MG tablet Take 1 tablet by mouth Every 4 (Four) Hours As Needed for Nausea or Vomiting. 6/25/19   Abram Dennis DPM   rivaroxaban (XARELTO) 20 MG tablet Take 1 tablet by mouth Daily With Dinner. 4/16/21   Vinicius Elise MD   tiZANidine (ZANAFLEX) 4 MG tablet Take 1 tablet by mouth Every 8 (Eight) Hours As Needed for Muscle Spasms. 5/28/21   Jorge A Plummer MD       Medications   sodium chloride 0.9 % flush 10 mL (has no administration in time range)   LORazepam (ATIVAN) injection 1 mg (1 mg Intravenous Given 3/4/23 1852)   HYDROcodone-acetaminophen (NORCO) 5-325 MG per tablet 1 tablet (1 tablet Oral Given 3/4/23 2109)       /79   Pulse 74   Temp 97.5 °F (36.4 °C) (Oral)   Resp 18   Ht 172.7 cm (68\")   Wt (!) 140 kg (308 lb)   SpO2 97%   BMI 46.83 kg/m²       Objective   Physical Exam  Vitals and nursing note reviewed.   Constitutional:       General: She is not in acute distress.     Appearance: She is well-developed. She is obese. She is not diaphoretic.   HENT:      Head: Normocephalic and atraumatic.   Eyes: "      Conjunctiva/sclera: Conjunctivae normal.      Pupils: Pupils are equal, round, and reactive to light.   Neck:      Trachea: No tracheal deviation.   Cardiovascular:      Rate and Rhythm: Normal rate and regular rhythm.      Heart sounds: Normal heart sounds. No murmur heard.  Pulmonary:      Effort: Pulmonary effort is normal.      Breath sounds: Normal breath sounds. No decreased breath sounds or wheezing.   Abdominal:      General: Bowel sounds are normal. There is no distension.      Palpations: Abdomen is soft. There is no mass.      Tenderness: There is no abdominal tenderness. There is no guarding or rebound.   Musculoskeletal:         General: Normal range of motion.      Cervical back: Normal range of motion and neck supple.      Right lower leg: No tenderness. Edema present.      Left lower leg: No tenderness. Edema present.   Skin:     General: Skin is warm and dry.      Capillary Refill: Capillary refill takes less than 2 seconds.   Neurological:      Mental Status: She is alert and oriented to person, place, and time.   Psychiatric:         Mood and Affect: Mood is anxious. Affect is tearful.         Behavior: Behavior normal.         Thought Content: Thought content normal.         Judgment: Judgment normal.         Procedures         Lab Results (last 24 hours)     Procedure Component Value Units Date/Time    CBC & Differential [[id number]]  (Abnormal) Collected: 03/04/23 1848    Specimen: Blood Updated: 03/04/23 1855    Narrative:      The following orders were created for panel order CBC & Differential.  Procedure                               Abnormality         Status                     ---------                               -----------         ------                     CBC Auto Differential[[id number]]        Abnormal            Final result                 Please view results for these tests on the individual orders.    Comprehensive Metabolic Panel [[id number]]  (Abnormal) Collected:  03/04/23 1848    Specimen: Blood Updated: 03/04/23 1917     Glucose 115 mg/dL      BUN 14 mg/dL      Creatinine 0.94 mg/dL      Sodium 141 mmol/L      Potassium 3.5 mmol/L      Chloride 101 mmol/L      CO2 27.0 mmol/L      Calcium 10.0 mg/dL      Total Protein 8.5 g/dL      Albumin 4.7 g/dL      ALT (SGPT) 14 U/L      AST (SGOT) 19 U/L      Alkaline Phosphatase 105 U/L      Total Bilirubin 0.4 mg/dL      Globulin 3.8 gm/dL      A/G Ratio 1.2 g/dL      BUN/Creatinine Ratio 14.9     Anion Gap 13.0 mmol/L      eGFR 73.2 mL/min/1.73     Narrative:      GFR Normal >60  Chronic Kidney Disease <60  Kidney Failure <15      BNP [[id number]]  (Normal) Collected: 03/04/23 1848    Specimen: Blood Updated: 03/04/23 1915     proBNP 581.2 pg/mL     Narrative:      Among patients with dyspnea, NT-proBNP is highly sensitive for the detection of acute congestive heart failure. In addition NT-proBNP of <300 pg/ml effectively rules out acute congestive heart failure with 99% negative predictive value.    Results may be falsely decreased if patient taking Biotin.      D-dimer, Quantitative [[id number]]  (Normal) Collected: 03/04/23 1848    Specimen: Blood Updated: 03/04/23 1906     D-Dimer, Quantitative 0.37 MCGFEU/mL     Narrative:      According to the assay 's published package insert, a normal (<0.50 MCGFEU/mL) D-dimer result in conjunction with a non-high clinical probability assessment, excludes deep vein thrombosis (DVT) and pulmonary embolism (PE) with high sensitivity.    D-dimer values increase with age and this can make VTE exclusion of an older population difficult. To address this, the [employer], based on best available evidence and recent guidelines, recommends that clinicians use age-adjusted D-dimer thresholds in patients greater than 50 years of age with: a) a low probability of PE who do not meet all Pulmonary Embolism Rule Out Criteria, or b) in those with intermediate probability of PE.  "  The formula for an age-adjusted D-dimer cut-off is \"age/100\".  For example, a 60 year old patient would have an age-adjusted cut-off of 0.60 MCGFEU/mL and an 80 year old 0.80 MCGFEU/mL.    High Sensitivity Troponin T [[id number]]  (Abnormal) Collected: 03/04/23 1848    Specimen: Blood Updated: 03/04/23 1915     HS Troponin T 13 ng/L     Narrative:      High Sensitive Troponin T Reference Range:  <10.0 ng/L- Negative Female for AMI  <15.0 ng/L- Negative Male for AMI  >=10 - Abnormal Female indicating possible myocardial injury.  >=15 - Abnormal Male indicating possible myocardial injury.   Clinicians would have to utilize clinical acumen, EKG, Troponin, and serial changes to determine if it is an Acute Myocardial Infarction or myocardial injury due to an underlying chronic condition.         CBC Auto Differential [[id number]]  (Abnormal) Collected: 03/04/23 1848    Specimen: Blood Updated: 03/04/23 1855     WBC 15.39 10*3/mm3      RBC 5.37 10*6/mm3      Hemoglobin 15.9 g/dL      Hematocrit 48.8 %      MCV 90.9 fL      MCH 29.6 pg      MCHC 32.6 g/dL      RDW 13.9 %      RDW-SD 46.4 fl      MPV 10.5 fL      Platelets 399 10*3/mm3      Neutrophil % 69.9 %      Lymphocyte % 20.3 %      Monocyte % 7.5 %      Eosinophil % 1.5 %      Basophil % 0.5 %      Immature Grans % 0.3 %      Neutrophils, Absolute 10.77 10*3/mm3      Lymphocytes, Absolute 3.12 10*3/mm3      Monocytes, Absolute 1.15 10*3/mm3      Eosinophils, Absolute 0.23 10*3/mm3      Basophils, Absolute 0.07 10*3/mm3      Immature Grans, Absolute 0.05 10*3/mm3      nRBC 0.0 /100 WBC     High Sensitivity Troponin T 2Hr [[id number]]  (Abnormal) Collected: 03/04/23 2055    Specimen: Blood Updated: 03/04/23 2120     HS Troponin T 13 ng/L      Troponin T Delta 0 ng/L     Narrative:      High Sensitive Troponin T Reference Range:  <10.0 ng/L- Negative Female for AMI  <15.0 ng/L- Negative Male for AMI  >=10 - Abnormal Female indicating possible myocardial injury.  >=15 - " Abnormal Male indicating possible myocardial injury.   Clinicians would have to utilize clinical acumen, EKG, Troponin, and serial changes to determine if it is an Acute Myocardial Infarction or myocardial injury due to an underlying chronic condition.               XR Chest 1 View    Result Date: 3/4/2023  Narrative: EXAM/TECHNIQUE: XR CHEST 1 VW-  INDICATION: Chest pain  COMPARISON: 01/28/2018  FINDINGS:  Cardiomediastinal silhouette is within normal limits. No pleural effusion or pneumothorax. No focal consolidation. No acute osseous findings.      Impression:  No acute findings. This report was finalized on 03/04/2023 19:16 by Dr. Enio Thomson MD.      ED Course  ED Course as of 03/04/23 2126   Sat Mar 04, 2023   2122 Patient has been reassessed and she is feeling much better.  Patient still felt relaxed that she feels relaxed after receiving Ativan 1 mg IV was able to rest.  I have educated her laboratory data was unremarkable.  Her high sensitive troponin x2 both were measured at 13 with no significant delta change.  Delta change is 0.  Heart score is 3.  Recommend patient follow with primary care provider for further outpatient cardiological evaluation.  Patient states her D-dimer was negative as well.  BNP was negative.  She does request a medication refill on her Xarelto as well as Flonase, Skelaxin, and something to help her sleep.  I will give her a short course of these medications.  She will make appoint with her primary care provider.  Strict return precaution advised. [TK]      ED Course User Index  [TK] Kiran Choi PA         HEART Score (for prediction of 6-week risk of major adverse cardiac event) reviewed and/or performed as part of the patient evaluation and treatment planning process.  The result associated with this review/performance is: 3      MDM  Number of Diagnoses or Management Options  Diagnosis management comments: Differential Diagnosis:  I considered chest wall pain,  muscle strain, costochondritis, pleurisy, rib fracture, herpes zoster, cardiovascular etiology, myocardial infarction, intermediate coronary syndrome, unstable angina, angina, aortic dissection, pericarditis, pulmonary etiology, pulmonary embolism, pneumonia, pneumothorax, lung cancer, gastroesophageal reflux disease, esophagitis, esophageal spasm and gastrointestinal etiology as a possible cause of chest pain in this patient. This is a partial list of diagnoses considered.         Amount and/or Complexity of Data Reviewed  Decide to obtain previous medical records or to obtain history from someone other than the patient: yes        Final diagnoses:   Chest pain, unspecified type   History of DVT (deep vein thrombosis)   Muscle pain   Chronic sinus complaints          Kiran Choi PA  03/04/23 2126       Kiran Choi PA  03/04/23 2126

## 2023-03-07 LAB
QT INTERVAL: 404 MS
QT INTERVAL: 426 MS
QTC INTERVAL: 482 MS
QTC INTERVAL: 491 MS

## 2023-04-19 ENCOUNTER — HOSPITAL ENCOUNTER (EMERGENCY)
Facility: HOSPITAL | Age: 53
Discharge: HOME | End: 2023-04-19
Admitting: FAMILY MEDICINE
Payer: MEDICARE

## 2023-04-19 ENCOUNTER — APPOINTMENT (OUTPATIENT)
Dept: GENERAL RADIOLOGY | Facility: HOSPITAL | Age: 53
End: 2023-04-19
Payer: MEDICARE

## 2023-04-19 VITALS
HEIGHT: 68 IN | DIASTOLIC BLOOD PRESSURE: 70 MMHG | BODY MASS INDEX: 44.41 KG/M2 | RESPIRATION RATE: 14 BRPM | OXYGEN SATURATION: 100 % | TEMPERATURE: 98.2 F | SYSTOLIC BLOOD PRESSURE: 128 MMHG | WEIGHT: 293 LBS | HEART RATE: 81 BPM

## 2023-04-19 DIAGNOSIS — M25.552 LEFT HIP PAIN: Primary | ICD-10-CM

## 2023-04-19 DIAGNOSIS — Z87.39 HISTORY OF RHEUMATOID ARTHRITIS: ICD-10-CM

## 2023-04-19 PROCEDURE — 99282 EMERGENCY DEPT VISIT SF MDM: CPT

## 2023-04-19 PROCEDURE — 73502 X-RAY EXAM HIP UNI 2-3 VIEWS: CPT

## 2023-04-19 PROCEDURE — 25010000002 DEXAMETHASONE PER 1 MG: Performed by: NURSE PRACTITIONER

## 2023-04-19 PROCEDURE — 96372 THER/PROPH/DIAG INJ SC/IM: CPT

## 2023-04-19 RX ORDER — DEXAMETHASONE SODIUM PHOSPHATE 10 MG/ML
8 INJECTION INTRAMUSCULAR; INTRAVENOUS ONCE
Status: COMPLETED | OUTPATIENT
Start: 2023-04-19 | End: 2023-04-19

## 2023-04-19 RX ADMIN — DEXAMETHASONE SODIUM PHOSPHATE 8 MG: 10 INJECTION INTRAMUSCULAR; INTRAVENOUS at 17:47

## 2023-04-19 NOTE — ED PROVIDER NOTES
Subjective   History of Present Illness  Patient is a 52-year-old female who presents to the ER with complaints of left hip pain.  She reports history of rheumatoid arthritis and states that she needs a hip and knee replacement.  Patient has been at the [facility] the past week.  She indicates that she tripped and fell causing worsening left hip pain however she believes this is all just her usual flare of rheumatoid arthritis.  She is not currently followed by rheumatologist.  Past medical history significant for arthritis, coronary artery disease, diabetes, hyperlipidemia, hypertension, morbid obesity.        Review of Systems   Constitutional: Negative.  Negative for fever.   HENT: Negative.  Negative for congestion.    Eyes: Negative.    Respiratory: Negative.  Negative for cough and shortness of breath.    Cardiovascular: Negative.  Negative for chest pain.   Gastrointestinal: Negative.  Negative for abdominal pain, diarrhea, nausea and vomiting.   Genitourinary: Negative.  Negative for dysuria.   Musculoskeletal:        Positive for hip pain   Skin: Negative.  Negative for wound.   All other systems reviewed and are negative.      Past Medical History:   Diagnosis Date   • Arthritis    • Coronary artery disease    • Diabetes mellitus    • Elevated cholesterol    • Hyperlipidemia    • Hypertension        No Known Allergies    Past Surgical History:   Procedure Laterality Date   • CARDIAC CATHETERIZATION     • CHOLECYSTECTOMY     • ORIF FOOT FRACTURE Right 6/25/2019    Procedure: OPEN REDUCTION INTERNAL FIXATION FIRST METATARSAL WITH REPAIR,- RIGHT FOOT;  Surgeon: Abram Dennis DPM;  Location: [facility] OR;  Service: Podiatry   • TOTAL KNEE ARTHROPLASTY Bilateral        No family history on file.    Social History     Socioeconomic History   • Marital status: Legally    Tobacco Use   • Smoking status: Every Day     Packs/day: 0.75     Types: Cigarettes   • Smokeless tobacco: Never    Substance and Sexual Activity   • Alcohol use: No   • Drug use: No   • Sexual activity: Defer           Objective   Physical Exam  Vitals and nursing note reviewed.   Constitutional:       Appearance: She is well-developed. She is obese.   HENT:      Head: Normocephalic and atraumatic.      Right Ear: External ear normal.      Left Ear: External ear normal.      Nose: Nose normal.      Mouth/Throat:      Pharynx: Oropharynx is clear.   Eyes:      Extraocular Movements: Extraocular movements intact.      Conjunctiva/sclera: Conjunctivae normal.   Cardiovascular:      Rate and Rhythm: Normal rate and regular rhythm.      Heart sounds: Normal heart sounds.   Pulmonary:      Effort: Pulmonary effort is normal.      Breath sounds: Normal breath sounds.   Abdominal:      General: Bowel sounds are normal.      Palpations: Abdomen is soft.   Musculoskeletal:         General: Tenderness and signs of injury present. No deformity. Normal range of motion.      Cervical back: Normal range of motion and neck supple.   Skin:     General: Skin is warm and dry.      Capillary Refill: Capillary refill takes less than 2 seconds.   Neurological:      Mental Status: She is alert and oriented to person, place, and time.   Psychiatric:         Mood and Affect: Mood normal.         Behavior: Behavior normal.         Thought Content: Thought content normal.         Judgment: Judgment normal.         Procedures           ED Course                                           Medical Decision Making  Patient is a 52-year-old female who presents to the ER with complaints of left hip pain.  She reports history of rheumatoid arthritis and states that she needs a hip and knee replacement.  Patient has been at the [facility] for the past week.  She indicates that she tripped and fell causing worsening left hip pain however she believes this is all just her usual flare of rheumatoid arthritis.  She is not currently followed by  rheumatologist.  Past medical history significant for arthritis, coronary artery disease, diabetes, hyperlipidemia, hypertension, morbid obesity.  Differential diagnosis: Arthritis, flare of rheumatoid arthritis, bony fracture, hip sprain, and other    XR Hip With or Without Pelvis 2 - 3 View Left   Final Result    1. Arthritic changes as described above. No acute osseous pathology    identified of the pelvis or hips.    This report was finalized on 04/19/2023 16:50 by Dr. Renetta Parikh MD.    X-rays indicative of arthritis.  No bony fracture noted.  Patient states that she has controlled blood sugars.  She is requesting a steroid shot.  Explained she will need to monitor blood sugars closely.  Unable to prescribe anti-inflammatories for her arthritis given the fact that she is on blood thinners.  We gave her a dose of steroids here in the ER and she will need close follow-up with her PCP for reevaluation.  Patient expressed understanding.       History of rheumatoid arthritis: chronic illness or injury     Details: Chronic arthritis  Left hip pain: chronic illness or injury  Amount and/or Complexity of Data Reviewed  Radiology: ordered. Decision-making details documented in ED Course.      Risk  Prescription drug management.          Final diagnoses:   Left hip pain   History of rheumatoid arthritis       ED Disposition  ED Disposition     ED Disposition   Discharge    Condition   Good    Comment   --             No follow-up provider specified.       Medication List      Changed    furosemide 20 MG tablet  Commonly known as: LASIX  Take 1 tablet by mouth Daily.  What changed:   · when to take this  · reasons to take this     lisinopril 2.5 MG tablet  Commonly known as: PRINIVIL,ZESTRIL  Take 1 tablet by mouth Daily.  What changed: how much to take             Carolyn Fernandez, APRN  04/19/23 [id number]

## 2023-06-18 ENCOUNTER — HOSPITAL ENCOUNTER (EMERGENCY)
Facility: HOSPITAL | Age: 53
Discharge: HOME | End: 2023-06-18
Attending: EMERGENCY MEDICINE | Admitting: EMERGENCY MEDICINE
Payer: MEDICARE

## 2023-06-18 VITALS
WEIGHT: 293 LBS | TEMPERATURE: 97.5 F | SYSTOLIC BLOOD PRESSURE: 155 MMHG | BODY MASS INDEX: 44.41 KG/M2 | HEIGHT: 68 IN | RESPIRATION RATE: 16 BRPM | DIASTOLIC BLOOD PRESSURE: 91 MMHG | HEART RATE: 74 BPM | OXYGEN SATURATION: 99 %
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
## 2023-06-18 DIAGNOSIS — S60.561A INSECT BITE OF RIGHT HAND, INITIAL ENCOUNTER: Primary | ICD-10-CM

## 2023-06-18 PROCEDURE — 99283 EMERGENCY DEPT VISIT LOW MDM: CPT

## 2023-06-18 PROCEDURE — 25010000002 TETANUS-DIPHTH-ACELL PERTUSSIS 5-2.5-18.5 LF-MCG/0.5 SUSPENSION PREFILLED SYRINGE: Performed by: EMERGENCY MEDICINE

## 2023-06-18 PROCEDURE — 90715 TDAP VACCINE 7 YRS/> IM: CPT | Performed by: EMERGENCY MEDICINE

## 2023-06-18 PROCEDURE — 90471 IMMUNIZATION ADMIN: CPT | Performed by: EMERGENCY MEDICINE

## 2023-06-18 RX ORDER — ACETAMINOPHEN 500 MG
1000 TABLET ORAL ONCE
Status: COMPLETED | OUTPATIENT
Start: 2023-06-18 | End: 2023-06-18

## 2023-06-18 RX ORDER — DOXYCYCLINE 100 MG/1
100 CAPSULE ORAL 2 TIMES DAILY
Qty: 14 CAPSULE | Refills: 0 | Status: SHIPPED | OUTPATIENT
Start: 2023-06-18 | End: 2023-06-25

## 2023-06-18 RX ORDER — PREDNISONE 20 MG/1
20 TABLET ORAL 2 TIMES DAILY
Qty: 10 TABLET | Refills: 0 | Status: SHIPPED | OUTPATIENT
Start: 2023-06-18 | End: 2023-06-23

## 2023-06-18 RX ADMIN — TETANUS TOXOID, REDUCED DIPHTHERIA TOXOID AND ACELLULAR PERTUSSIS VACCINE, ADSORBED 0.5 ML: 5; 2.5; 8; 8; 2.5 SUSPENSION INTRAMUSCULAR at 12:23

## 2023-06-18 RX ADMIN — ACETAMINOPHEN 1000 MG: 500 TABLET, FILM COATED ORAL at 11:54

## 2023-06-18 NOTE — ED PROVIDER NOTES
Subjective   History of Present Illness  Insect bite right hand    Insect Bite  Contact animal:  Insect  Location:  Hand  Hand injury location:  [place] of R hand  Time since incident:  12 hours  Pain details:     Quality:  Aching and burning    Severity:  Moderate    Timing:  Constant    Progression:  Worsening  Incident location:  Home  Animal's rabies vaccination status:  Up to date  Relieved by:  Nothing  Worsened by:  Nothing    Review of Systems   Constitutional: Negative.    HENT: Negative.     Eyes: Negative.    Respiratory: Negative.     Cardiovascular: Negative.    Gastrointestinal: Negative.    Musculoskeletal: Negative.  Negative for back pain and neck pain.   Skin: Negative.    Neurological: Negative.    All other systems reviewed and are negative.    Past Medical History:   Diagnosis Date    Arthritis     Coronary artery disease     Diabetes mellitus     Elevated cholesterol     Hyperlipidemia     Hypertension        No Known Allergies    Past Surgical History:   Procedure Laterality Date    CARDIAC CATHETERIZATION      CHOLECYSTECTOMY      ORIF FOOT FRACTURE Right 6/25/2019    Procedure: OPEN REDUCTION INTERNAL FIXATION FIRST METATARSAL WITH REPAIR,- RIGHT FOOT;  Surgeon: Abram Dennis DPM;  Location: [facility] OR;  Service: Podiatry    TOTAL KNEE ARTHROPLASTY Bilateral        History reviewed. No pertinent family history.    Social History     Socioeconomic History    Marital status: Legally    Tobacco Use    Smoking status: Every Day     Packs/day: 0.75     Types: Cigarettes    Smokeless tobacco: Never   Substance and Sexual Activity    Alcohol use: No    Drug use: No    Sexual activity: Defer           Objective   Physical Exam  Vitals and nursing note reviewed. Exam conducted with a chaperone present.   Constitutional:       General: She is awake.      Appearance: Normal appearance. She is well-developed. She is not ill-appearing.   HENT:      Head: Normocephalic and atraumatic.   Eyes:       General: Lids are normal.      Pupils: Pupils are equal, round, and reactive to light.   Cardiovascular:      Rate and Rhythm: Normal rate.      Chest Wall: PMI is not displaced.      Pulses: Normal pulses.   Pulmonary:      Effort: Pulmonary effort is normal.      Breath sounds: No decreased breath sounds.   Musculoskeletal:         General: Normal range of motion.      Right hand: Swelling and tenderness present. No deformity, lacerations or bony tenderness. Normal range of motion. Normal strength. Normal sensation. There is no disruption of two-point discrimination. Normal capillary refill. Normal pulse.      Cervical back: Full passive range of motion without pain and normal range of motion.      Comments: Possible insect bite to the dorsum of the right middle finger over the proximal phalanx.  With localized swelling over the second and the third proximal phalanx and medical metacarpophalangeal joint area.  Range of motion limited because of pain mild erythema no streaking and no obvious abscess no obvious cellulitis no gangrene.  Neurovascular admission negative.  Radial pulse palpable.   Skin:     General: Skin is warm.      Capillary Refill: Capillary refill takes less than 2 seconds.   Neurological:      Mental Status: She is alert.      Motor: Motor function is intact.      Deep Tendon Reflexes: Reflexes are normal and symmetric.   Psychiatric:         Behavior: Behavior is cooperative.       Procedures           ED Course  ED Course as of 06/18/23 1209   Sun Jun 18, 2023   1206 Patient be advised that this can get worse somewhat a pleural antibiotic and steroids and see how she does with this. [TS]      ED Course User Index  [TS] Mark Avila MD                                           Medical Decision Making  Possible insect bite patient is metacarpophalangeal and dissent of any joint examination of the right hand is intact.  Since examination negative.  Neurovascular is unremarkable.    Problems  Addressed:  Insect bite of right hand, initial encounter:     Details: Possible insect bite with localized reaction no evidence of cellulitis abscess no systemic findings no gangrene.    Risk  OTC drugs.        Final diagnoses:   Insect bite of right hand, initial encounter       ED Disposition  ED Disposition       ED Disposition   Discharge    Condition   Stable    Comment   --               No follow-up provider specified.       Medication List        New Prescriptions      doxycycline 100 MG capsule  Commonly known as: MONODOX  Take 1 capsule by mouth 2 (Two) Times a Day for 7 days.     predniSONE 20 MG tablet  Commonly known as: DELTASONE  Take 1 tablet by mouth 2 (Two) Times a Day for 5 days.            Changed      furosemide 20 MG tablet  Commonly known as: LASIX  Take 1 tablet by mouth Daily.  What changed:   when to take this  reasons to take this     lisinopril 2.5 MG tablet  Commonly known as: PRINIVIL,ZESTRIL  Take 1 tablet by mouth Daily.  What changed: how much to take               Where to Get Your Medications        These medications were sent to [facility]/pharmacy #[id number] - [place], [place] - [address] AT ACROSS FROM RACHANA GOMEZ  [phone] [facility] [phone]   [address] [facility] [address]      Phone: [phone]   doxycycline 100 MG capsule  predniSONE 20 MG tablet            Mark Avila MD  06/18/23 1610

## 2023-08-07 ENCOUNTER — OFFICE VISIT (OUTPATIENT)
Dept: FAMILY MEDICINE CLINIC | Facility: CLINIC | Age: 53
End: 2023-08-07
Payer: MEDICARE

## 2023-08-07 VITALS
HEIGHT: 68 IN | SYSTOLIC BLOOD PRESSURE: 158 MMHG | RESPIRATION RATE: 20 BRPM | HEART RATE: 78 BPM | DIASTOLIC BLOOD PRESSURE: 89 MMHG | WEIGHT: 293 LBS | BODY MASS INDEX: 44.41 KG/M2

## 2023-08-07 DIAGNOSIS — F41.8 MIXED ANXIETY AND DEPRESSIVE DISORDER: Primary | ICD-10-CM

## 2023-08-07 DIAGNOSIS — E66.01 CLASS 3 SEVERE OBESITY WITH BODY MASS INDEX (BMI) OF 45.0 TO 49.9 IN ADULT, UNSPECIFIED OBESITY TYPE, UNSPECIFIED WHETHER SERIOUS COMORBIDITY PRESENT: ICD-10-CM

## 2023-08-07 DIAGNOSIS — Z12.31 ENCOUNTER FOR SCREENING MAMMOGRAM FOR MALIGNANT NEOPLASM OF BREAST: ICD-10-CM

## 2023-08-07 PROBLEM — E66.813 CLASS 3 SEVERE OBESITY WITH BODY MASS INDEX (BMI) OF 45.0 TO 49.9 IN ADULT: Status: ACTIVE | Noted: 2023-08-07

## 2023-08-07 PROCEDURE — 99203 OFFICE O/P NEW LOW 30 MIN: CPT | Performed by: NURSE PRACTITIONER

## 2023-08-07 PROCEDURE — 1159F MED LIST DOCD IN RCRD: CPT | Performed by: NURSE PRACTITIONER

## 2023-08-07 PROCEDURE — 1160F RVW MEDS BY RX/DR IN RCRD: CPT | Performed by: NURSE PRACTITIONER

## 2023-08-07 RX ORDER — TIZANIDINE 4 MG/1
4 TABLET ORAL NIGHTLY PRN
COMMUNITY

## 2023-08-07 NOTE — PROGRESS NOTES
"Chief Complaint  Anxiety and Depression    Subjective    History of Present Illness      Patient presents to [facility] for   History of Present Illness  States she is here for a letter for an emotional support animal. She is at the [facility] and is wanting to herkeep her dog of 5 years.      Review of Systems   Constitutional: Negative.    HENT: Negative.     Eyes: Negative.    Respiratory: Negative.     Cardiovascular: Negative.    Gastrointestinal: Negative.    Endocrine: Negative.    Genitourinary: Negative.    Musculoskeletal: Negative.    Skin: Negative.    Allergic/Immunologic: Negative.    Neurological: Negative.    Hematological: Negative.    Psychiatric/Behavioral: Negative.       I have reviewed and agree with the HPI and ROS information as above.  Ariana Raines, APRN     Objective   Vital Signs:   /89   Pulse 78   Resp 20   Ht 172.7 cm (68\")   Wt (!) 147 kg (323 lb)   BMI 49.11 kg/mý     Class 3 Severe Obesity (BMI >=40). Obesity-related health conditions include the following: hypertension and diabetes mellitus. Obesity is unchanged. BMI is is above average; BMI management plan is completed. We discussed low calorie, low carb based diet program, portion control, and increasing exercise.      Physical Exam  Constitutional:       Appearance: Normal appearance. She is well-developed. She is obese.   HENT:      Head: Normocephalic and atraumatic.      Right Ear: External ear normal.      Left Ear: External ear normal.      Nose: Nose normal. No nasal tenderness or congestion.      Mouth/Throat:      Lips: Pink. No lesions.      Mouth: Mucous membranes are moist. No oral lesions.      Dentition: Normal dentition.      Pharynx: Oropharynx is clear. No pharyngeal swelling, oropharyngeal exudate or posterior oropharyngeal erythema.   Eyes:      General: Lids are normal. Vision grossly intact. No scleral icterus.        Right eye: No discharge.         Left eye: " No discharge.      Extraocular Movements: Extraocular movements intact.      Conjunctiva/sclera: Conjunctivae normal.      Right eye: Right conjunctiva is not injected.      Left eye: Left conjunctiva is not injected.      Pupils: Pupils are equal, round, and reactive to light.   Cardiovascular:      Rate and Rhythm: Normal rate and regular rhythm.      Heart sounds: Normal heart sounds. No murmur heard.    No gallop.   Pulmonary:      Effort: Pulmonary effort is normal.      Breath sounds: Normal breath sounds and air entry. No wheezing, rhonchi or rales.   Musculoskeletal:         General: No tenderness or deformity. Normal range of motion.      Cervical back: Full passive range of motion without pain, normal range of motion and neck supple.      Right lower leg: No edema.      Left lower leg: No edema.   Skin:     General: Skin is warm and dry.      Coloration: Skin is not jaundiced.      Findings: No rash.   Neurological:      Mental Status: She is alert and oriented to person, place, and time.      Sensory: Sensation is intact.      Motor: Motor function is intact.      Coordination: Coordination is intact.      Gait: Gait is intact.   Psychiatric:         Attention and Perception: Attention normal.         Mood and Affect: Mood and affect normal.         Behavior: Behavior is not hyperactive. Behavior is cooperative.         Thought Content: Thought content normal.         Judgment: Judgment normal.        SABI-7: Over the last two weeks, how often have you been bothered by the following problems?  Feeling nervous, anxious or on edge: Several days  Not being able to stop or control worrying: Several days  Worrying too much about different things: Several days  Trouble Relaxing: Several days  Being so restless that it is hard to sit still: Several days  Becoming easily annoyed or irritable: Several days  Feeling afraid as if something awful might happen: Several days  SABI 7 Total Score: 7  If you checked any  problems, how difficult have these problems made it for you to do your work, take care of things at home, or get along with other people: Somewhat difficult    PHQ-2 Depression Screening  Little interest or pleasure in doing things? 2-->more than half the days   Feeling down, depressed, or hopeless? 2-->more than half the days   PHQ-2 Total Score 10     PHQ-9 Depression Screening  Little interest or pleasure in doing things? 2-->more than half the days   Feeling down, depressed, or hopeless? 2-->more than half the days   Trouble falling or staying asleep, or sleeping too much? 1-->several days   Feeling tired or having little energy? 2-->more than half the days   Poor appetite or overeating? 2-->more than half the days   Feeling bad about yourself - or that you are a failure or have let yourself or your family down? 1-->several days   Trouble concentrating on things, such as reading the newspaper or watching television? 0-->not at all   Moving or speaking so slowly that other people could have noticed? Or the opposite - being so fidgety or restless that you have been moving around a lot more than usual? 0-->not at all   Thoughts that you would be better off dead, or of hurting yourself in some way? 0-->not at all   PHQ-9 Total Score 10   If you checked off any problems, how difficult have these problems made it for you to do your work, take care of things at home, or get along with other people? somewhat difficult      Result Review  Data Reviewed:                Assessment and Plan      Diagnoses and all orders for this visit:    1. Mixed anxiety and depressive disorder (Primary)    2. Class 3 severe obesity with body mass index (BMI) of 45.0 to 49.9 in adult, unspecified obesity type, unspecified whether serious comorbidity present    3. Encounter for screening mammogram for malignant neoplasm of breast  -     Mammo Screening Bilateral With CAD; Future      New patient here today to establish care and request an  emotional support letter.  She is needing the emotional support letter order to keep her dog with her while staying at the [facility].  She states she has been there for about 4 months, but plans to be moving into her own house within the next few months.  She has a history of anxiety and depression which has been managed along with her other chronic conditions by her PCP Danielle Mcconnell, as needed in [facility].  She does not have transportation to be able to make these appointments with her PCP. She has an appointment in September for medication follow up. Pt would also like an order for a mammogram as well.     Plan:    Emotional support letter written and given to pt.   Mammogram ordered.  F/u as needed.     Patient has been erroneously marked as diabetic. Based on the available clinical information, she does not have diabetes and should therefore be excluded from diabetic health maintenance and quality measures for the remainder of the reporting period.     Follow Up   Return if symptoms worsen or fail to improve.  Patient was given instructions and counseling regarding her condition or for health maintenance advice. Please see specific information pulled into the AVS if appropriate.

## 2023-08-10 ENCOUNTER — PATIENT ROUNDING (BHMG ONLY) (OUTPATIENT)
Dept: FAMILY MEDICINE CLINIC | Facility: CLINIC | Age: 53
End: 2023-08-10
Payer: MEDICARE

## 2023-08-10 NOTE — PROGRESS NOTES
August 10, 2023    Hello, may I speak with Radha Boswell?    My name is Alessandra/ # not working      I am  with [facility]  [facility]  [address] [place] [address] [phone].    Before we get started may I verify your date of birth? 1970    I am calling to officially welcome you to our practice and ask about your recent visit. Is this a good time to talk?     Tell me about your visit with us. What things went well?         We're always looking for ways to make our patients' experiences even better. Do you have recommendations on ways we may improve?      Overall were you satisfied with your first visit to our practice?        I appreciate you taking the time to speak with me today. Is there anything else I can do for you?       Thank you, and have a great day.

## 2023-08-15 DIAGNOSIS — Z12.31 SCREENING MAMMOGRAM FOR BREAST CANCER: Primary | ICD-10-CM

## 2024-02-03 ENCOUNTER — APPOINTMENT (OUTPATIENT)
Dept: GENERAL RADIOLOGY | Facility: HOSPITAL | Age: 54
End: 2024-02-03
Payer: MEDICARE

## 2024-02-03 ENCOUNTER — APPOINTMENT (OUTPATIENT)
Dept: CARDIOLOGY | Facility: HOSPITAL | Age: 54
End: 2024-02-03
Payer: MEDICARE

## 2024-02-03 ENCOUNTER — APPOINTMENT (OUTPATIENT)
Dept: CT IMAGING | Facility: HOSPITAL | Age: 54
End: 2024-02-03
Payer: MEDICARE

## 2024-02-03 ENCOUNTER — HOSPITAL ENCOUNTER (OUTPATIENT)
Facility: HOSPITAL | Age: 54
Discharge: HOME | End: 2024-02-05
Attending: STUDENT IN AN ORGANIZED HEALTH CARE EDUCATION/TRAINING PROGRAM | Admitting: STUDENT IN AN ORGANIZED HEALTH CARE EDUCATION/TRAINING PROGRAM
Payer: MEDICARE

## 2024-02-03 DIAGNOSIS — Z86.79 HISTORY OF CORONARY ARTERY DISEASE: ICD-10-CM

## 2024-02-03 DIAGNOSIS — R07.9 ACUTE CHEST PAIN: Primary | ICD-10-CM

## 2024-02-03 DIAGNOSIS — Z91.148 MEDICATION NONADHERENCE DUE TO PSYCHOSOCIAL PROBLEM: ICD-10-CM

## 2024-02-03 DIAGNOSIS — N28.9 RENAL LESION: ICD-10-CM

## 2024-02-03 DIAGNOSIS — Z86.718 HISTORY OF DVT (DEEP VEIN THROMBOSIS): ICD-10-CM

## 2024-02-03 DIAGNOSIS — Z65.9 MEDICATION NONADHERENCE DUE TO PSYCHOSOCIAL PROBLEM: ICD-10-CM

## 2024-02-03 DIAGNOSIS — R94.39 ABNORMAL STRESS ECHO: ICD-10-CM

## 2024-02-03 DIAGNOSIS — Z86.711 HISTORY OF PULMONARY EMBOLISM: ICD-10-CM

## 2024-02-03 PROBLEM — I25.10 CORONARY ARTERY DISEASE INVOLVING NATIVE CORONARY ARTERY OF NATIVE HEART WITHOUT ANGINA PECTORIS: Status: ACTIVE | Noted: 2024-02-03

## 2024-02-03 PROBLEM — E11.40 TYPE 2 DIABETES MELLITUS WITH DIABETIC NEUROPATHY, WITHOUT LONG-TERM CURRENT USE OF INSULIN: Status: ACTIVE | Noted: 2024-02-03

## 2024-02-03 LAB
ALBUMIN SERPL-MCNC: 3.9 G/DL (ref 3.5–5.2)
ALBUMIN/GLOB SERPL: 1.1 G/DL
ALP SERPL-CCNC: 90 U/L (ref 39–117)
ALT SERPL W P-5'-P-CCNC: 13 U/L (ref 1–33)
ANION GAP SERPL CALCULATED.3IONS-SCNC: 12 MMOL/L (ref 5–15)
AST SERPL-CCNC: 19 U/L (ref 1–32)
BH CV STRESS BP STAGE 1: NORMAL
BH CV STRESS BP STAGE 2: NORMAL
BH CV STRESS BP STAGE 3: NORMAL
BH CV STRESS DOB - ATROPINE STAGE 3: 1
BH CV STRESS DOSE DOBUTAMINE STAGE 1: 10
BH CV STRESS DOSE DOBUTAMINE STAGE 2: 20
BH CV STRESS DOSE DOBUTAMINE STAGE 3: 30
BH CV STRESS DURATION MIN STAGE 1: 3
BH CV STRESS DURATION MIN STAGE 2: 3
BH CV STRESS DURATION MIN STAGE 3: 2
BH CV STRESS DURATION SEC STAGE 1: 0
BH CV STRESS DURATION SEC STAGE 2: 0
BH CV STRESS DURATION SEC STAGE 3: 53
BH CV STRESS HR STAGE 1: 69
BH CV STRESS HR STAGE 2: 114
BH CV STRESS HR STAGE 3: 148
BH CV STRESS PROTOCOL 1: NORMAL
BH CV STRESS RECOVERY BP: NORMAL MMHG
BH CV STRESS RECOVERY HR: 98 BPM
BH CV STRESS STAGE 1: 1
BH CV STRESS STAGE 2: 2
BH CV STRESS STAGE 3: 3
BILIRUB SERPL-MCNC: 0.4 MG/DL (ref 0–1.2)
BUN SERPL-MCNC: 10 MG/DL (ref 6–20)
BUN/CREAT SERPL: 9.9 (ref 7–25)
CALCIUM SPEC-SCNC: 10.2 MG/DL (ref 8.6–10.5)
CHLORIDE SERPL-SCNC: 101 MMOL/L (ref 98–107)
CHOLEST SERPL-MCNC: 142 MG/DL (ref 0–200)
CO2 SERPL-SCNC: 27 MMOL/L (ref 22–29)
CREAT SERPL-MCNC: 1.01 MG/DL (ref 0.57–1)
DEPRECATED RDW RBC AUTO: 46.4 FL (ref 37–54)
EGFRCR SERPLBLD CKD-EPI 2021: 66.7 ML/MIN/1.73
EOSINOPHIL # BLD MANUAL: 0.13 10*3/MM3 (ref 0–0.4)
EOSINOPHIL NFR BLD MANUAL: 1 % (ref 0.3–6.2)
ERYTHROCYTE [DISTWIDTH] IN BLOOD BY AUTOMATED COUNT: 14.4 % (ref 12.3–15.4)
FLUAV RNA RESP QL NAA+PROBE: NOT DETECTED
FLUBV RNA RESP QL NAA+PROBE: NOT DETECTED
GEN 5 2HR TROPONIN T REFLEX: 16 NG/L
GLOBULIN UR ELPH-MCNC: 3.6 GM/DL
GLUCOSE BLDC GLUCOMTR-MCNC: 116 MG/DL (ref 70–130)
GLUCOSE BLDC GLUCOMTR-MCNC: 145 MG/DL (ref 70–130)
GLUCOSE BLDC GLUCOMTR-MCNC: 150 MG/DL (ref 70–130)
GLUCOSE SERPL-MCNC: 148 MG/DL (ref 65–99)
HBA1C MFR BLD: 5.7 % (ref 4.8–5.6)
HCT VFR BLD AUTO: 44.8 % (ref 34–46.6)
HDLC SERPL-MCNC: 27 MG/DL (ref 40–60)
HGB BLD-MCNC: 15 G/DL (ref 12–15.9)
HOLD SPECIMEN: NORMAL
HOLD SPECIMEN: NORMAL
LDLC SERPL CALC-MCNC: 92 MG/DL (ref 0–100)
LDLC/HDLC SERPL: 3.33 {RATIO}
LIPASE SERPL-CCNC: 79 U/L (ref 13–60)
LYMPHOCYTES # BLD MANUAL: 4.21 10*3/MM3 (ref 0.7–3.1)
LYMPHOCYTES NFR BLD MANUAL: 3 % (ref 5–12)
MAGNESIUM SERPL-MCNC: 1.9 MG/DL (ref 1.6–2.6)
MAXIMAL PREDICTED HEART RATE: 167 BPM
MCH RBC QN AUTO: 30.1 PG (ref 26.6–33)
MCHC RBC AUTO-ENTMCNC: 33.5 G/DL (ref 31.5–35.7)
MCV RBC AUTO: 89.8 FL (ref 79–97)
MONOCYTES # BLD: 0.4 10*3/MM3 (ref 0.1–0.9)
NEUTROPHILS # BLD AUTO: 8.17 10*3/MM3 (ref 1.7–7)
NEUTROPHILS NFR BLD MANUAL: 61 % (ref 42.7–76)
NEUTS BAND NFR BLD MANUAL: 1 % (ref 0–5)
PERCENT MAX PREDICTED HR: 88.62 %
PLASMA CELL PREC NFR BLD MANUAL: 2 % (ref 0–0)
PLAT MORPH BLD: NORMAL
PLATELET # BLD AUTO: 362 10*3/MM3 (ref 140–450)
PMV BLD AUTO: 10.8 FL (ref 6–12)
POTASSIUM SERPL-SCNC: 3.6 MMOL/L (ref 3.5–5.2)
PROT SERPL-MCNC: 7.5 G/DL (ref 6–8.5)
RBC # BLD AUTO: 4.99 10*6/MM3 (ref 3.77–5.28)
RBC MORPH BLD: NORMAL
SARS-COV-2 RNA RESP QL NAA+PROBE: NOT DETECTED
SODIUM SERPL-SCNC: 140 MMOL/L (ref 136–145)
STRESS BASELINE BP: NORMAL MMHG
STRESS BASELINE HR: 66 BPM
STRESS PERCENT HR: 104 %
STRESS POST EXERCISE DUR MIN: 8 MIN
STRESS POST EXERCISE DUR SEC: 53 SEC
STRESS POST PEAK BP: NORMAL MMHG
STRESS POST PEAK HR: 148 BPM
STRESS TARGET HR: 142 BPM
TRIGL SERPL-MCNC: 125 MG/DL (ref 0–150)
TROPONIN T DELTA: 0 NG/L
TROPONIN T SERPL HS-MCNC: 16 NG/L
VARIANT LYMPHS NFR BLD MANUAL: 28 % (ref 19.6–45.3)
VARIANT LYMPHS NFR BLD MANUAL: 4 % (ref 0–5)
VLDLC SERPL-MCNC: 23 MG/DL (ref 5–40)
WBC MORPH BLD: NORMAL
WBC NRBC COR # BLD AUTO: 13.17 10*3/MM3 (ref 3.4–10.8)
WHOLE BLOOD HOLD COAG: NORMAL
WHOLE BLOOD HOLD SPECIMEN: NORMAL

## 2024-02-03 PROCEDURE — 93017 CV STRESS TEST TRACING ONLY: CPT

## 2024-02-03 PROCEDURE — G0378 HOSPITAL OBSERVATION PER HR: HCPCS

## 2024-02-03 PROCEDURE — C1887 CATHETER, GUIDING: HCPCS | Performed by: EMERGENCY MEDICINE

## 2024-02-03 PROCEDURE — A9270 NON-COVERED ITEM OR SERVICE: HCPCS | Performed by: EMERGENCY MEDICINE

## 2024-02-03 PROCEDURE — C9600 PERC DRUG-EL COR STENT SING: HCPCS | Performed by: EMERGENCY MEDICINE

## 2024-02-03 PROCEDURE — 93010 ELECTROCARDIOGRAM REPORT: CPT | Performed by: INTERNAL MEDICINE

## 2024-02-03 PROCEDURE — 85007 BL SMEAR W/DIFF WBC COUNT: CPT | Performed by: STUDENT IN AN ORGANIZED HEALTH CARE EDUCATION/TRAINING PROGRAM

## 2024-02-03 PROCEDURE — 25010000002 ENOXAPARIN PER 10 MG: Performed by: EMERGENCY MEDICINE

## 2024-02-03 PROCEDURE — 87636 SARSCOV2 & INF A&B AMP PRB: CPT | Performed by: STUDENT IN AN ORGANIZED HEALTH CARE EDUCATION/TRAINING PROGRAM

## 2024-02-03 PROCEDURE — 25010000002 HEPARIN (PORCINE) 2000-0.9 UNIT/L-% SOLUTION: Performed by: EMERGENCY MEDICINE

## 2024-02-03 PROCEDURE — 85025 COMPLETE CBC W/AUTO DIFF WBC: CPT | Performed by: STUDENT IN AN ORGANIZED HEALTH CARE EDUCATION/TRAINING PROGRAM

## 2024-02-03 PROCEDURE — C1769 GUIDE WIRE: HCPCS | Performed by: EMERGENCY MEDICINE

## 2024-02-03 PROCEDURE — 93458 L HRT ARTERY/VENTRICLE ANGIO: CPT | Performed by: EMERGENCY MEDICINE

## 2024-02-03 PROCEDURE — 84484 ASSAY OF TROPONIN QUANT: CPT | Performed by: STUDENT IN AN ORGANIZED HEALTH CARE EDUCATION/TRAINING PROGRAM

## 2024-02-03 PROCEDURE — 25010000002 FENTANYL CITRATE (PF) 50 MCG/ML SOLUTION: Performed by: EMERGENCY MEDICINE

## 2024-02-03 PROCEDURE — 25010000002 ATROPINE SULFATE: Performed by: EMERGENCY MEDICINE

## 2024-02-03 PROCEDURE — 83735 ASSAY OF MAGNESIUM: CPT | Performed by: STUDENT IN AN ORGANIZED HEALTH CARE EDUCATION/TRAINING PROGRAM

## 2024-02-03 PROCEDURE — 25010000002 HEPARIN (PORCINE) PER 1000 UNITS: Performed by: EMERGENCY MEDICINE

## 2024-02-03 PROCEDURE — 93005 ELECTROCARDIOGRAM TRACING: CPT | Performed by: STUDENT IN AN ORGANIZED HEALTH CARE EDUCATION/TRAINING PROGRAM

## 2024-02-03 PROCEDURE — 99223 1ST HOSP IP/OBS HIGH 75: CPT

## 2024-02-03 PROCEDURE — C1874 STENT, COATED/COV W/DEL SYS: HCPCS | Performed by: EMERGENCY MEDICINE

## 2024-02-03 PROCEDURE — 80053 COMPREHEN METABOLIC PANEL: CPT | Performed by: STUDENT IN AN ORGANIZED HEALTH CARE EDUCATION/TRAINING PROGRAM

## 2024-02-03 PROCEDURE — 63710000001 PANTOPRAZOLE 40 MG TABLET DELAYED-RELEASE: Performed by: EMERGENCY MEDICINE

## 2024-02-03 PROCEDURE — 36415 COLL VENOUS BLD VENIPUNCTURE: CPT

## 2024-02-03 PROCEDURE — C1725 CATH, TRANSLUMIN NON-LASER: HCPCS | Performed by: EMERGENCY MEDICINE

## 2024-02-03 PROCEDURE — 99285 EMERGENCY DEPT VISIT HI MDM: CPT

## 2024-02-03 PROCEDURE — 99153 MOD SED SAME PHYS/QHP EA: CPT | Performed by: EMERGENCY MEDICINE

## 2024-02-03 PROCEDURE — 25010000002 KETOROLAC TROMETHAMINE PER 15 MG: Performed by: STUDENT IN AN ORGANIZED HEALTH CARE EDUCATION/TRAINING PROGRAM

## 2024-02-03 PROCEDURE — 99152 MOD SED SAME PHYS/QHP 5/>YRS: CPT | Performed by: EMERGENCY MEDICINE

## 2024-02-03 PROCEDURE — 83036 HEMOGLOBIN GLYCOSYLATED A1C: CPT

## 2024-02-03 PROCEDURE — 25010000002 DIPHENHYDRAMINE PER 50 MG: Performed by: EMERGENCY MEDICINE

## 2024-02-03 PROCEDURE — C1894 INTRO/SHEATH, NON-LASER: HCPCS | Performed by: EMERGENCY MEDICINE

## 2024-02-03 PROCEDURE — 25510000001 PERFLUTREN 6.52 MG/ML SUSPENSION: Performed by: EMERGENCY MEDICINE

## 2024-02-03 PROCEDURE — 83690 ASSAY OF LIPASE: CPT | Performed by: STUDENT IN AN ORGANIZED HEALTH CARE EDUCATION/TRAINING PROGRAM

## 2024-02-03 PROCEDURE — 71045 X-RAY EXAM CHEST 1 VIEW: CPT

## 2024-02-03 PROCEDURE — 25510000001 IOPAMIDOL PER 1 ML: Performed by: STUDENT IN AN ORGANIZED HEALTH CARE EDUCATION/TRAINING PROGRAM

## 2024-02-03 PROCEDURE — 25810000003 SODIUM CHLORIDE 0.9 % SOLUTION

## 2024-02-03 PROCEDURE — 25010000002 HEPARIN (PORCINE) 1000-0.9 UT/500ML-% SOLUTION: Performed by: EMERGENCY MEDICINE

## 2024-02-03 PROCEDURE — 93350 STRESS TTE ONLY: CPT | Performed by: EMERGENCY MEDICINE

## 2024-02-03 PROCEDURE — 92928 PRQ TCAT PLMT NTRAC ST 1 LES: CPT | Performed by: EMERGENCY MEDICINE

## 2024-02-03 PROCEDURE — 82948 REAGENT STRIP/BLOOD GLUCOSE: CPT

## 2024-02-03 PROCEDURE — 63710000001 ACETAMINOPHEN 325 MG TABLET: Performed by: EMERGENCY MEDICINE

## 2024-02-03 PROCEDURE — 25510000001 IOPAMIDOL PER 1 ML: Performed by: EMERGENCY MEDICINE

## 2024-02-03 PROCEDURE — 96372 THER/PROPH/DIAG INJ SC/IM: CPT

## 2024-02-03 PROCEDURE — 80061 LIPID PANEL: CPT

## 2024-02-03 PROCEDURE — 25810000003 SODIUM CHLORIDE 0.9 % SOLUTION: Performed by: EMERGENCY MEDICINE

## 2024-02-03 PROCEDURE — 25010000002 DOBUTAMINE 250 MG/20ML SOLUTION: Performed by: EMERGENCY MEDICINE

## 2024-02-03 PROCEDURE — 25010000002 BIVALIRUDIN TRIFLUOROACETATE 250 MG RECONSTITUTED SOLUTION: Performed by: EMERGENCY MEDICINE

## 2024-02-03 PROCEDURE — 93352 ADMIN ECG CONTRAST AGENT: CPT | Performed by: EMERGENCY MEDICINE

## 2024-02-03 PROCEDURE — 63710000001 ATORVASTATIN 40 MG TABLET: Performed by: EMERGENCY MEDICINE

## 2024-02-03 PROCEDURE — 93350 STRESS TTE ONLY: CPT

## 2024-02-03 PROCEDURE — 93018 CV STRESS TEST I&R ONLY: CPT | Performed by: EMERGENCY MEDICINE

## 2024-02-03 PROCEDURE — 63710000001 DULOXETINE 30 MG CAPSULE DELAYED-RELEASE PARTICLES: Performed by: EMERGENCY MEDICINE

## 2024-02-03 PROCEDURE — 96374 THER/PROPH/DIAG INJ IV PUSH: CPT

## 2024-02-03 PROCEDURE — 63710000001 CLOPIDOGREL 75 MG TABLET: Performed by: EMERGENCY MEDICINE

## 2024-02-03 PROCEDURE — 25010000002 MIDAZOLAM PER 1 MG: Performed by: EMERGENCY MEDICINE

## 2024-02-03 PROCEDURE — 71275 CT ANGIOGRAPHY CHEST: CPT

## 2024-02-03 DEVICE — IMPLANTABLE DEVICE: Type: IMPLANTABLE DEVICE | Status: FUNCTIONAL

## 2024-02-03 RX ORDER — DOCUSATE SODIUM 100 MG/1
100 CAPSULE, LIQUID FILLED ORAL 2 TIMES DAILY PRN
Status: DISCONTINUED | OUTPATIENT
Start: 2024-02-03 | End: 2024-02-05 | Stop reason: HOSPADM

## 2024-02-03 RX ORDER — IBUPROFEN 600 MG/1
1 TABLET ORAL
Status: DISCONTINUED | OUTPATIENT
Start: 2024-02-03 | End: 2024-02-05 | Stop reason: HOSPADM

## 2024-02-03 RX ORDER — ATORVASTATIN CALCIUM 40 MG/1
40 TABLET, FILM COATED ORAL NIGHTLY
Status: DISCONTINUED | OUTPATIENT
Start: 2024-02-03 | End: 2024-02-05 | Stop reason: HOSPADM

## 2024-02-03 RX ORDER — HEPARIN SODIUM 200 [USP'U]/100ML
INJECTION, SOLUTION INTRAVENOUS
Status: DISCONTINUED | OUTPATIENT
Start: 2024-02-03 | End: 2024-02-03 | Stop reason: HOSPADM

## 2024-02-03 RX ORDER — SODIUM CHLORIDE 9 MG/ML
100 INJECTION, SOLUTION INTRAVENOUS CONTINUOUS
Status: DISCONTINUED | OUTPATIENT
Start: 2024-02-03 | End: 2024-02-05 | Stop reason: HOSPADM

## 2024-02-03 RX ORDER — ONDANSETRON 2 MG/ML
4 INJECTION INTRAMUSCULAR; INTRAVENOUS EVERY 6 HOURS PRN
Status: DISCONTINUED | OUTPATIENT
Start: 2024-02-03 | End: 2024-02-05 | Stop reason: HOSPADM

## 2024-02-03 RX ORDER — BENZONATATE 100 MG/1
100 CAPSULE ORAL 3 TIMES DAILY PRN
Status: DISCONTINUED | OUTPATIENT
Start: 2024-02-03 | End: 2024-02-05 | Stop reason: HOSPADM

## 2024-02-03 RX ORDER — HEPARIN SODIUM 1000 [USP'U]/ML
INJECTION, SOLUTION INTRAVENOUS; SUBCUTANEOUS
Status: DISCONTINUED | OUTPATIENT
Start: 2024-02-03 | End: 2024-02-03 | Stop reason: HOSPADM

## 2024-02-03 RX ORDER — NITROGLYCERIN 0.4 MG/1
0.4 TABLET SUBLINGUAL
Status: DISCONTINUED | OUTPATIENT
Start: 2024-02-03 | End: 2024-02-05 | Stop reason: HOSPADM

## 2024-02-03 RX ORDER — DIPHENHYDRAMINE HYDROCHLORIDE 50 MG/ML
INJECTION INTRAMUSCULAR; INTRAVENOUS
Status: DISCONTINUED | OUTPATIENT
Start: 2024-02-03 | End: 2024-02-03 | Stop reason: HOSPADM

## 2024-02-03 RX ORDER — SODIUM CHLORIDE 0.9 % (FLUSH) 0.9 %
10 SYRINGE (ML) INJECTION AS NEEDED
Status: DISCONTINUED | OUTPATIENT
Start: 2024-02-03 | End: 2024-02-05 | Stop reason: HOSPADM

## 2024-02-03 RX ORDER — TIZANIDINE 4 MG/1
4 TABLET ORAL NIGHTLY PRN
Status: DISCONTINUED | OUTPATIENT
Start: 2024-02-03 | End: 2024-02-03

## 2024-02-03 RX ORDER — PROMETHAZINE HYDROCHLORIDE 25 MG/1
12.5 TABLET ORAL EVERY 4 HOURS PRN
Status: DISCONTINUED | OUTPATIENT
Start: 2024-02-03 | End: 2024-02-05 | Stop reason: HOSPADM

## 2024-02-03 RX ORDER — LIDOCAINE HYDROCHLORIDE 20 MG/ML
INJECTION, SOLUTION INFILTRATION; PERINEURAL
Status: DISCONTINUED | OUTPATIENT
Start: 2024-02-03 | End: 2024-02-03 | Stop reason: HOSPADM

## 2024-02-03 RX ORDER — DEXTROSE MONOHYDRATE 25 G/50ML
25 INJECTION, SOLUTION INTRAVENOUS
Status: DISCONTINUED | OUTPATIENT
Start: 2024-02-03 | End: 2024-02-05 | Stop reason: HOSPADM

## 2024-02-03 RX ORDER — CLOPIDOGREL BISULFATE 75 MG/1
TABLET ORAL
Status: DISCONTINUED | OUTPATIENT
Start: 2024-02-03 | End: 2024-02-03 | Stop reason: HOSPADM

## 2024-02-03 RX ORDER — PANTOPRAZOLE SODIUM 40 MG/1
40 TABLET, DELAYED RELEASE ORAL DAILY
Status: DISCONTINUED | OUTPATIENT
Start: 2024-02-03 | End: 2024-02-05 | Stop reason: HOSPADM

## 2024-02-03 RX ORDER — TIZANIDINE 4 MG/1
4 TABLET ORAL EVERY 8 HOURS PRN
Status: DISCONTINUED | OUTPATIENT
Start: 2024-02-03 | End: 2024-02-05 | Stop reason: HOSPADM

## 2024-02-03 RX ORDER — DULOXETIN HYDROCHLORIDE 30 MG/1
60 CAPSULE, DELAYED RELEASE ORAL 2 TIMES DAILY
Status: DISCONTINUED | OUTPATIENT
Start: 2024-02-03 | End: 2024-02-05 | Stop reason: HOSPADM

## 2024-02-03 RX ORDER — ENOXAPARIN SODIUM 150 MG/ML
1 INJECTION SUBCUTANEOUS EVERY 12 HOURS SCHEDULED
Status: DISCONTINUED | OUTPATIENT
Start: 2024-02-03 | End: 2024-02-04

## 2024-02-03 RX ORDER — ALBUTEROL SULFATE 90 UG/1
2 AEROSOL, METERED RESPIRATORY (INHALATION) EVERY 4 HOURS PRN
Status: DISCONTINUED | OUTPATIENT
Start: 2024-02-03 | End: 2024-02-05 | Stop reason: HOSPADM

## 2024-02-03 RX ORDER — MIDAZOLAM HYDROCHLORIDE 1 MG/ML
INJECTION INTRAMUSCULAR; INTRAVENOUS
Status: DISCONTINUED | OUTPATIENT
Start: 2024-02-03 | End: 2024-02-03 | Stop reason: HOSPADM

## 2024-02-03 RX ORDER — ONDANSETRON 4 MG/1
4 TABLET, ORALLY DISINTEGRATING ORAL EVERY 6 HOURS PRN
Status: DISCONTINUED | OUTPATIENT
Start: 2024-02-03 | End: 2024-02-05 | Stop reason: HOSPADM

## 2024-02-03 RX ORDER — DOBUTAMINE HYDROCHLORIDE 100 MG/100ML
10-50 INJECTION INTRAVENOUS CONTINUOUS
Status: DISCONTINUED | OUTPATIENT
Start: 2024-02-03 | End: 2024-02-03

## 2024-02-03 RX ORDER — ACETAMINOPHEN 500 MG
1000 TABLET ORAL ONCE
Status: COMPLETED | OUTPATIENT
Start: 2024-02-03 | End: 2024-02-03

## 2024-02-03 RX ORDER — METOPROLOL TARTRATE 1 MG/ML
5 INJECTION, SOLUTION INTRAVENOUS ONCE
Status: DISCONTINUED | OUTPATIENT
Start: 2024-02-03 | End: 2024-02-03

## 2024-02-03 RX ORDER — SODIUM CHLORIDE 9 MG/ML
1 INJECTION, SOLUTION INTRAVENOUS CONTINUOUS
Status: DISPENSED | OUTPATIENT
Start: 2024-02-03 | End: 2024-02-03

## 2024-02-03 RX ORDER — KETOROLAC TROMETHAMINE 15 MG/ML
15 INJECTION, SOLUTION INTRAMUSCULAR; INTRAVENOUS ONCE
Status: COMPLETED | OUTPATIENT
Start: 2024-02-03 | End: 2024-02-03

## 2024-02-03 RX ORDER — ACETAMINOPHEN 325 MG/1
650 TABLET ORAL EVERY 4 HOURS PRN
Status: DISCONTINUED | OUTPATIENT
Start: 2024-02-03 | End: 2024-02-05 | Stop reason: HOSPADM

## 2024-02-03 RX ORDER — TRAZODONE HYDROCHLORIDE 50 MG/1
25 TABLET ORAL NIGHTLY PRN
Status: DISCONTINUED | OUTPATIENT
Start: 2024-02-03 | End: 2024-02-05 | Stop reason: HOSPADM

## 2024-02-03 RX ORDER — ASPIRIN 81 MG/1
324 TABLET, CHEWABLE ORAL ONCE
Status: COMPLETED | OUTPATIENT
Start: 2024-02-03 | End: 2024-02-03

## 2024-02-03 RX ORDER — NICOTINE POLACRILEX 4 MG
15 LOZENGE BUCCAL
Status: DISCONTINUED | OUTPATIENT
Start: 2024-02-03 | End: 2024-02-05 | Stop reason: HOSPADM

## 2024-02-03 RX ORDER — VERAPAMIL HYDROCHLORIDE 2.5 MG/ML
INJECTION, SOLUTION INTRAVENOUS
Status: DISCONTINUED | OUTPATIENT
Start: 2024-02-03 | End: 2024-02-03 | Stop reason: HOSPADM

## 2024-02-03 RX ORDER — INSULIN LISPRO 100 [IU]/ML
2-9 INJECTION, SOLUTION INTRAVENOUS; SUBCUTANEOUS
Status: DISCONTINUED | OUTPATIENT
Start: 2024-02-03 | End: 2024-02-05 | Stop reason: HOSPADM

## 2024-02-03 RX ORDER — CLOPIDOGREL BISULFATE 75 MG/1
75 TABLET ORAL DAILY
Status: DISCONTINUED | OUTPATIENT
Start: 2024-02-04 | End: 2024-02-05 | Stop reason: HOSPADM

## 2024-02-03 RX ORDER — FENTANYL CITRATE 50 UG/ML
INJECTION, SOLUTION INTRAMUSCULAR; INTRAVENOUS
Status: DISCONTINUED | OUTPATIENT
Start: 2024-02-03 | End: 2024-02-03 | Stop reason: HOSPADM

## 2024-02-03 RX ORDER — ASPIRIN 81 MG/1
81 TABLET, CHEWABLE ORAL DAILY
Status: DISCONTINUED | OUTPATIENT
Start: 2024-02-04 | End: 2024-02-05 | Stop reason: HOSPADM

## 2024-02-03 RX ADMIN — ATORVASTATIN CALCIUM 40 MG: 40 TABLET ORAL at 20:39

## 2024-02-03 RX ADMIN — ENOXAPARIN SODIUM 135 MG: 150 INJECTION SUBCUTANEOUS at 20:39

## 2024-02-03 RX ADMIN — DULOXETINE HYDROCHLORIDE 60 MG: 30 CAPSULE, DELAYED RELEASE ORAL at 15:44

## 2024-02-03 RX ADMIN — DULOXETINE HYDROCHLORIDE 60 MG: 30 CAPSULE, DELAYED RELEASE ORAL at 20:39

## 2024-02-03 RX ADMIN — PANTOPRAZOLE SODIUM 40 MG: 40 TABLET, DELAYED RELEASE ORAL at 15:44

## 2024-02-03 RX ADMIN — PERFLUTREN 8.48 MG: 6.52 INJECTION, SUSPENSION INTRAVENOUS at 09:17

## 2024-02-03 RX ADMIN — DOBUTAMINE 10 MCG/KG/MIN: 12.5 INJECTION, SOLUTION, CONCENTRATE INTRAVENOUS at 09:23

## 2024-02-03 RX ADMIN — SODIUM CHLORIDE 100 ML/HR: 9 INJECTION, SOLUTION INTRAVENOUS at 16:03

## 2024-02-03 RX ADMIN — SODIUM CHLORIDE 100 ML/HR: 9 INJECTION, SOLUTION INTRAVENOUS at 12:17

## 2024-02-03 RX ADMIN — IOPAMIDOL 100 ML: 755 INJECTION, SOLUTION INTRAVENOUS at 05:22

## 2024-02-03 RX ADMIN — ATROPINE SULFATE 1 MG: 0.1 INJECTION INTRAVENOUS at 09:32

## 2024-02-03 RX ADMIN — ACETAMINOPHEN 650 MG: 325 TABLET, FILM COATED ORAL at 15:48

## 2024-02-03 RX ADMIN — ACETAMINOPHEN 1000 MG: 500 TABLET, FILM COATED ORAL at 06:04

## 2024-02-03 RX ADMIN — ASPIRIN 324 MG: 81 TABLET, CHEWABLE ORAL at 04:24

## 2024-02-03 RX ADMIN — KETOROLAC TROMETHAMINE 15 MG: 15 INJECTION, SOLUTION INTRAMUSCULAR; INTRAVENOUS at 06:04

## 2024-02-03 NOTE — ED PROVIDER NOTES
"EMERGENCY DEPARTMENT ATTENDING NOTE    Patient Name: Radha Boswell    Chief Complaint   Patient presents with    Chest Pain    Jaw Pain       PATIENT PRESENTATION:  Radha Boswell is a very pleasant 53 y.o. female with history of hypertension hyperlipidemia and diabetes mellitus as well as coronary disease present emergency department due to acute chest pain.    On my interview with the patient she states that she had a previous pulmonary embolism but unfortunately been unable to afford her Xarelto as the price is increased so she has been off it for about a month.  States she has a history of diabetes mellitus not on insulin as well as hyperlipidemia family history of myocardial fraction and reports prior history of coronary stent placement.  She has a document history of coronary artery disease.  She describes her chest pain more as chest tightness with also what she describes as \"jaw jittering\" as well as some leg pain that she states feels like her prior DVTs.      PHYSICAL EXAM:   VS: /65   Pulse 77   Temp 98.5 °F (36.9 °C) (Oral)   Resp 22   Ht 172.7 cm (68\")   Wt 130 kg (287 lb)   SpO2 98%   BMI 43.64 kg/m²   GENERAL: Chronically ill-appearing middle-age woman sitting up stretcher no acute distress; well-nourished, well-developed, awake, alert, no acute distress, nontoxic appearing, comfortable  RESPIRATORY: unlabored respiratory effort, clear to auscultation bilaterally, good air movement; no inspiratory or expiratory wheezing  CARDIOVASCULAR: no murmurs, peripheral pulses 2+ and equal in all extremities  GI: soft, nontender, nondistended  MUSCULOSKELETAL/EXTREMITIES: No pitting edema of the bilateral lower extremities; extremities without obvious deformity  SKIN: warm and dry with no obvious rashes  PSYCHIATRIC: alert, pleasant and cooperative. Appropriate mood and affect.      MEDICAL DECISION MAKING:    Radha Boswell is a 53 y.o. female with history of prior DVT and PE not on anticoagulation " in setting of inability to afford her medication present emerged part due to chest tightness and with leg pain.    Differential Diagnosis Considered: Acute coronary syndrome including STEMI, NSTEMI, and unstable angina, pulmonary embolism including saddle embolus, pneumonia, pneumothorax    Labs Ordered:  Labs Reviewed   COMPREHENSIVE METABOLIC PANEL - Abnormal; Notable for the following components:       Result Value    Glucose 148 (*)     Creatinine 1.01 (*)     All other components within normal limits    Narrative:     GFR Normal >60  Chronic Kidney Disease <60  Kidney Failure <15     TROPONIN - Abnormal; Notable for the following components:    HS Troponin T 16 (*)     All other components within normal limits    Narrative:     High Sensitive Troponin T Reference Range:  <14.0 ng/L- Negative Female for AMI  <22.0 ng/L- Negative Male for AMI  >=14 - Abnormal Female indicating possible myocardial injury.  >=22 - Abnormal Male indicating possible myocardial injury.   Clinicians would have to utilize clinical acumen, EKG, Troponin, and serial changes to determine if it is an Acute Myocardial Infarction or myocardial injury due to an underlying chronic condition.        CBC WITH AUTO DIFFERENTIAL - Abnormal; Notable for the following components:    WBC 13.17 (*)     All other components within normal limits    Narrative:     The previously reported component NRBC is no longer being reported. Previous result was 0.0 /100 WBC (Reference Range: 0.0-0.2 /100 WBC) on 2/3/2024 at 0438 CST.   LIPASE - Abnormal; Notable for the following components:    Lipase 79 (*)     All other components within normal limits   MANUAL DIFFERENTIAL - Abnormal; Notable for the following components:    Monocyte % 3.0 (*)     Plasma Cells % 2.0 (*)     Neutrophils Absolute 8.17 (*)     Lymphocytes Absolute 4.21 (*)     All other components within normal limits   HIGH SENSITIVITIY TROPONIN T 2HR - Abnormal; Notable for the following  components:    HS Troponin T 16 (*)     All other components within normal limits    Narrative:     High Sensitive Troponin T Reference Range:  <14.0 ng/L- Negative Female for AMI  <22.0 ng/L- Negative Male for AMI  >=14 - Abnormal Female indicating possible myocardial injury.  >=22 - Abnormal Male indicating possible myocardial injury.   Clinicians would have to utilize clinical acumen, EKG, Troponin, and serial changes to determine if it is an Acute Myocardial Infarction or myocardial injury due to an underlying chronic condition.        MAGNESIUM - Normal   COVID-19 AND FLU A/B, NP SWAB IN TRANSPORT MEDIA 1 HR TAT   RAINBOW DRAW    Narrative:     The following orders were created for panel order [place] Draw.  Procedure                               Abnormality         Status                     ---------                               -----------         ------                     Green Top (Gel)[[id number]]                                  Final result               Lavender Top[[id number]]                                     Final result               Red Top[[id number]]                                          Final result               Light Blue Top[[id number]]                                   Final result                 Please view results for these tests on the individual orders.   CBC AND DIFFERENTIAL    Narrative:     The following orders were created for panel order CBC & Differential.  Procedure                               Abnormality         Status                     ---------                               -----------         ------                     CBC Auto Differential[[id number]]        Abnormal            Final result                 Please view results for these tests on the individual orders.   GREEN TOP   LAVENDER TOP   RED TOP   LIGHT BLUE TOP        Imaging Ordered:   CT Angiogram Chest   Final Result   1. No evidence of pulmonary thromboembolic disease. The thoracic aorta   is normal in  caliber with no evidence of dissection or aneurysm. The   lungs are clear.    2. On limited images through the upper abdomen I would question whether   there may be an exophytic lesion emanating from the medial upper pole of   the right kidney. Follow-up with outpatient renal ultrasound or CT   abdomen and pelvis would be recommended as this appears to represent a   change in the morphology of the upper pole of the right kidney from a   remote CT from December 2015.       This report was signed and finalized on 2/3/2024 5:33 AM by Dr. Santiago Underwood MD.          XR Chest 1 View   ED Interpretation   No acute findings.      Final Result   Impression: No evidence of acute cardiopulmonary disease.       This report was signed and finalized on 2/3/2024 5:00 AM by Dr. Santiago Underwood MD.              Internal chart review:   Past Medical History:   Diagnosis Date    Arthritis     Coronary artery disease     Diabetes mellitus     Elevated cholesterol     Hyperlipidemia     Hypertension        Past Surgical History:   Procedure Laterality Date    CARDIAC CATHETERIZATION      CHOLECYSTECTOMY      ORIF FOOT FRACTURE Right 6/25/2019    Procedure: OPEN REDUCTION INTERNAL FIXATION FIRST METATARSAL WITH REPAIR,- RIGHT FOOT;  Surgeon: Abram Dennis DPM;  Location: [facility] OR;  Service: Podiatry    TOTAL KNEE ARTHROPLASTY Bilateral        No Known Allergies      Current Facility-Administered Medications:     sodium chloride 0.9 % flush 10 mL, 10 mL, Intravenous, PRN, Karson Nava MD    Current Outpatient Medications:     albuterol sulfate  (90 Base) MCG/ACT inhaler, Inhale 2 puffs Every 4 (Four) Hours As Needed for Wheezing., Disp: , Rfl:     docusate sodium 100 MG capsule, Take 100 mg by mouth 2 (Two) Times a Day As Needed for Constipation., Disp: 30 capsule, Rfl: 0    DULoxetine (Cymbalta) 60 MG capsule, Take 1 capsule by mouth 2 (Two) Times a Day., Disp: 60 capsule, Rfl: 0    fluticasone (FLONASE) 50  MCG/ACT nasal spray, 2 sprays into the nostril(s) as directed by provider Daily., Disp: 16 g, Rfl: 0    furosemide (LASIX) 20 MG tablet, Take 1 tablet by mouth Daily. (Patient taking differently: Take 1 tablet by mouth Daily As Needed.), Disp: 30 tablet, Rfl: 0    hydrOXYzine pamoate (VISTARIL) 50 MG capsule, Take 1 capsule by mouth At Night As Needed for Anxiety., Disp: 5 capsule, Rfl: 0    lisinopril (PRINIVIL,ZESTRIL) 2.5 MG tablet, Take 1 tablet by mouth Daily. (Patient taking differently: Take 2 tablets by mouth Daily.), Disp: 30 tablet, Rfl: 0    metoprolol tartrate (LOPRESSOR) 25 MG tablet, Take 0.5 tablets by mouth Every 12 (Twelve) Hours., Disp: 60 tablet, Rfl: 0    pantoprazole (PROTONIX) 40 MG EC tablet, Take 1 tablet by mouth Daily., Disp: 30 tablet, Rfl: 0    pregabalin (LYRICA) 200 MG capsule, Take 1 capsule by mouth Every 8 (Eight) Hours., Disp: 30 capsule, Rfl: 0    promethazine (PHENERGAN) 12.5 MG tablet, Take 1 tablet by mouth Every 4 (Four) Hours As Needed for Nausea or Vomiting., Disp: 42 tablet, Rfl: 0    rivaroxaban (XARELTO) 20 MG tablet, Take 1 tablet by mouth Daily With Dinner., Disp: 10 tablet, Rfl: 0    tiZANidine (ZANAFLEX) 4 MG tablet, Take 1 tablet by mouth At Night As Needed for Muscle Spasms., Disp: , Rfl:     My EKG interpretation: Normal sinus rhythm with what appears to be incomplete bundle branch block QRS duration of 126 with no acute ST elevations ST depressions or T wave inversions.  Compared to prior EKG our system from March 4, 2023 which shows similar morphology.     My lab interpretation: Initial high-sensitivity troponin 16 with repeat of 16 with a negative delta of 0.  Otherwise slight leukocytosis at 13.17 with no neutrophilic shift.  Lipase 79.  Normal mag.  COVID and influenza testing pending.    My imaging interpretation: Chest x-ray with no acute findings.  CT angiogram of the chest with no evidence of any pulmonary embolism or aortic dissection.  Possible renal  lesion.    Decision rules/scores evaluated: HEART score 4.    Shared decision making: Discussed risk and benefits of pursuing gracious testing with the patient she has a history of prior myocardial stenting but has not had any cardiac stress testing in quite some time.  Patient wants to pursue stress testing from the emergency department rather than outpatient follow-up.    ED Course and Re-evaluation: 52yo F with history of prior myocardial stenting as well as prior DVT on Eliquis presenting Emergency Department due to chest pain in the setting of not being on Xarelto for multiple days due to inability to afford medication.  Patient vital signs initially reassuring.  She has not had gracious testing or any cardiac testing since her stent in quite some time.  Labs with initial and repeat high since troponin negative.  No acute ischemic findings on EKG does appear similar to her prior EKG.  Otherwise labs are fairly unremarkable.  CT angio of chest pursued given patient's been off her medication there is no evidence of any pulmonary embolism.  Given patient heart score discussed risk benefits of obtaining her stress testing to the emergency department versus following up outpatient cardiology patient wanted to proceed to cardiac stress testing has been ordered.  COVID and flu test are pending.    Final disposition pending results of cardiac chest testing at time my signout to the [facility] emergency medicine attending, Dr. Avila, care of this patient.      ED Diagnosis:  (R07.9) Acute chest pain    (Z86.711) History of pulmonary embolism    (Z91.148,  Z65.9) Medication nonadherence due to psychosocial problem    (Z86.718) History of DVT (deep vein thrombosis)    (Z86.79) History of coronary artery disease     Disposition: Pending cardiac stress testing at time assigned to the [facility] response and attending, Dr. Avila, assuming care of this patient.        Signed:  Karson Nava MD  Emergency Medicine  Physician    Please note that portions of this note were completed with a voice recognition program.      Karson Nava MD  02/03/24 [id number]

## 2024-02-03 NOTE — OP NOTE
"  [facility]      Date of procedure: 2/3/2024     Procedures performed:     1.  Access to the right radial artery via modified Seldinger technique  2.  Left heart catheterization with retrograde crossing aortic valve to the left ventricle pressure recorded  3.  LV ventriculography  4.  Selective bilateral coronary angiography  5.  Conscious sedation with continuous hemodynamic monitoring for 1 hour  6.  Patent hemostasis achieved in the right radial artery access site using a TR band  7.  Successful PTCA to the RCA with an emerge MR 3 x 30 mm balloon times multiple inflations  8.  Successful PCI to the proximal/mid and mid RCA with a 3.5 x 33 mm Xience Skypoint drug-eluting stent  9.  Successful PCI to the mid and mid/distal RCA with a 3.0 x 28 mm Xience azam point drug-eluting stent  10.  Successful NC PTCA to the RCA times multiple inflations with an NC trek Ramon 4 x 20 mm balloon    Risk, Benefits, and Alternatives discussed with the patient and/or family.  Plan is for moderate sedation, and the patient agrees to proceed with the procedure.  An immediate assessment was done prior to the administration of moderate sedation        Indication: NSTEMI, abnormal nuclear stress test concerning for inferior ischemia  Premedication: Versed, Fentanyl  Contrast: Isovue 250 cc  Radiation: Flouro time= 15 minutes. Air Kerma= 1873 mGy  Catheters: 5F Maurice, pigtail  Guiding catheter: JR4 guide  PCI Hardware: .014\" BMW wire, 0.014 inch whisper export wire, balloons and stents as outlined above      Procedural details:    The patient was brought to the cath lab and prepped and draped in the usual fashion.  Access was obtained in the right radial artery access site using a modified Seldinger technique.  A 6 [place] sheath was placed into the artery and flushed.  Next, a Maurice catheter was inserted and used to engage both the left and right coronary arteries and selective bilateral coronary angiography was " performed in multiple views.  We then inserted a pigtail catheter which was used to cross the aortic valve to the left ventricle pressure recorded LV ventriculography was performed.  This catheter was then pulled back cross aortic valve and again pressures were recorded.  Next, a JR4 guide was inserted used to engage the right coronary artery.  We attempted to advance a BMW wire to the distal vessel but this wire was unable to cross the lesion successfully.  We then used a whisper extra-support wire which was easily able to cross the lesion.  It was advanced into the distal vessel.  Next, a emerge MR 3 x 30 mm balloon was inserted into the RCA where it was inflated multiple times.  We then inserted a Xience Skypoint 3.5 x 33 mm drug-eluting stent into the proximal/mid and mid RCA where it was deployed at 12 rob for 45 seconds.  Next, we inserted a Xience Skypoint 3 x 28 mm drug-eluting stent into the mid and mid/distal RCA where was deployed at 12 rob for 45 seconds.  We then inserted a NC trek Ramon balloon into the recently deployed stents where he was inflated multiple times in a stepwise fashion.  Postintervention angiography was performed in multiple views.  Everything was then withdrawn through the sheath and the sheath was flushed.  Patent hemostasis was achieved in the right radial artery access site using a TR band.  Patient tolerated procedure well without any complications.  She left the Cath Lab in stable condition.      I supervised the administration of conscious sedation by nursing staff throughout the case.  First dose was given at 1350 and the end of my face-to-face encounter was at 1450, accounting for a total of 60 minutes of supervision.  During the case, continuous pulse oximetry, heart rate, blood pressure, and patient status were monitored.     Findings:    Hemodynamics:    Aortic: 102/78 mmHg  LV: 128/29 mmHg  LVEDP: 39 mmHg    Left ventriculography:  1. The contractility of the left ventricle  is mildly reduced.  Estimated ejection fraction 45% with inferior hypokinesis noted.  2.  No significant gradient across the aortic valve on pullback    Selective coronary angiography:     Left Main: Large-caliber vessel that bifurcates into the LAD and left circumflex coronary arteries, no angiographic evidence of stenosis, ALISSON-3 flow    LAD: Large-caliber vessel with no angiographic evidence of stenosis, ALISSON-3 flow.  There is significant myocardial bridging noted in the mid vessel however.    Diagonals: First diagonal is moderate caliber with mild disease in the proximal segment    Left circumflex: Moderate caliber vessel with no angiographic evidence of stenosis, ALISSON-3 flow    There is 1 moderate caliber obtuse marginal branch with no significant disease    RCA: Large-caliber, dominant vessel with a stent present in the mid and mid/distal vessel with significant in-stent stenosis of approximately 80 to 90% and with what appears to be acute thrombus present within the stent.  ALISSON II flow prior to intervention.  Status post successful PTCA and stent placement x 2 in an overlap fashion we had return of ALISSON-3 flow and no residual stenosis remaining in this vessel.    PDA/BLOSSOM: BLOSSOM is moderate caliber with mild disease noted in the mid and distal segments that are small in nature, the PDA is moderate caliber with no significant disease      Estimated Blood Loss: 15 cc    Specimens: None    Complications: None     Impression:  1.  Coronary artery disease as described above including a hemodynamically significant lesion in the mid RCA with 80 to 90% in-stent stenosis and what appeared to be an acute thrombus present status post successful PTCA and stent placement x 2 with return of ALISSON-3 flow and no residual stenosis remaining  2.  Diabetes  3.  History of DVT     Plan:   1.  TR band off in 2 hours, admit to the floor and monitor closely overnight with plans for discharge home in the morning if there are no  complications noted  2.  Recommend patient be on triple therapy with aspirin, Plavix and some anticoagulant (Lovenox treatment dose while inpatient and will need to discuss an oral anticoagulant before discharge) for 30 days.  On day 31, stop aspirin and continue the Plavix and oral anticoagulant  3.  Initiate high intensity statin  4.  ACE inhibitor and beta-blocker once vitals can tolerate  5.  Echocardiogram  6.  Referral for cardiac rehab  7.  Close follow-up with [facility] cardiology as an outpatient        Jacob Phillips,   Interventional cardiology  [facility]  February 3, 2024

## 2024-02-03 NOTE — PROGRESS NOTES
"Pharmacy Dosing Service  Anticoagulation  Enoxaparin    Assessment:  Current order: Enoxaparin 135 mg (1 mg/kg) SQ every 12 hours for ACS.  Dose is appropriate based on indication and patient factors (age, weight, and renal function).  No changes at this time.  Pharmacy will continue to monitor and evaluate.    Plan:  Continue current dosage. Continue routine follow-up evaluation.       Subjective:  Radha Boswell is a 53 y.o. female on enoxaparin for ACS.    1. Acute chest pain    2. History of pulmonary embolism    3. Medication nonadherence due to psychosocial problem    4. History of DVT (deep vein thrombosis)    5. History of coronary artery disease    6. Renal lesion    7. Abnormal stress echo        Objective:  [Ht: 172.7 cm (68\"); Wt: 130 kg (287 lb); BMI: Body mass index is 43.64 kg/m².]  Estimated Creatinine Clearance: 91.8 mL/min (A) (by C-G formula based on SCr of 1.01 mg/dL (H)).   Creatinine   Date Value Ref Range Status   02/03/2024 1.01 (H) 0.57 - 1.00 mg/dL Final   03/04/2023 0.94 0.57 - 1.00 mg/dL Final   05/28/2021 0.95 0.57 - 1.00 mg/dL Final   09/08/2018 0.9 0.5 - 0.9 mg/dL Final   01/05/2018 0.9 0.5 - 0.9 mg/dL Final   01/04/2018 1.1 (H) 0.5 - 0.9 mg/dL Final     Lab Results   Component Value Date    INR 1.31 (H) 05/28/2021    INR 1.80 (H) 09/08/2018    INR 1.07 01/28/2018    PROTIME 15.3 (H) 05/28/2021    PROTIME 20.9 (H) 09/08/2018    PROTIME 14.2 01/28/2018      Lab Results   Component Value Date    HGB 15.0 02/03/2024    HGB 15.9 03/04/2023    HGB 12.4 05/28/2021      Lab Results   Component Value Date    HCT 44.8 02/03/2024    HCT 48.8 (H) 03/04/2023    HCT 36.7 05/28/2021      Lab Results   Component Value Date     02/03/2024     03/04/2023     05/28/2021    No results found for: \"DDIMER\"  COVID19   Date Value Ref Range Status   02/03/2024 Not Detected Not Detected - Ref. Range Final         René Velarde, PharmD  02/03/24 11:13 CST     "

## 2024-02-03 NOTE — H&P
[facility] -  History and Physical     LOS: 0 days   Patient Care Team:  Robe Mcconnell APRN as PCP - General  Robe Mcconnell APRN as PCP - Family Medicine  Robe Mcconnell APRN as Referring Physician (Family Medicine)  Sean Mukherjee MD as Cardiologist (Cardiology)    Chief Complaint: Chest pain.    Subjective     Interval History:   Radha Roth is a 53-year-old female with known history history of coronary artery disease with stenting to her mid RCA in December 2017, diabetes mellitus, hypertension, and hyperlipidemia who cardiology asked to admit due to abnormal stress test and chest pain.    Patient presented to the emergency room on 2/3/2024 with complaints of chest pain.  It is reported the patient has history of chronic DVT and has unfortunately not been able to afford her Xarelto in about a month.  She has been off blood thinner due to this.  Upon arrival to the ER she described her pain as a chest tightness that also led to jaw discomfort.  Chest imaging was negative for any pulmonary embolism or acute cardiopulmonary process.  Patient did have elevated troponin at 16 that then repeated at 16 with a 0 delta.  Patient did undergo stress test which was high risk for ischemia and therefore she is being admitted for further ischemic evaluation.    Upon my examination patient sitting on the edge of the bed.  She is in no acute distress.She does report to me that she stopped taking her Xarelto about a month ago due to cost.  She says the co-pay went up to $47 and this is unaffordable for her.  She says she had been in her normal health up until recently.  She again describes her pain as a tightness in the center of her chest.  Currently the pain is relieved.  She is very anxious about her high risk stress test.  She has a lot of social determinant factors which have made healthcare for heart recently.  She does not have reliable transportation.  She has not seen her PCP in some time.   She does have other chronic medical issues such as rheumatoid arthritis as well as diabetes mellitus.  She reports her diabetes is well-managed.        Review of Systems:     Review of Systems   Gastrointestinal:  Positive for abdominal pain.   Psychiatric/Behavioral:  The patient is nervous/anxious.        History  Past Medical History:   Diagnosis Date    Arthritis     Coronary artery disease     Diabetes mellitus     Elevated cholesterol     Hyperlipidemia     Hypertension      Past Surgical History:   Procedure Laterality Date    CARDIAC CATHETERIZATION      CHOLECYSTECTOMY      ORIF FOOT FRACTURE Right 6/25/2019    Procedure: OPEN REDUCTION INTERNAL FIXATION FIRST METATARSAL WITH REPAIR,- RIGHT FOOT;  Surgeon: Abram Dennis DPM;  Location: [facility] OR;  Service: Podiatry    TOTAL KNEE ARTHROPLASTY Bilateral      History reviewed. No pertinent family history.  Social History     Tobacco Use    Smoking status: Every Day     Packs/day: .75     Types: Cigarettes    Smokeless tobacco: Never   Substance Use Topics    Alcohol use: No    Drug use: No     (Not in a hospital admission)    Allergies:  Patient has no known allergies.    Objective     Vital Sign Min/Max for last 24 hours  Temp  Min: 98.5 °F (36.9 °C)  Max: 98.5 °F (36.9 °C)   BP  Min: 108/68  Max: 135/75   Pulse  Min: 68  Max: 90   Resp  Min: 18  Max: 22   SpO2  Min: 97 %  Max: 100 %   No data recorded   Weight  Min: 130 kg (287 lb)  Max: 130 kg (287 lb)         02/03/24  0916   Weight: 130 kg (287 lb)       Physical Exam:    Constitutional:       Appearance: Healthy appearance. Not in distress.   Pulmonary:      Effort: Pulmonary effort is normal.      Breath sounds: Normal breath sounds.   Cardiovascular:      PMI at left midclavicular line. Normal rate. Regular rhythm.      Murmurs: There is no murmur.      No gallop.  No click. No rub.   Edema:     Peripheral edema absent.   Abdominal:      General: Bowel sounds are normal.   Musculoskeletal: Normal  range of motion.      Cervical back: Normal range of motion and neck supple. Skin:     General: Skin is warm.   Neurological:      Mental Status: Alert and oriented to person, place and time.       Results Review:   Lab Results (last 72 hours)       Procedure Component Value Units Date/Time    COVID-19 and FLU A/B PCR, 1 HR TAT - Swab, Nasopharynx [[id number]]  (Normal) Collected: 02/03/24 0556    Specimen: Swab from Nasopharynx Updated: 02/03/24 0635     COVID19 Not Detected     Influenza A PCR Not Detected     Influenza B PCR Not Detected    Narrative:      Fact sheet for providers: https://www.fda.gov/media/957693/download    Fact sheet for patients: https://www.fda.gov/media/877609/download    Test performed by PCR.    High Sensitivity Troponin T 2Hr [[id number]]  (Abnormal) Collected: 02/03/24 0600    Specimen: Blood Updated: 02/03/24 0623     HS Troponin T 16 ng/L      Troponin T Delta 0 ng/L     Narrative:      High Sensitive Troponin T Reference Range:  <14.0 ng/L- Negative Female for AMI  <22.0 ng/L- Negative Male for AMI  >=14 - Abnormal Female indicating possible myocardial injury.  >=22 - Abnormal Male indicating possible myocardial injury.   Clinicians would have to utilize clinical acumen, EKG, Troponin, and serial changes to determine if it is an Acute Myocardial Infarction or myocardial injury due to an underlying chronic condition.         [place] Draw [[id number]] Collected: 02/03/24 0424    Specimen: Blood Updated: 02/03/24 0530    Narrative:      The following orders were created for panel order [place] Draw.  Procedure                               Abnormality         Status                     ---------                               -----------         ------                     Green Top (Gel)[[id number]]                                  Final result               Lavender Top[[id number]]                                     Final result               Red Top[[id number]]                                           Final result               Light Blue Top[[id number]]                                   Final result                 Please view results for these tests on the individual orders.    Green Top (Gel) [[id number]] Collected: 02/03/24 0424    Specimen: Blood Updated: 02/03/24 0530     Extra Tube Hold for add-ons.     Comment: Auto resulted.       Lavender Top [[id number]] Collected: 02/03/24 0424    Specimen: Blood Updated: 02/03/24 0530     Extra Tube hold for add-on     Comment: Auto resulted       Red Top [[id number]] Collected: 02/03/24 0424    Specimen: Blood Updated: 02/03/24 0530     Extra Tube Hold for add-ons.     Comment: Auto resulted.       Light Blue Top [[id number]] Collected: 02/03/24 0424    Specimen: Blood Updated: 02/03/24 0530     Extra Tube Hold for add-ons.     Comment: Auto resulted       Comprehensive Metabolic Panel [[id number]]  (Abnormal) Collected: 02/03/24 0424    Specimen: Blood Updated: 02/03/24 0500     Glucose 148 mg/dL      BUN 10 mg/dL      Creatinine 1.01 mg/dL      Sodium 140 mmol/L      Potassium 3.6 mmol/L      Comment: Slight hemolysis detected by analyzer. Result may be falsely elevated.        Chloride 101 mmol/L      CO2 27.0 mmol/L      Calcium 10.2 mg/dL      Total Protein 7.5 g/dL      Albumin 3.9 g/dL      ALT (SGPT) 13 U/L      AST (SGOT) 19 U/L      Comment: Slight hemolysis detected by analyzer. Result may be falsely elevated.        Alkaline Phosphatase 90 U/L      Total Bilirubin 0.4 mg/dL      Globulin 3.6 gm/dL      A/G Ratio 1.1 g/dL      BUN/Creatinine Ratio 9.9     Anion Gap 12.0 mmol/L      eGFR 66.7 mL/min/1.73     Narrative:      GFR Normal >60  Chronic Kidney Disease <60  Kidney Failure <15      Magnesium [[id number]]  (Normal) Collected: 02/03/24 0424    Specimen: Blood Updated: 02/03/24 0500     Magnesium 1.9 mg/dL     CBC & Differential [[id number]]  (Abnormal) Collected: 02/03/24 0424    Specimen: Blood Updated: 02/03/24 0456    Narrative:      The  following orders were created for panel order CBC & Differential.  Procedure                               Abnormality         Status                     ---------                               -----------         ------                     CBC Auto Differential[[id number]]        Abnormal            Final result                 Please view results for these tests on the individual orders.    CBC Auto Differential [[id number]]  (Abnormal) Collected: 02/03/24 0424    Specimen: Blood Updated: 02/03/24 0456     WBC 13.17 10*3/mm3      RBC 4.99 10*6/mm3      Hemoglobin 15.0 g/dL      Hematocrit 44.8 %      MCV 89.8 fL      MCH 30.1 pg      MCHC 33.5 g/dL      RDW 14.4 %      RDW-SD 46.4 fl      MPV 10.8 fL      Platelets 362 10*3/mm3     Narrative:      The previously reported component NRBC is no longer being reported. Previous result was 0.0 /100 WBC (Reference Range: 0.0-0.2 /100 WBC) on 2/3/2024 at 0438 CST.    Manual Differential [[id number]]  (Abnormal) Collected: 02/03/24 0424    Specimen: Blood Updated: 02/03/24 0456     Neutrophil % 61.0 %      Lymphocyte % 28.0 %      Monocyte % 3.0 %      Eosinophil % 1.0 %      Bands %  1.0 %      Atypical Lymphocyte % 4.0 %      Plasma Cells % 2.0 %      Neutrophils Absolute 8.17 10*3/mm3      Lymphocytes Absolute 4.21 10*3/mm3      Monocytes Absolute 0.40 10*3/mm3      Eosinophils Absolute 0.13 10*3/mm3      RBC Morphology Normal     WBC Morphology Normal     Platelet Morphology Normal    High Sensitivity Troponin T [[id number]]  (Abnormal) Collected: 02/03/24 0424    Specimen: Blood Updated: 02/03/24 0453     HS Troponin T 16 ng/L     Narrative:      High Sensitive Troponin T Reference Range:  <14.0 ng/L- Negative Female for AMI  <22.0 ng/L- Negative Male for AMI  >=14 - Abnormal Female indicating possible myocardial injury.  >=22 - Abnormal Male indicating possible myocardial injury.   Clinicians would have to utilize clinical acumen, EKG, Troponin, and serial changes to  "determine if it is an Acute Myocardial Infarction or myocardial injury due to an underlying chronic condition.         Lipase [[id number]]  (Abnormal) Collected: 02/03/24 0424    Specimen: Blood Updated: 02/03/24 0450     Lipase 79 U/L                 Echo EF Estimated  No results found for: \"ECHOEFEST\"      Cath Ejection Fraction Quantitative  No results found for: \"CATHEF\"        Medication Review: yes  Current Facility-Administered Medications   Medication Dose Route Frequency Provider Last Rate Last Admin    dextrose (D50W) (25 g/50 mL) IV injection 25 g  25 g Intravenous Q15 Min PRN Jarod Carbajal APRN        dextrose (GLUTOSE) oral gel 15 g  15 g Oral Q15 Min PRN Jarod Carbajal APRN        Enoxaparin Sodium (LOVENOX) syringe 135 mg  1 mg/kg Subcutaneous Q12H Mark Avila MD        glucagon (GLUCAGEN) injection 1 mg  1 mg Intramuscular Q15 Min PRN Jarod Carbajal APRN        Insulin Lispro (humaLOG) injection 2-9 Units  2-9 Units Subcutaneous 4x Daily AC & at Bedtime Jarod Carbajal APRN        Pharmacy to Dose enoxaparin (LOVENOX)   Does not apply Continuous PRN Jarod Carbajal APRN        sodium chloride 0.9 % flush 10 mL  10 mL Intravenous PRN Karson Nava MD        tiZANidine (ZANAFLEX) tablet 4 mg  4 mg Oral Nightly PRN Jarod Carbajal APRN        traZODone (DESYREL) tablet 25 mg  25 mg Oral Nightly PRN Jarod Carbajal APRN         Current Outpatient Medications   Medication Sig Dispense Refill    DULoxetine (Cymbalta) 60 MG capsule Take 1 capsule by mouth 2 (Two) Times a Day. 60 capsule 0    fluticasone (FLONASE) 50 MCG/ACT nasal spray 2 sprays into the nostril(s) as directed by provider Daily. 16 g 0    pregabalin (LYRICA) 200 MG capsule Take 1 capsule by mouth Every 8 (Eight) Hours. 30 capsule 0    rivaroxaban (XARELTO) 20 MG tablet Take 1 tablet by mouth Daily With Dinner. 10 tablet 0    tiZANidine (ZANAFLEX) 4 MG tablet Take 1 tablet by mouth At Night As Needed for Muscle Spasms. "      albuterol sulfate  (90 Base) MCG/ACT inhaler Inhale 2 puffs Every 4 (Four) Hours As Needed for Wheezing.      docusate sodium 100 MG capsule Take 100 mg by mouth 2 (Two) Times a Day As Needed for Constipation. 30 capsule 0    furosemide (LASIX) 20 MG tablet Take 1 tablet by mouth Daily. (Patient taking differently: Take 1 tablet by mouth Daily As Needed.) 30 tablet 0    hydrOXYzine pamoate (VISTARIL) 50 MG capsule Take 1 capsule by mouth At Night As Needed for Anxiety. 5 capsule 0    lisinopril (PRINIVIL,ZESTRIL) 2.5 MG tablet Take 1 tablet by mouth Daily. (Patient taking differently: Take 2 tablets by mouth Daily.) 30 tablet 0    metoprolol tartrate (LOPRESSOR) 25 MG tablet Take 0.5 tablets by mouth Every 12 (Twelve) Hours. 60 tablet 0    pantoprazole (PROTONIX) 40 MG EC tablet Take 1 tablet by mouth Daily. 30 tablet 0    promethazine (PHENERGAN) 12.5 MG tablet Take 1 tablet by mouth Every 4 (Four) Hours As Needed for Nausea or Vomiting. 42 tablet 0         Assessment & Plan       Chest pain    Abnormal stress test    Coronary artery disease involving native coronary artery of native heart without angina pectoris    Type 2 diabetes mellitus with diabetic neuropathy, without long-term current use of insulin    Abnormal stress test: Patient did rule out ACS in the ER with flat trending troponins.  However, patient did have stress test which was resulted as high risk.  She does have a known history of CAD and has been off anticoagulation for 1 month.  -N.p.o. today for left heart catheterization per Dr. Phillips    Diabetes mellitus: Check A1c during hospitalization.  Placing referral for new primary care per patient's request.  She would like to see someone at the hospital given that she get public transportation to get here  - Sliding scale insulin during hospitalization  -Hemoglobin A1c pending    Coronary artery disease: Patient has known coronary artery disease and had been on Xarelto monotherapy  given her history of chronic DVT.  This has been off for about a month.  Concern for stent thrombosis  - Left heart cath as noted above  - Lipid panel pending, currently not on statin therapy.  Pending left heart cath will initiate this  - Pending results of heart catheterization will determine patient is anticoagulation on 4.  Patient does need DOAC or equivalent given her chronic DVT.  If patient does require stenting would need another agent as well.  Will do therapeutic Lovenox at this time.    Neuropathy: Patient complains of neuropathy, she is been off her Lyrica for a few months due to not seeing a primary care provider  - Will not restart at this time, will place referral to primary care within [facility], [place].    Sleep disturbance: Patient states she is unable to sleep without the assistance of medication.  - Will order low-dose trazodone as she takes this at home to help her sleep.  Needs to discuss this further with primary care.    Chronic deep vein thrombosis: Will place the patient on therapeutic Lovenox during hospitalization.  Will consult social work for socioeconomic issues to include prescription drug coverage.    PPI prophylaxis: Patient's home Protonix restarted.    Exophytic lesion: Noted on CT scan of right kidney.  Recommend follow-up in the outpatient setting on this.    Further orders per Dr. Phillips      Electronically signed by BRAYDEN Stafford, 02/03/24, 10:23 AM CST.

## 2024-02-03 NOTE — Clinical Note
Goal: Pain level will decrease  Description  Pain level will decrease  Outcome: Ongoing  Note:    Pain well controlled with oral pain medication per MAR. Verbalizes a decrease in pain level upon reassessment. Will continue to monitor. Problem: Falls - Risk of:  Goal: Will remain free from falls  Description  Will remain free from falls  Outcome: Ongoing  Note:   Patient remains free from falls this shift. In bed with alarm on, call light within reach. Will continue to monitor. Wire inserted in right coronary artery.

## 2024-02-03 NOTE — Clinical Note
First balloon inflation max pressure = 12 rob. First balloon inflation duration = 20 seconds. Second inflation of balloon - Max pressure = 14 rob. 2nd Inflation of balloon - Duration = 25 seconds.

## 2024-02-03 NOTE — ED NOTES
"Nursing report ED to floor  Radha Boswell  53 y.o.  female  Report given to WINDY Oconnor  HPI:   Chief Complaint   Patient presents with    Chest Pain    Jaw Pain       Admitting doctor:   Jacob Phillips DO    Consulting provider(s):  Consults       No orders found from 1/5/2024 to 2/4/2024.             Admitting diagnosis:   The primary encounter diagnosis was Acute chest pain. Diagnoses of History of pulmonary embolism, Medication nonadherence due to psychosocial problem, History of DVT (deep vein thrombosis), History of coronary artery disease, Renal lesion, and Abnormal stress echo were also pertinent to this visit.    Code status:   Current Code Status       Date Active Code Status Order ID Comments User Context       Prior            Allergies:   Patient has no known allergies.    Intake and Output  No intake or output data in the 24 hours ending 02/03/24 1118    Weight:       02/03/24  0916   Weight: 130 kg (287 lb)       Most recent vitals:   Vitals:    02/03/24 0601 02/03/24 0701 02/03/24 0916 02/03/24 1111   BP: 119/65 111/89 108/68 110/65   BP Location:       Patient Position:       Pulse: 77 79 68 69   Resp:    18   Temp:       TempSrc:       SpO2:  97%  97%   Weight:   130 kg (287 lb)    Height:   172.7 cm (68\")      Oxygen Therapy: .    Active LDAs/IV Access:   Lines, Drains & Airways       Active LDAs       Name Placement date Placement time Site Days    Peripheral IV 02/03/24 0427 Distal;Posterior;Right Forearm 02/03/24 0427  Forearm  less than 1                    Labs (abnormal labs have a star):   Labs Reviewed   COMPREHENSIVE METABOLIC PANEL - Abnormal; Notable for the following components:       Result Value    Glucose 148 (*)     Creatinine 1.01 (*)     All other components within normal limits    Narrative:     GFR Normal >60  Chronic Kidney Disease <60  Kidney Failure <15     TROPONIN - Abnormal; Notable for the following components:    HS Troponin T 16 (*)     All other components " within normal limits    Narrative:     High Sensitive Troponin T Reference Range:  <14.0 ng/L- Negative Female for AMI  <22.0 ng/L- Negative Male for AMI  >=14 - Abnormal Female indicating possible myocardial injury.  >=22 - Abnormal Male indicating possible myocardial injury.   Clinicians would have to utilize clinical acumen, EKG, Troponin, and serial changes to determine if it is an Acute Myocardial Infarction or myocardial injury due to an underlying chronic condition.        CBC WITH AUTO DIFFERENTIAL - Abnormal; Notable for the following components:    WBC 13.17 (*)     All other components within normal limits    Narrative:     The previously reported component NRBC is no longer being reported. Previous result was 0.0 /100 WBC (Reference Range: 0.0-0.2 /100 WBC) on 2/3/2024 at 0438 CST.   LIPASE - Abnormal; Notable for the following components:    Lipase 79 (*)     All other components within normal limits   MANUAL DIFFERENTIAL - Abnormal; Notable for the following components:    Monocyte % 3.0 (*)     Plasma Cells % 2.0 (*)     Neutrophils Absolute 8.17 (*)     Lymphocytes Absolute 4.21 (*)     All other components within normal limits   HIGH SENSITIVITIY TROPONIN T 2HR - Abnormal; Notable for the following components:    HS Troponin T 16 (*)     All other components within normal limits    Narrative:     High Sensitive Troponin T Reference Range:  <14.0 ng/L- Negative Female for AMI  <22.0 ng/L- Negative Male for AMI  >=14 - Abnormal Female indicating possible myocardial injury.  >=22 - Abnormal Male indicating possible myocardial injury.   Clinicians would have to utilize clinical acumen, EKG, Troponin, and serial changes to determine if it is an Acute Myocardial Infarction or myocardial injury due to an underlying chronic condition.        COVID-19 AND FLU A/B, NP SWAB IN TRANSPORT MEDIA 1 HR TAT - Normal    Narrative:     Fact sheet for providers: https://www.fda.gov/media/351665/download    Fact sheet  for patients: https://www.fda.gov/media/992906/download    Test performed by PCR.   MAGNESIUM - Normal   RAINBOW DRAW    Narrative:     The following orders were created for panel order [place] Draw.  Procedure                               Abnormality         Status                     ---------                               -----------         ------                     Green Top (Gel)[[id number]]                                  Final result               Lavender Top[[id number]]                                     Final result               Red Top[[id number]]                                          Final result               Light Blue Top[[id number]]                                   Final result                 Please view results for these tests on the individual orders.   HEMOGLOBIN A1C   LIPID PANEL   POCT GLUCOSE FINGERSTICK   POCT GLUCOSE FINGERSTICK   POCT GLUCOSE FINGERSTICK   POCT GLUCOSE FINGERSTICK   CBC AND DIFFERENTIAL    Narrative:     The following orders were created for panel order CBC & Differential.  Procedure                               Abnormality         Status                     ---------                               -----------         ------                     CBC Auto Differential[[id number]]        Abnormal            Final result                 Please view results for these tests on the individual orders.   GREEN TOP   LAVENDER TOP   RED TOP   LIGHT BLUE TOP       Meds given in ED:   Medications   sodium chloride 0.9 % flush 10 mL (has no administration in time range)   dextrose (GLUTOSE) oral gel 15 g (has no administration in time range)   dextrose (D50W) (25 g/50 mL) IV injection 25 g (has no administration in time range)   glucagon (GLUCAGEN) injection 1 mg (has no administration in time range)   Insulin Lispro (humaLOG) injection 2-9 Units (has no administration in time range)   Pharmacy to Dose enoxaparin (LOVENOX) (has no administration in time range)   tiZANidine  (ZANAFLEX) tablet 4 mg (has no administration in time range)   traZODone (DESYREL) tablet 25 mg (has no administration in time range)   Enoxaparin Sodium (LOVENOX) syringe 135 mg (has no administration in time range)   aspirin chewable tablet 324 mg (324 mg Oral Given 2/3/24 0424)   iopamidol (ISOVUE-370) 76 % injection 100 mL (100 mL Intravenous Given 2/3/24 0522)   ketorolac (TORADOL) injection 15 mg (15 mg Intravenous Given 2/3/24 0604)   acetaminophen (TYLENOL) tablet 1,000 mg (1,000 mg Oral Given 2/3/24 0604)   perflutren (DEFINITY) lipid microspheres injection 8.476 mg (8.476 mg Intravenous Given 2/3/24 0917)   atropine sulfate injection 2 mg (1 mg Intravenous Given 2/3/24 0932)     Pharmacy to Dose enoxaparin (LOVENOX),          NIH Stroke Scale:       Isolation/Infection(s):  No active isolations   COVID (rule out)     COVID Testing  Collected .  Resulted .    Nursing report ED to floor:  Mental status: .  Ambulatory status: .  Precautions: .    ED nurse phone extentsion- ..  [phone]

## 2024-02-03 NOTE — Clinical Note
First balloon inflation max pressure = 8 rob. First balloon inflation duration = 20 seconds. Second inflation of balloon - Max pressure = 10 rob. 2nd Inflation of balloon - Duration = 20 seconds. The balloon is positioned in the Distal segment of the vessel. Third inflation of balloon - Max pressure = 12 rob. 3rd Inflation of balloon - Duration = 20 seconds. The balloon is positioned in the Mid segment of the vessel. Fourth inflation of  balloon - Max pressure = 14 rob. 4th Inflation of balloon - Duration = 20 seconds. The balloon is positioned in the Proximal segment of the vessel.

## 2024-02-03 NOTE — CASE MANAGEMENT/SOCIAL WORK
Discharge Planning Assessment  [facility]     Patient Name: Radha Boswell  MRN: [id number]  Today's Date: 2/3/2024    Admit Date: 2/3/2024    Plan: Home   Discharge Needs Assessment       Row Name 02/03/24 1245       Living Environment    People in Home alone    Current Living Arrangements apartment    Potentially Unsafe Housing Conditions none    In the past 12 months has the electric, gas, oil, or water company threatened to shut off services in your home? No    Primary Care Provided by self    Provides Primary Care For no one    Family Caregiver if Needed none    Quality of Family Relationships unable to assess    Able to Return to Prior Arrangements yes       Resource/Environmental Concerns    Resource/Environmental Concerns financial;reliable transportation    Financial Concerns insurance, inadequate;food, unable to afford;medicine, unable to afford    Transportation Concerns no car       Transportation Needs    In the past 12 months, has lack of transportation kept you from medical appointments or from getting medications? yes    In the past 12 months, has lack of transportation kept you from meetings, work, or from getting things needed for daily living? Yes       Food Insecurity    Within the past 12 months, you worried that your food would run out before you got the money to buy more. Often true    Within the past 12 months, the food you bought just didn't last and you didn't have money to get more. Often true       Transition Planning    Patient/Family Anticipates Transition to home    Patient/Family Anticipated Services at Transition none       Discharge Needs Assessment    Readmission Within the Last 30 Days no previous admission in last 30 days    Equipment Currently Used at Home walker, rolling    Concerns to be Addressed basic needs;care coordination/care conferences;discharge planning    Anticipated Changes Related to Illness none    Equipment Needed After Discharge none    Current Discharge Risk  financial support inadequate;lack of support system/caregiver                   Discharge Plan       Row Name 02/03/24 8425       Plan    Plan Home    Patient/Family in Agreement with Plan yes    Plan Comments Order:Prescription drug coverage, lack of transportation, establishing new PCP. Pt is saying she had a new PCP appt. here and unable to go due to no ride.  She does have RX coverage but saying she can not afford copay, pt agreed being placed on meds to beds.  Will provide pt resource packet before she d/c's home.  She is aware how to get to medicaid office, says her neighbor can take her at some point.  She is saying she needs food stamps again and had them in past.  She was informed to apply for medicaid there as well ASAP as they can be a backup for med coverage as well as transportation to medical appointments.  Did inform about [facility] being available for very low fee to transport in [facility] when needed in meantime.  Will follow.                  Continued Care and Services - Admitted Since 2/3/2024    Coordination has not been started for this encounter.          Demographic Summary    No documentation.                  Functional Status    No documentation.                  Psychosocial    No documentation.                  Abuse/Neglect    No documentation.                  Legal    No documentation.                  Substance Abuse    No documentation.                  Patient Forms    No documentation.                     ALICIA JiménezW

## 2024-02-03 NOTE — Clinical Note
Patient schedule for shingles #2. Please place order.   Future Appointments   Date Time Provider [place] Walker   11/8/2021 11:15 AM EMG 35 NURSE [facility] 35 75TH [facility] 75[facility]   11/16/2021  9:00 AM HOB DEXA RM1 HOB DEXA [place]   11/16/2021 10:20 AM HOB DONAL RM1 HOB MAMMO [place]   12/3/2021  8:30 AM EMG 35 NURSE EMG 35 75TH EMG 75TH Right coronary artery stent inserted.

## 2024-02-03 NOTE — ED NOTES
Pt reports having a chronic dvt and has recently not filled her blood thinner due to increase in price

## 2024-02-03 NOTE — ED PROVIDER NOTES
Subjective   History of Present Illness    Review of Systems    Past Medical History:   Diagnosis Date    Arthritis     Coronary artery disease     Diabetes mellitus     Elevated cholesterol     Hyperlipidemia     Hypertension        No Known Allergies    Past Surgical History:   Procedure Laterality Date    CARDIAC CATHETERIZATION      CHOLECYSTECTOMY      ORIF FOOT FRACTURE Right 6/25/2019    Procedure: OPEN REDUCTION INTERNAL FIXATION FIRST METATARSAL WITH REPAIR,- RIGHT FOOT;  Surgeon: Abram Dennis DPM;  Location:  [facility] OR;  Service: Podiatry    TOTAL KNEE ARTHROPLASTY Bilateral        History reviewed. No pertinent family history.    Social History     Socioeconomic History    Marital status: Legally    Tobacco Use    Smoking status: Every Day     Packs/day: .75     Types: Cigarettes    Smokeless tobacco: Never   Substance and Sexual Activity    Alcohol use: No    Drug use: No    Sexual activity: Defer           Objective   Physical Exam    Procedures           ED Course  ED Course as of 02/03/24 1034   Sat Feb 03, 2024   0635 Taken over from Dr Nava [TS]   6047  No evidence of pulmonary thromboembolic disease. The thoracic aorta  is normal in caliber with no evidence of dissection or aneurysm. The  lungs are clear.   2. On limited images through the upper abdomen I would question whether  there may be an exophytic lesion emanating from the medial upper pole of  the right kidney. Follow-up with outpatient renal ultrasound or CT  abdomen and pelvis would be recommended as this appears to represent a  change in the morphology of the upper pole of the right kidney from a  remote CT from December 2015.  The patient was informed about this renal finding the reason for follow-up. [TS]   1029 Patient stress echo is abnormal will be admitted to cardiology service.  I have discussed this case with the patient and informed her about that.  Also she was informed about the exophytic lesion in her kidney  will need follow-up. [TS]      ED Course User Index  [TS] Mark Avila MD                                             Medical Decision Making  Problems Addressed:  Abnormal stress echo: complicated acute illness or injury     Details: Patient stress echo was abnormal she denies any chest pain at this time will be admitted to cardiology service  Acute chest pain: complicated acute illness or injury     Details: Unstable angina abnormal stress test will be admitted cardiology service  History of coronary artery disease: chronic illness or injury  History of DVT (deep vein thrombosis): chronic illness or injury     Details: No clinical evidence of DVT at this time  History of pulmonary embolism: chronic illness or injury     Details: No acute PE at this time  Medication nonadherence due to psychosocial problem: chronic illness or injury  Renal lesion: undiagnosed new problem with uncertain prognosis     Details: Patient has been informed that he needs to follow-up with the primary MD for this    Amount and/or Complexity of Data Reviewed  Labs: ordered.     Details: Labs reviewed  Radiology: ordered and independent interpretation performed.  ECG/medicine tests: ordered.  Discussion of management or test interpretation with external provider(s): Discussed with Dr. Phillips    Risk  OTC drugs.  Prescription drug management.  Decision regarding hospitalization.  Risk Details: Patient will be admitted to cardiology service        Final diagnoses:   Acute chest pain   History of pulmonary embolism   Medication nonadherence due to psychosocial problem   History of DVT (deep vein thrombosis)   History of coronary artery disease   Renal lesion   Abnormal stress echo       ED Disposition  ED Disposition       ED Disposition   Decision to Admit    Condition   --    Comment   Level of Care: Telemetry [5]   Diagnosis: Chest pain [705375]   Admitting Physician: SHANE PHILLIPS [[id number]]   Attending Physician: GAUTAM  SHANE DOW [[id number]]                 No follow-up provider specified.       Medication List      No changes were made to your prescriptions during this visit.            Mark Avial MD  02/03/24 1033       Mark Avila MD  02/03/24 1033

## 2024-02-04 ENCOUNTER — APPOINTMENT (OUTPATIENT)
Dept: CARDIOLOGY | Facility: HOSPITAL | Age: 54
End: 2024-02-04
Payer: MEDICARE

## 2024-02-04 LAB
ANION GAP SERPL CALCULATED.3IONS-SCNC: 10 MMOL/L (ref 5–15)
BH CV ECHO MEAS - AO MAX PG: 9.7 MMHG
BH CV ECHO MEAS - AO MEAN PG: 5 MMHG
BH CV ECHO MEAS - AO ROOT DIAM: 2.9 CM
BH CV ECHO MEAS - AO V2 MAX: 156 CM/SEC
BH CV ECHO MEAS - AO V2 VTI: 35 CM
BH CV ECHO MEAS - AVA(I,D): 2.6 CM2
BH CV ECHO MEAS - EDV(CUBED): 147.2 ML
BH CV ECHO MEAS - EDV(MOD-SP4): 139 ML
BH CV ECHO MEAS - EF(MOD-SP4): 49.8 %
BH CV ECHO MEAS - ESV(CUBED): 52.7 ML
BH CV ECHO MEAS - ESV(MOD-SP4): 69.8 ML
BH CV ECHO MEAS - FS: 29 %
BH CV ECHO MEAS - IVS/LVPW: 1.05 CM
BH CV ECHO MEAS - IVSD: 1.54 CM
BH CV ECHO MEAS - LA DIMENSION: 4 CM
BH CV ECHO MEAS - LAT PEAK E' VEL: 5.2 CM/SEC
BH CV ECHO MEAS - LV DIASTOLIC VOL/BSA (35-75): 58.2 CM2
BH CV ECHO MEAS - LV MASS(C)D: 350.4 GRAMS
BH CV ECHO MEAS - LV MAX PG: 4 MMHG
BH CV ECHO MEAS - LV MEAN PG: 2 MMHG
BH CV ECHO MEAS - LV SYSTOLIC VOL/BSA (12-30): 29.2 CM2
BH CV ECHO MEAS - LV V1 MAX: 99.4 CM/SEC
BH CV ECHO MEAS - LV V1 VTI: 23.7 CM
BH CV ECHO MEAS - LVIDD: 5.3 CM
BH CV ECHO MEAS - LVIDS: 3.8 CM
BH CV ECHO MEAS - LVOT AREA: 3.8 CM2
BH CV ECHO MEAS - LVOT DIAM: 2.2 CM
BH CV ECHO MEAS - LVPWD: 1.46 CM
BH CV ECHO MEAS - MED PEAK E' VEL: 4.6 CM/SEC
BH CV ECHO MEAS - MV A MAX VEL: 62.1 CM/SEC
BH CV ECHO MEAS - MV DEC TIME: 0.31 SEC
BH CV ECHO MEAS - MV E MAX VEL: 69.8 CM/SEC
BH CV ECHO MEAS - MV E/A: 1.12
BH CV ECHO MEAS - SI(MOD-SP4): 29 ML/M2
BH CV ECHO MEAS - SV(LVOT): 90.1 ML
BH CV ECHO MEAS - SV(MOD-SP4): 69.2 ML
BH CV ECHO MEASUREMENTS AVERAGE E/E' RATIO: 14.24
BUN SERPL-MCNC: 13 MG/DL (ref 6–20)
BUN/CREAT SERPL: 14.3 (ref 7–25)
CALCIUM SPEC-SCNC: 9.5 MG/DL (ref 8.6–10.5)
CHLORIDE SERPL-SCNC: 106 MMOL/L (ref 98–107)
CO2 SERPL-SCNC: 25 MMOL/L (ref 22–29)
CREAT SERPL-MCNC: 0.91 MG/DL (ref 0.57–1)
DEPRECATED RDW RBC AUTO: 47.8 FL (ref 37–54)
EGFRCR SERPLBLD CKD-EPI 2021: 75.6 ML/MIN/1.73
ERYTHROCYTE [DISTWIDTH] IN BLOOD BY AUTOMATED COUNT: 14.5 % (ref 12.3–15.4)
GLUCOSE BLDC GLUCOMTR-MCNC: 108 MG/DL (ref 70–130)
GLUCOSE SERPL-MCNC: 134 MG/DL (ref 65–99)
HCT VFR BLD AUTO: 43.7 % (ref 34–46.6)
HGB BLD-MCNC: 14.1 G/DL (ref 12–15.9)
LEFT ATRIUM VOLUME INDEX: 28.6 ML/M2
LEFT ATRIUM VOLUME: 68.3 ML
MCH RBC QN AUTO: 29.4 PG (ref 26.6–33)
MCHC RBC AUTO-ENTMCNC: 32.3 G/DL (ref 31.5–35.7)
MCV RBC AUTO: 91.2 FL (ref 79–97)
PLATELET # BLD AUTO: 334 10*3/MM3 (ref 140–450)
PMV BLD AUTO: 11.1 FL (ref 6–12)
POTASSIUM SERPL-SCNC: 3.6 MMOL/L (ref 3.5–5.2)
RBC # BLD AUTO: 4.79 10*6/MM3 (ref 3.77–5.28)
SODIUM SERPL-SCNC: 141 MMOL/L (ref 136–145)
WBC NRBC COR # BLD AUTO: 10.74 10*3/MM3 (ref 3.4–10.8)

## 2024-02-04 PROCEDURE — 63710000001 ATORVASTATIN 40 MG TABLET: Performed by: EMERGENCY MEDICINE

## 2024-02-04 PROCEDURE — A9270 NON-COVERED ITEM OR SERVICE: HCPCS | Performed by: EMERGENCY MEDICINE

## 2024-02-04 PROCEDURE — 82948 REAGENT STRIP/BLOOD GLUCOSE: CPT

## 2024-02-04 PROCEDURE — 99232 SBSQ HOSP IP/OBS MODERATE 35: CPT

## 2024-02-04 PROCEDURE — 96372 THER/PROPH/DIAG INJ SC/IM: CPT

## 2024-02-04 PROCEDURE — 80048 BASIC METABOLIC PNL TOTAL CA: CPT | Performed by: EMERGENCY MEDICINE

## 2024-02-04 PROCEDURE — 85027 COMPLETE CBC AUTOMATED: CPT | Performed by: EMERGENCY MEDICINE

## 2024-02-04 PROCEDURE — 63710000001 PANTOPRAZOLE 40 MG TABLET DELAYED-RELEASE: Performed by: EMERGENCY MEDICINE

## 2024-02-04 PROCEDURE — A9270 NON-COVERED ITEM OR SERVICE: HCPCS

## 2024-02-04 PROCEDURE — 63710000001 TIZANIDINE 4 MG TABLET: Performed by: EMERGENCY MEDICINE

## 2024-02-04 PROCEDURE — 63710000001 LOSARTAN 25 MG TABLET

## 2024-02-04 PROCEDURE — 93306 TTE W/DOPPLER COMPLETE: CPT

## 2024-02-04 PROCEDURE — 63710000001 DULOXETINE 30 MG CAPSULE DELAYED-RELEASE PARTICLES: Performed by: EMERGENCY MEDICINE

## 2024-02-04 PROCEDURE — 63710000001 CLOPIDOGREL 75 MG TABLET: Performed by: EMERGENCY MEDICINE

## 2024-02-04 PROCEDURE — G0378 HOSPITAL OBSERVATION PER HR: HCPCS

## 2024-02-04 PROCEDURE — 93306 TTE W/DOPPLER COMPLETE: CPT | Performed by: EMERGENCY MEDICINE

## 2024-02-04 PROCEDURE — 63710000001 TRAZODONE 50 MG TABLET: Performed by: EMERGENCY MEDICINE

## 2024-02-04 PROCEDURE — 93010 ELECTROCARDIOGRAM REPORT: CPT | Performed by: INTERNAL MEDICINE

## 2024-02-04 PROCEDURE — 63710000001 APIXABAN 5 MG TABLET

## 2024-02-04 PROCEDURE — 63710000001 INSULIN LISPRO (HUMAN) PER 5 UNITS: Performed by: EMERGENCY MEDICINE

## 2024-02-04 PROCEDURE — 63710000001 BENZONATATE 100 MG CAPSULE: Performed by: EMERGENCY MEDICINE

## 2024-02-04 PROCEDURE — 25010000002 ENOXAPARIN PER 10 MG: Performed by: EMERGENCY MEDICINE

## 2024-02-04 PROCEDURE — 63710000001 ASPIRIN 81 MG CHEWABLE TABLET: Performed by: EMERGENCY MEDICINE

## 2024-02-04 PROCEDURE — 93005 ELECTROCARDIOGRAM TRACING: CPT | Performed by: EMERGENCY MEDICINE

## 2024-02-04 RX ORDER — LOSARTAN POTASSIUM 25 MG/1
12.5 TABLET ORAL
Status: DISCONTINUED | OUTPATIENT
Start: 2024-02-04 | End: 2024-02-05 | Stop reason: HOSPADM

## 2024-02-04 RX ORDER — TRAZODONE HYDROCHLORIDE 150 MG/1
150 TABLET ORAL NIGHTLY
COMMUNITY

## 2024-02-04 RX ADMIN — LOSARTAN POTASSIUM 12.5 MG: 25 TABLET, FILM COATED ORAL at 09:45

## 2024-02-04 RX ADMIN — DULOXETINE HYDROCHLORIDE 60 MG: 30 CAPSULE, DELAYED RELEASE ORAL at 09:37

## 2024-02-04 RX ADMIN — INSULIN LISPRO 2 UNITS: 100 INJECTION, SOLUTION INTRAVENOUS; SUBCUTANEOUS at 20:14

## 2024-02-04 RX ADMIN — TRAZODONE HYDROCHLORIDE 25 MG: 50 TABLET ORAL at 21:32

## 2024-02-04 RX ADMIN — CLOPIDOGREL BISULFATE 75 MG: 75 TABLET, FILM COATED ORAL at 09:38

## 2024-02-04 RX ADMIN — ATORVASTATIN CALCIUM 40 MG: 40 TABLET ORAL at 20:14

## 2024-02-04 RX ADMIN — ENOXAPARIN SODIUM 135 MG: 150 INJECTION SUBCUTANEOUS at 09:38

## 2024-02-04 RX ADMIN — BENZONATATE 100 MG: 100 CAPSULE ORAL at 09:44

## 2024-02-04 RX ADMIN — DULOXETINE HYDROCHLORIDE 60 MG: 30 CAPSULE, DELAYED RELEASE ORAL at 20:14

## 2024-02-04 RX ADMIN — TIZANIDINE 4 MG: 4 TABLET ORAL at 09:44

## 2024-02-04 RX ADMIN — APIXABAN 5 MG: 5 TABLET, FILM COATED ORAL at 20:14

## 2024-02-04 RX ADMIN — BENZONATATE 100 MG: 100 CAPSULE ORAL at 18:05

## 2024-02-04 RX ADMIN — PANTOPRAZOLE SODIUM 40 MG: 40 TABLET, DELAYED RELEASE ORAL at 09:37

## 2024-02-04 RX ADMIN — ASPIRIN 81 MG: 81 TABLET, CHEWABLE ORAL at 09:37

## 2024-02-04 NOTE — PLAN OF CARE
Goal Outcome Evaluation:  Plan of Care Reviewed With: patient        Progress: improving  Outcome Evaluation: Normal sinus 60-83 per tele. no c/o pain. vss. right radial site is c/d/i. will continue to monitor.

## 2024-02-04 NOTE — PROGRESS NOTES
[facility] -  Progress Note     LOS: 0 days   Patient Care Team:  Robe Mcconnell APRN as PCP - General  Robe Mcconnell APRN as PCP - Family Medicine  Robe Mcconnell APRN as Referring Physician (Family Medicine)  Sean Mukherjee MD as Cardiologist (Cardiology)    Chief Complaint: Follow-up chest discomfort    Subjective     Interval History:   Patient underwent left heart catheterization yesterday which revealed in-stent stenosis with with what appeared to be an acute thrombus.  Dr. Phillips did PTCA and then did stent placement x 2.    Overnight patient has no cardiac complaints.  She is slightly unsettled in her stomach.  She is appreciative of all the help she is getting.  Bowel and bladder moving appropriately.  Ambulating in the room without discomfort.        Review of Systems:     Review of Systems   Constitutional:  Negative for activity change, fatigue and fever.   HENT:  Negative for facial swelling, trouble swallowing and voice change.    Eyes:  Negative for visual disturbance.   Respiratory:  Negative for apnea, cough, shortness of breath and wheezing.    Cardiovascular:  Negative for chest pain and palpitations.   Gastrointestinal:  Positive for nausea. Negative for abdominal distention, abdominal pain, constipation and diarrhea.   Endocrine: Negative for polydipsia, polyphagia and polyuria.   Genitourinary:  Negative for difficulty urinating and flank pain.   Musculoskeletal:  Negative for back pain and joint swelling.   Skin:  Negative for color change.   Neurological:  Negative for dizziness, speech difficulty and numbness.   Hematological:  Does not bruise/bleed easily.     Objective     Vital Sign Min/Max for last 24 hours  Temp  Min: 97.5 °F (36.4 °C)  Max: 97.9 °F (36.6 °C)   BP  Min: 110/65  Max: 130/96   Pulse  Min: 60  Max: 75   Resp  Min: 17  Max: 18   SpO2  Min: 96 %  Max: 99 %   No data recorded   Weight  Min: 131 kg (288 lb 3 oz)  Max: 131 kg (288 lb 3 oz)          02/03/24 2054   Weight: 131 kg (288 lb 3 oz)       Telemetry: Normal sinus rhythm rates in the 50s and 60s      Physical Exam:    Constitutional:       Appearance: Healthy appearance. Not in distress.   Pulmonary:      Effort: Pulmonary effort is normal.      Breath sounds: Normal breath sounds.   Cardiovascular:      PMI at left midclavicular line. Normal rate. Regular rhythm.      Murmurs: There is no murmur.      No gallop.  No click. No rub.      Comments: Right radial catheterization site clean dry and intact.  No signs of hematoma.  Edema:     Peripheral edema absent.   Abdominal:      General: Bowel sounds are normal.   Musculoskeletal: Normal range of motion.      Cervical back: Normal range of motion and neck supple. Skin:     General: Skin is warm.   Neurological:      Mental Status: Alert and oriented to person, place and time.       Results Review:   Lab Results (last 72 hours)       Procedure Component Value Units Date/Time    Basic Metabolic Panel [[id number]]  (Abnormal) Collected: 02/04/24 0530    Specimen: Blood Updated: 02/04/24 0618     Glucose 134 mg/dL      BUN 13 mg/dL      Creatinine 0.91 mg/dL      Sodium 141 mmol/L      Potassium 3.6 mmol/L      Chloride 106 mmol/L      CO2 25.0 mmol/L      Calcium 9.5 mg/dL      BUN/Creatinine Ratio 14.3     Anion Gap 10.0 mmol/L      eGFR 75.6 mL/min/1.73     Narrative:      GFR Normal >60  Chronic Kidney Disease <60  Kidney Failure <15      CBC (No Diff) [[id number]]  (Normal) Collected: 02/04/24 0530    Specimen: Blood Updated: 02/04/24 0557     WBC 10.74 10*3/mm3      RBC 4.79 10*6/mm3      Hemoglobin 14.1 g/dL      Hematocrit 43.7 %      MCV 91.2 fL      MCH 29.4 pg      MCHC 32.3 g/dL      RDW 14.5 %      RDW-SD 47.8 fl      MPV 11.1 fL      Platelets 334 10*3/mm3     POC Glucose Once [[id number]]  (Abnormal) Collected: 02/03/24 2044    Specimen: Blood Updated: 02/03/24 2055     Glucose 145 mg/dL      Comment: : [id number] Cummins BaylenMeter ID:  [id number]       POC Glucose Once [[id number]]  (Abnormal) Collected: 02/03/24 1647    Specimen: Blood Updated: 02/03/24 1659     Glucose 150 mg/dL      Comment: : [id number] Uriel UlloaMeter ID: [id number]       POC Glucose Once [[id number]]  (Normal) Collected: 02/03/24 1222    Specimen: Blood Updated: 02/03/24 1234     Glucose 116 mg/dL      Comment: : [id number] Uriel ValenzuelaleyMeter ID: [id number]       Lipid Panel [[id number]]  (Abnormal) Collected: 02/03/24 0424    Specimen: Blood Updated: 02/03/24 1215     Total Cholesterol 142 mg/dL      Triglycerides 125 mg/dL      HDL Cholesterol 27 mg/dL      LDL Cholesterol  92 mg/dL      VLDL Cholesterol 23 mg/dL      LDL/HDL Ratio 3.33    Narrative:      Cholesterol Reference Ranges  (U.S. Department of Health and Human Services ATP III Classifications)    Desirable          <200 mg/dL  Borderline High    200-239 mg/dL  High Risk          >240 mg/dL      Triglyceride Reference Ranges  (U.S. Department of Health and Human Services ATP III Classifications)    Normal           <150 mg/dL  Borderline High  150-199 mg/dL  High             200-499 mg/dL  Very High        >500 mg/dL    HDL Reference Ranges  (U.S. Department of Health and Human Services ATP III Classifications)    Low     <40 mg/dl (major risk factor for CHD)  High    >60 mg/dl ('negative' risk factor for CHD)        LDL Reference Ranges  (U.S. Department of Health and Human Services ATP III Classifications)    Optimal          <100 mg/dL  Near Optimal     100-129 mg/dL  Borderline High  130-159 mg/dL  High             160-189 mg/dL  Very High        >189 mg/dL    Hemoglobin A1c [[id number]]  (Abnormal) Collected: 02/03/24 0424    Specimen: Blood Updated: 02/03/24 1133     Hemoglobin A1C 5.70 %     Narrative:      Hemoglobin A1C Ranges:    Increased Risk for Diabetes  5.7% to 6.4%  Diabetes                     >= 6.5%  Diabetic Goal                < 7.0%    COVID-19 and FLU A/B PCR, 1 HR TAT - Swab,  Nasopharynx [[id number]]  (Normal) Collected: 02/03/24 0556    Specimen: Swab from Nasopharynx Updated: 02/03/24 0635     COVID19 Not Detected     Influenza A PCR Not Detected     Influenza B PCR Not Detected    Narrative:      Fact sheet for providers: https://www.fda.gov/media/325398/download    Fact sheet for patients: https://www.fda.gov/media/114053/download    Test performed by PCR.    High Sensitivity Troponin T 2Hr [[id number]]  (Abnormal) Collected: 02/03/24 0600    Specimen: Blood Updated: 02/03/24 0623     HS Troponin T 16 ng/L      Troponin T Delta 0 ng/L     Narrative:      High Sensitive Troponin T Reference Range:  <14.0 ng/L- Negative Female for AMI  <22.0 ng/L- Negative Male for AMI  >=14 - Abnormal Female indicating possible myocardial injury.  >=22 - Abnormal Male indicating possible myocardial injury.   Clinicians would have to utilize clinical acumen, EKG, Troponin, and serial changes to determine if it is an Acute Myocardial Infarction or myocardial injury due to an underlying chronic condition.         [place] Draw [[id number]] Collected: 02/03/24 0424    Specimen: Blood Updated: 02/03/24 0530    Narrative:      The following orders were created for panel order [place] Draw.  Procedure                               Abnormality         Status                     ---------                               -----------         ------                     Green Top (Gel)[[id number]]                                  Final result               Lavender Top[[id number]]                                     Final result               Red Top[[id number]]                                          Final result               Light Blue Top[[id number]]                                   Final result                 Please view results for these tests on the individual orders.    Green Top (Gel) [[id number]] Collected: 02/03/24 0424    Specimen: Blood Updated: 02/03/24 0530     Extra Tube Hold for add-ons.     Comment: Auto  resulted.       Lavender Top [[id number]] Collected: 02/03/24 0424    Specimen: Blood Updated: 02/03/24 0530     Extra Tube hold for add-on     Comment: Auto resulted       Red Top [[id number]] Collected: 02/03/24 0424    Specimen: Blood Updated: 02/03/24 0530     Extra Tube Hold for add-ons.     Comment: Auto resulted.       Light Blue Top [[id number]] Collected: 02/03/24 0424    Specimen: Blood Updated: 02/03/24 0530     Extra Tube Hold for add-ons.     Comment: Auto resulted       Comprehensive Metabolic Panel [[id number]]  (Abnormal) Collected: 02/03/24 0424    Specimen: Blood Updated: 02/03/24 0500     Glucose 148 mg/dL      BUN 10 mg/dL      Creatinine 1.01 mg/dL      Sodium 140 mmol/L      Potassium 3.6 mmol/L      Comment: Slight hemolysis detected by analyzer. Result may be falsely elevated.        Chloride 101 mmol/L      CO2 27.0 mmol/L      Calcium 10.2 mg/dL      Total Protein 7.5 g/dL      Albumin 3.9 g/dL      ALT (SGPT) 13 U/L      AST (SGOT) 19 U/L      Comment: Slight hemolysis detected by analyzer. Result may be falsely elevated.        Alkaline Phosphatase 90 U/L      Total Bilirubin 0.4 mg/dL      Globulin 3.6 gm/dL      A/G Ratio 1.1 g/dL      BUN/Creatinine Ratio 9.9     Anion Gap 12.0 mmol/L      eGFR 66.7 mL/min/1.73     Narrative:      GFR Normal >60  Chronic Kidney Disease <60  Kidney Failure <15      Magnesium [[id number]]  (Normal) Collected: 02/03/24 0424    Specimen: Blood Updated: 02/03/24 0500     Magnesium 1.9 mg/dL     CBC & Differential [[id number]]  (Abnormal) Collected: 02/03/24 0424    Specimen: Blood Updated: 02/03/24 0456    Narrative:      The following orders were created for panel order CBC & Differential.  Procedure                               Abnormality         Status                     ---------                               -----------         ------                     CBC Auto Differential[[id number]]        Abnormal            Final result                 Please view  "results for these tests on the individual orders.    CBC Auto Differential [[id number]]  (Abnormal) Collected: 02/03/24 0424    Specimen: Blood Updated: 02/03/24 0456     WBC 13.17 10*3/mm3      RBC 4.99 10*6/mm3      Hemoglobin 15.0 g/dL      Hematocrit 44.8 %      MCV 89.8 fL      MCH 30.1 pg      MCHC 33.5 g/dL      RDW 14.4 %      RDW-SD 46.4 fl      MPV 10.8 fL      Platelets 362 10*3/mm3     Narrative:      The previously reported component NRBC is no longer being reported. Previous result was 0.0 /100 WBC (Reference Range: 0.0-0.2 /100 WBC) on 2/3/2024 at 0438 CST.    Manual Differential [[id number]]  (Abnormal) Collected: 02/03/24 0424    Specimen: Blood Updated: 02/03/24 0456     Neutrophil % 61.0 %      Lymphocyte % 28.0 %      Monocyte % 3.0 %      Eosinophil % 1.0 %      Bands %  1.0 %      Atypical Lymphocyte % 4.0 %      Plasma Cells % 2.0 %      Neutrophils Absolute 8.17 10*3/mm3      Lymphocytes Absolute 4.21 10*3/mm3      Monocytes Absolute 0.40 10*3/mm3      Eosinophils Absolute 0.13 10*3/mm3      RBC Morphology Normal     WBC Morphology Normal     Platelet Morphology Normal    High Sensitivity Troponin T [[id number]]  (Abnormal) Collected: 02/03/24 0424    Specimen: Blood Updated: 02/03/24 0453     HS Troponin T 16 ng/L     Narrative:      High Sensitive Troponin T Reference Range:  <14.0 ng/L- Negative Female for AMI  <22.0 ng/L- Negative Male for AMI  >=14 - Abnormal Female indicating possible myocardial injury.  >=22 - Abnormal Male indicating possible myocardial injury.   Clinicians would have to utilize clinical acumen, EKG, Troponin, and serial changes to determine if it is an Acute Myocardial Infarction or myocardial injury due to an underlying chronic condition.         Lipase [[id number]]  (Abnormal) Collected: 02/03/24 0424    Specimen: Blood Updated: 02/03/24 0450     Lipase 79 U/L                 Echo EF Estimated  No results found for: \"ECHOEFEST\"      Cath Ejection Fraction " "Quantitative  No results found for: \"CATHEF\"        Medication Review: yes  Current Facility-Administered Medications   Medication Dose Route Frequency Provider Last Rate Last Admin    acetaminophen (TYLENOL) tablet 650 mg  650 mg Oral Q4H PRN Jacob Phillips DO   650 mg at 02/03/24 1548    albuterol sulfate HFA (PROVENTIL HFA;VENTOLIN HFA;PROAIR HFA) inhaler 2 puff  2 puff Inhalation Q4H PRN Jacob Phillips DO        aspirin chewable tablet 81 mg  81 mg Oral Daily Jacob Phillips DO        atorvastatin (LIPITOR) tablet 40 mg  40 mg Oral Nightly Jacob Phillips DO   40 mg at 02/03/24 2039    benzonatate (TESSALON) capsule 100 mg  100 mg Oral TID PRN Jacob Phillips DO        clopidogrel (PLAVIX) tablet 75 mg  75 mg Oral Daily Jacob Phillips DO        dextrose (D50W) (25 g/50 mL) IV injection 25 g  25 g Intravenous Q15 Min PRN Jacob Phillips DO        dextrose (GLUTOSE) oral gel 15 g  15 g Oral Q15 Min PRN Jacob Phillips DO        docusate sodium (COLACE) capsule 100 mg  100 mg Oral BID PRN Jacob Phillips DO        DULoxetine (CYMBALTA) DR capsule 60 mg  60 mg Oral BID Jacob Phillips DO   60 mg at 02/03/24 2039    Enoxaparin Sodium (LOVENOX) syringe 135 mg  1 mg/kg Subcutaneous Q12H Jacob Phillips DO   135 mg at 02/03/24 2039    glucagon (GLUCAGEN) injection 1 mg  1 mg Intramuscular Q15 Min PRN Jacob Phillips DO        Insulin Lispro (humaLOG) injection 2-9 Units  2-9 Units Subcutaneous 4x Daily AC & at Bedtime Jacob Phillips DO        losartan (COZAAR) tablet 12.5 mg  12.5 mg Oral Q24H Jarod Carbajal APRN        nitroglycerin (NITROSTAT) SL tablet 0.4 mg  0.4 mg Sublingual Q5 Min PRN Jacob Phillips DO        ondansetron ODT (ZOFRAN-ODT) disintegrating tablet 4 mg  4 mg Oral Q6H PRN Jacob Phillips DO        Or    ondansetron (ZOFRAN) " injection 4 mg  4 mg Intravenous Q6H PRN Jacob Phillips DO        pantoprazole (PROTONIX) EC tablet 40 mg  40 mg Oral Daily Jacob Phillips DO   40 mg at 02/03/24 1544    Pharmacy to Dose enoxaparin (LOVENOX)   Does not apply Continuous PRN Jacob Phillips DO        promethazine (PHENERGAN) tablet 12.5 mg  12.5 mg Oral Q4H PRN Jacob Phillips DO        sodium chloride 0.9 % flush 10 mL  10 mL Intravenous PRN Jacob Phillips DO        sodium chloride 0.9 % infusion  100 mL/hr Intravenous Continuous Jacob Phillips  mL/hr at 02/03/24 1603 100 mL/hr at 02/03/24 1603    tiZANidine (ZANAFLEX) tablet 4 mg  4 mg Oral Q8H PRN Jacob Phillips DO        traZODone (DESYREL) tablet 25 mg  25 mg Oral Nightly PRN Jacob Phillips DO             Assessment & Plan       Coronary artery disease: In-stent stenosis with likely thrombus treated with ALISSON-3 flow obtained.  - As noted in Dr. Phillips's note, patient will need to be on triple therapy for 30 days and then transition to a DOAC and Plavix on day 31.  - Will transition patient from Lovenox to Eliquis starting this evening.  -Patient started on Lipitor 40 mg daily  -Started on losartan 12.5 mg today.  -Echo pending  - Social work consulted given socioeconomic concerns with lack of transportation and pharmacy coverage    Diabetes mellitus: A1c resulted at 5.7 which is actually an improvement for her  - Continue sliding scale insulin during hospitalization  - Patient will be establishing with new primary care provider 1 week from discharge for further management of this    Neuropathy: Patient's neuropathy is currently controlled, however she would like to discuss resuming her Lyrica  - Establishing with PCP at discharge    Chronic deep vein thrombosis: As noted above patient will be going home on triple therapy and then will need to be on anticoagulant for life  - Will transition  patient to Eliquis today and provide all resources at discharge for her to have appropriate adherence to this medication    PPI prophylaxis: Protonix    Exophytic lesion: Noted on CT scan of right kidney.  -Establishing with primary care for further workup regarding this.      Further orders per Dr. Phillips      Electronically signed by BRAYDEN Stafford, 02/04/24, 9:35 AM CST.

## 2024-02-05 VITALS
HEART RATE: 71 BPM | DIASTOLIC BLOOD PRESSURE: 70 MMHG | OXYGEN SATURATION: 99 % | SYSTOLIC BLOOD PRESSURE: 134 MMHG | TEMPERATURE: 98.2 F | BODY MASS INDEX: 43.52 KG/M2 | RESPIRATION RATE: 16 BRPM | WEIGHT: 287.13 LBS | HEIGHT: 68 IN

## 2024-02-05 DIAGNOSIS — Z95.5 S/P DRUG ELUTING CORONARY STENT PLACEMENT: Primary | ICD-10-CM

## 2024-02-05 LAB
ANION GAP SERPL CALCULATED.3IONS-SCNC: 10 MMOL/L (ref 5–15)
BUN SERPL-MCNC: 10 MG/DL (ref 6–20)
BUN/CREAT SERPL: 12.8 (ref 7–25)
CALCIUM SPEC-SCNC: 9.5 MG/DL (ref 8.6–10.5)
CHLORIDE SERPL-SCNC: 106 MMOL/L (ref 98–107)
CO2 SERPL-SCNC: 26 MMOL/L (ref 22–29)
CREAT SERPL-MCNC: 0.78 MG/DL (ref 0.57–1)
DEPRECATED RDW RBC AUTO: 47.8 FL (ref 37–54)
EGFRCR SERPLBLD CKD-EPI 2021: 91 ML/MIN/1.73
ERYTHROCYTE [DISTWIDTH] IN BLOOD BY AUTOMATED COUNT: 14.5 % (ref 12.3–15.4)
GLUCOSE BLDC GLUCOMTR-MCNC: 121 MG/DL (ref 70–130)
GLUCOSE SERPL-MCNC: 118 MG/DL (ref 65–99)
HCT VFR BLD AUTO: 43.9 % (ref 34–46.6)
HGB BLD-MCNC: 14.4 G/DL (ref 12–15.9)
MCH RBC QN AUTO: 29.8 PG (ref 26.6–33)
MCHC RBC AUTO-ENTMCNC: 32.8 G/DL (ref 31.5–35.7)
MCV RBC AUTO: 90.9 FL (ref 79–97)
PLATELET # BLD AUTO: 326 10*3/MM3 (ref 140–450)
PMV BLD AUTO: 10.9 FL (ref 6–12)
POTASSIUM SERPL-SCNC: 3.5 MMOL/L (ref 3.5–5.2)
QT INTERVAL: 408 MS
QTC INTERVAL: 449 MS
RBC # BLD AUTO: 4.83 10*6/MM3 (ref 3.77–5.28)
SODIUM SERPL-SCNC: 142 MMOL/L (ref 136–145)
WBC NRBC COR # BLD AUTO: 11.39 10*3/MM3 (ref 3.4–10.8)

## 2024-02-05 PROCEDURE — 63710000001 PANTOPRAZOLE 40 MG TABLET DELAYED-RELEASE: Performed by: EMERGENCY MEDICINE

## 2024-02-05 PROCEDURE — A9270 NON-COVERED ITEM OR SERVICE: HCPCS | Performed by: EMERGENCY MEDICINE

## 2024-02-05 PROCEDURE — 63710000001 APIXABAN 5 MG TABLET

## 2024-02-05 PROCEDURE — 82948 REAGENT STRIP/BLOOD GLUCOSE: CPT

## 2024-02-05 PROCEDURE — 63710000001 ONDANSETRON ODT 4 MG TABLET DISPERSIBLE: Performed by: EMERGENCY MEDICINE

## 2024-02-05 PROCEDURE — 63710000001 TIZANIDINE 4 MG TABLET: Performed by: EMERGENCY MEDICINE

## 2024-02-05 PROCEDURE — 63710000001 ASPIRIN 81 MG CHEWABLE TABLET: Performed by: EMERGENCY MEDICINE

## 2024-02-05 PROCEDURE — 99239 HOSP IP/OBS DSCHRG MGMT >30: CPT | Performed by: NURSE PRACTITIONER

## 2024-02-05 PROCEDURE — 85027 COMPLETE CBC AUTOMATED: CPT

## 2024-02-05 PROCEDURE — 80048 BASIC METABOLIC PNL TOTAL CA: CPT

## 2024-02-05 PROCEDURE — 63710000001 CLOPIDOGREL 75 MG TABLET: Performed by: EMERGENCY MEDICINE

## 2024-02-05 PROCEDURE — A9270 NON-COVERED ITEM OR SERVICE: HCPCS

## 2024-02-05 PROCEDURE — 63710000001 DULOXETINE 30 MG CAPSULE DELAYED-RELEASE PARTICLES: Performed by: EMERGENCY MEDICINE

## 2024-02-05 PROCEDURE — 63710000001 LOSARTAN 25 MG TABLET

## 2024-02-05 PROCEDURE — G0378 HOSPITAL OBSERVATION PER HR: HCPCS

## 2024-02-05 RX ORDER — METOPROLOL SUCCINATE 25 MG/1
12.5 TABLET, EXTENDED RELEASE ORAL DAILY
Qty: 30 TABLET | Refills: 11 | Status: SHIPPED | OUTPATIENT
Start: 2024-02-05

## 2024-02-05 RX ORDER — NITROGLYCERIN 0.4 MG/1
0.4 TABLET SUBLINGUAL
Qty: 25 TABLET | Refills: 1 | Status: SHIPPED | OUTPATIENT
Start: 2024-02-05

## 2024-02-05 RX ORDER — ASPIRIN 81 MG/1
81 TABLET, CHEWABLE ORAL DAILY
Qty: 30 TABLET | Refills: 0 | Status: SHIPPED | OUTPATIENT
Start: 2024-02-05

## 2024-02-05 RX ORDER — PANTOPRAZOLE SODIUM 40 MG/1
40 TABLET, DELAYED RELEASE ORAL DAILY
Qty: 30 TABLET | Refills: 3 | Status: SHIPPED | OUTPATIENT
Start: 2024-02-05

## 2024-02-05 RX ORDER — ATORVASTATIN CALCIUM 40 MG/1
40 TABLET, FILM COATED ORAL NIGHTLY
Qty: 90 TABLET | Refills: 3 | Status: SHIPPED | OUTPATIENT
Start: 2024-02-05

## 2024-02-05 RX ORDER — LOSARTAN POTASSIUM 25 MG/1
12.5 TABLET ORAL
Qty: 30 TABLET | Refills: 11 | Status: SHIPPED | OUTPATIENT
Start: 2024-02-05

## 2024-02-05 RX ORDER — FLUTICASONE PROPIONATE 50 MCG
1 SPRAY, SUSPENSION (ML) NASAL DAILY
Start: 2024-02-05

## 2024-02-05 RX ORDER — CLOPIDOGREL BISULFATE 75 MG/1
75 TABLET ORAL DAILY
Qty: 30 TABLET | Refills: 11 | Status: SHIPPED | OUTPATIENT
Start: 2024-02-05

## 2024-02-05 RX ADMIN — DULOXETINE HYDROCHLORIDE 60 MG: 30 CAPSULE, DELAYED RELEASE ORAL at 08:57

## 2024-02-05 RX ADMIN — TIZANIDINE 4 MG: 4 TABLET ORAL at 09:03

## 2024-02-05 RX ADMIN — ONDANSETRON 4 MG: 4 TABLET, ORALLY DISINTEGRATING ORAL at 09:03

## 2024-02-05 RX ADMIN — CLOPIDOGREL BISULFATE 75 MG: 75 TABLET, FILM COATED ORAL at 08:56

## 2024-02-05 RX ADMIN — ASPIRIN 81 MG: 81 TABLET, CHEWABLE ORAL at 08:57

## 2024-02-05 RX ADMIN — LOSARTAN POTASSIUM 12.5 MG: 25 TABLET, FILM COATED ORAL at 08:57

## 2024-02-05 RX ADMIN — PANTOPRAZOLE SODIUM 40 MG: 40 TABLET, DELAYED RELEASE ORAL at 08:57

## 2024-02-05 RX ADMIN — APIXABAN 5 MG: 5 TABLET, FILM COATED ORAL at 08:56

## 2024-02-05 NOTE — CASE MANAGEMENT/SOCIAL WORK
Continued Stay Note  [facility]     Patient Name: Radha Boswell  MRN: [id number]  Today's Date: 2/5/2024    Admit Date: 2/3/2024    Plan: Home   Discharge Plan       Row Name 02/05/24 0939       Plan    Plan Home    Final Discharge Disposition Code 01 - home or self-care    Final Note Community Resource guide provided. Meds sent to [facility], will assist as needed.      Row Name 02/05/24 0820       Plan    Plan Home    Plan Comments Received another consult re medications. Patient has Rx coverage. MSW will give patient a community resource guide at discharge, unsure if patient will qualify for community Rx help (example: [employer]) d/t having Rx coverage through [employer].               Expected Discharge Date and Time       Expected Discharge Date Expected Discharge Time    Feb 5, 2024               ROSEMARIE Ziegler

## 2024-02-05 NOTE — CASE MANAGEMENT/SOCIAL WORK
Continued Stay Note   [place]     Patient Name: Radha Boswell  MRN: [id number]  Today's Date: 2/5/2024    Admit Date: 2/3/2024    Plan: Home   Discharge Plan       Row Name 02/05/24 0820       Plan    Plan Home    Plan Comments Received another consult re medications. Patient has Rx coverage. MSW will give patient a community resource guide at discharge, unsure if patient will qualify for community Rx help (example: [employer]) d/t having Rx coverage through [employer] but patient can check on this after discharge.             ROSEMARIE Ziegler

## 2024-02-05 NOTE — DISCHARGE SUMMARY
"[facility] HEART GROUP DISCHARGE    Date of Discharge:  2/5/2024    Discharge Diagnosis:     -Chest pain with abnormal stress test now status post successful PCI to the mid RCA with placement of 2 drug-eluting stents per Dr. Phillips on 2/3/2024    Presenting Problem/History of Present Illness  Chest pain [R07.9]    Per HPI on admission by Jarod Carbajal, APRN : \"Radha Roth is a 53-year-old female with known history history of coronary artery disease with stenting to her mid RCA in December 2017, diabetes mellitus, hypertension, and hyperlipidemia who cardiology asked to admit due to abnormal stress test and chest pain.     Patient presented to the emergency room on 2/3/2024 with complaints of chest pain.  It is reported the patient has history of chronic DVT and has unfortunately not been able to afford her Xarelto in about a month.  She has been off blood thinner due to this.  Upon arrival to the ER she described her pain as a chest tightness that also led to jaw discomfort.  Chest imaging was negative for any pulmonary embolism or acute cardiopulmonary process.  Patient did have elevated troponin at 16 that then repeated at 16 with a 0 delta.  Patient did undergo stress test which was high risk for ischemia and therefore she is being admitted for further ischemic evaluation.     Upon my examination patient sitting on the edge of the bed.  She is in no acute distress.She does report to me that she stopped taking her Xarelto about a month ago due to cost.  She says the co-pay went up to $47 and this is unaffordable for her.  She says she had been in her normal health up until recently.  She again describes her pain as a tightness in the center of her chest.  Currently the pain is relieved.  She is very anxious about her high risk stress test.  She has a lot of social determinant factors which have made healthcare for heart recently.  She does not have reliable transportation.  She has not seen her PCP " "in some time.  She does have other chronic medical issues such as rheumatoid arthritis as well as diabetes mellitus.  She reports her diabetes is well-managed.\"       Hospital Course  Patient is a 53 y.o. female presented with chest pain as outlined above.  Given her symptoms and abnormal stress test she underwent cardiac catheterization per Dr. Phillips and was found to have a hemodynamically significant lesion in the mid RCA with 80 to 90% in-stent restenosis and what appeared to be an acute thrombus present.  This was treated with PTCA and placement of 2 drug-eluting stents.  She has done well postprocedure.  Echocardiogram revealed a normal LVEF.  She tells me today her chest discomfort and jaw discomfort completely resolved following cardiac catheterization.  She continues to have some occasional nausea.    She was incidentally found to have a possible lesion on her kidney on CTA of the chest.  She will follow-up for further evaluation of this with her PCP in 1 week.    She does have a history of DVT around 2018.  Per my discussion with the patient this was her 1 and only DVT and she denies history of PE.  However, she tells me she was told by the physicians that treated her initial DVT that she would need lifelong anticoagulation.  She tells me this occurred in the setting of a leg injury.    As outlined by Dr. Phillips, she will go home on aspirin, Plavix and Eliquis and in 30 days she will discontinue the aspirin.  She has been started on high intensity statin for goal LDL less than 70.  She has also been started on a low-dose ARB and beta-blocker.  We discussed indications to use as needed sublingual nitroglycerin.  I have referred her for cardiac rehab.    She will have close follow-up with Dr. Phillips in 2 to 4 weeks      Echo this admission :     Left ventricular systolic function is normal. Left ventricular ejection fraction appears to be 51 - 55%.    The left ventricular cavity is mildly dilated. Left " ventricular wall thickness is consistent with moderate concentric hypertrophy.    Normal right ventricular cavity size and systolic function noted.    Mild mitral valve regurgitation is present.    CTA chest this admission :    IMPRESSION:  1. No evidence of pulmonary thromboembolic disease. The thoracic aorta  is normal in caliber with no evidence of dissection or aneurysm. The  lungs are clear.   2. On limited images through the upper abdomen I would question whether  there may be an exophytic lesion emanating from the medial upper pole of  the right kidney. Follow-up with outpatient renal ultrasound or CT  abdomen and pelvis would be recommended as this appears to represent a  change in the morphology of the upper pole of the right kidney from a  remote CT from December 2015.     This report was signed and finalized on 2/3/2024 5:33 AM by Dr. Santiago Underwood MD.       Procedures Performed  Procedure(s):  Left Heart Cath     Impression:  1. Coronary artery disease as described above including a hemodynamically significant lesion in the mid RCA with 80 to 90% in-stent stenosis and what appeared to be an acute thrombus present status post successful PTCA and stent placement x 2 with return of ALISSON-3 flow and no residual stenosis remaining  2. Diabetes  3. History of DVT    Plan:   1. TR band off in 2 hours, admit to the floor and monitor closely overnight with plans for discharge home in the morning if there are no complications noted  2. Recommend patient be on triple therapy with aspirin, Plavix and some anticoagulant (Lovenox treatment dose while inpatient and will need to discuss an oral anticoagulant before discharge) for 30 days. On day 31, stop aspirin and continue the Plavix and oral anticoagulant  3. Initiate high intensity statin  4. ACE inhibitor and beta-blocker once vitals can tolerate  5. Echocardiogram  6. Referral for cardiac rehab  7. Close follow-up with [facility] as an  outpatient        Jacob Phillips DO  Interventional cardiology  [facility]  February 3, 2024       Condition on Discharge:  Stable     Physical Exam at Discharge  General: alert and oriented   Card: RRR; NSR 50s-70s on tele  Resp: CTA  Extrem: no edema, PPP, right radial cath site CDI, no redness, warmth, hematoma or drainage, good cap refill, pulse 3+    Vital Signs  Temp:  [98 °F (36.7 °C)-98.7 °F (37.1 °C)] 98.2 °F (36.8 °C)  Heart Rate:  [71-76] 71  Resp:  [16] 16  BP: (120-134)/(45-85) 134/70      Discharge Disposition  Home or Self Care    Discharge Medications     Discharge Medications        New Medications        Instructions Start Date   apixaban 5 MG tablet tablet  Commonly known as: ELIQUIS   5 mg, Oral, Every 12 Hours Scheduled      aspirin 81 MG chewable tablet   81 mg, Oral, Daily      atorvastatin 40 MG tablet  Commonly known as: LIPITOR   40 mg, Oral, Nightly      clopidogrel 75 MG tablet  Commonly known as: PLAVIX   75 mg, Oral, Daily      losartan 25 MG tablet  Commonly known as: COZAAR   12.5 mg, Oral, Every 24 Hours Scheduled      metoprolol succinate XL 25 MG 24 hr tablet  Commonly known as: TOPROL-XL   12.5 mg, Oral, Daily      nitroglycerin 0.4 MG SL tablet  Commonly known as: NITROSTAT   0.4 mg, Sublingual, Every 5 Minutes PRN, Take no more than 3 doses in 15 minutes.             Continue These Medications        Instructions Start Date   DULoxetine 60 MG capsule  Commonly known as: Cymbalta   60 mg, Oral, 2 Times Daily      fluticasone 50 MCG/ACT nasal spray  Commonly known as: FLONASE   1 spray, Nasal, Daily      pantoprazole 40 MG EC tablet  Commonly known as: PROTONIX   40 mg, Oral, Daily      tiZANidine 4 MG tablet  Commonly known as: ZANAFLEX   4 mg, Oral, Every 8 Hours PRN      traZODone 150 MG tablet  Commonly known as: DESYREL   150 mg, Oral, Nightly             Stop These Medications      rivaroxaban 20 MG tablet  Commonly known as: XARELTO               Discharge Diet: heart healthy, diabetic     Activity at Discharge:   No heavy lifting for 5-7 days greater than 10 pounds.  No heavy or strenuous pushing or pulling.  No tub baths, hot tubs, swimming pools, or submerging site underwater for 1 week.  Wash site daily with antibacterial soap and water. Rinse and keep clean and dry.  No lotions, powders, or topical ointments to site. Keep open to air and dry.  Call our office with any concerns or if you notice redness, swelling, drainage, warmth, or pain at the site.     Follow-up Appointments  Dr. Gregg 1 week  Dr. Phillips 2-4 weeks        Electronically signed by BRAYDEN Hernandez, 02/05/24, 8:30 AM CST.     Time:45 minutes

## 2024-02-05 NOTE — PLAN OF CARE
Goal Outcome Evaluation:  Plan of Care Reviewed With: patient        Progress: no change  Outcome Evaluation: sb/s 57-79 per tele. vss. no c/o pain. will continue to monitor.

## 2024-02-07 LAB
QT INTERVAL: 412 MS
QT INTERVAL: 432 MS
QTC INTERVAL: 484 MS
QTC INTERVAL: 501 MS

## 2024-02-09 LAB
GLUCOSE BLDC GLUCOMTR-MCNC: 119 MG/DL (ref 70–130)
GLUCOSE BLDC GLUCOMTR-MCNC: 151 MG/DL (ref 70–130)

## 2024-02-13 ENCOUNTER — TELEPHONE (OUTPATIENT)
Dept: INTERNAL MEDICINE | Facility: CLINIC | Age: 54
End: 2024-02-13

## 2024-02-13 ENCOUNTER — OFFICE VISIT (OUTPATIENT)
Dept: INTERNAL MEDICINE | Facility: CLINIC | Age: 54
End: 2024-02-13
Payer: MEDICARE

## 2024-02-13 VITALS
BODY MASS INDEX: 43.41 KG/M2 | HEIGHT: 68 IN | TEMPERATURE: 98.4 F | HEART RATE: 88 BPM | SYSTOLIC BLOOD PRESSURE: 120 MMHG | DIASTOLIC BLOOD PRESSURE: 90 MMHG | OXYGEN SATURATION: 98 % | WEIGHT: 286.4 LBS

## 2024-02-13 DIAGNOSIS — E78.2 MIXED HYPERLIPIDEMIA: ICD-10-CM

## 2024-02-13 DIAGNOSIS — I25.10 CORONARY ARTERY DISEASE INVOLVING NATIVE CORONARY ARTERY OF NATIVE HEART WITHOUT ANGINA PECTORIS: ICD-10-CM

## 2024-02-13 DIAGNOSIS — F32.A DEPRESSION, UNSPECIFIED DEPRESSION TYPE: ICD-10-CM

## 2024-02-13 DIAGNOSIS — E11.40 TYPE 2 DIABETES MELLITUS WITH DIABETIC NEUROPATHY, WITHOUT LONG-TERM CURRENT USE OF INSULIN: Primary | ICD-10-CM

## 2024-02-13 DIAGNOSIS — Z72.0 TOBACCO USE: ICD-10-CM

## 2024-02-13 DIAGNOSIS — F51.01 PRIMARY INSOMNIA: ICD-10-CM

## 2024-02-13 DIAGNOSIS — M79.7 FIBROMYALGIA: ICD-10-CM

## 2024-02-13 DIAGNOSIS — E11.40 TYPE 2 DIABETES MELLITUS WITH DIABETIC NEUROPATHY, WITHOUT LONG-TERM CURRENT USE OF INSULIN: ICD-10-CM

## 2024-02-13 DIAGNOSIS — N28.89 RIGHT KIDNEY MASS: ICD-10-CM

## 2024-02-13 RX ORDER — SEMAGLUTIDE 0.68 MG/ML
INJECTION, SOLUTION SUBCUTANEOUS
Qty: 3 ML | Refills: 0 | Status: SHIPPED | OUTPATIENT
Start: 2024-02-13 | End: 2024-02-13 | Stop reason: SDUPTHER

## 2024-02-13 RX ORDER — TIZANIDINE 4 MG/1
4 TABLET ORAL EVERY 8 HOURS PRN
Qty: 90 TABLET | Refills: 5 | Status: SHIPPED | OUTPATIENT
Start: 2024-02-13

## 2024-02-13 RX ORDER — SEMAGLUTIDE 1.34 MG/ML
1 INJECTION, SOLUTION SUBCUTANEOUS WEEKLY
Qty: 3 ML | Refills: 5 | Status: SHIPPED | OUTPATIENT
Start: 2024-04-02 | End: 2024-02-13 | Stop reason: SDUPTHER

## 2024-02-13 RX ORDER — PREGABALIN 200 MG/1
200 CAPSULE ORAL 3 TIMES DAILY
COMMUNITY
End: 2024-02-13 | Stop reason: SDUPTHER

## 2024-02-13 RX ORDER — SEMAGLUTIDE 1.34 MG/ML
1 INJECTION, SOLUTION SUBCUTANEOUS WEEKLY
Qty: 3 ML | Refills: 5 | Status: SHIPPED | OUTPATIENT
Start: 2024-04-02

## 2024-02-13 RX ORDER — DULOXETIN HYDROCHLORIDE 60 MG/1
60 CAPSULE, DELAYED RELEASE ORAL 2 TIMES DAILY
Qty: 60 CAPSULE | Refills: 5 | Status: SHIPPED | OUTPATIENT
Start: 2024-02-13

## 2024-02-13 RX ORDER — FLUTICASONE PROPIONATE 50 MCG
1 SPRAY, SUSPENSION (ML) NASAL DAILY
Qty: 16 G | Refills: 11 | Status: SHIPPED | OUTPATIENT
Start: 2024-02-13

## 2024-02-13 RX ORDER — TIZANIDINE 4 MG/1
4 TABLET ORAL EVERY 8 HOURS PRN
Qty: 90 TABLET | Refills: 5 | Status: SHIPPED | OUTPATIENT
Start: 2024-02-13 | End: 2024-02-13 | Stop reason: SDUPTHER

## 2024-02-13 RX ORDER — TRAZODONE HYDROCHLORIDE 150 MG/1
150 TABLET ORAL NIGHTLY
Qty: 60 TABLET | Refills: 5 | Status: SHIPPED | OUTPATIENT
Start: 2024-02-13 | End: 2024-02-13 | Stop reason: SDUPTHER

## 2024-02-13 RX ORDER — SEMAGLUTIDE 0.68 MG/ML
INJECTION, SOLUTION SUBCUTANEOUS
Qty: 3 ML | Refills: 0 | Status: SHIPPED | OUTPATIENT
Start: 2024-02-13 | End: 2024-03-26

## 2024-02-13 RX ORDER — PREGABALIN 150 MG/1
150 CAPSULE ORAL DAILY
Qty: 30 CAPSULE | Refills: 2 | Status: CANCELLED | OUTPATIENT
Start: 2024-02-13

## 2024-02-13 RX ORDER — PREGABALIN 150 MG/1
150 CAPSULE ORAL DAILY
Qty: 30 CAPSULE | Refills: 2 | Status: SHIPPED | OUTPATIENT
Start: 2024-02-13

## 2024-02-13 RX ORDER — DULOXETIN HYDROCHLORIDE 60 MG/1
60 CAPSULE, DELAYED RELEASE ORAL 2 TIMES DAILY
Qty: 60 CAPSULE | Refills: 5 | Status: SHIPPED | OUTPATIENT
Start: 2024-02-13 | End: 2024-02-13 | Stop reason: SDUPTHER

## 2024-02-13 RX ORDER — FLUTICASONE PROPIONATE 50 MCG
1 SPRAY, SUSPENSION (ML) NASAL DAILY
Qty: 16 G | Refills: 11 | Status: SHIPPED | OUTPATIENT
Start: 2024-02-13 | End: 2024-02-13 | Stop reason: SDUPTHER

## 2024-02-13 RX ORDER — TRAZODONE HYDROCHLORIDE 150 MG/1
150 TABLET ORAL NIGHTLY
Qty: 60 TABLET | Refills: 5 | Status: SHIPPED | OUTPATIENT
Start: 2024-02-13

## 2024-02-20 ENCOUNTER — HOSPITAL ENCOUNTER (OUTPATIENT)
Dept: ULTRASOUND IMAGING | Facility: HOSPITAL | Age: 54
Discharge: HOME | End: 2024-02-20
Admitting: INTERNAL MEDICINE
Payer: MEDICARE

## 2024-02-20 DIAGNOSIS — N28.89 RIGHT KIDNEY MASS: ICD-10-CM

## 2024-02-20 DIAGNOSIS — E11.40 TYPE 2 DIABETES MELLITUS WITH DIABETIC NEUROPATHY, WITHOUT LONG-TERM CURRENT USE OF INSULIN: ICD-10-CM

## 2024-02-20 DIAGNOSIS — M79.7 FIBROMYALGIA: ICD-10-CM

## 2024-02-20 PROCEDURE — 76775 US EXAM ABDO BACK WALL LIM: CPT

## 2024-02-20 RX ORDER — PREGABALIN 150 MG/1
150 CAPSULE ORAL DAILY
Qty: 30 CAPSULE | Refills: 4 | Status: SHIPPED | OUTPATIENT
Start: 2024-02-20

## 2024-02-26 ENCOUNTER — TELEPHONE (OUTPATIENT)
Dept: INTERNAL MEDICINE | Facility: CLINIC | Age: 54
End: 2024-02-26
Payer: MEDICARE

## 2024-02-26 NOTE — TELEPHONE ENCOUNTER
Caller: Radha Boswell    Relationship: Self    Best call back number: [phone]    What is the best time to reach you: SOON PLEASE     Who are you requesting to speak with (clinical staff, provider,  specific staff member): PROVIDER OR CLINICAL STAFF         What was the call regarding: PATIENT REQUESTING A CALL BACK  TO DISCUSS  GETTING HER MEDICATIONS ADJUSTED  PATIENT STATES HER CYMBALTA AND/OR TRAZODONE IS NOT WORKING LIKE IT USE TO AND SHE IS EXHAUSTED FROM NOT SLEEPING WELL AT ALL AND IS STILL VERY ANXIOUS     Is it okay if the provider responds through MyChart: PREFERS A CALL BACK

## 2024-02-26 NOTE — TELEPHONE ENCOUNTER
Spoke with pt. She is taking cymbalta 60mg BID. She is still having anxiety problems.    Also, she is taking trazadone 150mg.  She says she took 150mg nightly for 1 week and it did not help. Since then she is taking 300mg now and she is still only getting 2-3 hours of sleep per night and it is inturrupted. She thinks a lot of her anxiety is from not sleeping. Please advise

## 2024-02-27 NOTE — TELEPHONE ENCOUNTER
Called and spoke with pt. She v/u. She states she will call back to schedule as she has to find out her sisters schedule because she is her ride here.

## 2024-04-23 RX ORDER — ASPIRIN 81 MG/1
TABLET, CHEWABLE ORAL
Refills: 0 | OUTPATIENT
Start: 2024-04-23

## 2024-04-23 NOTE — TELEPHONE ENCOUNTER
Relay       Please schedule patient with a new patient hospital follow up -first available is fine.

## 2024-06-05 ENCOUNTER — TELEPHONE (OUTPATIENT)
Dept: INTERNAL MEDICINE | Facility: CLINIC | Age: 54
End: 2024-06-05

## 2024-06-05 NOTE — TELEPHONE ENCOUNTER
Caller: Radha Boswell    Relationship: Self    Best call back number: [phone]    What is the best time to reach you: SOON PLEASE     Who are you requesting to speak with (clinical staff, provider,  specific staff member): PROVIDER OR CLINICAL STAFF         What was the call regarding: PATIENT REQUESTING A CALL BACK TO DISCUSS WHETHER OR NOT PROVIDER DOES THE PAPERWORK FOR THE MEDICAL MARIJUANA  PATIENT STATES IT DOES HELP HER FIBROMYALGIA PAIN BUT SHE DID QUIT SMOKING TO RESPECT HER PCP     Is it okay if the provider responds through MyChart: PREFERS A CALL BACK

## 2024-06-05 NOTE — TELEPHONE ENCOUNTER
Marijuana is not legal in [place] although there are bills in the works for this.  There is nothing in law yet.  To answer your question we do not prescribe medical marijuana.  If she has questions we can discuss further at her office visit tomorrow.

## 2024-06-05 NOTE — TELEPHONE ENCOUNTER
PATIENT HAS RETURNED CALL, GIVEN MESSAGE AND STATED THAT SHE WILL KEEP HER APPOINTMENT ON 06/06/2024

## 2024-06-12 ENCOUNTER — TELEPHONE (OUTPATIENT)
Dept: INTERNAL MEDICINE | Facility: CLINIC | Age: 54
End: 2024-06-12
Payer: MEDICARE

## 2024-06-12 NOTE — TELEPHONE ENCOUNTER
NO SHOW LETTER SENT TO PATIENT/LEFT VOICEMAIL REGARDING RESCHEDULING APPT AND REMINDING OF NO SHOW POLICY

## 2024-06-24 ENCOUNTER — TELEPHONE (OUTPATIENT)
Dept: INTERNAL MEDICINE | Facility: CLINIC | Age: 54
End: 2024-06-24
Payer: MEDICARE

## 2024-06-24 NOTE — TELEPHONE ENCOUNTER
PATIENT HAS CALLED, SHE STATED THAT SHE HAS BRUISES THAT HAS COME UP ON ON HER ARMS, UNDER HER ARMS AND TOP OF HER LEGS.     SHE STATED THAT SHE IS ON 3 BLOOD THINNERS.  SHE STATED THAT IS FROM AN ACCIDENT FROM ROUND 6 YEARS AGO.   SHE STATED THAT SHE HAS A PERMANENT CLOTTING ISSUE.      SHE IS ASKING IF SHE SHOULD BE SEEN IN THE ER?        [phone]

## 2024-06-24 NOTE — TELEPHONE ENCOUNTER
I do not believe that she needs to be evaluated on an emergent basis unless she is having active bleeding such as blood in the stool, vomiting blood, nosebleeds, or blood in her urine.  I do recommend that she schedule an appointment to follow-up in this office for further evaluation of the bruising.  Thank you.

## 2024-06-24 NOTE — TELEPHONE ENCOUNTER
When she was admitted to the hospital in February, it was noted that she was supposed to stop aspirin after 30 days.  Can you find out if she is still taking the aspirin?  Thank you

## 2024-06-28 ENCOUNTER — TELEPHONE (OUTPATIENT)
Dept: INTERNAL MEDICINE | Facility: CLINIC | Age: 54
End: 2024-06-28

## 2024-06-28 NOTE — TELEPHONE ENCOUNTER
no show letter sent to patient. called to reschedule pt stated she was sorry she missed she was sleeping because she isnt feeling well. will call to reschedule when she feels better

## 2024-07-01 ENCOUNTER — TELEPHONE (OUTPATIENT)
Dept: PALLIATIVE CARE | Facility: CLINIC | Age: 54
End: 2024-07-01
Payer: MEDICARE

## 2024-07-01 NOTE — TELEPHONE ENCOUNTER
Had a note on my desk to call patient when I return from vacation. Note stated patient did not want to continue care with Dr Avila. When I called I had to leave a message on her voice mail to return my call.

## 2024-07-17 ENCOUNTER — TELEPHONE (OUTPATIENT)
Dept: INTERNAL MEDICINE | Facility: CLINIC | Age: 54
End: 2024-07-17
Payer: MEDICARE

## 2024-07-17 NOTE — TELEPHONE ENCOUNTER
PATIENT HAS RETURNED CALL, SHE STATED THAT SHE IS HAVING AN ISSUE GETTING A RIDE TO THE ER.  PATIENT DOES WANT TO GO SHE JUST DOESN'T HAVE A WAY.  SHE STATED THAT SHE DOESN'T WANT TO CALL THE AMBULANCE BECAUSE SOMEONE MAY BE HAVING A HEART ATTACK.   IT WAS EXPLAINED THAT PATIENT SHOULD BE EVALUATED IN THE ER OR UC DUE TO HER SYMPTOMS.   PATIENT VOICED UNDERSTANDING AND SAID THAT HER NIECE IS GETTING OFF WORK SOON AND SHE WILL HAVE HER TAKE HER.

## 2024-07-17 NOTE — TELEPHONE ENCOUNTER
PATIENT HAS CALLED, SHE STATED THAT SHE PASSED OUT LAST EVENING AND WAS HAVING ISSUES WITH HER VISION AND HAVING SOME DIZZINESS. .   PATIENT WAS ASKING TO BE SEEN TODAY.    IT WAS EXPLAINED THAT THERE WAS NOT OPENINGS AND SHE SHOULD PROCEED TO THE ER OR UC WITH HER SYMPTOMS.

## 2024-07-17 NOTE — TELEPHONE ENCOUNTER
I agree with the evaluation today given her symptoms.  None of the providers in the office have any appointments available.  Did she say that she was going to urgent care or the emergency room today?

## 2024-08-14 ENCOUNTER — TELEPHONE (OUTPATIENT)
Dept: INTERNAL MEDICINE | Facility: CLINIC | Age: 54
End: 2024-08-14
Payer: MEDICARE

## 2024-08-14 RX ORDER — PANTOPRAZOLE SODIUM 40 MG/1
40 TABLET, DELAYED RELEASE ORAL DAILY
Qty: 30 TABLET | Refills: 3 | Status: CANCELLED | OUTPATIENT
Start: 2024-08-14

## 2024-08-14 NOTE — TELEPHONE ENCOUNTER
Caller: Radha Boswell    Relationship: Self    Best call back number: [phone]     What medication are you requesting: STRONGER DOSAGE OF pantoprazole (PROTONIX) 40 MG EC tablet      If a prescription is needed, what is your preferred pharmacy and phone number:  [facility] - [place], [place] - [address] - [phone] [facility] [phone] FX

## 2024-08-14 NOTE — TELEPHONE ENCOUNTER
PATIENT HAS BEEN CALLED,  SHE STATED THAT HER PROTONIX IS NOT HELPING AT THE 40mg.  SHE FEELS THAT SHE NEEDS SOMETHING STRONGER.   PATIENT STATED THAT ANYTHING SHE EATS IRRITATES HER STOMACH.   SHE DOES NOT HAVE A WAY TO GET TO AN APPOINTMENT THIS WEEK.

## 2024-08-15 ENCOUNTER — OFFICE VISIT (OUTPATIENT)
Dept: INTERNAL MEDICINE | Facility: CLINIC | Age: 54
End: 2024-08-15
Payer: MEDICARE

## 2024-08-15 VITALS
HEIGHT: 68 IN | TEMPERATURE: 97.6 F | OXYGEN SATURATION: 97 % | HEART RATE: 75 BPM | BODY MASS INDEX: 39.31 KG/M2 | WEIGHT: 259.4 LBS | DIASTOLIC BLOOD PRESSURE: 80 MMHG | SYSTOLIC BLOOD PRESSURE: 132 MMHG

## 2024-08-15 DIAGNOSIS — K21.9 GASTROESOPHAGEAL REFLUX DISEASE, UNSPECIFIED WHETHER ESOPHAGITIS PRESENT: Primary | ICD-10-CM

## 2024-08-15 DIAGNOSIS — F32.A ANXIETY AND DEPRESSION: ICD-10-CM

## 2024-08-15 DIAGNOSIS — F41.9 ANXIETY AND DEPRESSION: ICD-10-CM

## 2024-08-15 PROCEDURE — 3044F HG A1C LEVEL LT 7.0%: CPT

## 2024-08-15 PROCEDURE — 99215 OFFICE O/P EST HI 40 MIN: CPT

## 2024-08-15 RX ORDER — BUSPIRONE HYDROCHLORIDE 5 MG/1
5 TABLET ORAL 3 TIMES DAILY
Qty: 90 TABLET | Refills: 1 | Status: SHIPPED | OUTPATIENT
Start: 2024-08-15

## 2024-08-15 NOTE — TELEPHONE ENCOUNTER
PATIENT HAS BEEN CALLED, GIVEN MESSAGE AND SCHEDULED FOR TODAY.   PATIENT STATED THAT SHE CANNOT TAKE MUCH MORE OF HER STOMACH BEING UPSET.

## 2024-08-15 NOTE — PROGRESS NOTES
Chief Complaint   Patient presents with    GI Problem     Reports  protonix is not working    Medication Problem     Would to discuss Cymbalta, Lyrica, trazodone         History:      Radha Boswell is a 54 y.o. female who presents today for evaluation of the above problems.      HPI    Ms. Boswell presents today to discuss gastrointestinal problems.  She reports that she has been taking Protonix up until approximately 1 week ago she stopped this medication because she was not noticing any improvement of symptoms.  She reports epigastric pain that occurs intermittently.  She has had a history of possible ulcer in the past she is concerned that she has another ulcer.  She has not noticed any improvement or worsening of symptoms since stopping Protonix 1 week ago.    She reports a longstanding history of depression and anxiety, she is currently taking Cymbalta 60 mg twice daily, she has been on this dosage for over 13 years.  She no longer feels that the medication is working well for her.  She denies any thoughts of harm to self or others.  Reports that she recently missed a few doses of this medication which caused her to feel very jittery.    ROS:  Review of Systems   Respiratory:  Negative for chest tightness and shortness of breath.    Cardiovascular:  Negative for chest pain and palpitations.   Gastrointestinal:  Positive for nausea. Negative for vomiting.   Genitourinary:  Negative for difficulty urinating.   Psychiatric/Behavioral:  Positive for sleep disturbance. Negative for self-injury and suicidal ideas. The patient is nervous/anxious.          PHQ-9 Depression Screening  Little interest or pleasure in doing things? 2-->more than half the days   Feeling down, depressed, or hopeless? 1-->several days   Trouble falling or staying asleep, or sleeping too much? 2-->more than half the days   Feeling tired or having little energy? 3-->nearly every day   Poor appetite or overeating? 3-->nearly every day   Feeling  bad about yourself - or that you are a failure or have let yourself or your family down? 1-->several days   Trouble concentrating on things, such as reading the newspaper or watching television? 2-->more than half the days   Moving or speaking so slowly that other people could have noticed? Or the opposite - being so fidgety or restless that you have been moving around a lot more than usual? 2-->more than half the days   Thoughts that you would be better off dead, or of hurting yourself in some way? 0-->not at all   PHQ-9 Total Score 16   If you checked off any problems, how difficult have these problems made it for you to do your work, take care of things at home, or get along with other people? somewhat difficult      Over the last two weeks, how often have you been bothered by the following problems?  Feeling nervous, anxious or on edge: Several days  Not being able to stop or control worrying: More than half the days  Worrying too much about different things: More than half the days  Trouble Relaxing: More than half the days  Being so restless that it is hard to sit still: Several days  Becoming easily annoyed or irritable: More than half the days  Feeling afraid as if something awful might happen: Several days  SABI 7 Total Score: 11  If you checked any problems, how difficult have these problems made it for you to do your work, take care of things at home, or get along with other people: Somewhat difficult       Current Outpatient Medications:     aspirin 81 MG chewable tablet, Chew 1 tablet Daily., Disp: 30 tablet, Rfl: 0    atorvastatin (LIPITOR) 40 MG tablet, Take 1 tablet by mouth Every Night., Disp: 90 tablet, Rfl: 3    clopidogrel (PLAVIX) 75 MG tablet, Take 1 tablet by mouth Daily., Disp: 30 tablet, Rfl: 11    DULoxetine (Cymbalta) 60 MG capsule, Take 1 capsule by mouth 2 (Two) Times a Day., Disp: 60 capsule, Rfl: 5    fluticasone (FLONASE) 50 MCG/ACT nasal spray, 1 spray into the nostril(s) as directed  by provider Daily., Disp: 16 g, Rfl: 11    losartan (COZAAR) 25 MG tablet, Take 0.5 tablets by mouth Daily., Disp: 30 tablet, Rfl: 11    metoprolol succinate XL (TOPROL-XL) 25 MG 24 hr tablet, Take 0.5 tablets by mouth Daily., Disp: 30 tablet, Rfl: 11    nitroglycerin (NITROSTAT) 0.4 MG SL tablet, Place 1 tablet under the tongue Every 5 (Five) Minutes As Needed for Chest Pain (Systolic BP Greater Than 100). Take no more than 3 doses in 15 minutes., Disp: 25 tablet, Rfl: 1    pantoprazole (PROTONIX) 40 MG EC tablet, Take 1 tablet by mouth Daily., Disp: 30 tablet, Rfl: 3    pregabalin (Lyrica) 150 MG capsule, Take 1 capsule by mouth Daily., Disp: 30 capsule, Rfl: 4    Semaglutide, 1 MG/DOSE, (Ozempic, 1 MG/DOSE,) 4 MG/3ML solution pen-injector, Inject 1 mg under the skin into the appropriate area as directed 1 (One) Time Per Week., Disp: 3 mL, Rfl: 5    tiZANidine (ZANAFLEX) 4 MG tablet, Take 1 tablet by mouth Every 8 (Eight) Hours As Needed for Muscle Spasms., Disp: 90 tablet, Rfl: 5    traZODone (DESYREL) 150 MG tablet, Take 1 tablet by mouth Every Night., Disp: 60 tablet, Rfl: 5    apixaban (ELIQUIS) 5 MG tablet tablet, Take 1 tablet by mouth Every 12 (Twelve) Hours. Indications: DVT/PE (active thrombosis), Other - full anticoagulation, chronic DVT, anticoagulation for life (Patient not taking: Reported on 8/15/2024), Disp: 60 tablet, Rfl: 11    busPIRone (BUSPAR) 5 MG tablet, Take 1 tablet by mouth 3 (Three) Times a Day., Disp: 90 tablet, Rfl: 1    Lab Results   Component Value Date    GLUCOSE 118 (H) 02/05/2024    BUN 10 02/05/2024    CREATININE 0.78 02/05/2024     02/05/2024    K 3.5 02/05/2024     02/05/2024    CALCIUM 9.5 02/05/2024    PROTEINTOT 7.5 02/03/2024    ALBUMIN 3.9 02/03/2024    ALT 13 02/03/2024    AST 19 02/03/2024    ALKPHOS 90 02/03/2024    BILITOT 0.4 02/03/2024    GLOB 3.6 02/03/2024    AGRATIO 1.1 02/03/2024    BCR 12.8 02/05/2024    ANIONGAP 10.0 02/05/2024    EGFR 91.0  02/05/2024       WBC   Date Value Ref Range Status   02/05/2024 11.39 (H) 3.40 - 10.80 10*3/mm3 Final   09/18/2018 14.6 (H) 4.8 - 10.8 K/uL Final     RBC   Date Value Ref Range Status   02/05/2024 4.83 3.77 - 5.28 10*6/mm3 Final   09/18/2018 4.78 4.20 - 5.40 M/uL Final     Hemoglobin   Date Value Ref Range Status   02/05/2024 14.4 12.0 - 15.9 g/dL Final   09/18/2018 14.3 12.0 - 16.0 g/dL Final     Hematocrit   Date Value Ref Range Status   02/05/2024 43.9 34.0 - 46.6 % Final   09/18/2018 43.5 37.0 - 47.0 % Final     MCV   Date Value Ref Range Status   02/05/2024 90.9 79.0 - 97.0 fL Final   09/18/2018 91.0 81.0 - 99.0 fL Final     MCH   Date Value Ref Range Status   02/05/2024 29.8 26.6 - 33.0 pg Final   09/18/2018 29.9 27.0 - 31.0 pg Final     MCHC   Date Value Ref Range Status   02/05/2024 32.8 31.5 - 35.7 g/dL Final   09/18/2018 32.9 (L) 33.0 - 37.0 g/dL Final     RDW   Date Value Ref Range Status   02/05/2024 14.5 12.3 - 15.4 % Final   09/18/2018 13.1 11.5 - 14.5 % Final     RDW-SD   Date Value Ref Range Status   02/05/2024 47.8 37.0 - 54.0 fl Final     MPV   Date Value Ref Range Status   02/05/2024 10.9 6.0 - 12.0 fL Final   09/18/2018 10.3 9.4 - 12.3 fL Final     Platelets   Date Value Ref Range Status   02/05/2024 326 140 - 450 10*3/mm3 Final   09/18/2018 419 (H) 130 - 400 K/uL Final     Neutrophil Rel %   Date Value Ref Range Status   09/18/2018 64.6 50.0 - 65.0 % Final     Neutrophil %   Date Value Ref Range Status   03/04/2023 69.9 42.7 - 76.0 % Final     Lymphocyte Rel %   Date Value Ref Range Status   09/18/2018 27.2 20.0 - 40.0 % Final     Lymphocyte %   Date Value Ref Range Status   03/04/2023 20.3 19.6 - 45.3 % Final     Monocyte Rel %   Date Value Ref Range Status   09/18/2018 6.2 0.0 - 10.0 % Final     Monocyte %   Date Value Ref Range Status   03/04/2023 7.5 5.0 - 12.0 % Final     Eosinophil Rel %   Date Value Ref Range Status   09/18/2018 1.4 0.0 - 5.0 % Final     Eosinophil %   Date Value Ref  "Range Status   03/04/2023 1.5 0.3 - 6.2 % Final     Basophil Rel %   Date Value Ref Range Status   09/18/2018 0.3 0.0 - 1.0 % Final     Basophil %   Date Value Ref Range Status   03/04/2023 0.5 0.0 - 1.5 % Final     Immature Grans %   Date Value Ref Range Status   03/04/2023 0.3 0.0 - 0.5 % Final     Neutrophils Absolute   Date Value Ref Range Status   02/03/2024 8.17 (H) 1.70 - 7.00 10*3/mm3 Final   09/18/2018 9.4 (H) 1.5 - 7.5 K/uL Final     Neutrophils, Absolute   Date Value Ref Range Status   03/04/2023 10.77 (H) 1.70 - 7.00 10*3/mm3 Final     Lymphocytes Absolute   Date Value Ref Range Status   09/18/2018 4.0 1.1 - 4.5 K/uL Final     Lymphocytes, Absolute   Date Value Ref Range Status   03/04/2023 3.12 (H) 0.70 - 3.10 10*3/mm3 Final     Monocytes Absolute   Date Value Ref Range Status   09/18/2018 0.90 0.00 - 0.90 K/uL Final     Monocytes, Absolute   Date Value Ref Range Status   03/04/2023 1.15 (H) 0.10 - 0.90 10*3/mm3 Final     Eosinophils Absolute   Date Value Ref Range Status   02/03/2024 0.13 0.00 - 0.40 10*3/mm3 Final   09/18/2018 0.20 0.00 - 0.60 K/uL Final     Eosinophils, Absolute   Date Value Ref Range Status   03/04/2023 0.23 0.00 - 0.40 10*3/mm3 Final     Basophils Absolute   Date Value Ref Range Status   09/18/2018 0.10 0.00 - 0.20 K/uL Final     Basophils, Absolute   Date Value Ref Range Status   03/04/2023 0.07 0.00 - 0.20 10*3/mm3 Final     Immature Grans, Absolute   Date Value Ref Range Status   03/04/2023 0.05 0.00 - 0.05 10*3/mm3 Final     nRBC   Date Value Ref Range Status   03/04/2023 0.0 0.0 - 0.2 /100 WBC Final     OBJECTIVE:  Visit Vitals  /80 (BP Location: Left arm, Patient Position: Sitting, Cuff Size: Adult)   Pulse 75   Temp 97.6 °F (36.4 °C) (Temporal)   Ht 172.7 cm (68\")   Wt 118 kg (259 lb 6.4 oz)   SpO2 97%   BMI 39.44 kg/m²      Physical Exam  Constitutional:       Appearance: Normal appearance.   HENT:      Head: Normocephalic.   Cardiovascular:      Rate and Rhythm: " Normal rate and regular rhythm.      Pulses: Normal pulses.      Heart sounds: Normal heart sounds.   Pulmonary:      Effort: Pulmonary effort is normal.      Breath sounds: Normal breath sounds.   Musculoskeletal:         General: Normal range of motion.   Skin:     General: Skin is warm and dry.   Neurological:      Mental Status: She is alert and oriented to person, place, and time.   Psychiatric:         Mood and Affect: Mood normal. Affect is tearful.         Behavior: Behavior normal.         Thought Content: Thought content normal.         Judgment: Judgment normal.       Assessment/Plan    Diagnoses and all orders for this visit:    1. Gastroesophageal reflux disease, unspecified whether esophagitis present (Primary)  -     Ambulatory Referral to Gastroenterology    2. Anxiety and depression  -     Cancel: Ambulatory Referral to Behavioral Health  -     busPIRone (BUSPAR) 5 MG tablet; Take 1 tablet by mouth 3 (Three) Times a Day.  Dispense: 90 tablet; Refill: 1  -     Ambulatory Referral to Psychiatry      Overall, acid reflux is not well-controlled with Protonix.  She has also began having epigastric pain.  With past medical history of possible gastroesophageal ulcer, recommend referral to gastroenterology for further evaluation.    Longstanding history of anxiety and depression.  Has been worsening I am hesitant to take her off of Cymbalta given recent symptoms associated with stopping this medication.  Trial of BuSpar to take in addition to Cymbalta.  I have also placed an urgent referral to psychiatry for medication management.  I do feel that she would benefit from counseling services as well, she is agreeable to this.  Referral has been placed.  I have discussed seeking emergency medical care if any thoughts of harm to self or others occur.      Return in about 3 months (around 11/15/2024).    I spent 49 minutes caring for Radha on this date of service. This time includes time spent by me in the  following activities: preparing for the visit, reviewing tests, performing a medically appropriate examination and/or evaluation, counseling and educating the patient/family/caregiver, documenting information in the medical record, and ordering medications      BRAYDEN Santos  16:06 CDT  8/15/2024   Electronically signed

## 2024-08-26 DIAGNOSIS — E11.40 TYPE 2 DIABETES MELLITUS WITH DIABETIC NEUROPATHY, WITHOUT LONG-TERM CURRENT USE OF INSULIN: ICD-10-CM

## 2024-08-26 DIAGNOSIS — M79.7 FIBROMYALGIA: ICD-10-CM

## 2024-08-26 NOTE — TELEPHONE ENCOUNTER
Rx Refill Note  Requested Prescriptions     Pending Prescriptions Disp Refills    pregabalin (LYRICA) 150 MG capsule [Pharmacy Med Name: PREGABLAIN 150MG CAPS 150 Capsule] 30 capsule 4     Sig: TAKE 1 CAPSULE BY MOUTH DAILY.      Last office visit with prescribing clinician: 2/13/2024   Last telemedicine visit with prescribing clinician: Visit date not found   Next office visit with prescribing clinician: Visit date not found                         Would you like a call back once the refill request has been completed: [] Yes [] No    If the office needs to give you a call back, can they leave a voicemail: [] Yes [] No    Valente Lewis MA  08/26/24, 12:04 CDT

## 2024-08-27 RX ORDER — PREGABALIN 150 MG/1
150 CAPSULE ORAL DAILY
Qty: 30 CAPSULE | Refills: 4 | Status: SHIPPED | OUTPATIENT
Start: 2024-08-27

## 2024-08-27 RX ORDER — PANTOPRAZOLE SODIUM 40 MG/1
40 TABLET, DELAYED RELEASE ORAL DAILY
OUTPATIENT
Start: 2024-08-27

## 2024-10-03 DIAGNOSIS — M79.7 FIBROMYALGIA: ICD-10-CM

## 2024-10-03 NOTE — TELEPHONE ENCOUNTER
Rx Refill Note  Requested Prescriptions     Pending Prescriptions Disp Refills    tiZANidine (ZANAFLEX) 4 MG tablet [Pharmacy Med Name: TIZANIDINE HCL 4 MG TABS 4 Tablet] 90 tablet 5     Sig: TAKE 1 TABLET BY MOUTH EVERY 8 (EIGHT) HOURS AS NEEDED FOR MUSCLE SPASMS.      Last office visit with prescribing clinician: 2/13/2024   Last telemedicine visit with prescribing clinician: Visit date not found   Next office visit with prescribing clinician: Visit date not found                         Would you like a call back once the refill request has been completed: [] Yes [] No    If the office needs to give you a call back, can they leave a voicemail: [] Yes [] No    Valente Lewis MA  10/03/24, 12:17 CDT

## 2024-10-03 NOTE — TELEPHONE ENCOUNTER
I am sending over 1 refill.  She needs to set up an office visit for her Medicare wellness visit.  Thank you

## 2024-10-14 ENCOUNTER — TELEPHONE (OUTPATIENT)
Dept: GASTROENTEROLOGY | Facility: CLINIC | Age: 54
End: 2024-10-14
Payer: MEDICARE

## 2024-10-14 PROBLEM — K21.9 GERD (GASTROESOPHAGEAL REFLUX DISEASE): Status: ACTIVE | Noted: 2024-10-14

## 2024-10-30 DIAGNOSIS — F32.A ANXIETY AND DEPRESSION: ICD-10-CM

## 2024-10-30 DIAGNOSIS — F41.9 ANXIETY AND DEPRESSION: ICD-10-CM

## 2024-10-30 NOTE — LETTER
November 1, 2024  [facility]  [address]  [place] [address] [phone]    Radha Boswell  [address]  [address]  [place] [address]          Dear Ms. Elbert    Thank you for choosing [facility] for your care.  This letter is in regards to your recent MEDICATION REQUEST.   We have been unable to contact you at the phone number(s) in your records.  Please call the office to schedule an appointment to be seen.    My direct line is [phone].  Have a great Thanksgiving and [place].          Other:      When calling please state your name and reason for call.        Thank you,    Valente Lewis MA

## 2024-11-01 RX ORDER — BUSPIRONE HYDROCHLORIDE 5 MG/1
5 TABLET ORAL 3 TIMES DAILY
Qty: 90 TABLET | Refills: 1 | Status: SHIPPED | OUTPATIENT
Start: 2024-11-01

## 2024-11-01 NOTE — TELEPHONE ENCOUNTER
ATTEMPTED TO CALL PATIENT, VM COULD NOT BE LEFT.   NUMBER WILL NOT GO THROUGH.  
Refills have been sent but she needs to schedule a Medicare wellness visit for after November 15, 2024.  Thanks  
Rx Refill Note  Requested Prescriptions     Pending Prescriptions Disp Refills    busPIRone (BUSPAR) 5 MG tablet [Pharmacy Med Name: BUSPIRONE HCL 5 MG TABS 5 Tablet] 90 tablet 1     Sig: TAKE 1 TABLET BY MOUTH 3 (THREE) TIMES A DAY.      Last office visit with prescribing clinician: 8/15/2024   Last telemedicine visit with prescribing clinician: Visit date not found   Next office visit with prescribing clinician: Visit date not found                         Would you like a call back once the refill request has been completed: [] Yes [] No    If the office needs to give you a call back, can they leave a voicemail: [] Yes [] No    Valente Lewis MA  10/31/24, 08:28 CDT    ATTEMPTED TO CALL PATIENT TO SCHEDULE FOR APPOINTMENT, NUMBER WOULD NOT GO THROUGH.      SPOKE TO PATIENTS SISTER AND SHE WILL HAVE HER TO RETURN CALL.       ATTEMPTED TO CALL PATIENT 11/01/2024 TO SCHEDULE APPOINTMENT, NUMBER WOULD NOT GO THROUGH.       LETTER SENT TO PATIENT TO CALL AND SCHEDULE AN APPOINTMENT.   
no midline tenderness

## 2024-12-10 DIAGNOSIS — E11.40 TYPE 2 DIABETES MELLITUS WITH DIABETIC NEUROPATHY, WITHOUT LONG-TERM CURRENT USE OF INSULIN: ICD-10-CM

## 2024-12-10 RX ORDER — SEMAGLUTIDE 0.68 MG/ML
0.25 INJECTION, SOLUTION SUBCUTANEOUS WEEKLY
Qty: 3 ML | Refills: 0 | OUTPATIENT
Start: 2024-12-10

## 2024-12-10 NOTE — TELEPHONE ENCOUNTER
Anesthesia Evaluation     Patient summary reviewed and Nursing notes reviewed                Airway   Mallampati: II  TM distance: >3 FB  Neck ROM: full  No difficulty expected  Dental - normal exam     Pulmonary - normal exam   (+) COPD,shortness of breath, sleep apnea  Cardiovascular - normal exam    (+) hypertension, CAD, hyperlipidemia      Neuro/Psych  (+) TIA, psychiatric history Anxiety and Depression  GI/Hepatic/Renal/Endo    (+) GERD, renal disease- CRI    Musculoskeletal     (+) back pain  Abdominal  - normal exam    Bowel sounds: normal.   Substance History - negative use     OB/GYN negative ob/gyn ROS         Other   arthritis,   history of cancer (PROSTATE)                Anesthesia Plan    ASA 4     general     intravenous induction     Anesthetic plan, risks, benefits, and alternatives have been provided, discussed and informed consent has been obtained with: patient.    Plan discussed with CRNA.    CODE STATUS:          Rx Refill Note  Requested Prescriptions     Pending Prescriptions Disp Refills    Ozempic, 0.25 or 0.5 MG/DOSE, 2 MG/3ML solution pen-injector [Pharmacy Med Name: OZEMPIC (0.25 OR 0.5 MG)3ML 2 Solution Pen-injector] 3 mL 0     Sig: INJECT 0.25 MG SUBQ ONCE WEEKLY FOR 28 DAYS, THEN 0.5MG ONCE WEEKLY FOR 14 DAYS      Last office visit with prescribing clinician: 2/13/2024   Last telemedicine visit with prescribing clinician: Visit date not found   Next office visit with prescribing clinician: Visit date not found                         Would you like a call back once the refill request has been completed: [] Yes [] No    If the office needs to give you a call back, can they leave a voicemail: [] Yes [] No    Valente Lewis MA  12/10/24, 10:17 CST

## 2024-12-10 NOTE — TELEPHONE ENCOUNTER
Rx Refill Note  Requested Prescriptions     Pending Prescriptions Disp Refills    Ozempic, 0.25 or 0.5 MG/DOSE, 2 MG/3ML solution pen-injector [Pharmacy Med Name: OZEMPIC (0.25 OR 0.5 MG)3ML 2 Solution Pen-injector] 3 mL 0     Sig: INJECT 0.25 MG SUBQ ONCE WEEKLY FOR 28 DAYS, THEN 0.5MG ONCE WEEKLY FOR 14 DAYS      Last office visit with prescribing clinician: 2/13/2024   Last telemedicine visit with prescribing clinician: Visit date not found   Next office visit with prescribing clinician: Visit date not found                         Would you like a call back once the refill request has been completed: [] Yes [] No    If the office needs to give you a call back, can they leave a voicemail: [] Yes [] No    Valente Lewis MA  12/10/24, 10:16 CST

## 2024-12-11 DIAGNOSIS — M79.7 FIBROMYALGIA: ICD-10-CM

## 2024-12-12 NOTE — TELEPHONE ENCOUNTER
A LETTER HAS BEEN SENT.     PATIENTS SISTER HAS GIVEN  HER MULTIPLE MESSAGES TO CALL THE OFFICE AND SHE DOES NOT DO IT.

## 2024-12-19 DIAGNOSIS — E11.40 TYPE 2 DIABETES MELLITUS WITH DIABETIC NEUROPATHY, WITHOUT LONG-TERM CURRENT USE OF INSULIN: ICD-10-CM

## 2024-12-19 DIAGNOSIS — M79.7 FIBROMYALGIA: ICD-10-CM

## 2024-12-19 RX ORDER — SEMAGLUTIDE 0.68 MG/ML
INJECTION, SOLUTION SUBCUTANEOUS
Qty: 3 ML | Refills: 0 | OUTPATIENT
Start: 2024-12-19

## 2024-12-19 NOTE — TELEPHONE ENCOUNTER
Rx Refill Note  Requested Prescriptions     Pending Prescriptions Disp Refills    Ozempic, 0.25 or 0.5 MG/DOSE, 2 MG/3ML solution pen-injector [Pharmacy Med Name: OZEMPIC (0.25 OR 0.5 MG)3ML 2 Solution Pen-injector] 3 mL 0     Sig: INJECT 0.25 MG SUBQ ONCE WEEKLY FOR 28 DAYS, THEN 0.5MG ONCE WEEKLY FOR 14 DAYS    tiZANidine (ZANAFLEX) 4 MG tablet [Pharmacy Med Name: TIZANIDINE HCL 4 MG TABS 4 Tablet] 90 tablet 0     Sig: TAKE 1 TABLET BY MOUTH EVERY 8 (EIGHT) HOURS AS NEEDED FOR MUSCLE SPASMS.      Last office visit with prescribing clinician: 2/13/2024   Last telemedicine visit with prescribing clinician: Visit date not found   Next office visit with prescribing clinician: Visit date not found                         Would you like a call back once the refill request has been completed: [] Yes [] No    If the office needs to give you a call back, can they leave a voicemail: [] Yes [] No    Valente Lewis MA  12/19/24, 13:44 CST

## 2024-12-20 RX ORDER — PANTOPRAZOLE SODIUM 40 MG/1
40 TABLET, DELAYED RELEASE ORAL DAILY
Qty: 30 TABLET | Refills: 2 | OUTPATIENT
Start: 2024-12-20

## 2025-03-17 DIAGNOSIS — M79.7 FIBROMYALGIA: ICD-10-CM

## 2025-03-17 DIAGNOSIS — E11.40 TYPE 2 DIABETES MELLITUS WITH DIABETIC NEUROPATHY, WITHOUT LONG-TERM CURRENT USE OF INSULIN: ICD-10-CM

## 2025-03-17 RX ORDER — PREGABALIN 150 MG/1
150 CAPSULE ORAL DAILY
Qty: 30 CAPSULE | Refills: 4 | OUTPATIENT
Start: 2025-03-17

## 2025-03-17 NOTE — TELEPHONE ENCOUNTER
ATTEMPTED TO CALL PATIENT, VM LEFT FOR RETURN CALL TO SCHEDULE AN APPOINTMENT.      SPOKE WITH PATIENTS SISTER, SHE STATED THAT SHE HAS NO WAY TO REACH PATIENT AND PATIENTS NUMBER HAS CHANGED. Rx Refill Note  Requested Prescriptions     Pending Prescriptions Disp Refills    pregabalin (LYRICA) 150 MG capsule [Pharmacy Med Name: PREGABLAIN 150MG CAPS 150 Capsule] 30 capsule 4     Sig: TAKE 1 CAPSULE BY MOUTH DAILY.      Last office visit with prescribing clinician: 2/13/2024   Last telemedicine visit with prescribing clinician: Visit date not found   Next office visit with prescribing clinician: Visit date not found                         Would you like a call back once the refill request has been completed: [] Yes [] No    If the office needs to give you a call back, can they leave a voicemail: [] Yes [] No    Valente Lewis MA  03/17/25, 15:37 CDT

## 2025-03-20 NOTE — TELEPHONE ENCOUNTER
ATTEMPTED TO REACH PATIENT, VM LEFT FOR RETURN CALL.  LEFT SEVERAL MESSAGES.  LETTER HAS BEEN SENT.

## 2025-04-16 ENCOUNTER — TELEPHONE (OUTPATIENT)
Dept: FAMILY MEDICINE CLINIC | Facility: CLINIC | Age: 55
End: 2025-04-16

## 2025-04-16 NOTE — TELEPHONE ENCOUNTER
L/M FOR PATIENT TO CALL THE OFFICE TO R/S APPT THIS DATE 04- PROVIDER IS ILL AND IF PT CALLS BACK OK FOR HUB TO SCHEDULE

## 2025-04-21 ENCOUNTER — TELEPHONE (OUTPATIENT)
Dept: FAMILY MEDICINE CLINIC | Facility: CLINIC | Age: 55
End: 2025-04-21
Payer: MEDICARE

## 2025-04-21 NOTE — TELEPHONE ENCOUNTER
Called patient to cancelled her appt this date had to l/m provider still on medical leave and to have the pt call back to r/s appt ok for the hub to schedule if patient calls back

## 2025-04-28 ENCOUNTER — TELEPHONE (OUTPATIENT)
Dept: FAMILY MEDICINE CLINIC | Facility: CLINIC | Age: 55
End: 2025-04-28

## 2025-04-28 NOTE — TELEPHONE ENCOUNTER
Called patient and let her no that she was a No Show today 04- @ 2:15 with Dr. Rivera new patient.  Stated she called and left message that she cancelled appt and the message stated someone would call her back? Explained office No Show policy to her and she understood and letter sent

## 2025-05-30 ENCOUNTER — OFFICE VISIT (OUTPATIENT)
Dept: FAMILY MEDICINE CLINIC | Facility: CLINIC | Age: 55
End: 2025-05-30
Payer: MEDICARE

## 2025-05-30 VITALS
OXYGEN SATURATION: 97 % | SYSTOLIC BLOOD PRESSURE: 140 MMHG | HEIGHT: 68 IN | BODY MASS INDEX: 37.74 KG/M2 | TEMPERATURE: 96.3 F | WEIGHT: 249 LBS | HEART RATE: 99 BPM | DIASTOLIC BLOOD PRESSURE: 90 MMHG

## 2025-05-30 DIAGNOSIS — K21.9 GASTROESOPHAGEAL REFLUX DISEASE, UNSPECIFIED WHETHER ESOPHAGITIS PRESENT: ICD-10-CM

## 2025-05-30 DIAGNOSIS — I25.10 CORONARY ARTERY DISEASE INVOLVING NATIVE HEART WITHOUT ANGINA PECTORIS, UNSPECIFIED VESSEL OR LESION TYPE: ICD-10-CM

## 2025-05-30 DIAGNOSIS — M79.7 FIBROMYALGIA: ICD-10-CM

## 2025-05-30 DIAGNOSIS — F33.1 MAJOR DEPRESSIVE DISORDER, RECURRENT EPISODE, MODERATE DEGREE: Primary | ICD-10-CM

## 2025-05-30 DIAGNOSIS — I82.5Z2 CHRONIC DEEP VEIN THROMBOSIS (DVT) OF DISTAL VEIN OF LEFT LOWER EXTREMITY: ICD-10-CM

## 2025-05-30 DIAGNOSIS — E78.2 MIXED HYPERLIPIDEMIA: ICD-10-CM

## 2025-05-30 DIAGNOSIS — E11.65 TYPE 2 DIABETES MELLITUS WITH HYPERGLYCEMIA, WITHOUT LONG-TERM CURRENT USE OF INSULIN: ICD-10-CM

## 2025-05-30 DIAGNOSIS — F43.10 PTSD (POST-TRAUMATIC STRESS DISORDER): ICD-10-CM

## 2025-05-30 DIAGNOSIS — J30.2 SEASONAL ALLERGIES: ICD-10-CM

## 2025-05-30 DIAGNOSIS — I10 ESSENTIAL HYPERTENSION: ICD-10-CM

## 2025-05-30 DIAGNOSIS — E66.812 CLASS 2 SEVERE OBESITY DUE TO EXCESS CALORIES WITH SERIOUS COMORBIDITY AND BODY MASS INDEX (BMI) OF 37.0 TO 37.9 IN ADULT: ICD-10-CM

## 2025-05-30 DIAGNOSIS — E66.01 CLASS 2 SEVERE OBESITY DUE TO EXCESS CALORIES WITH SERIOUS COMORBIDITY AND BODY MASS INDEX (BMI) OF 37.0 TO 37.9 IN ADULT: ICD-10-CM

## 2025-05-30 RX ORDER — ATORVASTATIN CALCIUM 40 MG/1
40 TABLET, FILM COATED ORAL NIGHTLY
Qty: 90 TABLET | Refills: 3 | Status: SHIPPED | OUTPATIENT
Start: 2025-05-30

## 2025-05-30 RX ORDER — CLOPIDOGREL BISULFATE 75 MG/1
75 TABLET ORAL DAILY
Qty: 30 TABLET | Refills: 11 | Status: SHIPPED | OUTPATIENT
Start: 2025-05-30

## 2025-05-30 RX ORDER — FLUTICASONE PROPIONATE 50 MCG
1 SPRAY, SUSPENSION (ML) NASAL DAILY
Qty: 16 G | Refills: 11 | Status: SHIPPED | OUTPATIENT
Start: 2025-05-30

## 2025-05-30 RX ORDER — METOPROLOL SUCCINATE 25 MG/1
12.5 TABLET, EXTENDED RELEASE ORAL DAILY
Qty: 30 TABLET | Refills: 11 | Status: SHIPPED | OUTPATIENT
Start: 2025-05-30

## 2025-05-30 RX ORDER — NITROGLYCERIN 0.4 MG/1
0.4 TABLET SUBLINGUAL
Qty: 25 TABLET | Refills: 1 | Status: SHIPPED | OUTPATIENT
Start: 2025-05-30

## 2025-05-30 RX ORDER — LOSARTAN POTASSIUM 25 MG/1
12.5 TABLET ORAL
Qty: 30 TABLET | Refills: 11 | Status: SHIPPED | OUTPATIENT
Start: 2025-05-30

## 2025-05-30 RX ORDER — PANTOPRAZOLE SODIUM 40 MG/1
40 TABLET, DELAYED RELEASE ORAL DAILY
Qty: 30 TABLET | Refills: 3 | Status: SHIPPED | OUTPATIENT
Start: 2025-05-30

## 2025-05-30 NOTE — PROGRESS NOTES
Kenya Rivera,   [facility]   Family Medicine  [address]  [place] [address]  Phone: [phone]  Fax: [phone]         Chief Complaint:  Chief Complaint   Patient presents with    Establish Care    Anxiety     Patient hasn't been taking meds. She ran out of refills        History:  Radha Boswell is a 54 y.o. female who presents today as a new patient to the clinic.  She is here with a friend. Has not seen a primary care doctor in almost 1 year, and has been without medications.     She has been struggling with her mood.  Previously on Cymbalta 60 mg twice daily, but did not feel like this did much for her.  She feels like life is always hard.  She has struggled with depression and PTSD since losing her daughter when she was 6 years old.  Her son also has a brain injury secondary to drugs, which she also has to take care of.  Previously she turned to drugs, but has avoided these for a while.  She does do THC regularly for her anxiety.  She has previously tried Seroquel which was too sedating, and has also been on trazodone but feels like this has stopped working.    With a history of fibromyalgia.  Previously taking muscle relaxers and Lyrica for this.  She notes the muscle relaxers make her groggy.    Patient has type 2 diabetes.  Does not currently check her sugars.  Previously on Ozempic, last A1c over a year ago was 5.7.  9 years ago it was 8.5.  She was previously on metformin, but had significant side effects-diarrhea.    Previously followed with cardiology due to CAD with stent placement, as well as chronic DVT.  She was previously on Eliquis and Plavix.     Smoking 1ppd. No family history of lung cancer.  Has not had lung cancer screening. Aunt had breast cancer.  Has been a while since had a mammogram.    HPI        Past Medical History:   Past Medical History:   Diagnosis Date    Anxiety and depression     Arthritis     Coronary artery disease     Fibromyalgia, primary      GERD (gastroesophageal reflux disease)     Hyperlipidemia     Hypertension     Insomnia     Type 2 diabetes mellitus        Past Surgical History:   Past Surgical History:   Procedure Laterality Date    CARDIAC CATHETERIZATION      CARDIAC CATHETERIZATION N/A 2/3/2024    Procedure: Left Heart Cath;  Surgeon: Jacob Phillips DO;  Location:  [facility] CATH INVASIVE LOC[facility];  Service: Cardiovascular;  Laterality: N/A;    CHOLECYSTECTOMY      ORIF FOOT FRACTURE Right 6/25/2019    Procedure: OPEN REDUCTION INTERNAL FIXATION FIRST METATARSAL WITH REPAIR,- RIGHT FOOT;  Surgeon: Abram Dennis DPM;  Location:  [facility] OR;  Service: Podiatry    TOTAL KNEE ARTHROPLASTY Bilateral        Family History:   Family History   Problem Relation Age of Onset    Stroke Mother     Hypertension Mother     Heart disease Mother     Cancer Father     Drug abuse Sister     Stroke Maternal Grandmother     Cancer Maternal Grandmother     Heart disease Maternal Grandmother     Hypertension Maternal Grandmother     Stroke Maternal Grandfather     Hypertension Maternal Grandfather        Social History:    reports that she has been smoking cigarettes. She started smoking about 37 years ago. She has a 63.7 pack-year smoking history. She has never used smokeless tobacco. She reports current drug use. Drug: Marijuana. She reports that she does not drink alcohol.    Current Medications:   Current Outpatient Medications   Medication Instructions    apixaban (ELIQUIS) 5 mg, Oral, Every 12 Hours Scheduled    atorvastatin (LIPITOR) 40 mg, Oral, Nightly    Cariprazine HCl (VRAYLAR) 1.5 mg, Oral, Daily    clopidogrel (PLAVIX) 75 mg, Oral, Daily    fluticasone (FLONASE) 50 MCG/ACT nasal spray 1 spray, Nasal, Daily    losartan (COZAAR) 12.5 mg, Oral, Every 24 Hours Scheduled    metoprolol succinate XL (TOPROL-XL) 12.5 mg, Oral, Daily    nitroglycerin (NITROSTAT) 0.4 mg, Sublingual, Every 5 Minutes PRN, Take no more than 3 doses in 15 minutes.  "   pantoprazole (PROTONIX) 40 mg, Oral, Daily    pregabalin (LYRICA) 150 mg, Oral, Daily    tiZANidine (ZANAFLEX) 4 mg, Oral, Every 8 Hours PRN    traZODone (DESYREL) 150 mg, Oral, Nightly       Allergies:   No Known Allergies    OBJECTIVE:  /90   Pulse 99   Temp 96.3 °F (35.7 °C)   Ht 172.6 cm (67.95\")   Wt 113 kg (249 lb)   SpO2 97%   BMI 37.91 kg/m²      Gen: No acute distress.  Tearful through much of visit.  Head and neck: Normocephalic, atraumatic.  HEENT: PERRLA, EOMI.  CV: Well perfused, no pallor.   Resp: Normal respiratory effort. No distress.   Musculoskeletal: No gross deformity.   Neuro: Alert and oriented.   Skin: No visible rash or erythema.   Psych: Appropriate.       Procedures    Assessment/Plan:     Diagnoses and all orders for this visit:    1. Major depressive disorder, recurrent episode, moderate degree (Primary)  2. PTSD (post-traumatic stress disorder)  Previously on Cymbalta, Seroquel, but remained uncontrolled.  She has also tried BuSpar, Atarax without relief.  Discussed options, and agreed to trial Vraylar.  Gave samples in office.  Will see her back in 4 weeks  -     Urine Drug Screen - Urine, Clean Catch; Future  -     Cariprazine HCl (Vraylar) 1.5 MG capsule capsule; Take 1 capsule by mouth Daily.  Dispense: 30 capsule; Refill: 1    4. Type 2 diabetes mellitus with hyperglycemia, without long-term current use of insulin  3. Class 2 severe obesity due to excess calories with serious comorbidity and body mass index (BMI) of 37.0 to 37.9 in adult  Most recent A1c 5.7 over a year ago, this was when she was on Ozempic.  Previously elevated A1c over 9.  Will recheck her A1c today, if elevated consider restarting Ozempic.  -     Hemoglobin A1c; Future  -     TSH; Future  -     Comprehensive Metabolic Panel; Future  -     Lipid Panel; Future  -     CBC Auto Differential; Future  -     Microalbumin / Creatinine Urine Ratio - Urine, Clean Catch; Future  -     Urinalysis With " Microscopic If Indicated (No Culture) - Urine, Clean Catch; Future    5. Essential hypertension  Hyperlipidemia  Blood pressure today 140/90, she has also been off of her medicines for about a year.  Will restart her back on low-dose metoprolol and losartan.  Will get baseline labs today, and then will need repeat BNP in 4 weeks.  -     metoprolol succinate XL (TOPROL-XL) 25 MG 24 hr tablet; Take 0.5 tablets by mouth Daily.  Dispense: 30 tablet; Refill: 11  -     losartan (COZAAR) 25 MG tablet; Take 0.5 tablets by mouth Daily.  Dispense: 30 tablet; Refill: 11  -     atorvastatin (LIPITOR) 40 MG tablet; Take 1 tablet by mouth Every Night.  Dispense: 90 tablet; Refill: 3    6. Coronary artery disease involving native heart without angina pectoris, unspecified vessel or lesion type  Currently stable without symptoms, but discussed needing to get back into cardiology for routine visits.  -     nitroglycerin (NITROSTAT) 0.4 MG SL tablet; Place 1 tablet under the tongue Every 5 (Five) Minutes As Needed for Chest Pain (Systolic BP Greater Than 100). Take no more than 3 doses in 15 minutes.  Dispense: 25 tablet; Refill: 1  -     apixaban (ELIQUIS) 5 MG tablet tablet; Take 1 tablet by mouth Every 12 (Twelve) Hours. Indications: DVT/PE (active thrombosis), Other - full anticoagulation, chronic DVT, anticoagulation for life  Dispense: 60 tablet; Refill: 11    7. Chronic deep vein thrombosis (DVT) of distal vein of left lower extremity  -     clopidogrel (PLAVIX) 75 MG tablet; Take 1 tablet by mouth Daily.  Dispense: 30 tablet; Refill: 11    8. Fibromyalgia  Uncontrolled.  She is previously on tizanidine as well as Lyrica.  Due to side effects tizanidine is very sedating for her.  As she is previously on trazodone without benefit, will use muscle relaxer to help with fibro, but also with sleep.  I she did admit to using THC occasionally, but if drug screen otherwise appropriate, will start Lyrica 75 mg twice daily.  -     Urine  Drug Screen - Urine, Clean Catch; Future  -Tizanidine 4mg nightly prn    9. Gastroesophageal reflux disease, unspecified whether esophagitis present  -     pantoprazole (PROTONIX) 40 MG EC tablet; Take 1 tablet by mouth Daily.  Dispense: 30 tablet; Refill: 3    10. Seasonal allergies  -     fluticasone (FLONASE) 50 MCG/ACT nasal spray; Administer 1 spray into the nostril(s) as directed by provider Daily.  Dispense: 16 g; Refill: 11      An After Visit Summary was printed and given to the patient at discharge.  Return in about 4 weeks (around 6/27/2025).         Kenya Rivera DO  5/30/2025   Electronically signed.

## 2025-06-27 ENCOUNTER — TELEPHONE (OUTPATIENT)
Dept: FAMILY MEDICINE CLINIC | Facility: CLINIC | Age: 55
End: 2025-06-27

## 2025-06-27 NOTE — TELEPHONE ENCOUNTER
Tried to call patient concerning No Show this date 06- with Dr. Rivera @ 1:30 PM phone number in chart is invalid.  Letter Sent

## 2025-08-02 DIAGNOSIS — F32.A ANXIETY AND DEPRESSION: ICD-10-CM

## 2025-08-02 DIAGNOSIS — F51.01 PRIMARY INSOMNIA: ICD-10-CM

## 2025-08-02 DIAGNOSIS — M79.7 FIBROMYALGIA: ICD-10-CM

## 2025-08-02 DIAGNOSIS — J30.2 SEASONAL ALLERGIES: ICD-10-CM

## 2025-08-02 DIAGNOSIS — I82.5Z2 CHRONIC DEEP VEIN THROMBOSIS (DVT) OF DISTAL VEIN OF LEFT LOWER EXTREMITY: ICD-10-CM

## 2025-08-02 DIAGNOSIS — I10 ESSENTIAL HYPERTENSION: ICD-10-CM

## 2025-08-02 DIAGNOSIS — F41.9 ANXIETY AND DEPRESSION: ICD-10-CM

## 2025-08-04 RX ORDER — BUSPIRONE HYDROCHLORIDE 5 MG/1
5 TABLET ORAL 3 TIMES DAILY
Qty: 90 TABLET | Refills: 1 | Status: SHIPPED | OUTPATIENT
Start: 2025-08-04

## 2025-08-04 RX ORDER — METOPROLOL SUCCINATE 25 MG/1
12.5 TABLET, EXTENDED RELEASE ORAL DAILY
OUTPATIENT
Start: 2025-08-04

## 2025-08-04 RX ORDER — CLOPIDOGREL BISULFATE 75 MG/1
75 TABLET ORAL DAILY
OUTPATIENT
Start: 2025-08-04

## 2025-08-04 RX ORDER — FLUTICASONE PROPIONATE 50 MCG
SPRAY, SUSPENSION (ML) NASAL
Qty: 16 G | Refills: 11 | Status: SHIPPED | OUTPATIENT
Start: 2025-08-04

## 2025-08-04 RX ORDER — TRAZODONE HYDROCHLORIDE 150 MG/1
150 TABLET ORAL NIGHTLY
Qty: 60 TABLET | Refills: 5 | Status: SHIPPED | OUTPATIENT
Start: 2025-08-04

## 2025-08-07 ENCOUNTER — EXTERNAL PBMM DATA (OUTPATIENT)
Dept: PHARMACY | Facility: OTHER | Age: 55
End: 2025-08-07
Payer: MEDICARE

## 2025-08-19 ENCOUNTER — TELEPHONE (OUTPATIENT)
Dept: FAMILY MEDICINE CLINIC | Facility: CLINIC | Age: 55
End: 2025-08-19
Payer: MEDICARE

## 2025-08-21 ENCOUNTER — EXTERNAL PBMM DATA (OUTPATIENT)
Dept: PHARMACY | Facility: OTHER | Age: 55
End: 2025-08-21
Payer: MEDICARE

## (undated) DEVICE — SUTURE VCRL SZ 0 L36IN ABSRB UD L36MM CT-1 1/2 CIR J946H

## (undated) DEVICE — SUTURE PERMAHAND SZ 3-0 L30IN NONABSORBABLE BLK SILK BRAID A304H

## (undated) DEVICE — 3M™ IOBAN™ 2 ANTIMICROBIAL INCISE DRAPE 6650EZ: Brand: IOBAN™ 2

## (undated) DEVICE — 2.7 X 20 MM R3CON LOCKING PLATE SCREW
Type: IMPLANTABLE DEVICE | Status: NON-FUNCTIONAL
Brand: GORILLA PLATING SYSTEM
Removed: 2019-06-25

## (undated) DEVICE — Device

## (undated) DEVICE — SUTURE PROL SZ 1 L60IN NONABSORBABLE BLU L65MM TP-1 3/8 CIR 8824G

## (undated) DEVICE — MEDI-VAC YANK SUCT HNDL W/TPRD BULBOUS TIP & CONTROL VENT: Brand: CARDINAL HEALTH

## (undated) DEVICE — BANDAGE,GAUZE,BULKEE II,4.5"X4.1YD,STRL: Brand: MEDLINE

## (undated) DEVICE — CATHETER ANGIO AD 5FR L65CM DIA0.049IN GWIRE 0.035IN SHEP

## (undated) DEVICE — SURGICAL PROCEDURE PACK VASC LOURDES HOSP

## (undated) DEVICE — VLV CONTRL HEMO BLEEDBACK COPILOT

## (undated) DEVICE — UNDERGLOVE SURG SZ 8 FNGR THK0.21MIL GRN LTX BEAD CUF

## (undated) DEVICE — UNDERCAST PADDING: Brand: DEROYAL

## (undated) DEVICE — CAUTERY TIP POLISHER: Brand: DEVON

## (undated) DEVICE — PRECISION THIN (9.0 X 0.38 X 25.0MM)

## (undated) DEVICE — DISPOSABLE TOURNIQUET CUFF SINGLE BLADDER, SINGLE PORT AND QUICK CONNECT CONNECTOR: Brand: COLOR CUFF

## (undated) DEVICE — FRAZIER SUCTION INSTRUMENT 10 FR W/CONTROL VENT & OBTURATOR: Brand: FRAZIER

## (undated) DEVICE — PK CATH CARD 30 CA/4

## (undated) DEVICE — KIT URIN CATHETER 16 FR 5 CC M INDWL SIL

## (undated) DEVICE — SOLUTION IV 100ML 0.9% SOD CHL PLAS CONT USP VIAFLX 1 PER

## (undated) DEVICE — DRP C/ARMOR

## (undated) DEVICE — K-WIRE, SINGLE ENDED TROCAR TIP, SMOOTH, 1.2 X 150MM
Type: IMPLANTABLE DEVICE | Status: NON-FUNCTIONAL
Brand: MONSTER SCREW SYSTEM
Removed: 2019-06-25

## (undated) DEVICE — SPONGE LAP W18XL18IN WHT COT 4 PLY FLD STRUNG RADPQ DISP ST

## (undated) DEVICE — SPNG GZ WOVN 4X4IN 12PLY 10/BX STRL

## (undated) DEVICE — CLIP INT SM WIDE RED TI TRNSVRS GRV CHEVRON SHP W/ PRECIS

## (undated) DEVICE — SUTURE PROL SZ 5-0 L36IN NONABSORBABLE BLU L17MM RB-1 1/2 8556H

## (undated) DEVICE — ELECTRD PAD DEFIB RADOLCNT M/FUNC PHYSIOCONTRL CONN/LD/IN A/

## (undated) DEVICE — COVER US PRB W15XL120CM W/ GEL RUBBERBAND TAPE STRP FLD GEN

## (undated) DEVICE — 2.7 X 18 MM R3CON NON-LOCKING PLATE SCREW
Type: IMPLANTABLE DEVICE | Status: NON-FUNCTIONAL
Brand: GORILLA PLATING SYSTEM
Removed: 2019-06-25

## (undated) DEVICE — CATHETER ANGIO 5FR L70CM GWIRE 0.035IN 1CM SPC OMNI FLSH 21

## (undated) DEVICE — KT CONTRST INJ ISOL/MANFLD W/TRANSDCR

## (undated) DEVICE — TRY SKINPREP WET CHG 4PCT SPNG HIB

## (undated) DEVICE — BNDG ELAS W/CLIP 6IN 10YD LF STRL

## (undated) DEVICE — CATH GUIDE VISTABRITE JR4 6F.07IN

## (undated) DEVICE — Z INACTIVE USE 2535480 CLIP LIG M BLU TI HRT SHP WIRE HORZ 180 PER BX

## (undated) DEVICE — NDL HYPO PRECISIONGLIDE REG 22G 1 1/2

## (undated) DEVICE — THREE QUARTER SHEET: Brand: CONVERTORS

## (undated) DEVICE — SUTURE PERMAHAND SZ 2-0 L30IN NONABSORBABLE BLK SILK W/O A305H

## (undated) DEVICE — SOLUTION IV 500ML 0.9% SOD CHL PH 5 INJ USP VIAFLX PLAS

## (undated) DEVICE — TOWEL,OR,DSP,ST,BLUE,DLX,4/PK,20PK/CS: Brand: MEDLINE

## (undated) DEVICE — CATH CORNRY OPTITORQUE 2SH JR3.5 5F 100

## (undated) DEVICE — Device: Brand: LEVEL 1

## (undated) DEVICE — CATHETER,URETHRAL,REDRUBBER,STRL,16FR: Brand: MEDLINE

## (undated) DEVICE — DEV COMPR RAD TR BND LNG 29CM

## (undated) DEVICE — SUTURE VCRL CTRL REL 2-0 CTX 18IN ABSRB BRAID UD J723D

## (undated) DEVICE — SUTURE VCRL SZ 3-0 L27IN ABSRB UD L26MM SH 1/2 CIR J416H

## (undated) DEVICE — ANTIBACTERIAL UNDYED BRAIDED (POLYGLACTIN 910), SYNTHETIC ABSORBABLE SUTURE: Brand: COATED VICRYL

## (undated) DEVICE — CHLORAPREP 26ML ORANGE

## (undated) DEVICE — CONVERTORS STOCKINETTE: Brand: CONVERTORS

## (undated) DEVICE — DRSNG SURESITE WNDW 4X4.5

## (undated) DEVICE — SOLIDIFIER LIQUI LOC PLUS 2000CC

## (undated) DEVICE — DEV TORQ GW HOT/PINK

## (undated) DEVICE — TOWEL SURG W17XL24IN WHT COT RADPQ DISPOSABLE ST 4 PER PK

## (undated) DEVICE — TOOL INSRT GW MTL OR PLSTC

## (undated) DEVICE — CLIP INT M L GRN TI TRNSVRS GRV CHEVRON SHP W/ PRECIS TIP

## (undated) DEVICE — SUTURE VCRL SZ 2-0 L36IN ABSRB UD L36MM CT-1 1/2 CIR J945H

## (undated) DEVICE — KT CONTRL HND ACIST CVI PREM HIPRESS 65

## (undated) DEVICE — CVR UNIV C/ARM

## (undated) DEVICE — SUTURE PROL SZ 4-0 L36IN NONABSORBABLE BLU L26MM SH 1/2 CIR 8521H

## (undated) DEVICE — RADIFOCUS GLIDEWIRE ADVANTAGE GUIDEWIRE: Brand: GLIDEWIRE ADVANTAGE

## (undated) DEVICE — SYR LL TP 10ML STRL

## (undated) DEVICE — ACCESSORY TOWEL PACK: Brand: MEDLINE INDUSTRIES, INC.

## (undated) DEVICE — RADIFOCUS GLIDEWIRE: Brand: GLIDEWIRE

## (undated) DEVICE — INTRO GLIDESHEATH SLENDER SS 21G .021 6F 10CM 45CM

## (undated) DEVICE — SUTURE PERMAHAND SZ 2-0 L30IN NONABSORBABLE BLK SH L26MM C016D

## (undated) DEVICE — BNDG ESMARK 4IN 9FT LF STRL BLU

## (undated) DEVICE — SUTURE PERMAHAND SZ 3-0 L18IN NONABSORBABLE BLK L26MM SH C013D

## (undated) DEVICE — DRSNG GZ CURAD XEROFORM NONADHS 5X9IN STRL

## (undated) DEVICE — SUTURE PERMAHAND SZ 4-0 L12X30IN NONABSORBABLE BLK SILK A303H

## (undated) DEVICE — E-Z CLEAN, NON-STICK, PTFE COATED, ELECTROSURGICAL BLADE ELECTRODE, 6.5 INCH (16.5 CM): Brand: MEGADYNE

## (undated) DEVICE — COVER,TABLE,44X90,STERILE: Brand: MEDLINE

## (undated) DEVICE — BANDAGE GZ W45INXL4 1 10YD FLUF RL 6 PLY DERMACEA

## (undated) DEVICE — GW STARTER FXD CORE J .035 3X260CM 3MM

## (undated) DEVICE — SUTURE NONABSORBABLE MONOFILAMENT 4-0 RB-1 36 IN BLU PROLENE 8557H

## (undated) DEVICE — DRILL, 2.0 X 110MM, SOLID, MEASURING, AO: Brand: BABY GORILLA®/GORILLA® PLATING SYSTEM

## (undated) DEVICE — GLV SURG TRIUMPH ORTHO W/ALOE PF LTX 8 STRL

## (undated) DEVICE — GUIDEWIRE VASC L180CM DIA0.035IN L10CM DIA3MM J TIP PTFE S

## (undated) DEVICE — INTENDED FOR TISSUE SEPARATION, AND OTHER PROCEDURES THAT REQUIRE A SHARP SURGICAL BLADE TO PUNCTURE OR CUT.: Brand: BARD-PARKER ® STAINLESS STEEL BLADES

## (undated) DEVICE — BLANKET THER AD W24XL60IN FAB COVERING SUP SFT ULT THN LTWT

## (undated) DEVICE — SUTURE VCRL SZ 3-0 L18IN ABSRB UD L26MM SH 1/2 CIR J864D

## (undated) DEVICE — DESTINATION RENAL GUIDING SHEATH: Brand: DESTINATION

## (undated) DEVICE — CATH GUIDELINER 6F 135CM

## (undated) DEVICE — SOL IRR NACL 0.9PCT BT 1000ML

## (undated) DEVICE — SUTURE PROL SZ 6-0 L30IN NONABSORBABLE BLU L13MM RB-2 1/2 8711H

## (undated) DEVICE — DECANTER FLD 9IN ST BG FOR ASEP TRNSF OF FLD

## (undated) DEVICE — SUTURE PROL SZ 3-0 L48IN NONABSORBABLE BLU SH L26MM 1/2 CIR 8534H

## (undated) DEVICE — SUP ARMBRD ART/LINE BLU

## (undated) DEVICE — CATHETER KIT 5 FR 21 GAX7 CM MICROINTRODUCER GUIDEWIRE STIFF

## (undated) DEVICE — DRAPE ISOLATN BG 18X18IN DISPOSABLE

## (undated) DEVICE — SUT ETHLN 4/0 FS2 18IN 662H

## (undated) DEVICE — DRP SURG BRACH ANGIO 38X44IN STRL

## (undated) DEVICE — SOLUTION IV 1000ML 0.9% SOD CHL PH 5 INJ USP VIAFLX PLAS

## (undated) DEVICE — SUTURE PERMA-HAND SZ 2-0 L30IN NONABSORBABLE BLK L26MM SH K833H

## (undated) DEVICE — SUT ETHLN 3/0 FS1 30IN 669H

## (undated) DEVICE — 4.0MM EGG BUR

## (undated) DEVICE — PK EXTRM 30

## (undated) DEVICE — Z INACTIVE USE 2641838 CLIP INT L ORNG TI TRNSVRS GRV CHEVRON SHP W/ PRECIS TIP TO

## (undated) DEVICE — PK TURNOVER RM ADV

## (undated) DEVICE — APPL DURAPREP IODOPHOR APL 26ML

## (undated) DEVICE — CANN NASL ETCO2 LO/FLO A/

## (undated) DEVICE — 3M™ IOBAN™ 2 ANTIMICROBIAL INCISE DRAPE 6651EZ: Brand: IOBAN™ 2

## (undated) DEVICE — MEDI-VAC NON-CONDUCTIVE SUCTION TUBING 6MM X 6.1M (20 FT.) L: Brand: CARDINAL HEALTH

## (undated) DEVICE — COVER,MAYO STAND,STERILE: Brand: MEDLINE

## (undated) DEVICE — CATH DIAG INFINITI ANG/PIG 6SH 145DEG 5F

## (undated) DEVICE — DRILL,  2.4 X 140MM, SOLID, MEASURING, LONG, AO: Brand: BABY GORILLA®/GORILLA® PLATING SYSTEM

## (undated) DEVICE — CATHETERIZATION TRAY PEDIATRIC 16 FR 5 CC INDWL DOVER

## (undated) DEVICE — SPONGE LAP ST XRAY 18X18

## (undated) DEVICE — GOWN,NON-REINFORCED,SIRUS,SET IN SLV,XL: Brand: MEDLINE

## (undated) DEVICE — SUTURE COAT VCRL SZ 0 L18IN ABSRB UD CTX L48MM 1/2 CIR J724D

## (undated) DEVICE — GLOVE SURG SZ 75 L12IN FNGR THK94MIL TRNSLUC YEL LTX

## (undated) DEVICE — ASTOUND STANDARD SURGICAL GOWN, XXL: Brand: CONVERTORS

## (undated) DEVICE — SOLUTION IV IRRIG POUR BRL 0.9% SODIUM CHL 2F7124

## (undated) DEVICE — TUBING ANGIO L72IN 900PSI CLR PVC M TO FEM AIRLESS ROT ADPT